# Patient Record
Sex: FEMALE | Race: WHITE | NOT HISPANIC OR LATINO | Employment: FULL TIME | URBAN - METROPOLITAN AREA
[De-identification: names, ages, dates, MRNs, and addresses within clinical notes are randomized per-mention and may not be internally consistent; named-entity substitution may affect disease eponyms.]

---

## 2017-02-13 ENCOUNTER — OFFICE VISIT (OUTPATIENT)
Dept: FAMILY MEDICINE | Facility: CLINIC | Age: 47
End: 2017-02-13
Payer: COMMERCIAL

## 2017-02-13 VITALS
DIASTOLIC BLOOD PRESSURE: 76 MMHG | HEART RATE: 125 BPM | SYSTOLIC BLOOD PRESSURE: 164 MMHG | OXYGEN SATURATION: 98 % | BODY MASS INDEX: 43.49 KG/M2 | WEIGHT: 270.6 LBS | TEMPERATURE: 97.1 F | HEIGHT: 66 IN

## 2017-02-13 DIAGNOSIS — I10 BENIGN ESSENTIAL HYPERTENSION: Primary | ICD-10-CM

## 2017-02-13 DIAGNOSIS — R42 VERTIGO: ICD-10-CM

## 2017-02-13 DIAGNOSIS — Z86.73 HISTORY OF STROKE: ICD-10-CM

## 2017-02-13 DIAGNOSIS — R00.0 TACHYCARDIA: ICD-10-CM

## 2017-02-13 DIAGNOSIS — E78.5 HYPERLIPIDEMIA LDL GOAL <100: ICD-10-CM

## 2017-02-13 PROCEDURE — 99214 OFFICE O/P EST MOD 30 MIN: CPT | Performed by: INTERNAL MEDICINE

## 2017-02-13 RX ORDER — METOPROLOL TARTRATE 25 MG/1
TABLET, FILM COATED ORAL
Refills: 1 | COMMUNITY
Start: 2017-01-23 | End: 2017-02-13 | Stop reason: DRUGHIGH

## 2017-02-13 RX ORDER — CALCIUM CARBONATE/VITAMIN D3 600 MG-20
TABLET ORAL
Refills: 0 | COMMUNITY
Start: 2016-04-21 | End: 2020-11-16

## 2017-02-13 RX ORDER — ATORVASTATIN CALCIUM 20 MG/1
TABLET, FILM COATED ORAL
Refills: 1 | COMMUNITY
Start: 2017-01-23 | End: 2017-06-26

## 2017-02-13 RX ORDER — CHLORTHALIDONE 25 MG/1
TABLET ORAL
Refills: 1 | COMMUNITY
Start: 2017-01-23 | End: 2017-03-08

## 2017-02-13 RX ORDER — CARVEDILOL PHOSPHATE 20 MG/1
20 CAPSULE, EXTENDED RELEASE ORAL DAILY
Qty: 30 CAPSULE | Refills: 3 | Status: SHIPPED | OUTPATIENT
Start: 2017-02-13 | End: 2017-02-17

## 2017-02-13 ASSESSMENT — PAIN SCALES - GENERAL: PAINLEVEL: MILD PAIN (3)

## 2017-02-13 NOTE — MR AVS SNAPSHOT
After Visit Summary   2/13/2017    Flor Fernandez    MRN: [id number]           Patient Information     Date Of Birth          1970        Visit Information        Provider Department      2/13/2017 3:00 PM Nayeli Barnard MD [facility]        Today's Diagnoses     Benign essential hypertension    -  1    Tachycardia        Hyperlipidemia LDL goal <100        Vertigo          Care Instructions    Stop metoprolol and start taking carvedilol 20 MG once daily. It can make you tired so you could just start taking it at night.  Have your blood pressure checked at the pharmacy Wednesday. You can have your fasting labs drawn anytime this week.      [facility]    If you have any questions regarding to your visit please contact your care team:     Team Pink:   Clinic Hours Telephone Number   Internal Medicine:  Dr. Nayeli Hi, NP       7am-7pm  Monday - Thursday   7am-5pm  Fridays  [phone]  (Appointment scheduling available 24/7)    Questions about your visit?  Team Line  [phone]   Urgent Care - [place] - 11am-9pm Monday-Friday Saturday-Sunday- 9am-5pm   [place] - 5pm-9pm Monday-Friday Saturday-Sunday- 9am-5pm  [phone] - [place]   [phone] - [place]       What options do I have for visits at the clinic other than the traditional office visit?  To expand how we care for you, many of our providers are utilizing electronic visits (e-visits) and telephone visits, when medically appropriate, for interactions with their patients rather than a visit in the clinic.   We also offer nurse visits for many medical concerns. Just like any other service, we will bill your insurance company for this type of visit based on time spent on the phone with your provider. Not all insurance companies cover these visits. Please check with your medical insurance if this type of visit is covered. You will  be responsible for any charges that are not paid by your insurance.      E-visits via Wattblockhart:  generally incur a $35.00 fee.  Telephone visits:  Time spent on the phone: *charged based on time that is spent on the phone in increments of 10 minutes. Estimated cost:   5-10 mins $30.00   11-20 mins. $59.00   21-30 mins. $85.00   Use [employer] (secure email communication and access to your chart) to send your primary care provider a message or make an appointment. Ask someone on your Team how to sign up for [employer].    For a Price Quote for your services, please call our [employer] Line at [phone].    As always, Thank you for trusting us with your health care needs!    Laurel Antunez CMA          Follow-ups after your visit        Future tests that were ordered for you today     Open Future Orders        Priority Expected Expires Ordered    **ALT FUTURE anytime Routine 2/13/2017 2/13/2018 2/13/2017    **TSH with free T4 reflex FUTURE anytime Routine 2/13/2017 2/13/2018 2/13/2017    **Lipid panel reflex to direct LDL FUTURE anytime Routine 2/13/2017 2/13/2018 2/13/2017    **CBC with platelets FUTURE anytime Routine 2/13/2017 2/13/2018 2/13/2017            Who to contact     If you have questions or need follow up information about today's clinic visit or your schedule please contact [facility] directly at [phone].  Normal or non-critical lab and imaging results will be communicated to you by Wattblockhart, letter or phone within 4 business days after the clinic has received the results. If you do not hear from us within 7 days, please contact the clinic through Wattblockhart or phone. If you have a critical or abnormal lab result, we will notify you by phone as soon as possible.  Submit refill requests through [employer] or call your pharmacy and they will forward the refill request to us. Please allow 3 business days for your refill to be completed.          Additional Information About Your Visit       "  Correlsensehart Information     Pinshape lets you send messages to your doctor, view your test results, renew your prescriptions, schedule appointments and more. To sign up, go to [email or web address] . Click on \"Log in\" on the left side of the screen, which will take you to the Welcome page. Then click on \"Sign up Now\" on the right side of the page.     You will be asked to enter the access code listed below, as well as some personal information. Please follow the directions to create your username and password.     Your access code is: [email or web address]  Expires: 2017  3:31 PM     Your access code will  in 90 days. If you need help or a new code, please call your [facility] clinic or [phone].        Care EveryWhere ID     This is your Care EveryWhere ID. This could be used by other organizations to access your [facility] medical records  [id number]        Your Vitals Were     Pulse Temperature Height Last Period Pulse Oximetry Breastfeeding?    125 97.1  F (36.2  C) (Oral) 5' 6\" (1.676 m) 2017 (Exact Date) 98% No    BMI (Body Mass Index)                   43.68 kg/m2            Blood Pressure from Last 3 Encounters:   17 164/76    Weight from Last 3 Encounters:   17 270 lb 9.6 oz (122.7 kg)                 Today's Medication Changes          These changes are accurate as of: 17  3:31 PM.  If you have any questions, ask your nurse or doctor.               Start taking these medicines.        Dose/Directions    carvedilol 20 MG 24 hr capsule   Commonly known as:  COREG CR   Used for:  Benign essential hypertension   Started by:  Nayeli Barnard MD        Dose:  20 mg   Take 1 capsule (20 mg) by mouth daily   Quantity:  30 capsule   Refills:  3         Stop taking these medicines if you haven't already. Please contact your care team if you have questions.     metoprolol 25 MG tablet   Commonly known as:  LOPRESSOR   Stopped by:  Nayeli Barnard MD                Where to get your " medicines      These medications were sent to [facility]/pharmacy #[id number] - [place], [place] - [address] [place]  [address], [place] [address]     Phone:  [phone]     carvedilol 20 MG 24 hr capsule                Primary Care Provider    None Specified       No primary provider on file.        Thank you!     Thank you for choosing [facility]  for your care. Our goal is always to provide you with excellent care. Hearing back from our patients is one way we can continue to improve our services. Please take a few minutes to complete the written survey that you may receive in the mail after your visit with us. Thank you!             Your Updated Medication List - Protect others around you: Learn how to safely use, store and throw away your medicines at www.disposemymeds.org.          This list is accurate as of: 2/13/17  3:31 PM.  Always use your most recent med list.                   Brand Name Dispense Instructions for use    atorvastatin 20 MG tablet    LIPITOR     TAKE 1 TAB BY MOUTH DAILY. INDICATIONS: CEREBROVASCULAR ACCIDENT OR STROKE       carvedilol 20 MG 24 hr capsule    COREG CR    30 capsule    Take 1 capsule (20 mg) by mouth daily       chlorthalidone 25 MG tablet    HYGROTON     TAKE 1/2 TALBET BY MOUTH EVERY DAY       [facility] ASPIRIN 325 MG tablet   Generic drug:  aspirin      TAKE 1 TABLET BY MOUTH ONCE DAILY WITH A MEAL.

## 2017-02-13 NOTE — NURSING NOTE
"Chief Complaint   Patient presents with     URI     x4-5 days       Initial BP (!) 156/92  Pulse 120  Temp 97.1  F (36.2  C) (Oral)  Ht 5' 6\" (1.676 m)  Wt 270 lb 9.6 oz (122.7 kg)  LMP 01/24/2017 (Exact Date)  SpO2 98%  Breastfeeding? No  BMI 43.68 kg/m2 Estimated body mass index is 43.68 kg/(m^2) as calculated from the following:    Height as of this encounter: 5' 6\" (1.676 m).    Weight as of this encounter: 270 lb 9.6 oz (122.7 kg).  Medication Reconciliation: complete       Laurel Antunez CMA      "

## 2017-02-13 NOTE — PROGRESS NOTES
"INTERNAL MEDICINE   SUBJECTIVE:                                                    Flor Fernandez is a 47 year old female who presents to clinic today for the following health issues:    ENT Symptoms             Symptoms: cc Present Absent Comment   Fever/Chills  x     Fatigue  x     Muscle Aches  x     Eye Irritation  x     Sneezing  x     Nasal Charles/Drg  x     Sinus Pressure/Pain   x    Loss of smell  x     Dental pain   x    Sore Throat   x    Swollen Glands   x    Ear Pain/Fullness  x     Cough  x     Wheeze   x    Chest Pain   x    Shortness of breath   x    Rash   x    Other   x      Symptom duration:  x4-5 days   Symptom severity:  moderate   Treatments tried:  Coriseden   Contacts:  Yes - work         Symptoms - Patient says she feels like she just has the \"crud\" like everyone else. She is taking Coriseden. Patient got concerned with her high BP and then the dizziness onset this morning- feels like she's spinning around. Yesterday she had a low grade fever. Her mom and son have been sick, her son's dry cough persists for two weeks now. Her most significant symptoms are fatigue and dizziness. These spells have been present prior to her ENT symptoms.    Last BP check was prior to 12/29/16- first reading was high, end of the visit it was 132/80 something.    Stroke hx - Previously she's had two strokes. The first at a time when she was not regularly seeing the doctor, smoking, and overweight. The second occurred after she ran out of medication. Patient has not been smoking since her strokes. Residual effects include minor short term memory loss.      Problem list and histories reviewed & adjusted, as indicated.  Additional history: as documented    Labs reviewed in EPIC  Problem list, Medication list, Allergies, and Medical/Social/Surgical histories reviewed in [facility] and updated as appropriate.    ROS:  C: NEGATIVE for fever, chills, change in weight  E/M: NEGATIVE for ear, mouth and throat problems  R: " "NEGATIVE for significant cough or SOB  CV: NEGATIVE for chest pain, palpitations or peripheral edema  GI: NEGATIVE for nausea, abdominal pain, heartburn, or change in bowel habits  N: NEGATIVE for weakness or paresthesias. POSITIVE for dizziness and fatigue   P: NEGATIVE for changes in mood or affect    This document serves as a record of the services and decisions personally performed and made by Nayeli Barnard MD. It was created on his/her behalf by Nahed Winston, trained medical scribe. The creation of this document is based the provider's statements to the medical scribes.    Scribe Nahed Winston 2:48 PM, February 13, 2017  OBJECTIVE:                                                    BP (!) 156/92  Pulse 120  Temp 97.1  F (36.2  C) (Oral)  Ht 1.676 m (5' 6\")  Wt 122.7 kg (270 lb 9.6 oz)  LMP 01/24/2017 (Exact Date)  SpO2 98%  Breastfeeding? No  BMI 43.68 kg/m2  Body mass index is 43.68 kg/(m^2).  GENERAL: healthy, alert and no distress  HENT: ear canals and TM's normal, nose and mouth without ulcers or lesions  NECK: no adenopathy, no asymmetry, masses, or scars and thyroid normal to palpation  RESP: lungs clear to auscultation - no rales, rhonchi or wheezes  CV: tachycardic and regular rhythm, normal S1 S2, no S3 or S4, no murmur, click or rub, no peripheral edema and peripheral pulses strong  NEURO: Normal strength and tone, mentation intact and speech normal.   PSYCH: mentation appears normal, affect normal/bright  CN 2-12 are grossly intact  Negative hallpike bilateral     Diagnostic Test Results:  none      ASSESSMENT/PLAN:                                                    1. Benign essential hypertension  Patient's BP was high at initial and recheck in clinic. She will stop taking metoprolol and start on carvedilol 20 MG daily. Returning Wednesday to the pharmacy for a BP check. If still high will consider increasing dose or adding on another BP med.   - carvedilol (COREG CR) 20 MG 24 hr capsule; Take " 1 capsule (20 mg) by mouth daily  Dispense: 30 capsule; Refill: 3    2. Tachycardia   She will schedule for lab draw.old records needed for comparison.   - **TSH with free T4 reflex FUTURE anytime; Future  - **CBC with platelets FUTURE anytime; Future    3. Hyperlipidemia LDL goal <100  Will schedule for fasting lab draw.   - **ALT FUTURE anytime; Future  - **Lipid panel reflex to direct LDL FUTURE anytime; Future    4. Vertigo  Likely due to high BP. Switched her BP medications. Hopefully episodes will resolve with lower BP.  No new neurologic symptoms otherwise to suggest stroke    History of stroke - no evidence to suggest recurrence.        Patient Instructions   Stop metoprolol and start taking carvedilol 20 MG once daily. It can make you tired so you could just start taking it at night.  Have your blood pressure checked at the pharmacy Wednesday. You can have your fasting labs drawn anytime this week.        I spent 19 minutes of time with the patient and >50% of it was in education and counseling regarding dizziness & body aches.    The information in this document, created by the medical scribe for me, accurately reflects the services I personally performed and the decisions made by me. I have reviewed and approved this document for accuracy prior to leaving the patient care area.  Nayeli Barnard MD  2:48 PM, 02/13/17    Nayeli Barnard MD  [facility]    Start 3:11 PM  End 3:30 PM

## 2017-02-13 NOTE — PATIENT INSTRUCTIONS
Stop metoprolol and start taking carvedilol 20 MG once daily. It can make you tired so you could just start taking it at night.  Have your blood pressure checked at the pharmacy Wednesday. You can have your fasting labs drawn anytime this week.      [facility]    If you have any questions regarding to your visit please contact your care team:     Team Pink:   Clinic Hours Telephone Number   Internal Medicine:  Dr. Nayeli Hi NP       7am-7pm  Monday - Thursday   7am-5pm  Fridays  [phone]  (Appointment scheduling available 24/7)    Questions about your visit?  Team Line  [phone]   Urgent Care - [place] - 11am-9pm Monday-Friday Saturday-Sunday- 9am-5pm   [place] - 5pm-9pm Monday-Friday Saturday-Sunday- 9am-5pm  [phone] - [place]   [phone] - [place]       What options do I have for visits at the clinic other than the traditional office visit?  To expand how we care for you, many of our providers are utilizing electronic visits (e-visits) and telephone visits, when medically appropriate, for interactions with their patients rather than a visit in the clinic.   We also offer nurse visits for many medical concerns. Just like any other service, we will bill your insurance company for this type of visit based on time spent on the phone with your provider. Not all insurance companies cover these visits. Please check with your medical insurance if this type of visit is covered. You will be responsible for any charges that are not paid by your insurance.      E-visits via InsideMaps:  generally incur a $35.00 fee.  Telephone visits:  Time spent on the phone: *charged based on time that is spent on the phone in increments of 10 minutes. Estimated cost:   5-10 mins $30.00   11-20 mins. $59.00   21-30 mins. $85.00   Use InsideMaps (secure email communication and access to your chart) to send your primary care provider a  message or make an appointment. Ask someone on your Team how to sign up for Order[employer].    For a Price Quote for your services, please call our Consumer Price Line at [phone].    As always, Thank you for trusting us with your health care needs!    Laurel Antunez CMA

## 2017-02-15 ENCOUNTER — ALLIED HEALTH/NURSE VISIT (OUTPATIENT)
Dept: NURSING | Facility: CLINIC | Age: 47
End: 2017-02-15
Payer: COMMERCIAL

## 2017-02-15 VITALS — DIASTOLIC BLOOD PRESSURE: 88 MMHG | HEART RATE: 84 BPM | SYSTOLIC BLOOD PRESSURE: 142 MMHG | OXYGEN SATURATION: 98 %

## 2017-02-15 DIAGNOSIS — I10 BENIGN ESSENTIAL HYPERTENSION: Primary | ICD-10-CM

## 2017-02-15 PROCEDURE — 99207 ZZC NO CHARGE NURSE ONLY: CPT

## 2017-02-15 NOTE — NURSING NOTE
"Chief Complaint   Patient presents with     Hypertension     BP Check       Initial /88  Pulse 84  LMP 01/24/2017 (Exact Date)  SpO2 98% Estimated body mass index is 43.68 kg/(m^2) as calculated from the following:    Height as of 2/13/17: 5' 6\" (1.676 m).    Weight as of 2/13/17: 270 lb 9.6 oz (122.7 kg).  Medication Reconciliation: unable or not appropriate to perform   Flor ANGI Fernandez is a 47 year old female who comes in today for a Blood Pressure check because of elevated BP reading at last appointment.    *Document pulse and BP  *Use new set of vitals button for multiple readings.  *Use extended vitals for orthostatic    Vitals as recorded, a large cuff was used.    Patient is taking medication as prescribed  Patient is tolerating medications well.  Patient is not monitoring Blood Pressure at home.  Average readings if yes are unknown  Patient states her insurance will not cover carvedilol (COREG CR) 20 MG 24 hr capsule. Pharmacy said they will send over PA request or have provider change medication. Patient is still taking Metoprolol in the mean time.    Current complaints: none    Disposition: results routed to MD/AP      "

## 2017-02-15 NOTE — MR AVS SNAPSHOT
After Visit Summary   2/15/2017    Flor Fernandez    MRN: [id number]           Patient Information     Date Of Birth          1970        Visit Information        Provider Department      2/15/2017 9:45 AM [facility]        Today's Diagnoses     Benign essential hypertension    -  1       Follow-ups after your visit        Your next 10 appointments already scheduled     Feb 17, 2017  8:00 AM CST   LAB with [facility]   [facility]    [address] [address] [phone]           Patient must bring picture ID.  Patient should be prepared to give a urine specimen  Please do not eat 10-12 hours before your appointment if you are coming in fasting for labs on lipids, cholesterol, or glucose (sugar).  Pregnant women should follow their Care Team instructions. Water with medications is okay. Do not drink coffee or other fluids.   If you have concerns about taking  your medications, please ask at office or if scheduling via Silent Edge, send a message by clicking on Secure Messaging, Message Your Care Team.              Who to contact     If you have questions or need follow up information about today's clinic visit or your schedule please contact [facility] directly at [phone].  Normal or non-critical lab and imaging results will be communicated to you by MyChart, letter or phone within 4 business days after the clinic has received the results. If you do not hear from us within 7 days, please contact the clinic through [employer] or phone. If you have a critical or abnormal lab result, we will notify you by phone as soon as possible.  Submit refill requests through [employer] or call your pharmacy and they will forward the refill request to us. Please allow 3 business days for your refill to be completed.          Additional Information About Your Visit        [employer] Information     Silent Edge lets you send  "messages to your doctor, view your test results, renew your prescriptions, schedule appointments and more. To sign up, go to [email or web address] . Click on \"Log in\" on the left side of the screen, which will take you to the Welcome page. Then click on \"Sign up Now\" on the right side of the page.     You will be asked to enter the access code listed below, as well as some personal information. Please follow the directions to create your username and password.     Your access code is: [email or web address]  Expires: 2017  3:31 PM     Your access code will  in 90 days. If you need help or a new code, please call your [place] clinic or [phone].        Care EveryWhere ID     This is your Care EveryWhere ID. This could be used by other organizations to access your [place] medical records  [id number]        Your Vitals Were     Pulse Last Period Pulse Oximetry             84 2017 (Exact Date) 98%          Blood Pressure from Last 3 Encounters:   02/15/17 142/88   17 164/76    Weight from Last 3 Encounters:   17 270 lb 9.6 oz (122.7 kg)              Today, you had the following     No orders found for display       Primary Care Provider    None Specified       No primary provider on file.        Thank you!     Thank you for choosing [facility]  for your care. Our goal is always to provide you with excellent care. Hearing back from our patients is one way we can continue to improve our services. Please take a few minutes to complete the written survey that you may receive in the mail after your visit with us. Thank you!             Your Updated Medication List - Protect others around you: Learn how to safely use, store and throw away your medicines at www.disposemymeds.org.          This list is accurate as of: 2/15/17  9:51 AM.  Always use your most recent med list.                   Brand Name Dispense Instructions for use    atorvastatin 20 MG tablet    LIPITOR     TAKE 1 TAB " BY MOUTH DAILY. INDICATIONS: CEREBROVASCULAR ACCIDENT OR STROKE       carvedilol 20 MG 24 hr capsule    COREG CR    30 capsule    Take 1 capsule (20 mg) by mouth daily       chlorthalidone 25 MG tablet    HYGROTON     TAKE 1/2 TALBET BY MOUTH EVERY DAY       [facility] ASPIRIN 325 MG tablet   Generic drug:  aspirin      TAKE 1 TABLET BY MOUTH ONCE DAILY WITH A MEAL.

## 2017-02-16 ENCOUNTER — TELEPHONE (OUTPATIENT)
Dept: FAMILY MEDICINE | Facility: CLINIC | Age: 47
End: 2017-02-16

## 2017-02-16 DIAGNOSIS — R00.0 TACHYCARDIA: Primary | ICD-10-CM

## 2017-02-16 NOTE — TELEPHONE ENCOUNTER
Received fax from pharmacy stating patient requires Prior Authorization for carvedilol (COREG CR) 20 MG 24 hr capsule.     Insurance information:   Name: [employer]  Phone number: [phone]   ID number: [id number]    Initiate prior authorization or change medication?    If a prior authorization is to be initiated, please list the following:    -any medications the patient has tried and failed or any contraindications.  -is the patient currently on this medication, or has tried before?  -What is the diagnosis?  -Justification or other information that may be helpful.

## 2017-02-16 NOTE — TELEPHONE ENCOUNTER
Can we contact patient and see if there is a reason why she cannot take coreg immediate release twice daily?    Thanks,  Jayna Hi, CNP

## 2017-02-16 NOTE — TELEPHONE ENCOUNTER
Spoke to patient in regards to question below. Patient states there isn't any reason as to why she couldn't take the coreg immediate release twice daily. Patient states that Dr. Barnard had thought the only one that would be covered was the 24 hour capsule and that is why she prescribed that one.  Please advise.    Laurel Antunez, CMA

## 2017-02-17 DIAGNOSIS — R00.0 TACHYCARDIA: ICD-10-CM

## 2017-02-17 DIAGNOSIS — E78.5 HYPERLIPIDEMIA LDL GOAL <100: ICD-10-CM

## 2017-02-17 LAB
ERYTHROCYTE [DISTWIDTH] IN BLOOD BY AUTOMATED COUNT: 14.2 % (ref 10–15)
HCT VFR BLD AUTO: 40.4 % (ref 35–47)
HGB BLD-MCNC: 12.4 G/DL (ref 11.7–15.7)
MCH RBC QN AUTO: 24.9 PG (ref 26.5–33)
MCHC RBC AUTO-ENTMCNC: 30.7 G/DL (ref 31.5–36.5)
MCV RBC AUTO: 81 FL (ref 78–100)
PLATELET # BLD AUTO: 461 10E9/L (ref 150–450)
RBC # BLD AUTO: 4.97 10E12/L (ref 3.8–5.2)
WBC # BLD AUTO: 9.5 10E9/L (ref 4–11)

## 2017-02-17 PROCEDURE — 36415 COLL VENOUS BLD VENIPUNCTURE: CPT | Performed by: INTERNAL MEDICINE

## 2017-02-17 PROCEDURE — 84460 ALANINE AMINO (ALT) (SGPT): CPT | Performed by: INTERNAL MEDICINE

## 2017-02-17 PROCEDURE — 84443 ASSAY THYROID STIM HORMONE: CPT | Performed by: INTERNAL MEDICINE

## 2017-02-17 PROCEDURE — 85027 COMPLETE CBC AUTOMATED: CPT | Performed by: INTERNAL MEDICINE

## 2017-02-17 PROCEDURE — 80061 LIPID PANEL: CPT | Performed by: INTERNAL MEDICINE

## 2017-02-17 RX ORDER — CARVEDILOL 6.25 MG/1
6.25 TABLET ORAL 2 TIMES DAILY WITH MEALS
Qty: 60 TABLET | Refills: 1 | Status: SHIPPED | OUTPATIENT
Start: 2017-02-17 | End: 2017-05-01

## 2017-02-17 NOTE — TELEPHONE ENCOUNTER
Script for coreg 6.25 mg BID sent to pharmacy for patient.  She should continue with plan for blood pressure recheck in pharmacy per Dr. Barnard's orders.    Jayna Hi, CNP

## 2017-02-17 NOTE — TELEPHONE ENCOUNTER
Left message on voicemail for patient to return call to RN hotline at # [phone].  Need to updated that Carvedilol Immediate release was sent to the pharmacy to be taken BID.  Extended release was not covered.     Michael Schwartz RN

## 2017-02-17 NOTE — TELEPHONE ENCOUNTER
Patient notified of providers message as written.  Patient verbalized understanding, no further questions or concerns.    Nikkie Garcia RN

## 2017-02-20 LAB
ALT SERPL W P-5'-P-CCNC: 33 U/L (ref 0–50)
CHOLEST SERPL-MCNC: 128 MG/DL
HDLC SERPL-MCNC: 46 MG/DL
LDLC SERPL CALC-MCNC: 46 MG/DL
NONHDLC SERPL-MCNC: 82 MG/DL
TRIGL SERPL-MCNC: 181 MG/DL
TSH SERPL DL<=0.005 MIU/L-ACNC: 2.84 MU/L (ref 0.4–4)

## 2017-02-21 NOTE — PROGRESS NOTES
Normal liver blood test. Normal thyroid. Platelet count is a touch high which is new for you as I compare to old labs.  I would recommend that we recheck this in a couple weeks.  I want to see you at that time anyway to recheck your blood pressure in the office.

## 2017-03-08 ENCOUNTER — OFFICE VISIT (OUTPATIENT)
Dept: FAMILY MEDICINE | Facility: CLINIC | Age: 47
End: 2017-03-08
Payer: COMMERCIAL

## 2017-03-08 VITALS
SYSTOLIC BLOOD PRESSURE: 140 MMHG | TEMPERATURE: 96.3 F | BODY MASS INDEX: 43.91 KG/M2 | DIASTOLIC BLOOD PRESSURE: 92 MMHG | OXYGEN SATURATION: 100 % | HEIGHT: 66 IN | WEIGHT: 273.2 LBS | HEART RATE: 71 BPM

## 2017-03-08 DIAGNOSIS — I10 BENIGN ESSENTIAL HYPERTENSION: Primary | ICD-10-CM

## 2017-03-08 DIAGNOSIS — D75.839 THROMBOCYTOSIS: ICD-10-CM

## 2017-03-08 DIAGNOSIS — Z86.73 HISTORY OF STROKE: ICD-10-CM

## 2017-03-08 PROCEDURE — 99213 OFFICE O/P EST LOW 20 MIN: CPT | Performed by: INTERNAL MEDICINE

## 2017-03-08 RX ORDER — CHLORTHALIDONE 25 MG/1
25 TABLET ORAL DAILY
Qty: 90 TABLET | Refills: 1 | Status: SHIPPED | OUTPATIENT
Start: 2017-03-08 | End: 2017-05-03

## 2017-03-08 NOTE — PATIENT INSTRUCTIONS
Increase chlorthalidone to 25 MG (full tablet) daily.   Schedule a blood draw in 2-3 weeks.  Have your blood pressure check in pharmacy at your lab draw.  I will follow up with you based off the results.    [facility]    If you have any questions regarding to your visit please contact your care team:     Team Pink:   Clinic Hours Telephone Number   Internal Medicine:  Dr. Nayeli Hi, NP       7am-7pm  Monday - Thursday   7am-5pm  Fridays  [phone]  (Appointment scheduling available 24/7)    Questions about your visit?  Team Line  [phone]   Urgent Care - [place] and [facility] - 11am-9pm Monday-Friday Saturday-Sunday- 9am-5pm   [place] - 5pm-9pm Monday-Friday Saturday-Sunday- 9am-5pm  [phone] - [place]   [phone] - [place]       What options do I have for visits at the clinic other than the traditional office visit?  To expand how we care for you, many of our providers are utilizing electronic visits (e-visits) and telephone visits, when medically appropriate, for interactions with their patients rather than a visit in the clinic.   We also offer nurse visits for many medical concerns. Just like any other service, we will bill your insurance company for this type of visit based on time spent on the phone with your provider. Not all insurance companies cover these visits. Please check with your medical insurance if this type of visit is covered. You will be responsible for any charges that are not paid by your insurance.      E-visits via AquaMobile:  generally incur a $35.00 fee.  Telephone visits:  Time spent on the phone: *charged based on time that is spent on the phone in increments of 10 minutes. Estimated cost:   5-10 mins $30.00   11-20 mins. $59.00   21-30 mins. $85.00   Use AquaMobile (secure email communication and access to your chart) to send your primary care provider a message or make an appointment. Ask someone on  your Team how to sign up for [employer].    For a Price Quote for your services, please call our Consumer Price Line at [phone].    As always, Thank you for trusting us with your health care needs!    Discharged by Alanis WEI CMA ([facility])

## 2017-03-08 NOTE — PROGRESS NOTES
"INTERNAL MEDICINE  SUBJECTIVE:                                                    Flor Fernandez is a 47 year old female who presents to clinic today for the following health issues:    Patient presents with:  Recheck Medication: bp meds  Results: lab results    Clinic on 2/13/17:   \"Stop metoprolol and start taking carvedilol 20 MG once daily. It can make you tired so you could just start taking it at night. Have your blood pressure checked at the pharmacy Wednesday. You can have your fasting labs drawn anytime this week.\"      BP - Feels better on the carvedilol. Denies any side effects. Notices her ankles are a little more swollen today, which generally happens during her period.    Perimenopause - Curious if she is pre menopause? Periods usually come every 2.5 weeks. Last time week late. Bleeding very heavy or passing blood clots. Not disturbed by this but annoyed. Has bled through clothes a couple times. Saw gynecologist awhile ago; who told her to take Ibuprofen and follow-up with an US.      Platelet count was elevated at last draw.  This is new.  She has a history of stroke.      Problem list and histories reviewed & adjusted, as indicated.  Additional history: as documented    Labs reviewed in EPIC  Reviewed and updated as needed this visit by clinical staff  Reviewed and updated as needed this visit by Provider      ROS:  C: NEGATIVE for fever, chills, change in weight  R: NEGATIVE for significant cough or SOB  CV: NEGATIVE for chest pain, palpitations or peripheral edema  : NEGATIVE for frequency, dysuria, or hematuria  M: NEGATIVE for significant arthralgias or myalgia  P: NEGATIVE for changes in mood or affect    This document serves as a record of the services and decisions personally performed and made by Nayeli Barnard MD. It was created on his/her behalf by Nahed Winston, trained medical scribe. The creation of this document is based the provider's statements to the medical scribes.    Scribe " "Nahed Winston 1:42 PM, March 8, 2017  OBJECTIVE:                                                    BP (!) 140/92 (BP Location: Left arm, Patient Position: Chair, Cuff Size: Adult Large)  Pulse 71  Temp 96.3  F (35.7  C) (Oral)  Ht 1.676 m (5' 6\")  Wt 123.9 kg (273 lb 3.2 oz)  LMP 03/03/2017  SpO2 100%  BMI 44.1 kg/m2  Body mass index is 44.1 kg/(m^2).  GENERAL: healthy, alert and no distress  CV: regular rate and rhythm, normal S1 S2, no S3 or S4, no murmur, click or rub, peripheral edema and peripheral pulses strong  MS: no gross musculoskeletal defects noted, trace ankle edema  NEURO: Normal strength and tone, mentation intact and speech normal  PSYCH: mentation appears normal, affect normal/bright    Diagnostic Test Results:  Results for orders placed or performed in visit on 02/17/17   **ALT FUTURE anytime   Result Value Ref Range    ALT 33 0 - 50 U/L   **TSH with free T4 reflex FUTURE anytime   Result Value Ref Range    TSH 2.84 0.40 - 4.00 mU/L   **Lipid panel reflex to direct LDL FUTURE anytime   Result Value Ref Range    Cholesterol 128 <200 mg/dL    Triglycerides 181 (H) <150 mg/dL    HDL Cholesterol 46 (L) >49 mg/dL    LDL Cholesterol Calculated 46 <100 mg/dL    Non HDL Cholesterol 82 <130 mg/dL   **CBC with platelets FUTURE anytime   Result Value Ref Range    WBC 9.5 4.0 - 11.0 10e9/L    RBC Count 4.97 3.8 - 5.2 10e12/L    Hemoglobin 12.4 11.7 - 15.7 g/dL    Hematocrit 40.4 35.0 - 47.0 %    MCV 81 78 - 100 fl    MCH 24.9 (L) 26.5 - 33.0 pg    MCHC 30.7 (L) 31.5 - 36.5 g/dL    RDW 14.2 10.0 - 15.0 %    Platelet Count 461 (H) 150 - 450 10e9/L        ASSESSMENT/PLAN:                                                    1. Benign essential hypertension  Slightly elevated today. Increased her chlorthalidone to 25 MG daily. She will return in 2-3 weeks for labs and have a BP check at this time.  - chlorthalidone (HYGROTON) 25 MG tablet; Take 1 tablet (25 mg) by mouth daily  Dispense: 90 tablet; Refill: 1  - " **Basic metabolic panel FUTURE anytime; Future    2. Thrombocytosis (H)  Elevated platelets at last draw. Will schedule lab draw to recheck. Could be related to iron deficiency given her heavy periods.    - CBC with platelets differential; Future  - Ferritin; Future  - Iron and iron binding capacity; Future    3. History of stroke  Further platelet lab monitoring. Otherwise no reason to suspect recurrence.       Patient Instructions     Increase chlorthalidone to 25 MG (full tablet) daily.   Schedule a blood draw in 2-3 weeks.  Have your blood pressure check in pharmacy at your lab draw.  I will follow up with you based off the results.    [facility]    If you have any questions regarding to your visit please contact your care team:     Team Pink:   Clinic Hours Telephone Number   Internal Medicine:  Dr. Nayeli Hi, NP       7am-7pm  Monday - Thursday   7am-5pm  Fridays  [phone]  (Appointment scheduling available 24/7)    Questions about your visit?  Team Line  [phone]   Urgent Care - [place] - 11am-9pm Monday-Friday Saturday-Sunday- 9am-5pm   [place] - 5pm-9pm Monday-Friday Saturday-Sunday- 9am-5pm  [phone] - [place]  [phone] - [place]       What options do I have for visits at the clinic other than the traditional office visit?  To expand how we care for you, many of our providers are utilizing electronic visits (e-visits) and telephone visits, when medically appropriate, for interactions with their patients rather than a visit in the clinic.   We also offer nurse visits for many medical concerns. Just like any other service, we will bill your insurance company for this type of visit based on time spent on the phone with your provider. Not all insurance companies cover these visits. Please check with your medical insurance if this type of visit is covered. You will be responsible for any charges that  are not paid by your insurance.      E-visits via Novocor Medical Systemshart:  generally incur a $35.00 fee.  Telephone visits:  Time spent on the phone: *charged based on time that is spent on the phone in increments of 10 minutes. Estimated cost:   5-10 mins $30.00   11-20 mins. $59.00   21-30 mins. $85.00   Use Novocor Medical Systemshart (secure email communication and access to your chart) to send your primary care provider a message or make an appointment. Ask someone on your Team how to sign up for Classkickt.    For a Price Quote for your services, please call our [employer] Line at [phone].    As always, Thank you for trusting us with your health care needs!    Discharged by Alanis WEI CMA ([facility])        I spent 12 minutes of time with the patient and >50% of it was in education and counseling regarding BP follow-up.    The information in this document, created by the medical scribe for me, accurately reflects the services I personally performed and the decisions made by me. I have reviewed and approved this document for accuracy prior to leaving the patient care area.  Nayeli Barnard MD  1:43 PM, 03/08/17    Nayeli Barnard MD  [facility]    Start 1:41 PM  End 1:53 PM

## 2017-03-08 NOTE — PROGRESS NOTES
"INTERNAL MEDICINE  SUBJECTIVE:                                                    Flor Fernandez is a 47 year old female who presents to clinic today for the following health issues:    Hypertension Follow-up      Outpatient blood pressures {ISCHECKIN}    Low Salt Diet: { :630748::\"no added salt\"}       Amount of exercise or physical activity: {Exercise frequency days per week:202378}    Problems taking medications regularly: {Med Problems:149067::\"No\"}    Medication side effects: {CHRONIC MED SIDE EFFECTS:786694::\"none\"}    Diet: { :282043}    Clinic on 17:   \"Stop metoprolol and start taking carvedilol 20 MG once daily. It can make you tired so you could just start taking it at night.  Have your blood pressure checked at the pharmacy Wednesday. You can have your fasting labs drawn anytime this week.\"           Problem list and histories reviewed & adjusted, as indicated.  Additional history: as documented    Labs reviewed in EPIC  Reviewed and updated as needed this visit by clinical staff  Reviewed and updated as needed this visit by Provider    ROS:  C: NEGATIVE for fever, chills, change in weight  I: NEGATIVE for worrisome rashes, moles or lesions  E: NEGATIVE for vision changes or irritation  E/M: NEGATIVE for ear, mouth and throat problems  R: NEGATIVE for significant cough or SOB  B: NEGATIVE for masses, tenderness or discharge  CV: NEGATIVE for chest pain, palpitations or peripheral edema  GI: NEGATIVE for nausea, abdominal pain, heartburn, or change in bowel habits  : NEGATIVE for frequency, dysuria, or hematuria  M: NEGATIVE for significant arthralgias or myalgia  N: NEGATIVE for weakness, dizziness or paresthesias  E: NEGATIVE for temperature intolerance, skin/hair changes  H: NEGATIVE for bleeding problems  P: NEGATIVE for changes in mood or affect    This document serves as a record of the services and decisions personally performed and made by Nayeli Barnard MD. It was created on " "his/her behalf by Nahed Winston, trained medical scribe. The creation of this document is based the provider's statements to the medical scribes.    Joseph Winston 1:37 PM, March 8, 2017  OBJECTIVE:                                                    /90 (BP Location: Left arm, Patient Position: Chair, Cuff Size: Adult Regular)  Pulse 87  Temp 96.3  F (35.7  C) (Oral)  Ht 1.676 m (5' 6\")  Wt 123.9 kg (273 lb 3.2 oz)  LMP 03/03/2017  SpO2 100%  BMI 44.1 kg/m2  Body mass index is 44.1 kg/(m^2).  {Exam List:692547}    Diagnostic Test Results:  Results for orders placed or performed in visit on 02/17/17   **ALT FUTURE anytime   Result Value Ref Range    ALT 33 0 - 50 U/L   **TSH with free T4 reflex FUTURE anytime   Result Value Ref Range    TSH 2.84 0.40 - 4.00 mU/L   **Lipid panel reflex to direct LDL FUTURE anytime   Result Value Ref Range    Cholesterol 128 <200 mg/dL    Triglycerides 181 (H) <150 mg/dL    HDL Cholesterol 46 (L) >49 mg/dL    LDL Cholesterol Calculated 46 <100 mg/dL    Non HDL Cholesterol 82 <130 mg/dL   **CBC with platelets FUTURE anytime   Result Value Ref Range    WBC 9.5 4.0 - 11.0 10e9/L    RBC Count 4.97 3.8 - 5.2 10e12/L    Hemoglobin 12.4 11.7 - 15.7 g/dL    Hematocrit 40.4 35.0 - 47.0 %    MCV 81 78 - 100 fl    MCH 24.9 (L) 26.5 - 33.0 pg    MCHC 30.7 (L) 31.5 - 36.5 g/dL    RDW 14.2 10.0 - 15.0 %    Platelet Count 461 (H) 150 - 450 10e9/L        ASSESSMENT/PLAN:                                                    {Diag Picklist:555683}    {Follow-Up:428500}    The information in this document, created by the medical scribe for me, accurately reflects the services I personally performed and the decisions made by me. I have reviewed and approved this document for accuracy prior to leaving the patient care area.  Nayeli Barnard MD  1:37 PM, 03/08/17    Nayeli Barnard MD  [facility] [place]    Start  End ***     "

## 2017-03-08 NOTE — NURSING NOTE
"Chief Complaint   Patient presents with     Recheck Medication     bp meds     Results     lab results       Initial /90 (BP Location: Left arm, Patient Position: Chair, Cuff Size: Adult Regular)  Pulse 87  Temp 96.3  F (35.7  C) (Oral)  Ht 5' 6\" (1.676 m)  Wt 273 lb 3.2 oz (123.9 kg)  LMP 03/03/2017  SpO2 100%  BMI 44.1 kg/m2 Estimated body mass index is 44.1 kg/(m^2) as calculated from the following:    Height as of this encounter: 5' 6\" (1.676 m).    Weight as of this encounter: 273 lb 3.2 oz (123.9 kg).  Medication Reconciliation: complete   Alanis WEI CMA ([facility])      "

## 2017-03-08 NOTE — MR AVS SNAPSHOT
After Visit Summary   3/8/2017    Flor Fernandez    MRN: [id number]           Patient Information     Date Of Birth          1970        Visit Information        Provider Department      3/8/2017 1:30 PM Nayeli Barnard MD [facility]        Today's Diagnoses     Benign essential hypertension    -  1    Thrombocytosis (H)        History of stroke          Care Instructions    Increase chlorthalidone to 25 MG (full tablet) daily.   Schedule a blood draw in 2-3 weeks.  Have your blood pressure check in pharmacy at your lab draw.  I will follow up with you based off the results.    [facility]    If you have any questions regarding to your visit please contact your care team:     Team Pink:   Clinic Hours Telephone Number   Internal Medicine:  Dr. Nayeli Hi NP       7am-7pm  Monday - Thursday   7am-5pm  Fridays  [phone]  (Appointment scheduling available 24/7)    Questions about your visit?  Team Line  [phone]   Urgent Care - [place] - 11am-9pm Monday-Friday Saturday-Sunday- 9am-5pm   [place] - 5pm-9pm Monday-Friday Saturday-Sunday- 9am-5pm  [phone] - [place]   [phone] - [place]       What options do I have for visits at the clinic other than the traditional office visit?  To expand how we care for you, many of our providers are utilizing electronic visits (e-visits) and telephone visits, when medically appropriate, for interactions with their patients rather than a visit in the clinic.   We also offer nurse visits for many medical concerns. Just like any other service, we will bill your insurance company for this type of visit based on time spent on the phone with your provider. Not all insurance companies cover these visits. Please check with your medical insurance if this type of visit is covered. You will be responsible for any charges that are not paid by your  insurance.      E-visits via DataRosehart:  generally incur a $35.00 fee.  Telephone visits:  Time spent on the phone: *charged based on time that is spent on the phone in increments of 10 minutes. Estimated cost:   5-10 mins $30.00   11-20 mins. $59.00   21-30 mins. $85.00   Use DataRosehart (secure email communication and access to your chart) to send your primary care provider a message or make an appointment. Ask someone on your Team how to sign up for [email or web address].    For a Price Quote for your services, please call our [employer] Line at [phone].    As always, Thank you for trusting us with your health care needs!    Discharged by Alanis WEI CMA ([facility])          Follow-ups after your visit        Follow-up notes from your care team     Return in about 6 months (around 9/8/2017).      Future tests that were ordered for you today     Open Future Orders        Priority Expected Expires Ordered    **Basic metabolic panel FUTURE anytime Routine 3/22/2017 3/8/2018 3/8/2017    CBC with platelets differential Routine 3/22/2017 3/8/2018 3/8/2017    Ferritin Routine 3/22/2017 3/8/2018 3/8/2017    Iron and iron binding capacity Routine 3/22/2017 3/8/2018 3/8/2017            Who to contact     If you have questions or need follow up information about today's clinic visit or your schedule please contact [facility] directly at [phone].  Normal or non-critical lab and imaging results will be communicated to you by MyChart, letter or phone within 4 business days after the clinic has received the results. If you do not hear from us within 7 days, please contact the clinic through DataRosehart or phone. If you have a critical or abnormal lab result, we will notify you by phone as soon as possible.  Submit refill requests through [employer] or call your pharmacy and they will forward the refill request to us. Please allow 3 business days for your refill to be completed.          Additional Information About Your Visit       "  Spiffy Societyhart Information     [employer] lets you send messages to your doctor, view your test results, renew your prescriptions, schedule appointments and more. To sign up, go to [email or web address] . Click on \"Log in\" on the left side of the screen, which will take you to the Welcome page. Then click on \"Sign up Now\" on the right side of the page.     You will be asked to enter the access code listed below, as well as some personal information. Please follow the directions to create your username and password.     Your access code is: [email or web address]  Expires: 2017  3:31 PM     Your access code will  in 90 days. If you need help or a new code, please call your [place] clinic or [phone].        Care EveryWhere ID     This is your Care EveryWhere ID. This could be used by other organizations to access your [place] medical records  [id number]        Your Vitals Were     Pulse Temperature Height Last Period Pulse Oximetry BMI (Body Mass Index)    71 96.3  F (35.7  C) (Oral) 5' 6\" (1.676 m) 2017 100% 44.1 kg/m2       Blood Pressure from Last 3 Encounters:   17 (!) 140/92   02/15/17 142/88   17 164/76    Weight from Last 3 Encounters:   17 273 lb 3.2 oz (123.9 kg)   17 270 lb 9.6 oz (122.7 kg)                 Today's Medication Changes          These changes are accurate as of: 3/8/17  1:54 PM.  If you have any questions, ask your nurse or doctor.               These medicines have changed or have updated prescriptions.        Dose/Directions    chlorthalidone 25 MG tablet   Commonly known as:  HYGROTON   This may have changed:  See the new instructions.   Used for:  Benign essential hypertension   Changed by:  Nayeli Barnard MD        Dose:  25 mg   Take 1 tablet (25 mg) by mouth daily   Quantity:  90 tablet   Refills:  1            Where to get your medicines      These medications were sent to [facility] - [address]  [address], [place] [address]     Phone:  [phone]     chlorthalidone 25 MG tablet                Primary Care Provider    None Specified       No primary provider on file.        Thank you!     Thank you for choosing [facility]  for your care. Our goal is always to provide you with excellent care. Hearing back from our patients is one way we can continue to improve our services. Please take a few minutes to complete the written survey that you may receive in the mail after your visit with us. Thank you!             Your Updated Medication List - Protect others around you: Learn how to safely use, store and throw away your medicines at www.disposemymeds.org.          This list is accurate as of: 3/8/17  1:54 PM.  Always use your most recent med list.                   Brand Name Dispense Instructions for use    atorvastatin 20 MG tablet    LIPITOR     TAKE 1 TAB BY MOUTH DAILY. INDICATIONS: CEREBROVASCULAR ACCIDENT OR STROKE       carvedilol 6.25 MG tablet    COREG    60 tablet    Take 1 tablet (6.25 mg) by mouth 2 times daily (with meals)       chlorthalidone 25 MG tablet    HYGROTON    90 tablet    Take 1 tablet (25 mg) by mouth daily       [facility] ASPIRIN 325 MG tablet   Generic drug:  aspirin      TAKE 1 TABLET BY MOUTH ONCE DAILY WITH A MEAL.

## 2017-03-22 DIAGNOSIS — I10 BENIGN ESSENTIAL HYPERTENSION: ICD-10-CM

## 2017-03-22 DIAGNOSIS — E61.1 IRON DEFICIENCY: Primary | ICD-10-CM

## 2017-03-22 DIAGNOSIS — D75.839 THROMBOCYTOSIS: ICD-10-CM

## 2017-03-22 LAB
ANION GAP SERPL CALCULATED.3IONS-SCNC: 10 MMOL/L (ref 3–14)
BASOPHILS # BLD AUTO: 0 10E9/L (ref 0–0.2)
BASOPHILS NFR BLD AUTO: 0.3 %
BUN SERPL-MCNC: 14 MG/DL (ref 7–30)
CALCIUM SERPL-MCNC: 9.2 MG/DL (ref 8.5–10.1)
CHLORIDE SERPL-SCNC: 100 MMOL/L (ref 94–109)
CO2 SERPL-SCNC: 30 MMOL/L (ref 20–32)
CREAT SERPL-MCNC: 0.62 MG/DL (ref 0.52–1.04)
DIFFERENTIAL METHOD BLD: ABNORMAL
EOSINOPHIL # BLD AUTO: 0.1 10E9/L (ref 0–0.7)
EOSINOPHIL NFR BLD AUTO: 1.2 %
ERYTHROCYTE [DISTWIDTH] IN BLOOD BY AUTOMATED COUNT: 15.3 % (ref 10–15)
FERRITIN SERPL-MCNC: 10 NG/ML (ref 8–252)
GFR SERPL CREATININE-BSD FRML MDRD: ABNORMAL ML/MIN/1.7M2
GLUCOSE SERPL-MCNC: 126 MG/DL (ref 70–99)
HCT VFR BLD AUTO: 39.7 % (ref 35–47)
HGB BLD-MCNC: 12.5 G/DL (ref 11.7–15.7)
IRON SATN MFR SERPL: 7 % (ref 15–46)
IRON SERPL-MCNC: 32 UG/DL (ref 35–180)
LYMPHOCYTES # BLD AUTO: 2.7 10E9/L (ref 0.8–5.3)
LYMPHOCYTES NFR BLD AUTO: 24.7 %
MCH RBC QN AUTO: 25.1 PG (ref 26.5–33)
MCHC RBC AUTO-ENTMCNC: 31.5 G/DL (ref 31.5–36.5)
MCV RBC AUTO: 80 FL (ref 78–100)
MONOCYTES # BLD AUTO: 0.6 10E9/L (ref 0–1.3)
MONOCYTES NFR BLD AUTO: 5.8 %
NEUTROPHILS # BLD AUTO: 7.5 10E9/L (ref 1.6–8.3)
NEUTROPHILS NFR BLD AUTO: 68 %
PLATELET # BLD AUTO: 502 10E9/L (ref 150–450)
POTASSIUM SERPL-SCNC: 3.6 MMOL/L (ref 3.4–5.3)
RBC # BLD AUTO: 4.99 10E12/L (ref 3.8–5.2)
SODIUM SERPL-SCNC: 140 MMOL/L (ref 133–144)
TIBC SERPL-MCNC: 450 UG/DL (ref 240–430)
WBC # BLD AUTO: 10.9 10E9/L (ref 4–11)

## 2017-03-22 PROCEDURE — 36415 COLL VENOUS BLD VENIPUNCTURE: CPT | Performed by: INTERNAL MEDICINE

## 2017-03-22 PROCEDURE — 80048 BASIC METABOLIC PNL TOTAL CA: CPT | Performed by: INTERNAL MEDICINE

## 2017-03-22 PROCEDURE — 83540 ASSAY OF IRON: CPT | Performed by: INTERNAL MEDICINE

## 2017-03-22 PROCEDURE — 85025 COMPLETE CBC W/AUTO DIFF WBC: CPT | Performed by: INTERNAL MEDICINE

## 2017-03-22 PROCEDURE — 82728 ASSAY OF FERRITIN: CPT | Performed by: INTERNAL MEDICINE

## 2017-03-22 PROCEDURE — 83550 IRON BINDING TEST: CPT | Performed by: INTERNAL MEDICINE

## 2017-03-22 NOTE — PROGRESS NOTES
Have her make a follow up appointment with me in 6 weeks but she still needs the blood pressure check asap.

## 2017-03-22 NOTE — PROGRESS NOTES
Normal kidney and electrolyte lab.  You are very deficient in iron and this is likely causing the elevated platelet count.  Please take Vitron C 1 tablet daily.  This is over the counter iron replacement.      She needed to come back for a blood pressure check as well per last note instructions.

## 2017-04-27 ENCOUNTER — ALLIED HEALTH/NURSE VISIT (OUTPATIENT)
Dept: FAMILY MEDICINE | Facility: CLINIC | Age: 47
End: 2017-04-27
Payer: COMMERCIAL

## 2017-04-27 ENCOUNTER — TELEPHONE (OUTPATIENT)
Dept: FAMILY MEDICINE | Facility: CLINIC | Age: 47
End: 2017-04-27

## 2017-04-27 VITALS — DIASTOLIC BLOOD PRESSURE: 92 MMHG | SYSTOLIC BLOOD PRESSURE: 142 MMHG

## 2017-04-27 DIAGNOSIS — Z01.30 BP CHECK: Primary | ICD-10-CM

## 2017-04-27 DIAGNOSIS — I10 BENIGN ESSENTIAL HYPERTENSION: Primary | ICD-10-CM

## 2017-04-27 PROCEDURE — 99207 ZZC NO CHARGE NURSE ONLY: CPT | Performed by: INTERNAL MEDICINE

## 2017-04-27 NOTE — PROGRESS NOTES
Flor Fernandez is enrolled/participating in the retail pharmacy Blood Pressure Goals Achievement Program (BPGAP).  Flor Fernandez was evaluated at [facility] on April 27, 2017 at which time her blood pressure was:    BP Readings from Last 3 Encounters:   04/27/17 (!) 142/92   03/08/17 (!) 140/92   02/15/17 142/88     Reviewed lifestyle modifications for blood pressure control and reduction: including making healthy food choices, managing weight, getting regular exercise, smoking cessation, reducing alcohol consumption, monitoring blood pressure regularly.     Flor Fernandez is not experiencing symptoms.    Follow-Up: BP is not at goal of < 140/90mmHg (patient 18+ years of age with or without diabetes), Recommended follow-up in 1 month at the pharmacy. Routing to PCP as an FYI.    Recommendation to Provider:    Completed by: REBA Espinal, Pharm.D.  [facility]   [facility] Pharmacist   On behalf of [facility]

## 2017-04-28 ENCOUNTER — TELEPHONE (OUTPATIENT)
Dept: FAMILY MEDICINE | Facility: CLINIC | Age: 47
End: 2017-04-28

## 2017-04-28 RX ORDER — LISINOPRIL 10 MG/1
10 TABLET ORAL DAILY
Qty: 30 TABLET | Refills: 0 | Status: SHIPPED | OUTPATIENT
Start: 2017-04-28 | End: 2017-05-03

## 2017-04-28 NOTE — TELEPHONE ENCOUNTER
RN notified patient of the provider's message as it's written below.  Patient agrees and verbalized understanding.     Rx sent to patient's preferred pharmacy.    Signed Prescriptions:                        Disp   Refills    lisinopril (PRINIVIL/ZESTRIL) 10 MG tablet 30 tab*0        Sig: Take 1 tablet (10 mg) by mouth daily  Authorizing Provider: GEMINI BARFIELD  Ordering User: NED CHANG, RN, BSN

## 2017-04-28 NOTE — TELEPHONE ENCOUNTER
Blood pressure is still not at goal.  Let's add on Lisinopril 10 mg daily.  Keep follow up appointment May 3rd.    1 month supply with 0 refills

## 2017-04-28 NOTE — TELEPHONE ENCOUNTER
Please confirm the new medication lisinopril 10mg is in addition to her chlorthalidone and carvedilol. Pt thought it was just a dose increase for one of her med.  Mira العراقي, [facility]  Phone [phone]  Fax      [phone]

## 2017-05-01 ENCOUNTER — TELEPHONE (OUTPATIENT)
Dept: FAMILY MEDICINE | Facility: CLINIC | Age: 47
End: 2017-05-01

## 2017-05-01 DIAGNOSIS — R00.0 TACHYCARDIA: ICD-10-CM

## 2017-05-01 RX ORDER — CARVEDILOL 6.25 MG/1
6.25 TABLET ORAL 2 TIMES DAILY WITH MEALS
Qty: 60 TABLET | Refills: 0 | Status: SHIPPED | OUTPATIENT
Start: 2017-05-01 | End: 2017-05-03

## 2017-05-01 NOTE — TELEPHONE ENCOUNTER
Reason for Call: Request for an order or referral:    Order or referral being requested: Carvedilol    Date needed: as soon as possible    Has the patient been seen by the PCP for this problem? YES    Additional comments: Patient ran out of her medication on 04/28/2017 need refill asap    Phone number Patient can be reached at:  Work number on file:  [phone]    Best Time:  Doing the day    Can we leave a detailed message on this number?      Call taken on 5/1/2017 at 3:11 PM by Abbey Lott

## 2017-05-01 NOTE — TELEPHONE ENCOUNTER
carvedilol (COREG) 6.25 MG tablet    Last Written Prescription Date: 2-17-17  Last Fill Quantity: 60, # refills: 1    Last Office Visit with G, P or [place] prescribing provider:  3-8-17   Future Office Visit:    Next 5 appointments (look out 90 days)     May 03, 2017  1:30 PM CDT   Office Visit with Nayeli Barnard MD   [facility]    [address] [address] [phone]                    BP Readings from Last 3 Encounters:   04/27/17 (!) 142/92   03/08/17 (!) 140/92   02/15/17 142/88

## 2017-05-01 NOTE — TELEPHONE ENCOUNTER
Prescription approved per [facility] Refill Protocol.  Please notify    Michael Schwartz RN

## 2017-05-03 ENCOUNTER — OFFICE VISIT (OUTPATIENT)
Dept: FAMILY MEDICINE | Facility: CLINIC | Age: 47
End: 2017-05-03
Payer: COMMERCIAL

## 2017-05-03 VITALS
HEART RATE: 98 BPM | WEIGHT: 272 LBS | BODY MASS INDEX: 43.9 KG/M2 | SYSTOLIC BLOOD PRESSURE: 134 MMHG | DIASTOLIC BLOOD PRESSURE: 80 MMHG | OXYGEN SATURATION: 99 % | TEMPERATURE: 98.7 F

## 2017-05-03 DIAGNOSIS — R00.0 TACHYCARDIA: ICD-10-CM

## 2017-05-03 DIAGNOSIS — Z86.73 HISTORY OF STROKE: ICD-10-CM

## 2017-05-03 DIAGNOSIS — E61.1 IRON DEFICIENCY: ICD-10-CM

## 2017-05-03 DIAGNOSIS — I10 BENIGN ESSENTIAL HYPERTENSION: Primary | ICD-10-CM

## 2017-05-03 DIAGNOSIS — D75.839 THROMBOCYTOSIS: ICD-10-CM

## 2017-05-03 DIAGNOSIS — E78.5 HYPERLIPIDEMIA LDL GOAL <100: ICD-10-CM

## 2017-05-03 LAB
ANION GAP SERPL CALCULATED.3IONS-SCNC: 7 MMOL/L (ref 3–14)
BUN SERPL-MCNC: 8 MG/DL (ref 7–30)
CALCIUM SERPL-MCNC: 9.6 MG/DL (ref 8.5–10.1)
CHLORIDE SERPL-SCNC: 95 MMOL/L (ref 94–109)
CO2 SERPL-SCNC: 34 MMOL/L (ref 20–32)
CREAT SERPL-MCNC: 0.55 MG/DL (ref 0.52–1.04)
FERRITIN SERPL-MCNC: 27 NG/ML (ref 8–252)
GFR SERPL CREATININE-BSD FRML MDRD: ABNORMAL ML/MIN/1.7M2
GLUCOSE SERPL-MCNC: 117 MG/DL (ref 70–99)
POTASSIUM SERPL-SCNC: 3.4 MMOL/L (ref 3.4–5.3)
SODIUM SERPL-SCNC: 136 MMOL/L (ref 133–144)

## 2017-05-03 PROCEDURE — 80048 BASIC METABOLIC PNL TOTAL CA: CPT | Performed by: INTERNAL MEDICINE

## 2017-05-03 PROCEDURE — 99213 OFFICE O/P EST LOW 20 MIN: CPT | Performed by: INTERNAL MEDICINE

## 2017-05-03 PROCEDURE — 85025 COMPLETE CBC W/AUTO DIFF WBC: CPT | Performed by: INTERNAL MEDICINE

## 2017-05-03 PROCEDURE — 82728 ASSAY OF FERRITIN: CPT | Performed by: INTERNAL MEDICINE

## 2017-05-03 PROCEDURE — 2894A VOIDCORRECT: CPT | Performed by: INTERNAL MEDICINE

## 2017-05-03 PROCEDURE — 36415 COLL VENOUS BLD VENIPUNCTURE: CPT | Performed by: INTERNAL MEDICINE

## 2017-05-03 RX ORDER — LISINOPRIL 10 MG/1
10 TABLET ORAL DAILY
Qty: 90 TABLET | Refills: 3 | Status: SHIPPED | OUTPATIENT
Start: 2017-05-03 | End: 2017-11-08

## 2017-05-03 RX ORDER — CHLORTHALIDONE 25 MG/1
25 TABLET ORAL DAILY
Qty: 90 TABLET | Refills: 1 | Status: SHIPPED | OUTPATIENT
Start: 2017-05-03 | End: 2017-11-08

## 2017-05-03 RX ORDER — CARVEDILOL 6.25 MG/1
6.25 TABLET ORAL 2 TIMES DAILY WITH MEALS
Qty: 180 TABLET | Refills: 3 | Status: SHIPPED | OUTPATIENT
Start: 2017-05-03 | End: 2017-11-08

## 2017-05-03 ASSESSMENT — PAIN SCALES - GENERAL: PAINLEVEL: NO PAIN (0)

## 2017-05-03 NOTE — PROGRESS NOTES
"INTERNAL MEDICINE  SUBJECTIVE:                                                    Flor Fernandez is a 47 year old female who presents to clinic today for the following health issues: 6 week hypertension follow-up.    Last Visit 3/8/17:  \"Increase chlorthalidone to 25 MG (full tablet) daily.   Schedule a blood draw in 2-3 weeks.  Have your blood pressure check in pharmacy at your lab draw.  I will follow up with you based off the results.\"      Patient is tolerating medications well. She is taking lisinopril at night as it makes her really tired; can sleep it off and is fine the next day. Taking atorvastatin at night, chlorthalidone in the morning, and carvedilol bid.     She is taking a daily iron supplement. Bowel movements were regular. With iron they fluctuated but are returning to normal. She has black stool.    Periods have been ok. Her last period came earlier than normal but they are once a month, no longer every two weeks. She had a PAP done roughly a year ago at [facility].     Denies any stroke like symptoms.    BP Readings from Last 3 Encounters:   05/03/17 134/80   04/27/17 (!) 142/92   03/08/17 (!) 140/92     Problem list and histories reviewed & adjusted, as indicated.  Additional history: as documented    Labs reviewed in EPIC  Reviewed and updated as needed this visit by clinical staff  Reviewed and updated as needed this visit by Provider    ROS:  C: NEGATIVE for fever, chills, change in weight  R: NEGATIVE for significant cough or SOB  CV: NEGATIVE for chest pain, palpitations or peripheral edema  GI: NEGATIVE for nausea, abdominal pain, heartburn, or change in bowel habits  M: NEGATIVE for significant arthralgias or myalgia  N: NEGATIVE for weakness, dizziness or paresthesias  P: NEGATIVE for changes in mood or affect    This document serves as a record of the services and decisions personally performed and made by Nayeli Barnard MD. It was created on his/her behalf by Nahed Winston, " trained medical scribe. The creation of this document is based the provider's statements to the medical scribes.    Mattibjovon Nahed Winston 1:42 PM, May 3, 2017  OBJECTIVE:                                                    /80 (BP Location: Left arm, Patient Position: Chair, Cuff Size: Adult Large)  Pulse 98  Temp 98.7  F (37.1  C) (Oral)  Wt 123.4 kg (272 lb)  LMP 04/24/2017 (Exact Date)  SpO2 99%  Breastfeeding? No  BMI 43.9 kg/m2  Body mass index is 43.9 kg/(m^2).  GENERAL: healthy, alert and no distress  RESP: lungs clear to auscultation - no rales, rhonchi or wheezes  CV: regular rate and rhythm, normal S1 S2, no S3 or S4, no murmur, click or rub, peripheral edema and peripheral pulses strong  MS: no gross musculoskeletal defects, trace ankle edema  NEURO: Normal strength and tone, mentation intact and speech normal  PSYCH: mentation appears normal, affect normal/bright    Diagnostic Test Results:  No results found for this or any previous visit (from the past 24 hour(s)).      ASSESSMENT/PLAN:                                                    1. Benign essential hypertension  Controlled. The current medical regimen is effective;  continue present plan and medications.  - lisinopril (PRINIVIL/ZESTRIL) 10 MG tablet; Take 1 tablet (10 mg) by mouth daily  Dispense: 90 tablet; Refill: 3  - chlorthalidone (HYGROTON) 25 MG tablet; Take 1 tablet (25 mg) by mouth daily  Dispense: 90 tablet; Refill: 1  - Basic metabolic panel    2. Tachycardia  Stable. She continues on carvedilol.   - carvedilol (COREG) 6.25 MG tablet; Take 1 tablet (6.25 mg) by mouth 2 times daily (with meals)  Dispense: 180 tablet; Refill: 3    3. Iron deficiency  Checking ferritin level today. She continues on daily iron supplement. I might have her continue daily iron while having her period (ie through menopause)  - Ferritin  - CBC with platelets    4. Thrombocytosis (H)  Checking platelet level. If doesn't improve would need to see  hematology due to history of stroke  - CBC with platelets    5. History of stroke  Per above.     6. Hyperlipidemia LDL goal <100  Stable. Continues on atorvastatin.       Patient Instructions     Follow up in 6 months.    [facility]    If you have any questions regarding to your visit please contact your care team:     Team Pink:   Clinic Hours Telephone Number   Internal Medicine:  Dr. Nayeli Hi, NP       7am-7pm  Monday - Thursday   7am-5pm  Fridays  [phone]  (Appointment scheduling available 24/7)    Questions about your visit?  Team Line  [phone]   [facility] [place] [facility] - 11am-9pm Monday-Friday Saturday-Sunday- 9am-5pm   [place] - 5pm-9pm Monday-Friday Saturday-Sunday- 9am-5pm  [phone] - [place]   [phone] - [place]       What options do I have for visits at the clinic other than the traditional office visit?  To expand how we care for you, many of our providers are utilizing electronic visits (e-visits) and telephone visits, when medically appropriate, for interactions with their patients rather than a visit in the clinic.   We also offer nurse visits for many medical concerns. Just like any other service, we will bill your insurance company for this type of visit based on time spent on the phone with your provider. Not all insurance companies cover these visits. Please check with your medical insurance if this type of visit is covered. You will be responsible for any charges that are not paid by your insurance.      E-visits via Info[employer]out:  generally incur a $35.00 fee.  Telephone visits:  Time spent on the phone: *charged based on time that is spent on the phone in increments of 10 minutes. Estimated cost:   5-10 mins $30.00   11-20 mins. $59.00   21-30 mins. $85.00   Use InfoScout (secure email communication and access to your chart) to send your primary care provider a message or make an appointment.  Ask someone on your Team how to sign up for [employer].    For a Price Quote for your services, please call our Consumer Price Line at [phone].    As always, Thank you for trusting us with your health care needs!    Discharged by Alanis WEI CMA ([facility])        I spent 13 minutes of time with the patient and >50% of it was in education and counseling regarding 6 week follow-up.    The information in this document, created by the medical scribe for me, accurately reflects the services I personally performed and the decisions made by me. I have reviewed and approved this document for accuracy prior to leaving the patient care area.  Nayeli Barnard MD  1:42 PM, 05/03/17    Nayeli Barnard MD  [facility]    Start 1:48 PM  End 2:01 PM

## 2017-05-03 NOTE — MR AVS SNAPSHOT
After Visit Summary   5/3/2017    Flor Fernandez    MRN: [id number]           Patient Information     Date Of Birth          1970        Visit Information        Provider Department      5/3/2017 1:30 PM Nayeli Barnard MD [facility]        Today's Diagnoses     Benign essential hypertension    -  1    Tachycardia        Iron deficiency        Thrombocytosis (H)        History of stroke        Hyperlipidemia LDL goal <100          Care Instructions    Follow up in 6 months.    [facility]    If you have any questions regarding to your visit please contact your care team:     Team Pink:   Clinic Hours Telephone Number   Internal Medicine:  Dr. Nayeli Hi, NP       7am-7pm  Monday - Thursday   7am-5pm  Fridays  [phone]  (Appointment scheduling available 24/7)    Questions about your visit?  Team Line  [phone]   [facility] [place] [facility] - 11am-9pm Monday-Friday Saturday-Sunday- 9am-5pm   [place] - 5pm-9pm Monday-Friday Saturday-Sunday- 9am-5pm  [phone] - [place]   [phone] - [place]       What options do I have for visits at the clinic other than the traditional office visit?  To expand how we care for you, many of our providers are utilizing electronic visits (e-visits) and telephone visits, when medically appropriate, for interactions with their patients rather than a visit in the clinic.   We also offer nurse visits for many medical concerns. Just like any other service, we will bill your insurance company for this type of visit based on time spent on the phone with your provider. Not all insurance companies cover these visits. Please check with your medical insurance if this type of visit is covered. You will be responsible for any charges that are not paid by your insurance.      E-visits via Plum:  generally incur a $35.00 fee.  Telephone visits:  Time spent on the  phone: *charged based on time that is spent on the phone in increments of 10 minutes. Estimated cost:   5-10 mins $30.00   11-20 mins. $59.00   21-30 mins. $85.00   Use Run3Dt (secure email communication and access to your chart) to send your primary care provider a message or make an appointment. Ask someone on your Team how to sign up for [employer].    For a Price Quote for your services, please call our Consumer Price Line at [phone].    As always, Thank you for trusting us with your health care needs!    Discharged by Alanis WEI CMA ([facility])          Follow-ups after your visit        Your next 10 appointments already scheduled     Sep 06, 2017  1:30 PM CDT   Office Visit with Nayeli Barnard MD   [facility]    [address] [place] [id number] [phone]           Bring a current list of meds and any records pertaining to this visit.  For Physicals, please bring immunization records and any forms needing to be filled out.  Please arrive 10 minutes early to complete paperwork.              Who to contact     If you have questions or need follow up information about today's clinic visit or your schedule please contact [facility] directly at [phone].  Normal or non-critical lab and imaging results will be communicated to you by [employer], letter or phone within 4 business days after the clinic has received the results. If you do not hear from us within 7 days, please contact the clinic through [employer] or phone. If you have a critical or abnormal lab result, we will notify you by phone as soon as possible.  Submit refill requests through [employer] or call your pharmacy and they will forward the refill request to us. Please allow 3 business days for your refill to be completed.          Additional Information About Your Visit        [employer] Information     [employer] lets you send messages to your doctor, view your test results, renew your  "prescriptions, schedule appointments and more. To sign up, go to [email or web address] . Click on \"Log in\" on the left side of the screen, which will take you to the Welcome page. Then click on \"Sign up Now\" on the right side of the page.     You will be asked to enter the access code listed below, as well as some personal information. Please follow the directions to create your username and password.     Your access code is: [email or web address]  Expires: 2017  4:31 PM     Your access code will  in 90 days. If you need help or a new code, please call your [place] clinic or [phone].        Care EveryWhere ID     This is your Care EveryWhere ID. This could be used by other organizations to access your [place] medical records  [id number]        Your Vitals Were     Pulse Temperature Last Period Pulse Oximetry Breastfeeding? BMI (Body Mass Index)    98 98.7  F (37.1  C) (Oral) 2017 (Exact Date) 99% No 43.9 kg/m2       Blood Pressure from Last 3 Encounters:   17 134/80   17 (!) 142/92   17 (!) 140/92    Weight from Last 3 Encounters:   17 272 lb (123.4 kg)   17 273 lb 3.2 oz (123.9 kg)   17 270 lb 9.6 oz (122.7 kg)              We Performed the Following     Basic metabolic panel     CBC with platelets     Ferritin          Where to get your medicines      These medications were sent to [facility] [place] - [place], [place] - [address]  [address], [place] [address]     Phone:  [phone]     carvedilol 6.25 MG tablet    chlorthalidone 25 MG tablet    lisinopril 10 MG tablet          Primary Care Provider    None Specified       No primary provider on file.        Thank you!     Thank you for choosing [facility]  for your care. Our goal is always to provide you with excellent care. Hearing back from our patients is one way we can continue to improve our services. Please take a few minutes to complete the written " survey that you may receive in the mail after your visit with us. Thank you!             Your Updated Medication List - Protect others around you: Learn how to safely use, store and throw away your medicines at www.disposemymeds.org.          This list is accurate as of: 5/3/17  2:03 PM.  Always use your most recent med list.                   Brand Name Dispense Instructions for use    atorvastatin 20 MG tablet    LIPITOR     TAKE 1 TAB BY MOUTH DAILY. INDICATIONS: CEREBROVASCULAR ACCIDENT OR STROKE       carvedilol 6.25 MG tablet    COREG    180 tablet    Take 1 tablet (6.25 mg) by mouth 2 times daily (with meals)       chlorthalidone 25 MG tablet    HYGROTON    90 tablet    Take 1 tablet (25 mg) by mouth daily       [employer] ASPIRIN 325 MG tablet   Generic drug:  aspirin      TAKE 1 TABLET BY MOUTH ONCE DAILY WITH A MEAL.       ferrous fumarate 65 mg ([place]. FE)-Vitamin C 125 mg  MG Tabs tablet    VITRON C     Take 1 tablet by mouth daily       lisinopril 10 MG tablet    PRINIVIL/ZESTRIL    90 tablet    Take 1 tablet (10 mg) by mouth daily

## 2017-05-03 NOTE — NURSING NOTE
"Chief Complaint   Patient presents with     RECHECK     6 week follow-up       Initial /80 (BP Location: Left arm, Patient Position: Chair, Cuff Size: Adult Large)  Pulse 98  Temp 98.7  F (37.1  C) (Oral)  Wt 272 lb (123.4 kg)  LMP 04/24/2017 (Exact Date)  SpO2 99%  Breastfeeding? No  BMI 43.9 kg/m2 Estimated body mass index is 43.9 kg/(m^2) as calculated from the following:    Height as of 3/8/17: 5' 6\" (1.676 m).    Weight as of this encounter: 272 lb (123.4 kg).  Medication Reconciliation: complete   .NAILA/MA      "

## 2017-05-03 NOTE — PATIENT INSTRUCTIONS
Follow up in 6 months.    [facility]    If you have any questions regarding to your visit please contact your care team:     Team Pink:   Clinic Hours Telephone Number   Internal Medicine:  Dr. Nayeli Hi, NP       7am-7pm  Monday - Thursday   7am-5pm  Fridays  [phone]  (Appointment scheduling available 24/7)    Questions about your visit?  Team Line  [phone]   Urgent Care - [place] and [facility] - 11am-9pm Monday-Friday Saturday-Sunday- 9am-5pm   [place] - 5pm-9pm Monday-Friday Saturday-Sunday- 9am-5pm  [phone] - [facility]  [phone] - [place]       What options do I have for visits at the clinic other than the traditional office visit?  To expand how we care for you, many of our providers are utilizing electronic visits (e-visits) and telephone visits, when medically appropriate, for interactions with their patients rather than a visit in the clinic.   We also offer nurse visits for many medical concerns. Just like any other service, we will bill your insurance company for this type of visit based on time spent on the phone with your provider. Not all insurance companies cover these visits. Please check with your medical insurance if this type of visit is covered. You will be responsible for any charges that are not paid by your insurance.      E-visits via [employer]:  generally incur a $35.00 fee.  Telephone visits:  Time spent on the phone: *charged based on time that is spent on the phone in increments of 10 minutes. Estimated cost:   5-10 mins $30.00   11-20 mins. $59.00   21-30 mins. $85.00   Use [employer] (secure email communication and access to your chart) to send your primary care provider a message or make an appointment. Ask someone on your Team how to sign up for [employer].    For a Price Quote for your services, please call our Consumer Price Line at [phone].    As always, Thank you for trusting us with your  health care needs!    Discharged by Alanis WEI CMA ([facility])

## 2017-05-04 LAB
DIFFERENTIAL METHOD BLD: ABNORMAL
EOSINOPHIL # BLD AUTO: 0.2 10E9/L (ref 0–0.7)
EOSINOPHIL NFR BLD AUTO: 2 %
ERYTHROCYTE [DISTWIDTH] IN BLOOD BY AUTOMATED COUNT: 18.7 % (ref 10–15)
ERYTHROCYTE [DISTWIDTH] IN BLOOD BY AUTOMATED COUNT: NORMAL % (ref 10–15)
HCT VFR BLD AUTO: 43.3 % (ref 35–47)
HCT VFR BLD AUTO: NORMAL % (ref 35–47)
HGB BLD-MCNC: 14 G/DL (ref 11.7–15.7)
HGB BLD-MCNC: NORMAL G/DL (ref 11.7–15.7)
LYMPHOCYTES # BLD AUTO: 3.2 10E9/L (ref 0.8–5.3)
LYMPHOCYTES NFR BLD AUTO: 27 %
MCH RBC QN AUTO: 26.5 PG (ref 26.5–33)
MCH RBC QN AUTO: NORMAL PG (ref 26.5–33)
MCHC RBC AUTO-ENTMCNC: 32.3 G/DL (ref 31.5–36.5)
MCHC RBC AUTO-ENTMCNC: NORMAL G/DL (ref 31.5–36.5)
MCV RBC AUTO: 82 FL (ref 78–100)
MCV RBC AUTO: NORMAL FL (ref 78–100)
MONOCYTES # BLD AUTO: 0.8 10E9/L (ref 0–1.3)
MONOCYTES NFR BLD AUTO: 7 %
NEUTROPHILS # BLD AUTO: 7.5 10E9/L (ref 1.6–8.3)
NEUTROPHILS NFR BLD AUTO: 64 %
PLATELET # BLD AUTO: 457 10E9/L (ref 150–450)
PLATELET # BLD AUTO: NORMAL 10E9/L (ref 150–450)
RBC # BLD AUTO: 5.29 10E12/L (ref 3.8–5.2)
RBC # BLD AUTO: NORMAL 10E12/L (ref 3.8–5.2)
WBC # BLD AUTO: 11.7 10E9/L (ref 4–11)
WBC # BLD AUTO: NORMAL 10E9/L (ref 4–11)

## 2017-05-04 NOTE — PROGRESS NOTES
Iron levels are better but she should remain on 1 tab daily of Vitron C.  Normal electrolytes. Normal kidney function.   Platelets are just a little elevated and may completely normalize with more iron.  Now the white cell count is a touch high as well.  Awaiting differential.

## 2017-05-05 NOTE — PROGRESS NOTES
Differential on the blood looks fine.  Let's repeat another CBC with diff in 3 months.  I think everything will be normal by then as you will have been on iron for longer.     Indication - thrombocytosis.

## 2017-05-28 DIAGNOSIS — R00.0 TACHYCARDIA: ICD-10-CM

## 2017-05-31 RX ORDER — CARVEDILOL 6.25 MG/1
TABLET ORAL
Qty: 60 TABLET | Refills: 0
Start: 2017-05-31

## 2017-05-31 NOTE — TELEPHONE ENCOUNTER
Prescription sent to [facility] pharmacy on 05/03/17, confirmed with patient that refills should not be going to [facility], patient has transferred to the [facility]. Kimberly Pires MA

## 2017-06-26 DIAGNOSIS — E78.5 HYPERLIPIDEMIA LDL GOAL <100: Primary | ICD-10-CM

## 2017-06-26 RX ORDER — ATORVASTATIN CALCIUM 20 MG/1
TABLET, FILM COATED ORAL
Qty: 90 TABLET | Refills: 3 | Status: SHIPPED | OUTPATIENT
Start: 2017-06-26 | End: 2017-11-08

## 2017-06-26 NOTE — TELEPHONE ENCOUNTER
Atorvastatin 20mg       Last Written Prescription Date: 03/23/2017  Last Fill Quantity: 90, # refills: 0    Last Office Visit with [facility], P or [facility] prescribing provider:  05/03/2017   Future Office Visit:      Cholesterol   Date Value Ref [place] Status   02/17/2017 128 <200 mg/dL Final     HDL Cholesterol   Date Value Ref [place] Status   02/17/2017 46 (L) >49 mg/dL Final     LDL Cholesterol Calculated   Date Value Ref Range Status   02/17/2017 46 <100 mg/dL Final     Comment:     Desirable:       <100 mg/dl     Triglycerides   Date Value Ref Range Status   02/17/2017 181 (H) <150 mg/dL Final     Comment:     Borderline high:  150-199 mg/dl   High:             200-499 mg/dl   Very high:       >499 mg/dl   Fasting specimen       ALT   Date Value Ref [place] Status   02/17/2017 33 0 - 50 U/L Final      Soledad Jimenez  Pharmacy Technician  [facility]  Ph# [phone]  Fax# [phone]

## 2017-10-03 ENCOUNTER — OFFICE VISIT (OUTPATIENT)
Dept: OPTOMETRY | Facility: CLINIC | Age: 47
End: 2017-10-03
Payer: COMMERCIAL

## 2017-10-03 DIAGNOSIS — H52.201 ASTIGMATISM WITH PRESBYOPIA, RIGHT: Primary | ICD-10-CM

## 2017-10-03 DIAGNOSIS — H52.12 MYOPIA WITH ASTIGMATISM AND PRESBYOPIA, LEFT: ICD-10-CM

## 2017-10-03 DIAGNOSIS — H52.202 MYOPIA WITH ASTIGMATISM AND PRESBYOPIA, LEFT: ICD-10-CM

## 2017-10-03 DIAGNOSIS — H52.4 ASTIGMATISM WITH PRESBYOPIA, RIGHT: Primary | ICD-10-CM

## 2017-10-03 DIAGNOSIS — H52.4 MYOPIA WITH ASTIGMATISM AND PRESBYOPIA, LEFT: ICD-10-CM

## 2017-10-03 PROCEDURE — 92004 COMPRE OPH EXAM NEW PT 1/>: CPT | Performed by: OPTOMETRIST

## 2017-10-03 PROCEDURE — 92015 DETERMINE REFRACTIVE STATE: CPT | Performed by: OPTOMETRIST

## 2017-10-03 ASSESSMENT — CONF VISUAL FIELD
OD_NORMAL: 1
OS_NORMAL: 1

## 2017-10-03 ASSESSMENT — TONOMETRY
OD_IOP_MMHG: 16
OS_IOP_MMHG: 16
IOP_METHOD: APPLANATION

## 2017-10-03 ASSESSMENT — VISUAL ACUITY
OS_SC: 20/30
OD_SC: 20/25
OD_SC: 20/30 -2
OS_SC+: -1
OS_SC: 20/40
METHOD: SNELLEN - LINEAR

## 2017-10-03 ASSESSMENT — CUP TO DISC RATIO
OS_RATIO: 0.3
OD_RATIO: 0.3

## 2017-10-03 ASSESSMENT — REFRACTION_MANIFEST
OS_AXIS: 180
OD_SPHERE: -0.50
OS_SPHERE: -1.00
OD_ADD: +1.25
OD_CYLINDER: +0.50
OS_CYLINDER: +0.50
OS_ADD: +1.25
OD_AXIS: 180

## 2017-10-03 ASSESSMENT — EXTERNAL EXAM - RIGHT EYE: OD_EXAM: NORMAL

## 2017-10-03 ASSESSMENT — SLIT LAMP EXAM - LIDS
COMMENTS: NORMAL
COMMENTS: NORMAL

## 2017-10-03 ASSESSMENT — EXTERNAL EXAM - LEFT EYE: OS_EXAM: NORMAL

## 2017-10-03 NOTE — MR AVS SNAPSHOT
After Visit Summary   10/3/2017    Flor Fernandez    MRN: [id number]           Patient Information     Date Of Birth          1970        Visit Information        Provider Department      10/3/2017 3:20 PM Maria Elena Lamas OD [facility]        Today's Diagnoses     Astigmatism with presbyopia, right    -  1    Myopia with astigmatism and presbyopia, left          Care Instructions        A final glasses prescription was given.  Allow time for adaptation.  The glasses may cause dizziness and affect depth perception for awhile.  Return to clinic 1 year for Comprehensive Vision Exam      Maria Elena Lamas O.D  [facility]  [address] [place] [address]. [place]  [address] [phone]                  Follow-ups after your visit        Follow-up notes from your care team     Return in about 1 year (around 10/3/2018) for Eye Exam.      Your next 10 appointments already scheduled     Nov 08, 2017  1:30 PM CST   Office Visit with Nayeli Barnard MD   [facility]    [address] [place] [address]  [place] [address] [phone]           Bring a current list of meds and any records pertaining to this visit. For Physicals, please bring immunization records and any forms needing to be filled out. Please arrive 10 minutes early to complete paperwork.              Who to contact     If you have questions or need follow up information about today's clinic visit or your schedule please contact [facility] directly at [phone].  Normal or non-critical lab and imaging results will be communicated to you by MyChart, letter or phone within 4 business days after the clinic has received the results. If you do not hear from us within 7 days, please contact the clinic through MyChart or phone. If you have a critical or abnormal lab result, we will notify you by phone as soon as possible.  Submit refill requests through J-Kant or call  "your pharmacy and they will forward the refill request to us. Please allow 3 business days for your refill to be completed.          Additional Information About Your Visit        MyChart Information     Chegue.lÃ¡har"" lets you send messages to your doctor, view your test results, renew your prescriptions, schedule appointments and more. To sign up, go to [email or web address] . Click on \"Log in\" on the left side of the screen, which will take you to the Welcome page. Then click on \"Sign up Now\" on the right side of the page.     You will be asked to enter the access code listed below, as well as some personal information. Please follow the directions to create your username and password.     Your access code is: [email or web address]  Expires: 2018  4:49 PM     Your access code will  in 90 days. If you need help or a new code, please call your [facility] clinic or [phone].        Care EveryWhere ID     This is your Care EveryWhere ID. This could be used by other organizations to access your [facility] medical records  [id number]         Blood Pressure from Last 3 Encounters:   17 134/80   17 (!) 142/92   17 (!) 140/92    Weight from Last 3 Encounters:   17 123.4 kg (272 lb)   17 123.9 kg (273 lb 3.2 oz)   17 122.7 kg (270 lb 9.6 oz)              We Performed the Following     EYE EXAM (SIMPLE-NONBILLABLE)     REFRACTION        Primary Care Provider    None Specified       No primary provider on file.        Equal Access to Services     DANY FLORES : Hadii aminah Khan, walaurelda lujavieradaha, qaybta kaalrosana summers. So [facility] [phone].    ATENCIÓN: Si habla español, tiene a sung disposición servicios gratuitos de asistencia lingüística. Llame al [phone].    We comply with applicable federal civil rights laws and [place] laws. We do not discriminate on the basis of race, color, national origin, age, disability, sex, sexual " orientation, or gender identity.            Thank you!     Thank you for choosing [facility]  for your care. Our goal is always to provide you with excellent care. Hearing back from our patients is one way we can continue to improve our services. Please take a few minutes to complete the written survey that you may receive in the mail after your visit with us. Thank you!             Your Updated Medication List - Protect others around you: Learn how to safely use, store and throw away your medicines at www.disposemymeds.org.          This list is accurate as of: 10/3/17  4:49 PM.  Always use your most recent med list.                   Brand Name Dispense Instructions for use Diagnosis    atorvastatin 20 MG tablet    LIPITOR    90 tablet    TAKE 1 TAB BY MOUTH DAILY. INDICATIONS: CEREBROVASCULAR ACCIDENT OR STROKE    Hyperlipidemia LDL goal <100       carvedilol 6.25 MG tablet    COREG    180 tablet    Take 1 tablet (6.25 mg) by mouth 2 times daily (with meals)    Tachycardia       chlorthalidone 25 MG tablet    HYGROTON    90 tablet    Take 1 tablet (25 mg) by mouth daily    Benign essential hypertension       CVS ASPIRIN 325 MG tablet   Generic drug:  aspirin      TAKE 1 TABLET BY MOUTH ONCE DAILY WITH A MEAL.        ferrous fumarate 65 mg ([place]. FE)-Vitamin C 125 mg  MG Tabs tablet    VITRON C     Take 1 tablet by mouth daily    Iron deficiency       lisinopril 10 MG tablet    PRINIVIL/ZESTRIL    90 tablet    Take 1 tablet (10 mg) by mouth daily    Benign essential hypertension

## 2017-10-03 NOTE — PROGRESS NOTES
Chief Complaint   Patient presents with     COMPREHENSIVE EYE EXAM      Accompanied by self  Last Eye Exam: 3 years ago  Dilated Previously: Yes    What are you currently using to see?  Glasses-lost       Distance Vision Acuity: Noticed gradual change in both eyes    Near Vision Acuity: Not satisfied     Eye Comfort: good  Do you use eye drops? 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       OBJECTIVE: See Ophthalmology exam    ASSESSMENT:    ICD-10-CM    1. Astigmatism with presbyopia, right H52.201 EYE EXAM (SIMPLE-NONBILLABLE)    H52.4 REFRACTION   2. Myopia with astigmatism and presbyopia, left H52.12 EYE EXAM (SIMPLE-NONBILLABLE)    H52.202 REFRACTION    H52.4       PLAN:   A final glasses prescription was given.  Allow time for adaptation.  The glasses may cause dizziness and affect depth perception for awhile.  Return to clinic 1 year for Comprehensive Vision Exam      Maria Elena Lamas O.D  [address]  [place]  [address]    [phone]

## 2017-10-03 NOTE — PATIENT INSTRUCTIONS
A final glasses prescription was given.  Allow time for adaptation.  The glasses may cause dizziness and affect depth perception for awhile.  Return to clinic 1 year for Comprehensive Vision Exam      Maria Elena Lamas O.D  [address]  [place]  [address]    [phone]

## 2017-11-02 ENCOUNTER — ALLIED HEALTH/NURSE VISIT (OUTPATIENT)
Dept: FAMILY MEDICINE | Facility: CLINIC | Age: 47
End: 2017-11-02
Payer: COMMERCIAL

## 2017-11-02 VITALS — SYSTOLIC BLOOD PRESSURE: 120 MMHG | DIASTOLIC BLOOD PRESSURE: 72 MMHG

## 2017-11-02 DIAGNOSIS — I10 BENIGN ESSENTIAL HYPERTENSION: Primary | ICD-10-CM

## 2017-11-02 PROCEDURE — 99207 ZZC NO CHARGE NURSE ONLY: CPT | Performed by: INTERNAL MEDICINE

## 2017-11-02 NOTE — PROGRESS NOTES
Flor Fernandez is enrolled/participating in the retail pharmacy Blood Pressure Goals Achievement Program (BPGAP).  Flor Fernandez was evaluated at [facility] on November 2, 2017 at which time her blood pressure was:    BP Readings from Last 3 Encounters:   11/02/17 120/72   05/03/17 134/80   04/27/17 (!) 142/92     Reviewed lifestyle modifications for blood pressure control and reduction: including making healthy food choices, managing weight, getting regular exercise, smoking cessation, reducing alcohol consumption, monitoring blood pressure regularly.     Flor Fernandez is not experiencing symptoms.    Follow-Up: BP is at goal of < 140/90mmHg (patient 18+ years of age with or without diabetes).  Recommended follow-up at pharmacy in 6 months.     Recommendation to Provider: Continue current treatment.     Flor Fernandez was evaluated for enrollment into the PGEN study today.    Patient eligible for enrollment:  No  Patient interested in enrollment:  No    Completed by: Thank you,  Yanira Burnett, PharmD  [facility]  [phone]

## 2017-11-02 NOTE — MR AVS SNAPSHOT
"              After Visit Summary   2017    Flor Fernandez    MRN: [id number]           Patient Information     Date Of Birth          1970        Visit Information        Provider Department      2017 2:54 PM Nayeli Banrard MD [facility]        Today's Diagnoses     Benign essential hypertension    -  1       Follow-ups after your visit        Who to contact     If you have questions or need follow up information about today's clinic visit or your schedule please contact [facility] directly at [phone].  Normal or non-critical lab and imaging results will be communicated to you by iRex Technologieshart, letter or phone within 4 business days after the clinic has received the results. If you do not hear from us within 7 days, please contact the clinic through iRex Technologieshart or phone. If you have a critical or abnormal lab result, we will notify you by phone as soon as possible.  Submit refill requests through Kuldat or call your pharmacy and they will forward the refill request to us. Please allow 3 business days for your refill to be completed.          Additional Information About Your Visit        MyChart Information     Kuldat lets you send messages to your doctor, view your test results, renew your prescriptions, schedule appointments and more. To sign up, go to [email or web address] . Click on \"Log in\" on the left side of the screen, which will take you to the Welcome page. Then click on \"Sign up Now\" on the right side of the page.     You will be asked to enter the access code listed below, as well as some personal information. Please follow the directions to create your username and password.     Your access code is: [email or web address]  Expires: 2018  3:49 PM     Your access code will  in 90 days. If you need help or a new code, please call your [facility] or [phone].        Care EveryWhere ID     This is your Care EveryWhere ID. This could be used by other " organizations to access your [place] medical records  [id number]         Blood Pressure from Last 3 Encounters:   11/08/17 120/74   11/02/17 120/72   05/03/17 134/80    Weight from Last 3 Encounters:   11/08/17 284 lb (128.8 kg)   05/03/17 272 lb (123.4 kg)   03/08/17 273 lb 3.2 oz (123.9 kg)              Today, you had the following     No orders found for display       Primary Care Provider Office Phone # Fax #    Nayeli Barnard [phone] [address] [facility] [address]        Equal Access to Services     [facility] : Hadii aminah Khan, waaxda atilio, qalyndon gonzalezalmada cee, rosana reddy. So [facility] [phone].    ATENCIÓN: Si habla español, tiene a sung disposición servicios gratuitos de asistencia lingüística. Llame al [phone].    We comply with applicable federal civil rights laws and [place] laws. We do not discriminate on the basis of race, color, national origin, age, disability, sex, sexual orientation, or gender identity.            Thank you!     Thank you for choosing [facility]  for your care. Our goal is always to provide you with excellent care. Hearing back from our patients is one way we can continue to improve our services. Please take a few minutes to complete the written survey that you may receive in the mail after your visit with us. Thank you!             Your Updated Medication List - Protect others around you: Learn how to safely use, store and throw away your medicines at www.disposemymeds.org.          This list is accurate as of: 11/2/17 11:59 PM.  Always use your most recent med list.                   Brand Name Dispense Instructions for use Diagnosis    CVS ASPIRIN 325 MG tablet   Generic drug:  aspirin      TAKE 1 TABLET BY MOUTH ONCE DAILY WITH A MEAL.        ferrous fumarate 65 mg ([place]. FE)-Vitamin C 125 mg  MG Tabs tablet    VITRON C     Take 1 tablet by mouth daily     Iron deficiency

## 2017-11-08 ENCOUNTER — OFFICE VISIT (OUTPATIENT)
Dept: INTERNAL MEDICINE | Facility: CLINIC | Age: 47
End: 2017-11-08
Payer: COMMERCIAL

## 2017-11-08 VITALS
OXYGEN SATURATION: 98 % | SYSTOLIC BLOOD PRESSURE: 120 MMHG | HEART RATE: 83 BPM | BODY MASS INDEX: 45.84 KG/M2 | WEIGHT: 284 LBS | DIASTOLIC BLOOD PRESSURE: 74 MMHG | TEMPERATURE: 98.6 F

## 2017-11-08 DIAGNOSIS — E61.1 IRON DEFICIENCY: ICD-10-CM

## 2017-11-08 DIAGNOSIS — I10 BENIGN ESSENTIAL HYPERTENSION: Primary | ICD-10-CM

## 2017-11-08 DIAGNOSIS — E78.5 HYPERLIPIDEMIA LDL GOAL <100: ICD-10-CM

## 2017-11-08 DIAGNOSIS — D75.839 THROMBOCYTOSIS: ICD-10-CM

## 2017-11-08 DIAGNOSIS — Z23 NEED FOR TDAP VACCINATION: ICD-10-CM

## 2017-11-08 DIAGNOSIS — E66.01 MORBID OBESITY (H): ICD-10-CM

## 2017-11-08 LAB
ANION GAP SERPL CALCULATED.3IONS-SCNC: 11 MMOL/L (ref 3–14)
BASOPHILS # BLD AUTO: 0.1 10E9/L (ref 0–0.2)
BASOPHILS NFR BLD AUTO: 0.6 %
BUN SERPL-MCNC: 9 MG/DL (ref 7–30)
CALCIUM SERPL-MCNC: 9.1 MG/DL (ref 8.5–10.1)
CHLORIDE SERPL-SCNC: 95 MMOL/L (ref 94–109)
CO2 SERPL-SCNC: 28 MMOL/L (ref 20–32)
CREAT SERPL-MCNC: 0.51 MG/DL (ref 0.52–1.04)
DIFFERENTIAL METHOD BLD: NORMAL
EOSINOPHIL # BLD AUTO: 0.1 10E9/L (ref 0–0.7)
EOSINOPHIL NFR BLD AUTO: 1.2 %
ERYTHROCYTE [DISTWIDTH] IN BLOOD BY AUTOMATED COUNT: 13.5 % (ref 10–15)
GFR SERPL CREATININE-BSD FRML MDRD: >90 ML/MIN/1.7M2
GLUCOSE SERPL-MCNC: 97 MG/DL (ref 70–99)
HCT VFR BLD AUTO: 41.9 % (ref 35–47)
HGB BLD-MCNC: 13.6 G/DL (ref 11.7–15.7)
LYMPHOCYTES # BLD AUTO: 2.6 10E9/L (ref 0.8–5.3)
LYMPHOCYTES NFR BLD AUTO: 25.2 %
MCH RBC QN AUTO: 28.9 PG (ref 26.5–33)
MCHC RBC AUTO-ENTMCNC: 32.5 G/DL (ref 31.5–36.5)
MCV RBC AUTO: 89 FL (ref 78–100)
MONOCYTES # BLD AUTO: 0.5 10E9/L (ref 0–1.3)
MONOCYTES NFR BLD AUTO: 5 %
NEUTROPHILS # BLD AUTO: 6.9 10E9/L (ref 1.6–8.3)
NEUTROPHILS NFR BLD AUTO: 68 %
PLATELET # BLD AUTO: 435 10E9/L (ref 150–450)
POTASSIUM SERPL-SCNC: 3.5 MMOL/L (ref 3.4–5.3)
RBC # BLD AUTO: 4.7 10E12/L (ref 3.8–5.2)
SODIUM SERPL-SCNC: 134 MMOL/L (ref 133–144)
WBC # BLD AUTO: 10.2 10E9/L (ref 4–11)

## 2017-11-08 PROCEDURE — 85025 COMPLETE CBC W/AUTO DIFF WBC: CPT | Performed by: INTERNAL MEDICINE

## 2017-11-08 PROCEDURE — 99214 OFFICE O/P EST MOD 30 MIN: CPT | Mod: 25 | Performed by: INTERNAL MEDICINE

## 2017-11-08 PROCEDURE — 90715 TDAP VACCINE 7 YRS/> IM: CPT | Performed by: INTERNAL MEDICINE

## 2017-11-08 PROCEDURE — 80048 BASIC METABOLIC PNL TOTAL CA: CPT | Performed by: INTERNAL MEDICINE

## 2017-11-08 PROCEDURE — 90471 IMMUNIZATION ADMIN: CPT | Performed by: INTERNAL MEDICINE

## 2017-11-08 PROCEDURE — 36415 COLL VENOUS BLD VENIPUNCTURE: CPT | Performed by: INTERNAL MEDICINE

## 2017-11-08 RX ORDER — LISINOPRIL 10 MG/1
10 TABLET ORAL DAILY
Qty: 90 TABLET | Refills: 3 | Status: SHIPPED | OUTPATIENT
Start: 2017-11-08 | End: 2018-12-03

## 2017-11-08 RX ORDER — CHLORTHALIDONE 25 MG/1
25 TABLET ORAL DAILY
Qty: 90 TABLET | Refills: 1 | Status: SHIPPED | OUTPATIENT
Start: 2017-11-08 | End: 2018-05-15

## 2017-11-08 RX ORDER — CARVEDILOL 6.25 MG/1
6.25 TABLET ORAL 2 TIMES DAILY WITH MEALS
Qty: 180 TABLET | Refills: 3 | Status: SHIPPED | OUTPATIENT
Start: 2017-11-08 | End: 2019-01-15

## 2017-11-08 RX ORDER — ATORVASTATIN CALCIUM 20 MG/1
TABLET, FILM COATED ORAL
Qty: 90 TABLET | Refills: 3 | Status: SHIPPED | OUTPATIENT
Start: 2017-11-08 | End: 2019-01-02

## 2017-11-08 NOTE — MR AVS SNAPSHOT
After Visit Summary   11/8/2017    Flor Fernandez    MRN: [id number]           Patient Information     Date Of Birth          1970        Visit Information        Provider Department      11/8/2017 1:30 PM Nayeli Barnard MD [facility]        Today's Diagnoses     Benign essential hypertension    -  1    Thrombocytosis (H)        Hyperlipidemia LDL goal <100        Need for Tdap vaccination          Care Instructions    Start food logging on MyFitness Pal for a week.    Look at your insurance drug formulary and see if there are any weight loss medications that are covered.     Schedule an appointment with me in the weight clinic.       [facility]    If you have any questions regarding to your visit please contact your care team:     Team Pink:   Clinic Hours Telephone Number   Internal Medicine:  Dr. Nayeli Hi, NP       7am-7pm  Monday - Thursday   7am-5pm  Fridays  [phone]  (Appointment scheduling available 24/7)    Questions about your visit?  Team Line  [phone]   Urgent Care - [place] - 11am-9pm Monday-Friday Saturday-Sunday- 9am-5pm   [place] - 5pm-9pm Monday-Friday Saturday-Sunday- 9am-5pm  [phone] - [place]   [phone] - [place]       What options do I have for visits at the clinic other than the traditional office visit?  To expand how we care for you, many of our providers are utilizing electronic visits (e-visits) and telephone visits, when medically appropriate, for interactions with their patients rather than a visit in the clinic.   We also offer nurse visits for many medical concerns. Just like any other service, we will bill your insurance company for this type of visit based on time spent on the phone with your provider. Not all insurance companies cover these visits. Please check with your medical insurance if this type of visit is covered. You will be  "responsible for any charges that are not paid by your insurance.      E-visits via Brencohart:  generally incur a $35.00 fee.  Telephone visits:  Time spent on the phone: *charged based on time that is spent on the phone in increments of 10 minutes. Estimated cost:   5-10 mins $30.00   11-20 mins. $59.00   21-30 mins. $85.00   Use Brencohart (secure email communication and access to your chart) to send your primary care provider a message or make an appointment. Ask someone on your Team how to sign up for RotaPostt.    For a Price Quote for your services, please call our [employer] Line at [phone].    As always, Thank you for trusting us with your health care needs!    Laurel Antunez CMA            Follow-ups after your visit        Follow-up notes from your care team     Return in about 6 months (around 5/8/2018).      Who to contact     If you have questions or need follow up information about today's clinic visit or your schedule please contact [facility] directly at [phone].  Normal or non-critical lab and imaging results will be communicated to you by Brencohart, letter or phone within 4 business days after the clinic has received the results. If you do not hear from us within 7 days, please contact the clinic through Brencohart or phone. If you have a critical or abnormal lab result, we will notify you by phone as soon as possible.  Submit refill requests through [employer] or call your pharmacy and they will forward the refill request to us. Please allow 3 business days for your refill to be completed.          Additional Information About Your Visit        [employer] Properties Information     [employer] lets you send messages to your doctor, view your test results, renew your prescriptions, schedule appointments and more. To sign up, go to [email or web address] . Click on \"Log in\" on the left side of the screen, which will take you to the Welcome page. Then click on \"Sign up Now\" on the right side of the page. "     You will be asked to enter the access code listed below, as well as some personal information. Please follow the directions to create your username and password.     Your access code is: [email or web address]  Expires: 2018  3:49 PM     Your access code will  in 90 days. If you need help or a new code, please call your [place] clinic or [phone].        Care EveryWhere ID     This is your Care EveryWhere ID. This could be used by other organizations to access your [place] medical records  [id number]        Your Vitals Were     Pulse Temperature Pulse Oximetry BMI (Body Mass Index)          83 98.6  F (37  C) (Oral) 98% 45.84 kg/m2         Blood Pressure from Last 3 Encounters:   17 120/74   17 120/72   17 134/80    Weight from Last 3 Encounters:   17 284 lb (128.8 kg)   17 272 lb (123.4 kg)   17 273 lb 3.2 oz (123.9 kg)              We Performed the Following     Basic metabolic panel     CBC with platelets differential     TDAP VACCINE (ADACEL)          Where to get your medicines      These medications were sent to [facility] [place]  [place] - [address] [place] [address]  [address] [facility] [address], [facility] [address]     Phone:  [phone]     atorvastatin 20 MG tablet    carvedilol 6.25 MG tablet    chlorthalidone 25 MG tablet    lisinopril 10 MG tablet          Primary Care Provider Office Phone # Fax #    Nayeli Barnard [phone] [address] [facility] [address]        Equal Access to Services     [facility]: Hadii aminah Khan, carlos trmible, qaybta lisaalrosana summers. So [facility] [phone].    ATENCIÓN: Si habla español, tiene a sung disposición servicios gratuitos de asistencia lingüística. Yao al [phone].    We comply with applicable federal civil rights laws and [place] laws. We do not discriminate on the basis of race, color, national origin,  age, disability, sex, sexual orientation, or gender identity.            Thank you!     Thank you for choosing [facility]  for your care. Our goal is always to provide you with excellent care. Hearing back from our patients is one way we can continue to improve our services. Please take a few minutes to complete the written survey that you may receive in the mail after your visit with us. Thank you!             Your Updated Medication List - Protect others around you: Learn how to safely use, store and throw away your medicines at www.disposemymeds.org.          This list is accurate as of: 11/8/17  2:02 PM.  Always use your most recent med list.                   Brand Name Dispense Instructions for use Diagnosis    atorvastatin 20 MG tablet    LIPITOR    90 tablet    TAKE 1 TAB BY MOUTH DAILY. INDICATIONS: CEREBROVASCULAR ACCIDENT OR STROKE    Hyperlipidemia LDL goal <100       carvedilol 6.25 MG tablet    COREG    180 tablet    Take 1 tablet (6.25 mg) by mouth 2 times daily (with meals)        chlorthalidone 25 MG tablet    HYGROTON    90 tablet    Take 1 tablet (25 mg) by mouth daily    Benign essential hypertension       CVS ASPIRIN 325 MG tablet   Generic drug:  aspirin      TAKE 1 TABLET BY MOUTH ONCE DAILY WITH A MEAL.        ferrous fumarate 65 mg ([place]. FE)-Vitamin C 125 mg  MG Tabs tablet    VITRON C     Take 1 tablet by mouth daily    Iron deficiency       lisinopril 10 MG tablet    PRINIVIL/ZESTRIL    90 tablet    Take 1 tablet (10 mg) by mouth daily    Benign essential hypertension

## 2017-11-08 NOTE — PATIENT INSTRUCTIONS
Start food logging on MyFitness Pal for a week.    Look at your insurance drug formulary and see if there are any weight loss medications that are covered.     Schedule an appointment with me in the weight clinic.       [facility]    If you have any questions regarding to your visit please contact your care team:     Team Pink:   Clinic Hours Telephone Number   Internal Medicine:  Dr. Nayeli Hi, NP       7am-7pm  Monday - Thursday   7am-5pm  Fridays  [phone]  (Appointment scheduling available 24/7)    Questions about your visit?  Team Line  [phone]   [facility] [place] [facility] 11am-9pm Monday-Friday Saturday-Sunday- 9am-5pm   [place] - 5pm-9pm Monday-Friday Saturday-Sunday- 9am-5pm  [phone] - [place]   [phone] - [place]       What options do I have for visits at the clinic other than the traditional office visit?  To expand how we care for you, many of our providers are utilizing electronic visits (e-visits) and telephone visits, when medically appropriate, for interactions with their patients rather than a visit in the clinic.   We also offer nurse visits for many medical concerns. Just like any other service, we will bill your insurance company for this type of visit based on time spent on the phone with your provider. Not all insurance companies cover these visits. Please check with your medical insurance if this type of visit is covered. You will be responsible for any charges that are not paid by your insurance.      E-visits via Mustard [employer]:  generally incur a $35.00 fee.  Telephone visits:  Time spent on the phone: *charged based on time that is spent on the phone in increments of 10 minutes. Estimated cost:   5-10 mins $30.00   11-20 mins. $59.00   21-30 mins. $85.00   Use [employer] (secure email communication and access to your chart) to send your primary care provider a message or make an appointment.  Ask someone on your Team how to sign up for Viblio.    For a Price Quote for your services, please call our Consumer Price Line at [phone].    As always, Thank you for trusting us with your health care needs!    Laurel Antunez CMA

## 2017-11-08 NOTE — LETTER
[address]  [place] [address]    November 9, 2017    Flor Fernandez  [address]  [place] [address]          Dear Flor,    Normal kidney function and electrolytes.       If you have any questions please feel free to contact [phone] or myself via Nuro Pharmahart    Enclosed is a copy of your results.     Results for orders placed or performed in visit on 11/08/17   CBC with platelets differential   Result Value Ref Range    WBC 10.2 4.0 - 11.0 10e9/L    RBC Count 4.70 3.8 - 5.2 10e12/L    Hemoglobin 13.6 11.7 - 15.7 g/dL    Hematocrit 41.9 35.0 - 47.0 %    MCV 89 78 - 100 fl    MCH 28.9 26.5 - 33.0 pg    MCHC 32.5 31.5 - 36.5 g/dL    RDW 13.5 10.0 - 15.0 %    Platelet Count 435 150 - 450 10e9/L    Diff Method Automated Method     % Neutrophils 68.0 %    % Lymphocytes 25.2 %    % Monocytes 5.0 %    % Eosinophils 1.2 %    % Basophils 0.6 %    Absolute Neutrophil 6.9 1.6 - 8.3 10e9/L    Absolute Lymphocytes 2.6 0.8 - 5.3 10e9/L    Absolute Monocytes 0.5 0.0 - 1.3 10e9/L    Absolute Eosinophils 0.1 0.0 - 0.7 10e9/L    Absolute Basophils 0.1 0.0 - 0.2 10e9/L   Basic metabolic panel   Result Value Ref Range    Sodium 134 133 - 144 mmol/L    Potassium 3.5 3.4 - 5.3 mmol/L    Chloride 95 94 - 109 mmol/L    Carbon Dioxide 28 20 - 32 mmol/L    Anion Gap 11 3 - 14 mmol/L    Glucose 97 70 - 99 mg/dL    Urea Nitrogen 9 7 - 30 mg/dL    Creatinine 0.51 (L) 0.52 - 1.04 mg/dL    GFR Estimate >90 >60 mL/min/1.7m2    GFR Estimate If Black >90 >60 mL/min/1.7m2    Calcium 9.1 8.5 - 10.1 mg/dL       If you have any questions or concerns, please call myself or my nurse at [phone].      Sincerely,        Nayeli Barnard MD/[place]

## 2017-11-08 NOTE — NURSING NOTE
"Chief Complaint   Patient presents with     Recheck Medication       Initial /74  Pulse 83  Temp 98.6  F (37  C) (Oral)  Wt 284 lb (128.8 kg)  SpO2 98%  BMI 45.84 kg/m2 Estimated body mass index is 45.84 kg/(m^2) as calculated from the following:    Height as of 3/8/17: 5' 6\" (1.676 m).    Weight as of this encounter: 284 lb (128.8 kg).  Medication Reconciliation: complete     Gisela Collins MA    "

## 2017-11-09 NOTE — PROGRESS NOTES
Dear Flor,    Your recent test results are attached.      Normal kidney function and electrolytes.      If you have any questions please feel free to contact [phone] or myself via Cigitalt.    Sincerely,  Jayna Hi, CNP

## 2018-01-15 NOTE — PROGRESS NOTES
INTERNAL MEDICINE  Medical Weight Loss - Initial Evaluation    Primary Care Physician: Nayeli Barnard    Chief Complaint:   Chief Complaint   Patient presents with     Consult     Wt Readings from Last 5 Encounters:   01/17/18 125.9 kg (277 lb 8 oz)   11/08/17 128.8 kg (284 lb)   05/03/17 123.4 kg (272 lb)   03/08/17 123.9 kg (273 lb 3.2 oz)   02/13/17 122.7 kg (270 lb 9.6 oz)       HISTORY OF PRESENT ILLNESS (HPI):    Patient is 47 year old year old female who is here for medical weight loss initial evaluation.       Weight history - She was a normal body weight child. She had her son 17 years ago and started gaining weight a few months after that. Other than that there have been no triggers to her weight gain, besides aging. Patient lost 20 pounds after her divorce. She has tried to diet and exercise more without results, would get frustrated, quit, and gain more weight.     Mom had Non Hodgkin's Lymphoma.   Dad is overweight.   Denies kidney stones, elevated eye pressure, or glaucoma.     Diet - She craves carbohydrates and is drinking regular soda 2 cans a day. Patient finds herself eating too fast and too much in general. She does not eat when she is tired but when she is bored. At work there are temptations. She does not craves sweets, but more so candy and sugar.    Typical Daily Diet  Breakfast: coffee   Lunch: dinner leftovers, frozen meal, soda  Dinner: pasta OR meat with vegetable, side dish (potato, pasta, rice), soda  Late night: chips, candy    Exercise - She has a gym membership and used to go, but does not currently use it. Patient had mixed feelings about going to the gym and did not like going alone. Sometimes she walks her dogs.      She desires to lose weight to Improve overall health and Improve self worth.    Highest adult weight was 284 lbs.   Patient feels her goal weight is 145-150 lbs 1-2 years.     Previous weight loss efforts: OTHER: diet and exercise.    Exercise Habits: Patient is not  "currently exercising.  Previous exercise included going to the gym.       Patient has had weight loss surgery.  Patient has not considered weight loss surgery but would like to reserve it for a last resort- she feels it is \"an easy way out\".  Patient has considered weight loss medication.  Patient has not been on weight loss medication.      OBESITY RELATED COMORBIDITIES:   hypertension and hyperlipidemia    PAST SURGICAL HISTORY:   Past Surgical History:   Procedure Laterality Date     APPENDECTOMY       [facility] AFF SURGICAL PATHOLOGY  12/29/2016       PAST MEDICAL HISTORY:  Past Medical History:   Diagnosis Date     Cerebral infarction (H)      Hypertension        MEDICATIONS:   Current Outpatient Prescriptions   Medication Sig Dispense Refill     topiramate (TOPAMAX) 25 MG tablet Take 1 tablet (25 mg) at bedtime for 1 week, then 1 tablet twice daily for 1 week, then 1 tablet in AM and 2 in PM for 1 week, then 2 tablets twice daily. 70 tablet 1     chlorthalidone (HYGROTON) 25 MG tablet Take 1 tablet (25 mg) by mouth daily 90 tablet 1     atorvastatin (LIPITOR) 20 MG tablet TAKE 1 TAB BY MOUTH DAILY. INDICATIONS: CEREBROVASCULAR ACCIDENT OR STROKE 90 tablet 3     lisinopril (PRINIVIL/ZESTRIL) 10 MG tablet Take 1 tablet (10 mg) by mouth daily 90 tablet 3     carvedilol (COREG) 6.25 MG tablet Take 1 tablet (6.25 mg) by mouth 2 times daily (with meals) 180 tablet 3     ferrous fumarate 65 mg, [place]. FE,-Vitamin C 125 mg (VITRON C)  MG TABS tablet Take 1 tablet by mouth daily       CVS ASPIRIN 325 MG tablet TAKE 1 TABLET BY MOUTH ONCE DAILY WITH A MEAL.  0       ALLERGIES:   No Known Allergies    SOCIAL HISTORY:   Social History     Social History     Marital status: Single     Spouse name: N/A     Number of children: N/A     Years of education: N/A     Occupational History     Not on file.     Social History Main Topics     Smoking status: Former Smoker     Types: Cigarettes     Smokeless tobacco: Never Used     " "Alcohol use No     Drug use: No     Sexual activity: Not on file     Other Topics Concern     Not on file     Social History Narrative       FAMILY HISTORY:   Family History   Problem Relation Age of Onset     Other Cancer Mother      Asthma Mother      DIABETES Father      Glaucoma No family hx of      Macular Degeneration No family hx of        REVIEW OF SYSTEMS:   ROS: 10 point ROS neg other than the symptoms noted above in the HPI.     This document serves as a record of the services and decisions personally performed and made by Nayeli Barnard MD. It was created on his/her behalf by Nahed Winston, trained medical scribe. The creation of this document is based the provider's statements to the medical scribes.    Scribjovon Winston 2:44 PM, January 17, 2018    PHYSICAL EXAMINATION:    VITALS: /78  Pulse 97  Temp 97.5  F (36.4  C) (Oral)  Resp 16  Ht 1.664 m (5' 5.5\")  Wt 125.9 kg (277 lb 8 oz)  LMP 01/03/2018  SpO2 98%  BMI 45.48 kg/m2  GENERAL: Patient is a 47 year old year old female is in no acute distress.  Patient is alert and orientated x 4, pleasant and cooperative with exam. Mood and affect are appropriate.  HEENT:  Head is normocephalic, nontraumatic. Oropharynx normal.  NECK: is supple without lymphadenopathy, thyroidmegaly, or mass.  Trachea midline.   CARDIOVASCULAR:  Regular rate and rhythm without murmurs, rubs, or gallops.  RESPIRATORY:  Lungs are clear to auscultation bilaterally, respiratory effort is normal.   GASTROINTESTINAL:  Abdomen is obese, soft, nontender, without obvious organomegaly or masses.  Positive bowel sounds throughout. No bruits.  LOWER EXTREMITIES:  No edema, cyanosis, ulceration, or chronic venous stasis noted bilaterally.  NEUROLOGIC:   Gait appears normal.  SKIN:  No intertiginous irritation or rash.    1+ posterior tibial pulses bilateral    PSYCH: mentation appears normal, affect normal/bright    LAB RESULTS:   Office Visit on 11/08/2017   Component Date " Value Ref Range Status     WBC 11/08/2017 10.2  4.0 - 11.0 10e9/L Final     RBC Count 11/08/2017 4.70  3.8 - 5.2 10e12/L Final     Hemoglobin 11/08/2017 13.6  11.7 - 15.7 g/dL Final     Hematocrit 11/08/2017 41.9  35.0 - 47.0 % Final     MCV 11/08/2017 89  78 - 100 fl Final     MCH 11/08/2017 28.9  26.5 - 33.0 pg Final     MCHC 11/08/2017 32.5  31.5 - 36.5 g/dL Final     RDW 11/08/2017 13.5  10.0 - 15.0 % Final     Platelet Count 11/08/2017 435  150 - 450 10e9/L Final     Diff Method 11/08/2017 Automated Method   Final     % Neutrophils 11/08/2017 68.0  % Final     % Lymphocytes 11/08/2017 25.2  % Final     % Monocytes 11/08/2017 5.0  % Final     % Eosinophils 11/08/2017 1.2  % Final     % Basophils 11/08/2017 0.6  % Final     Absolute Neutrophil 11/08/2017 6.9  1.6 - 8.3 10e9/L Final     Absolute Lymphocytes 11/08/2017 2.6  0.8 - 5.3 10e9/L Final     Absolute Monocytes 11/08/2017 0.5  0.0 - 1.3 10e9/L Final     Absolute Eosinophils 11/08/2017 0.1  0.0 - 0.7 10e9/L Final     Absolute Basophils 11/08/2017 0.1  0.0 - 0.2 10e9/L Final     Sodium 11/08/2017 134  133 - 144 mmol/L Final     Potassium 11/08/2017 3.5  3.4 - 5.3 mmol/L Final     Chloride 11/08/2017 95  94 - 109 mmol/L Final     Carbon Dioxide 11/08/2017 28  20 - 32 mmol/L Final     Anion Gap 11/08/2017 11  3 - 14 mmol/L Final     Glucose 11/08/2017 97  70 - 99 mg/dL Final    Non Fasting     Urea Nitrogen 11/08/2017 9  7 - 30 mg/dL Final     Creatinine 11/08/2017 0.51* 0.52 - 1.04 mg/dL Final     GFR Estimate 11/08/2017 >90  >60 mL/min/1.7m2 Final    Non  GFR Calc     GFR Estimate If Black 11/08/2017 >90  >60 mL/min/1.7m2 Final    African American GFR Calc     Calcium 11/08/2017 9.1  8.5 - 10.1 mg/dL Final       ASSESSMENT/PLAN:    1. Morbid obesity (H)  Pt started progressively gaining weight after having her son 17 years ago. She struggles with sugar cravings and is drinking two soda a day. She is not exercising. Pt will start eating  breakfast, following the Plate Method, and stop drinking soda. She will start exercising again and look into options through the Advaxis. Start Topamax one tablet at bedtime and increase her dose per instructions on the pill bottle if needed. Discussed that bariatric surgery would be a good tool for her to consider in the future, with the likelihood of medication helping her lose weight. Follow up with me in one month.   Med options: Belviq, Contrave, Victoza  - topiramate (TOPAMAX) 25 MG tablet; Take 1 tablet (25 mg) at bedtime for 1 week, then 1 tablet twice daily for 1 week, then 1 tablet in AM and 2 in PM for 1 week, then 2 tablets twice daily.  Dispense: 70 tablet; Refill: 1    2. Benign essential hypertension  Controlled. Continues on chlorthalidone 25 mg daily, lisinopril 10 mg daily, and carvedilol 6.25 mg bid.    3. Craving for particular food  Sugar and soda cravings. She will start Topamax.         All of patients questions about the weight loss program were answered fully.       Patient Instructions     Stop drinking soda.     Start eating breakfast and following the Plate Method at lunch and dinner.    Try to start doing some form of exercise.   Look into Advaxis- money you can use towards a , fitness classes, etc.    Start Topamax one tablet at bedtime to help with cravings - you can increase your dose per instructions on the pill bottle.     Bariatric surgery is an option in the future; you can always go to an information session to learn more (Davin).    Follow up with me in one month.      [facility]    If you have any questions regarding to your visit please contact your care team:     Team Pink:   Clinic Hours Telephone Number   Internal Medicine:  Dr. Nayeli Hi NP       7am-7pm  Monday - Thursday   7am-5pm  Fridays  [phone]  (Appointment scheduling available 24/7)    Questions about  your visit?  Team Line  [phone]   Urgent Care - [place] and [place] - 11am-9pm Monday-Friday Saturday-Sunday- 9am-5pm   [place] - 5pm-9pm Monday-Friday Saturday-Sunday- 9am-5pm  [phone] - [place]  [phone] - [place]       What options do I have for visits at the clinic other than the traditional office visit?  To expand how we care for you, many of our providers are utilizing electronic visits (e-visits) and telephone visits, when medically appropriate, for interactions with their patients rather than a visit in the clinic.   We also offer nurse visits for many medical concerns. Just like any other service, we will bill your insurance company for this type of visit based on time spent on the phone with your provider. Not all insurance companies cover these visits. Please check with your medical insurance if this type of visit is covered. You will be responsible for any charges that are not paid by your insurance.      E-visits via CrossMedia:  generally incur a $35.00 fee.  Telephone visits:  Time spent on the phone: *charged based on time that is spent on the phone in increments of 10 minutes. Estimated cost:   5-10 mins $30.00   11-20 mins. $59.00   21-30 mins. $85.00   Use Club Emprendehart (secure email communication and access to your chart) to send your primary care provider a message or make an appointment. Ask someone on your Team how to sign up for RidePalt.    For a Price Quote for your services, please call our Consumer Price Line at [phone].    As always, Thank you for trusting us with your health care needs!    Discharged by Alanis WEI CMA ([facility])        I spent 21 minutes of time with the patient and >50% of it was in education and counseling regarding weight management.    The information in this document, created by the medical scribe for me, accurately reflects the services I personally performed and the decisions made by me. I have reviewed and approved this document  for accuracy prior to leaving the patient care area.  Nayeli Barnard MD  2:44 PM, 01/17/18    Nayeli Barnard MD  Internal Medicine    American Board of Obesity Medicine Diplomate     Start 2:43 PM  End 2:56 PM  Post Call  Start 3:04 PM  End 3:13 PM

## 2018-01-17 ENCOUNTER — OFFICE VISIT (OUTPATIENT)
Dept: INTERNAL MEDICINE | Facility: CLINIC | Age: 48
End: 2018-01-17
Payer: COMMERCIAL

## 2018-01-17 VITALS
SYSTOLIC BLOOD PRESSURE: 126 MMHG | DIASTOLIC BLOOD PRESSURE: 78 MMHG | OXYGEN SATURATION: 98 % | HEIGHT: 66 IN | BODY MASS INDEX: 44.6 KG/M2 | RESPIRATION RATE: 16 BRPM | WEIGHT: 277.5 LBS | HEART RATE: 97 BPM | TEMPERATURE: 97.5 F

## 2018-01-17 DIAGNOSIS — I10 BENIGN ESSENTIAL HYPERTENSION: ICD-10-CM

## 2018-01-17 DIAGNOSIS — R63.8 CRAVING FOR PARTICULAR FOOD: ICD-10-CM

## 2018-01-17 DIAGNOSIS — E66.01 MORBID OBESITY (H): Primary | ICD-10-CM

## 2018-01-17 PROCEDURE — 99214 OFFICE O/P EST MOD 30 MIN: CPT | Performed by: INTERNAL MEDICINE

## 2018-01-17 RX ORDER — TOPIRAMATE 25 MG/1
TABLET, FILM COATED ORAL
Qty: 70 TABLET | Refills: 1 | Status: SHIPPED | OUTPATIENT
Start: 2018-01-17 | End: 2018-02-26

## 2018-01-17 NOTE — MR AVS SNAPSHOT
After Visit Summary   1/17/2018    Flor Fernandez    MRN: [id number]           Patient Information     Date Of Birth          1970        Visit Information        Provider Department      1/17/2018 2:30 PM Nayeli Barnard MD [facility]        Today's Diagnoses     Morbid obesity (H)    -  1    Benign essential hypertension        Craving for particular food          Care Instructions    Stop drinking soda.     Start eating breakfast and following the Plate Method at lunch and dinner.    Try to start doing some form of exercise.   Look into [place] [employer] Savings- money you can use towards a , fitness classes, etc.    Start Topamax one tablet at bedtime to help with cravings - you can increase your dose per instructions on the pill bottle.     Bariatric surgery is an option in the future; you can always go to an information session to learn more (Davin).    Follow up with me in one month.      [facility]    If you have any questions regarding to your visit please contact your care team:     Team Pink:   Clinic Hours Telephone Number   Internal Medicine:  Dr. Nayeli Hi, NP       7am-7pm  Monday - Thursday   7am-5pm  Fridays  [phone]  (Appointment scheduling available 24/7)    Questions about your visit?  Team Line  [phone]   Urgent Care - [place] - 11am-9pm Monday-Friday Saturday-Sunday- 9am-5pm   [place] - 5pm-9pm Monday-Friday Saturday-Sunday- 9am-5pm  [phone] - [place]   [phone] - [place]       What options do I have for visits at the clinic other than the traditional office visit?  To expand how we care for you, many of our providers are utilizing electronic visits (e-visits) and telephone visits, when medically appropriate, for interactions with their patients rather than a visit in the clinic.   We also offer nurse visits for many medical  concerns. Just like any other service, we will bill your insurance company for this type of visit based on time spent on the phone with your provider. Not all insurance companies cover these visits. Please check with your medical insurance if this type of visit is covered. You will be responsible for any charges that are not paid by your insurance.      E-visits via Pixonichart:  generally incur a $35.00 fee.  Telephone visits:  Time spent on the phone: *charged based on time that is spent on the phone in increments of 10 minutes. Estimated cost:   5-10 mins $30.00   11-20 mins. $59.00   21-30 mins. $85.00   Use TI[employer]O Software (secure email communication and access to your chart) to send your primary care provider a message or make an appointment. Ask someone on your Team how to sign up for TIBCO Software.    For a Price Quote for your services, please call our [employer] Line at [phone].    As always, Thank you for trusting us with your health care needs!    Discharged by Alanis WEI CMA ([facility])            Follow-ups after your visit        Your next 10 appointments already scheduled     May 08, 2018  3:00 PM CDT   Office Visit with Nayeli Barnard MD   [facility] ([facility])    [address] [place] [id number] [phone]           Bring a current list of meds and any records pertaining to this visit. For Physicals, please bring immunization records and any forms needing to be filled out. Please arrive 10 minutes early to complete paperwork.              Who to contact     If you have questions or need follow up information about today's clinic visit or your schedule please contact [facility] directly at [phone].  Normal or non-critical lab and imaging results will be communicated to you by Pixonichart, letter or phone within 4 business days after the clinic has received the results. If you do not hear from us within 7 days, please contact the clinic through TIBCO Software  "or phone. If you have a critical or abnormal lab result, we will notify you by phone as soon as possible.  Submit refill requests through [employer] Online [employer] or call your pharmacy and they will forward the refill request to us. Please allow 3 business days for your refill to be completed.          Additional Information About Your Visit        Vahnahart Information     [employer] Online Entertainment lets you send messages to your doctor, view your test results, renew your prescriptions, schedule appointments and more. To sign up, go to [email or web address] . Click on \"Log in\" on the left side of the screen, which will take you to the Welcome page. Then click on \"Sign up Now\" on the right side of the page.     You will be asked to enter the access code listed below, as well as some personal information. Please follow the directions to create your username and password.     Your access code is: [email or web address]  Expires: 2018  3:14 PM     Your access code will  in 90 days. If you need help or a new code, please call your [place] clinic or [phone].        Care EveryWhere ID     This is your Care EveryWhere ID. This could be used by other organizations to access your [place] medical records  [id number]        Your Vitals Were     Pulse Temperature Respirations Height Last Period Pulse Oximetry    97 97.5  F (36.4  C) (Oral) 16 5' 5.5\" (1.664 m) 2018 98%    BMI (Body Mass Index)                   45.48 kg/m2            Blood Pressure from Last 3 Encounters:   18 126/78   17 120/74   17 120/72    Weight from Last 3 Encounters:   18 277 lb 8 oz (125.9 kg)   17 284 lb (128.8 kg)   17 272 lb (123.4 kg)              Today, you had the following     No orders found for display         Today's Medication Changes          These changes are accurate as of: 18  3:14 PM.  If you have any questions, ask your nurse or doctor.               Start taking these medicines.        Dose/Directions    topiramate " 25 MG tablet   Commonly known as:  TOPAMAX   Used for:  Morbid obesity (H)   Started by:  Nayeli Barnard MD        Take 1 tablet (25 mg) at bedtime for 1 week, then 1 tablet twice daily for 1 week, then 1 tablet in AM and 2 in PM for 1 week, then 2 tablets twice daily.   Quantity:  70 tablet   Refills:  1            Where to get your medicines      Some of these will need a paper prescription and others can be bought over the counter.  Ask your nurse if you have questions.     Bring a paper prescription for each of these medications     topiramate 25 MG tablet                Primary Care Provider Office Phone # Fax #    Nayeli Barnard [phone] [address] [facility] [address]        Equal Access to Services     [facility] : Hadii aminah mccain hadasho Soomaali, waaxda luqadaha, qaybta kaalmada adeegyada, rosana ruiz . So [facility] [phone].    ATENCIÓN: Si habla español, tiene a sung disposición servicios gratuitos de asistencia lingüística. LlMount St. Mary Hospital [phone].    We comply with applicable federal civil rights laws and [place] laws. We do not discriminate on the basis of race, color, national origin, age, disability, sex, sexual orientation, or gender identity.            Thank you!     Thank you for choosing [facility]  for your care. Our goal is always to provide you with excellent care. Hearing back from our patients is one way we can continue to improve our services. Please take a few minutes to complete the written survey that you may receive in the mail after your visit with us. Thank you!             Your Updated Medication List - Protect others around you: Learn how to safely use, store and throw away your medicines at www.disposemymeds.org.          This list is accurate as of: 1/17/18  3:14 PM.  Always use your most recent med list.                   Brand Name Dispense Instructions for use Diagnosis    atorvastatin 20 MG tablet     LIPITOR    90 tablet    TAKE 1 TAB BY MOUTH DAILY. INDICATIONS: CEREBROVASCULAR ACCIDENT OR STROKE    Hyperlipidemia LDL goal <100       carvedilol 6.25 MG tablet    COREG    180 tablet    Take 1 tablet (6.25 mg) by mouth 2 times daily (with meals)        chlorthalidone 25 MG tablet    HYGROTON    90 tablet    Take 1 tablet (25 mg) by mouth daily    Benign essential hypertension       CVS ASPIRIN 325 MG tablet   Generic drug:  aspirin      TAKE 1 TABLET BY MOUTH ONCE DAILY WITH A MEAL.        ferrous fumarate 65 mg ([place]. FE)-Vitamin C 125 mg  MG Tabs tablet    VITRON C     Take 1 tablet by mouth daily    Iron deficiency       lisinopril 10 MG tablet    PRINIVIL/ZESTRIL    90 tablet    Take 1 tablet (10 mg) by mouth daily    Benign essential hypertension       topiramate 25 MG tablet    TOPAMAX    70 tablet    Take 1 tablet (25 mg) at bedtime for 1 week, then 1 tablet twice daily for 1 week, then 1 tablet in AM and 2 in PM for 1 week, then 2 tablets twice daily.    Morbid obesity (H)

## 2018-01-17 NOTE — PATIENT INSTRUCTIONS
Stop drinking soda.     Start eating breakfast and following the Plate Method at lunch and dinner.    Try to start doing some form of exercise.   Look into [place] [employer] Savings- money you can use towards a , fitness classes, etc.    Start Topamax one tablet at bedtime to help with cravings - you can increase your dose per instructions on the pill bottle.     Bariatric surgery is an option in the future; you can always go to an information session to learn more (Davin).    Follow up with me in one month.      [facility]    If you have any questions regarding to your visit please contact your care team:     Team Pink:   Clinic Hours Telephone Number   Internal Medicine:  Dr. Nayeli Hi, NP       7am-7pm  Monday - Thursday   7am-5pm  Fridays  [phone]  (Appointment scheduling available 24/7)    Questions about your visit?  Team Line  [phone]   Urgent Care - [place] - 11am-9pm Monday-Friday Saturday-Sunday- 9am-5pm   [place] - 5pm-9pm Monday-Friday Saturday-Sunday- 9am-5pm  [phone] - [place]   [phone] - [place]       What options do I have for visits at the clinic other than the traditional office visit?  To expand how we care for you, many of our providers are utilizing electronic visits (e-visits) and telephone visits, when medically appropriate, for interactions with their patients rather than a visit in the clinic.   We also offer nurse visits for many medical concerns. Just like any other service, we will bill your insurance company for this type of visit based on time spent on the phone with your provider. Not all insurance companies cover these visits. Please check with your medical insurance if this type of visit is covered. You will be responsible for any charges that are not paid by your insurance.      E-visits via mGenerator:  generally incur a $35.00 fee.  Telephone  visits:  Time spent on the phone: *charged based on time that is spent on the phone in increments of 10 minutes. Estimated cost:   5-10 mins $30.00   11-20 mins. $59.00   21-30 mins. $85.00   Use Trading Blockt (secure email communication and access to your chart) to send your primary care provider a message or make an appointment. Ask someone on your Team how to sign up for WeSpeke.    For a Price Quote for your services, please call our [employer] Price Line at [phone].    As always, Thank you for trusting us with your health care needs!    Discharged by Alanis WEI CMA ([facility])

## 2018-01-18 PROBLEM — R63.8 CRAVING FOR PARTICULAR FOOD: Status: ACTIVE | Noted: 2018-01-18

## 2018-02-04 ENCOUNTER — RADIANT APPOINTMENT (OUTPATIENT)
Dept: GENERAL RADIOLOGY | Facility: CLINIC | Age: 48
End: 2018-02-04
Attending: FAMILY MEDICINE
Payer: COMMERCIAL

## 2018-02-04 ENCOUNTER — OFFICE VISIT (OUTPATIENT)
Dept: URGENT CARE | Facility: URGENT CARE | Age: 48
End: 2018-02-04
Payer: COMMERCIAL

## 2018-02-04 VITALS
HEART RATE: 120 BPM | OXYGEN SATURATION: 98 % | BODY MASS INDEX: 45.48 KG/M2 | DIASTOLIC BLOOD PRESSURE: 81 MMHG | TEMPERATURE: 98.1 F | SYSTOLIC BLOOD PRESSURE: 140 MMHG | WEIGHT: 277.5 LBS

## 2018-02-04 DIAGNOSIS — S89.92XA KNEE INJURY, LEFT, INITIAL ENCOUNTER: Primary | ICD-10-CM

## 2018-02-04 DIAGNOSIS — S89.92XA KNEE INJURY, LEFT, INITIAL ENCOUNTER: ICD-10-CM

## 2018-02-04 PROCEDURE — 99213 OFFICE O/P EST LOW 20 MIN: CPT | Performed by: FAMILY MEDICINE

## 2018-02-04 PROCEDURE — 73562 X-RAY EXAM OF KNEE 3: CPT | Mod: LT

## 2018-02-04 NOTE — MR AVS SNAPSHOT
After Visit Summary   2/4/2018    Flor Fernandez    MRN: [id number]           Patient Information     Date Of Birth          1970        Visit Information        Provider Department      2/4/2018 9:25 AM Foreign Dailey MD [facility]        Today's Diagnoses     Knee injury, left, initial encounter    -  1      Care Instructions      Treating Strains and Sprains  Strains and sprains happen when muscles or other soft tissues near your bones stretch or tear. These injuries can cause bruising, swelling, and pain. To ease your discomfort and speed the healing of your strain or sprain, follow the tips below. Remember, a strain or sprain can take 6 to 8 weeks to heal.     Important Note: Do not give aspirin to children or teens without discussing it with your healthcare provider first.        Ice first, heat later    Use ice for the first 24 to 48 hours after injury. Ice helps prevent swelling and reduce pain. Ice the injury for no more than 20 minutes at a time and allow at least  20 minutes between icing sessions.    Apply heat after the first 72 hours, once the swelling has gone down. Heat relaxes muscles and increases blood flow. Soak the injured area in warm water or use a heating pad set on low for no more than 15 minutes at a time.  Wrap and elevate    Wrap an injured limb firmly with an elastic bandage. This provides support and helps prevent swelling. Don t wear an elastic bandage overnight. Watch for tingling, numbness, or increased pain, and remove the bandage immediately if any of these occurs.    Elevate the injured area to help reduce swelling and throbbing. It s best to raise an injured limb above the level of your heart.     Medicines    Over-the-counter medicines such as acetaminophen or ibuprofen can help reduce pain. Some also help reduce swelling.    Take medicine only as directed.    Rest the area even if medicines are controlling the pain.  Rest    Rest the  injured area by not using it for 24 hours.    When you re ready, return slowly to your normal activities. Rest the injured area often.    Don t use or walk on an injured limb if it hurts.  Date Last Reviewed: 9/3/2015    0816-6897 [employer]. [address] [place] [address]. All rights reserved. This information is not intended as a substitute for professional medical care. Always follow your healthcare professional's instructions.                Follow-ups after your visit        Your next 10 appointments already scheduled     Feb 26, 2018  2:45 PM CST   SHORT with Nayeli Barnard MD   [facility]    [address] [place] [id number] [phone]            May 08, 2018  3:00 PM CDT   Office Visit with Nayeli Barnard MD   [facility] ([facility]    [address] [place] [phone] [phone]           Bring a current list of meds and any records pertaining to this visit. For Physicals, please bring immunization records and any forms needing to be filled out. Please arrive 10 minutes early to complete paperwork.              Who to contact     If you have questions or need follow up information about today's clinic visit or your schedule please contact [facility] directly at [phone].  Normal or non-critical lab and imaging results will be communicated to you by MyChart, letter or phone within 4 business days after the clinic has received the results. If you do not hear from us within 7 days, please contact the clinic through [employer] or phone. If you have a critical or abnormal lab result, we will notify you by phone as soon as possible.  Submit refill requests through [employer] or call your pharmacy and they will forward the refill request to us. Please allow 3 business days for your refill to be completed.          Additional Information About Your Visit        [facility]  "Information     [employer] lets you send messages to your doctor, view your test results, renew your prescriptions, schedule appointments and more. To sign up, go to [email or web address] . Click on \"Log in\" on the left side of the screen, which will take you to the Welcome page. Then click on \"Sign up Now\" on the right side of the page.     You will be asked to enter the access code listed below, as well as some personal information. Please follow the directions to create your username and password.     Your access code is: [email or web address]  Expires: 2018  3:14 PM     Your access code will  in 90 days. If you need help or a new code, please call your [place] clinic or [phone].        Care EveryWhere ID     This is your [facility] EveryWhere ID. This could be used by other organizations to access your [place] medical records  [id number]        Your Vitals Were     Pulse Temperature Pulse Oximetry BMI (Body Mass Index)          120 98.1  F (36.7  C) (Tympanic) 98% 45.48 kg/m2         Blood Pressure from Last 3 Encounters:   18 140/81   18 126/78   17 120/74    Weight from Last 3 Encounters:   18 277 lb 8 oz (125.9 kg)   18 277 lb 8 oz (125.9 kg)   17 284 lb (128.8 kg)               Primary Care Provider Office Phone # Fax #    Nayeli Cheryl Barnard [phone] [facility] [address]        Equal Access to Services     [facility]: Hadii aminah mccain hadashsantosh Sojung, waaxda luqadaha, qaybta kaalrosana summers. So [facility] [phone].    ATENCIÓN: Si habla español, tiene a sung disposición servicios gratuitos de asistencia lingüística. Llame al [phone].    We comply with applicable federal civil rights laws and [place] laws. We do not discriminate on the basis of race, color, national origin, age, disability, sex, sexual orientation, or gender identity.            Thank you!     Thank you for " choosing [facility]  for your care. Our goal is always to provide you with excellent care. Hearing back from our patients is one way we can continue to improve our services. Please take a few minutes to complete the written survey that you may receive in the mail after your visit with us. Thank you!             Your Updated Medication List - Protect others around you: Learn how to safely use, store and throw away your medicines at www.disposemymeds.org.          This list is accurate as of 2/4/18 10:32 AM.  Always use your most recent med list.                   Brand Name Dispense Instructions for use Diagnosis    atorvastatin 20 MG tablet    LIPITOR    90 tablet    TAKE 1 TAB BY MOUTH DAILY. INDICATIONS: CEREBROVASCULAR ACCIDENT OR STROKE    Hyperlipidemia LDL goal <100       carvedilol 6.25 MG tablet    COREG    180 tablet    Take 1 tablet (6.25 mg) by mouth 2 times daily (with meals)        chlorthalidone 25 MG tablet    HYGROTON    90 tablet    Take 1 tablet (25 mg) by mouth daily    Benign essential hypertension       [employer] ASPIRIN 325 MG tablet   Generic drug:  aspirin      TAKE 1 TABLET BY MOUTH ONCE DAILY WITH A MEAL.        ferrous fumarate 65 mg ([place]. FE)-Vitamin C 125 mg  MG Tabs tablet    VITRON C     Take 1 tablet by mouth daily    Iron deficiency       lisinopril 10 MG tablet    PRINIVIL/ZESTRIL    90 tablet    Take 1 tablet (10 mg) by mouth daily    Benign essential hypertension       topiramate 25 MG tablet    TOPAMAX    70 tablet    Take 1 tablet (25 mg) at bedtime for 1 week, then 1 tablet twice daily for 1 week, then 1 tablet in AM and 2 in PM for 1 week, then 2 tablets twice daily.    Morbid obesity (H)

## 2018-02-04 NOTE — PATIENT INSTRUCTIONS
Treating Strains and Sprains  Strains and sprains happen when muscles or other soft tissues near your bones stretch or tear. These injuries can cause bruising, swelling, and pain. To ease your discomfort and speed the healing of your strain or sprain, follow the tips below. Remember, a strain or sprain can take 6 to 8 weeks to heal.     Important Note: Do not give aspirin to children or teens without discussing it with your healthcare provider first.        Ice first, heat later    Use ice for the first 24 to 48 hours after injury. Ice helps prevent swelling and reduce pain. Ice the injury for no more than 20 minutes at a time and allow at least  20 minutes between icing sessions.    Apply heat after the first 72 hours, once the swelling has gone down. Heat relaxes muscles and increases blood flow. Soak the injured area in warm water or use a heating pad set on low for no more than 15 minutes at a time.  Wrap and elevate    Wrap an injured limb firmly with an elastic bandage. This provides support and helps prevent swelling. Don t wear an elastic bandage overnight. Watch for tingling, numbness, or increased pain, and remove the bandage immediately if any of these occurs.    Elevate the injured area to help reduce swelling and throbbing. It s best to raise an injured limb above the level of your heart.     Medicines    Over-the-counter medicines such as acetaminophen or ibuprofen can help reduce pain. Some also help reduce swelling.    Take medicine only as directed.    Rest the area even if medicines are controlling the pain.  Rest    Rest the injured area by not using it for 24 hours.    When you re ready, return slowly to your normal activities. Rest the injured area often.    Don t use or walk on an injured limb if it hurts.  Date Last Reviewed: 9/3/2015    0431-6578 [employer]. [address] [facility], [place] [address]. All rights reserved. This information is not intended as a substitute for  professional medical care. Always follow your healthcare professional's instructions.

## 2018-02-04 NOTE — PROGRESS NOTES
SUBJECTIVE:                                                    Flor Fernandez is a 48 year old female who presents to clinic today for the following health issues:      Knee Pain      Duration: yesterday     Description (location/character/radiation): left knee    Intensity:  moderate    Accompanying signs and symptoms: pain and swelling     History (similar episodes/previous evaluation): slipped and fell on side walk     Precipitating or alleviating factors: None    Therapies tried and outcome: ice and ibuprofen with some relief     Able to weight bear and ambulate since injury though with some difficulty        Problem list and histories reviewed & adjusted, as indicated.  Additional history: as documented    Patient Active Problem List   Diagnosis     Vertigo     Hyperlipidemia LDL goal <100     Tachycardia     Benign essential hypertension     History of stroke     Iron deficiency     Thrombocytosis (H)     Morbid obesity (H)     Craving for particular food     Past Surgical History:   Procedure Laterality Date     APPENDECTOMY       [facility] SURGICAL PATHOLOGY  12/29/2016       Social History   Substance Use Topics     Smoking status: Former Smoker     Types: Cigarettes     Smokeless tobacco: Never Used     Alcohol use No     Family History   Problem Relation Age of Onset     Other Cancer Mother      Asthma Mother      DIABETES Father      Glaucoma No family hx of      Macular Degeneration No family hx of          Current Outpatient Prescriptions   Medication Sig Dispense Refill     topiramate (TOPAMAX) 25 MG tablet Take 1 tablet (25 mg) at bedtime for 1 week, then 1 tablet twice daily for 1 week, then 1 tablet in AM and 2 in PM for 1 week, then 2 tablets twice daily. 70 tablet 1     chlorthalidone (HYGROTON) 25 MG tablet Take 1 tablet (25 mg) by mouth daily 90 tablet 1     atorvastatin (LIPITOR) 20 MG tablet TAKE 1 TAB BY MOUTH DAILY. INDICATIONS: CEREBROVASCULAR ACCIDENT OR STROKE 90 tablet 3      lisinopril (PRINIVIL/ZESTRIL) 10 MG tablet Take 1 tablet (10 mg) by mouth daily 90 tablet 3     carvedilol (COREG) 6.25 MG tablet Take 1 tablet (6.25 mg) by mouth 2 times daily (with meals) 180 tablet 3     ferrous fumarate 65 mg, [place]. FE,-Vitamin C 125 mg (VITRON C)  MG TABS tablet Take 1 tablet by mouth daily       CVS ASPIRIN 325 MG tablet TAKE 1 TABLET BY MOUTH ONCE DAILY WITH A MEAL.  0     No Known Allergies  Recent Labs   Lab Test  11/08/17   1407  05/03/17   1408   02/17/17   0804  05/27/16 04/25/16   A1C   --    --    --    --    --    --   5.3   LDL   --    --    --   46   --   91   --    HDL   --    --    --   46*   --   42   --    TRIG   --    --    --   181*   --   188*   --    ALT   --    --    --   33   --    --    --    CR  0.51*  0.55   < >   --    < >  0.66   --    GFRESTIMATED  >90  >90  Non African American GFR Calc     < >   --    < >  >60   --    GFRESTBLACK  >90  >90  African American GFR Calc     < >   --    < >  >60   --    POTASSIUM  3.5  3.4   < >   --    < >  3.9   --    TSH   --    --    --   2.84   --    --    --     < > = values in this interval not displayed.      BP Readings from Last 3 Encounters:   02/04/18 140/81   01/17/18 126/78   11/08/17 120/74    Wt Readings from Last 3 Encounters:   02/04/18 277 lb 8 oz (125.9 kg)   01/17/18 277 lb 8 oz (125.9 kg)   11/08/17 284 lb (128.8 kg)                  Labs reviewed in EPIC    ROS:  Constitutional, HEENT, cardiovascular, pulmonary, gi and gu systems are negative, except as otherwise noted.    OBJECTIVE:     /81  Pulse 120  Temp 98.1  F (36.7  C) (Tympanic)  Wt 277 lb 8 oz (125.9 kg)  SpO2 98%  BMI 45.48 kg/m2  Body mass index is 45.48 kg/(m^2).  GENERAL: alert and no distress  EYES: Eyes grossly normal to inspection, PERRL and conjunctivae and sclerae normal  HENT: ear canals and TM's normal, nose and mouth without ulcers or lesions  NECK: no adenopathy, no asymmetry, masses, or scars and thyroid normal to  palpation  RESP: lungs clear to auscultation - no rales, rhonchi or wheezes  CV: regular rates and rhythm, normal S1 S2, no S3 or S4 and no murmur, click or rub  MS: Left knee: Mildly swollen anterior knee with some bruising and tenderness, no erythema or joint laxity noted, gait slightly antalgic, no pedal edema, pulses 3+, sensation to touch and pressure intact  NEURO: Normal strength and tone, mentation intact and speech normal    XR KNEE LT 3 VW   2/4/2018 10:24 AM      HISTORY: slipped and fell on side walk; Knee injury, left, initial  encounter     COMPARISON: None.         IMPRESSION: No evidence of fracture. Joint spaces are well-preserved.  No joint effusion.  ASSESSMENT/PLAN:         ICD-10-CM    1. Knee injury, left, initial encounter S89.92XA XR Knee Left 3 Views     Symptoms are likely secondary to left knee sprain injury.  X-ray findings explained this came back unremarkable. RICE therapy recommended along with over-the-counter analgesia.  Written information provided. Suggested to follow-up if symptoms persist or worsen.  All questions answered.       Patient Instructions       Treating Strains and Sprains  Strains and sprains happen when muscles or other soft tissues near your bones stretch or tear. These injuries can cause bruising, swelling, and pain. To ease your discomfort and speed the healing of your strain or sprain, follow the tips below. Remember, a strain or sprain can take 6 to 8 weeks to heal.     Important Note: Do not give aspirin to children or teens without discussing it with your healthcare provider first.        Ice first, heat later    Use ice for the first 24 to 48 hours after injury. Ice helps prevent swelling and reduce pain. Ice the injury for no more than 20 minutes at a time and allow at least  20 minutes between icing sessions.    Apply heat after the first 72 hours, once the swelling has gone down. Heat relaxes muscles and increases blood flow. Soak the injured area in warm  water or use a heating pad set on low for no more than 15 minutes at a time.  Wrap and elevate    Wrap an injured limb firmly with an elastic bandage. This provides support and helps prevent swelling. Don t wear an elastic bandage overnight. Watch for tingling, numbness, or increased pain, and remove the bandage immediately if any of these occurs.    Elevate the injured area to help reduce swelling and throbbing. It s best to raise an injured limb above the level of your heart.     Medicines    Over-the-counter medicines such as acetaminophen or ibuprofen can help reduce pain. Some also help reduce swelling.    Take medicine only as directed.    Rest the area even if medicines are controlling the pain.  Rest    Rest the injured area by not using it for 24 hours.    When you re ready, return slowly to your normal activities. Rest the injured area often.    Don t use or walk on an injured limb if it hurts.  Date Last Reviewed: 9/3/2015    8442-6461 [employer]. [address] [place] [address], [place] [address]. All rights reserved. This information is not intended as a substitute for professional medical care. Always follow your healthcare professional's instructions.            Foreign Dailey MD  [facility]

## 2018-02-09 ENCOUNTER — OFFICE VISIT (OUTPATIENT)
Dept: ORTHOPEDICS | Facility: CLINIC | Age: 48
End: 2018-02-09

## 2018-02-09 VITALS — RESPIRATION RATE: 16 BRPM | HEIGHT: 66 IN | BODY MASS INDEX: 45.13 KG/M2 | WEIGHT: 280.8 LBS

## 2018-02-09 DIAGNOSIS — S80.02XA CONTUSION OF LEFT KNEE, INITIAL ENCOUNTER: Primary | ICD-10-CM

## 2018-02-09 PROCEDURE — 99203 OFFICE O/P NEW LOW 30 MIN: CPT | Performed by: ORTHOPAEDIC SURGERY

## 2018-02-09 ASSESSMENT — PAIN SCALES - GENERAL: PAINLEVEL: NO PAIN (0)

## 2018-02-09 NOTE — PROGRESS NOTES
chief complaint:   Chief Complaint   Patient presents with     Knee Pain     Left knee injury. DOI 2/3/18. Patient states she slipped on the sidewalk at [employer] and fell and landed straight on her knee. Immediate pain and swelling. Pain is mid shin to knee, anterior. Today she has numbness over the area. She has been using an ACE wrap, icing, and taking ibuprofen.       HISTORY OF PRESENT ILLNESS    Flor Fernandez is a 48 year old female seen for evaluation of a left knee injury that occurred on 2/3/2018. Patient was outside a [employer] and slipped on the sidewalk, landing straight onto the front of her left knee. She had immediate pain and swelling. She was seen at urgent care the next day and had x-rays taken, which didn't show any fracture or swelling. Also started to have bruising about 2 days after the injury. It has been 1 weeks since the injury. She continues to have some pain over the mid shin to the anterior knee. She has frequent numbness over the lateral aspect of the area. No pain at rest or walking, rated a 0/10. For treatment, she has tried using an ACE wrap, icing and is taking ibuprofen.    No history of injury or problems with the left knee in the past.     Present symptoms: no pain, + swelling, + bruising.    Symptoms occur weightbearing, kneeling.    The frequency of symptoms frequent.  Pain severity: 0/10  Pain quality: dull and aching  Exacerbating Factors: pressure to front of the knee.  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  Patient Active Problem List    Diagnosis Date Noted      Craving for particular food 01/18/2018     Priority: Medium     Morbid obesity (H) 11/09/2017     Priority: Medium     Iron deficiency 05/03/2017     Priority: Medium     Thrombocytosis (H) 05/03/2017     Priority: Medium     Vertigo 02/13/2017     Priority: Medium     Hyperlipidemia LDL goal <100 02/13/2017     Priority: Medium     Tachycardia 02/13/2017     Priority: Medium     Benign essential hypertension 02/13/2017     Priority: Medium     History of stroke 02/13/2017     Priority: Medium       Surgical Hx:  has a past surgical history that includes SURGICAL PATHOLOGY (12/29/2016) and appendectomy.    Medications:   Current Outpatient Prescriptions:      topiramate (TOPAMAX) 25 MG tablet, Take 1 tablet (25 mg) at bedtime for 1 week, then 1 tablet twice daily for 1 week, then 1 tablet in AM and 2 in PM for 1 week, then 2 tablets twice daily., Disp: 70 tablet, Rfl: 1     chlorthalidone (HYGROTON) 25 MG tablet, Take 1 tablet (25 mg) by mouth daily, Disp: 90 tablet, Rfl: 1     atorvastatin (LIPITOR) 20 MG tablet, TAKE 1 TAB BY MOUTH DAILY. INDICATIONS: CEREBROVASCULAR ACCIDENT OR STROKE, Disp: 90 tablet, Rfl: 3     lisinopril (PRINIVIL/ZESTRIL) 10 MG tablet, Take 1 tablet (10 mg) by mouth daily, Disp: 90 tablet, Rfl: 3     carvedilol (COREG) 6.25 MG tablet, Take 1 tablet (6.25 mg) by mouth 2 times daily (with meals), Disp: 180 tablet, Rfl: 3     ferrous fumarate 65 mg, [place]. FE,-Vitamin C 125 mg (VITRON C)  MG TABS tablet, Take 1 tablet by mouth daily, Disp: , Rfl:      [employer] ASPIRIN 325 MG tablet, TAKE 1 TABLET BY MOUTH ONCE DAILY WITH A MEAL., Disp: , Rfl: 0    Allergies: No Known Allergies    Social Hx:  at [facility] [employer].  reports that she has quit smoking. Her smoking use included Cigarettes. She has never used smokeless tobacco. She reports that she does not drink alcohol or use illicit drugs.    Family Hx: family history includes Asthma in her mother; DIABETES in her father; Other Cancer in her  "mother. There is no history of Glaucoma or Macular Degeneration.    REVIEW OF SYSTEMS: 10 point ROS neg other than the symptoms noted above in the HPI and PMH. Notables include  CONSTITUTIONAL:NEGATIVE for fever, chills, change in weight  INTEGUMENTARY/SKIN: NEGATIVE for worrisome rashes, moles or lesions  MUSCULOSKELETAL:See HPI above  NEURO: NEGATIVE for weakness, dizziness or paresthesias    This document serves as a record of the services and decisions personally performed and made by Samm Costa MD. It was created on his behalf by Remedios Rojas, a trained medical scribe. The creation of this document is based the provider's statements to the medical scribe.    Scribe Remedios Rojas 8:59 AM 2/9/2018    PHYSICAL EXAM:  Resp 16  Ht 1.664 m (5' 5.5\")  Wt 127.4 kg (280 lb 12.8 oz)  BMI 46.02 kg/m2   GENERAL APPEARANCE: healthy, alert, no distress  SKIN: no suspicious lesions or rashes  NEURO: Normal strength and tone, mentation intact and speech normal  PSYCH:  mentation appears normal and affect normal, not anxious  RESPIRATORY: No increased work of breathing.    Hands: no clubbing, nail pitting or nodes.    BILATERAL LOWER EXTREMITIES:  Gait: normal  Alignment: valgus.  No gross deformities or masses.  No Quad atrophy, strength normal.  Intact sensation deep peroneal nerve, superficial peroneal nerve, med/lat tibial nerve, sural nerve, saphenous nerve  Intact EHL, EDL, TA, FHL, GS, quadriceps hamstrings and hip flexors  Toes warm and well perfused, brisk capillary refill. Palpable 2+ dp pulses.  Bilateral calf soft and nttp or squeeze.  Edema: none    LEFT KNEE EXAM:    Skin: intact, large area of anterior anteromedial knee ecchymosis, inferior to patella. There is mild localized swelling.  Decreased sensation lateral to area of ecchymosis  Squat: 100 %, not limited by pain.     ROM: 0 extension to 120 flexion without discomfort.  Tight hamstrings on straight leg raise.  Effusion: none  Tender: patella , " prepatellar bursa, tibial tubercle and area of ecchymosis.  NTTP med/lat joint line, anterior or posterior knee  McMurrays: negative    Valgus stress/MCL: stable, and non-painful at both 0 and 30 degrees knee flexion  Varus stress: stable, and non-painful at both 0 and 30 degrees knee flexion  Lachmans: neg, firm endpoint  Posterior Drawer stable  Patellofemoral joint:                Extensor Lag: none              Q Angle: normal              Patellar Mobility: normal              Apprehension: negative              Crepitations: minimal   Grind: negative    RIGHT KNEE EXAM:    Skin: intact, no ecchymosis or erythema  Squat: 100 %, not limited by pain.     ROM: 0 extension to 120 flexion  Tight hamstrings on straight leg raise.  Effusion: none  Tender: NTTP med/lat joint line, anterior or posterior knee, calf, shin  McMurrays: negative    Valgus stress/MCL: stable, and non-painful at both 0 and 30 degrees knee flexion  Varus stress: stable, and non-painful at both 0 and 30 degrees knee flexion  Lachmans: neg, firm endpoint  Posterior Drawer stable  Patellofemoral joint:                Extensor Lag: none              Q Angle: normal              Patellar Mobility: normal              Apprehension: negative              Crepitations: minimal   Grind: negative    X-RAY:  3 views left knee from 2/4/2018 were reviewed in clinic today. On my review, no obvious fractures or dislocations. no obvious effusion.        Impression:  48 year old female with acute anterior left knee pain, contusion.    Plan:   * reviewed imaging studies with patient, exam  * consistent with contusion / bruising, swelling.  * numbness likely from contusion, swelling around the nerve.  * expect symptoms to gradually improve over the next weeks  * rest  * ice  * elevation  * knee range of motion to prevent stiffness  * NSAIDS, acetaminophen as needed  * avoid aggravating activities, in particular kneeling  * return to clinic as needed.        The  information in this document, created by a scribe for me, accurately reflects the services I personally performed and the decisions made by me. I have reviewed and approved this document for accuracy.     Samm Costa M.D., M.S.  Dept. of Orthopaedic Surgery  [facility]

## 2018-02-09 NOTE — MR AVS SNAPSHOT
After Visit Summary   2/9/2018    Flor Fernandez    MRN: [id number]           Patient Information     Date Of Birth          1970        Visit Information        Provider Department      2/9/2018 8:45 AM Samm Costa MD [facility]        Today's Diagnoses     Contusion of left knee, initial encounter    -  1      Care Instructions    Please remember to call and schedule a follow up appointment if one was recommended at your earliest convenience.  Orthopedics CLINIC HOURS TELEPHONE NUMBER   Dr. Julissa Quan  Certified Medical Assistant   Monday & Wednesday   8am - 5pm  Thursday 1pm - 5pm  Friday 8am -11:30am Specialty schedulers:   [phone] to schedule your surgery.  Main Clinic:   [phone] to make an appointment with any provider.    Urgent Care locations:    [facility] Monday-Friday Closed  Saturday-Sunday 9am-5pm      Monday-Friday 12pm - 8pm  Saturday-Sunday 9am-5pm [phone]     If SURGERY has been recommended, please call our Specialty Schedulers at [phone] to schedule your procedure.    If you need a medication refill, please contact your pharmacy. Please allow 3 business days for your refill to be completed.    If an MRI or CT scan has been recommended, please call [facility] Schedulers at [phone] to schedule your appointment.  Use [employer] (secure e-mail communication and access to your chart) to send a message or to make an appointment. Please ask how you can sign up for [employer].  Your care team's suggested websites for health information:   [email or web address] : Up to date and easily searchable information on multiple topics.   [email or web address] : [place] dept of heat, public health issues in [place], N1N1              Follow-ups after your visit        Follow-up notes from your care team     Return if symptoms worsen or fail to improve.      Your next 10 appointments already  "scheduled     Feb 26, 2018  2:45 PM CST   SHORT with Nayeli Barnard MD   [facility] ([facility])    [address] [place] [id number] [phone]            May 08, 2018  3:00 PM CDT   Office Visit with Nayeli Barnard MD   [facility] ([address] [id number] [phone]           Bring a current list of meds and any records pertaining to this visit. For Physicals, please bring immunization records and any forms needing to be filled out. Please arrive 10 minutes early to complete paperwork.              Who to contact     If you have questions or need follow up information about today's clinic visit or your schedule please contact [facility] directly at [phone].  Normal or non-critical lab and imaging results will be communicated to you by MyChart, letter or phone within 4 business days after the clinic has received the results. If you do not hear from us within 7 days, please contact the clinic through [employer] or phone. If you have a critical or abnormal lab result, we will notify you by phone as soon as possible.  Submit refill requests through "[employer]" or call your pharmacy and they will forward the refill request to us. Please allow 3 business days for your refill to be completed.          Additional Information About Your Visit        "[employer]" Information     "[employer]" lets you send messages to your doctor, view your test results, renew your prescriptions, schedule appointments and more. To sign up, go to [email or web address] . Click on \"Log in\" on the left side of the screen, which will take you to the Welcome page. Then click on \"Sign up Now\" on the right side of the page.     You will be asked to enter the access code listed below, as well as some personal information. Please follow the directions to create your username and password.     Your access code is: " [email or web address]  Expires: 2018  3:14 PM     Your access code will  in 90 days. If you need help or a new code, please call your [place] clinic or [phone].        Care EveryWhere ID     This is your Care EveryWhere ID. This could be used by other organizations to access your [place] medical records  [id number]        Your Vitals Were     Respirations Height BMI (Body Mass Index)             16 5' 5.5\" (1.664 m) 46.02 kg/m2          Blood Pressure from Last 3 Encounters:   18 140/81   18 126/78   17 120/74    Weight from Last 3 Encounters:   18 280 lb 12.8 oz (127.4 kg)   18 277 lb 8 oz (125.9 kg)   18 277 lb 8 oz (125.9 kg)              Today, you had the following     No orders found for display       Primary Care Provider Office Phone # Fax #    Nayeli Barnard [phone] [address] [facility] [address]        Equal Access to Services     [facility] AH: Hadii aad ku hadasho Sokateali, waaxda luqadaha, qaybta kaalmada adeegyada, rosana ruiz . So [facility] [phone].    ATENCIÓN: Si habla español, tiene a sung disposición servicios gratuitos de asistencia lingüística. Llame al [phone].    We comply with applicable federal civil rights laws and [place] laws. We do not discriminate on the basis of race, color, national origin, age, disability, sex, sexual orientation, or gender identity.            Thank you!     Thank you for choosing [facility]  for your care. Our goal is always to provide you with excellent care. Hearing back from our patients is one way we can continue to improve our services. Please take a few minutes to complete the written survey that you may receive in the mail after your visit with us. Thank you!             Your Updated Medication List - Protect others around you: Learn how to safely use, store and throw away your medicines at www.disposemymeds.org.          This " list is accurate as of 2/9/18 10:17 AM.  Always use your most recent med list.                   Brand Name Dispense Instructions for use Diagnosis    atorvastatin 20 MG tablet    LIPITOR    90 tablet    TAKE 1 TAB BY MOUTH DAILY. INDICATIONS: CEREBROVASCULAR ACCIDENT OR STROKE    Hyperlipidemia LDL goal <100       carvedilol 6.25 MG tablet    COREG    180 tablet    Take 1 tablet (6.25 mg) by mouth 2 times daily (with meals)        chlorthalidone 25 MG tablet    HYGROTON    90 tablet    Take 1 tablet (25 mg) by mouth daily    Benign essential hypertension       CVS ASPIRIN 325 MG tablet   Generic drug:  aspirin      TAKE 1 TABLET BY MOUTH ONCE DAILY WITH A MEAL.        ferrous fumarate 65 mg ([place]. FE)-Vitamin C 125 mg  MG Tabs tablet    VITRON C     Take 1 tablet by mouth daily    Iron deficiency       lisinopril 10 MG tablet    PRINIVIL/ZESTRIL    90 tablet    Take 1 tablet (10 mg) by mouth daily    Benign essential hypertension       topiramate 25 MG tablet    TOPAMAX    70 tablet    Take 1 tablet (25 mg) at bedtime for 1 week, then 1 tablet twice daily for 1 week, then 1 tablet in AM and 2 in PM for 1 week, then 2 tablets twice daily.    Morbid obesity (H)

## 2018-02-09 NOTE — PATIENT INSTRUCTIONS
Please remember to call and schedule a follow up appointment if one was recommended at your earliest convenience.  Orthopedics CLINIC HOURS TELEPHONE NUMBER   Dr. Julissa Quan  Certified Medical Assistant   Monday & Wednesday   8am - 5pm  Thursday 1pm - 5pm  Friday 8am -11:30am Specialty schedulers:   [phone] to schedule your surgery.  Main Clinic:   [phone] to make an appointment with any provider.    Urgent Care locations:    [facility] Monday-Friday Closed  Saturday-Sunday 9am-5pm      Monday-Friday 12pm - 8pm  Saturday-Sunday 9am-5pm [phone]     If SURGERY has been recommended, please call our Specialty Schedulers at [phone] to schedule your procedure.    If you need a medication refill, please contact your pharmacy. Please allow 3 business days for your refill to be completed.    If an MRI or CT scan has been recommended, please call [facility] Schedulers at [phone] to schedule your appointment.  Use [employer] (secure e-mail communication and access to your chart) to send a message or to make an appointment. Please ask how you can sign up for [employer].  Your care team's suggested websites for health information:   [email or web address] : Up to date and easily searchable information on multiple topics.   Www.[email or web address].[email or web address] : [place] dept of heat, public health issues in [place], N1N1

## 2018-02-09 NOTE — LETTER
2/9/2018         RE: Flor Fernandez  [address]  [place] [address]        Dear Colleague,    Thank you for referring your patient, Flor Fernandez, to the [facility]. Please see a copy of my visit note below.    chief complaint:   Chief Complaint   Patient presents with     Knee Pain     Left knee injury. DOI 2/3/18. Patient states she slipped on the sidewalk at [employer] and fell and landed straight on her knee. Immediate pain and swelling. Pain is mid shin to knee, anterior. Today she has numbness over the area. She has been using an ACE wrap, icing, and taking ibuprofen.       HISTORY OF PRESENT ILLNESS    Flor Fernandez is a 48 year old female seen for evaluation of a left knee injury that occurred on 2/3/2018. Patient was outside a [employer] and slipped on the sidewalk, landing straight onto the front of her left knee. She had immediate pain and swelling. She was seen at urgent care the next day and had x-rays taken, which didn't show any fracture or swelling. Also started to have bruising about 2 days after the injury. It has been 1 weeks since the injury. She continues to have some pain over the mid shin to the anterior knee. She has frequent numbness over the lateral aspect of the area. No pain at rest or walking, rated a 0/10. For treatment, she has tried using an ACE wrap, icing and is taking ibuprofen.    No history of injury or problems with the left knee in the past.     Present symptoms: no pain, + swelling, + bruising.    Symptoms occur weightbearing, kneeling.    The frequency of symptoms frequent.  Pain severity: 0/10  Pain quality: dull and aching  Exacerbating Factors: pressure to front of the knee.  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  Patient Active Problem List    Diagnosis Date Noted     Craving for particular food 01/18/2018     Priority: Medium     Morbid obesity (H) 11/09/2017     Priority: Medium     Iron deficiency 05/03/2017     Priority: Medium     Thrombocytosis (H) 05/03/2017     Priority: Medium     Vertigo 02/13/2017     Priority: Medium     Hyperlipidemia LDL goal <100 02/13/2017     Priority: Medium     Tachycardia 02/13/2017     Priority: Medium     Benign essential hypertension 02/13/2017     Priority: Medium     History of stroke 02/13/2017     Priority: Medium       Surgical Hx:  has a past surgical history that includes SURGICAL PATHOLOGY (12/29/2016) and appendectomy.    Medications:   Current Outpatient Prescriptions:      topiramate (TOPAMAX) 25 MG tablet, Take 1 tablet (25 mg) at bedtime for 1 week, then 1 tablet twice daily for 1 week, then 1 tablet in AM and 2 in PM for 1 week, then 2 tablets twice daily., Disp: 70 tablet, Rfl: 1     chlorthalidone (HYGROTON) 25 MG tablet, Take 1 tablet (25 mg) by mouth daily, Disp: 90 tablet, Rfl: 1     atorvastatin (LIPITOR) 20 MG tablet, TAKE 1 TAB BY MOUTH DAILY. INDICATIONS: CEREBROVASCULAR ACCIDENT OR STROKE, Disp: 90 tablet, Rfl: 3     lisinopril (PRINIVIL/ZESTRIL) 10 MG tablet, Take 1 tablet (10 mg) by mouth daily, Disp: 90 tablet, Rfl: 3     carvedilol (COREG) 6.25 MG tablet, Take 1 tablet (6.25 mg) by mouth 2 times daily (with meals), Disp: 180 tablet, Rfl: 3     ferrous fumarate 65 mg, [place]. FE,-Vitamin C 125 mg (VITRON C)  MG TABS tablet, Take 1 tablet by mouth daily, Disp: , Rfl:      CVS ASPIRIN 325 MG tablet, TAKE 1 TABLET BY MOUTH ONCE DAILY WITH A MEAL., Disp: , Rfl: 0    Allergies: No Known Allergies    Social Hx:  at [facility].  reports that she has quit smoking. Her  "smoking use included Cigarettes. She has never used smokeless tobacco. She reports that she does not drink alcohol or use illicit drugs.    Family Hx: family history includes Asthma in her mother; DIABETES in her father; Other Cancer in her mother. There is no history of Glaucoma or Macular Degeneration.    REVIEW OF SYSTEMS: 10 point ROS neg other than the symptoms noted above in the HPI and PMH. Notables include  CONSTITUTIONAL:NEGATIVE for fever, chills, change in weight  INTEGUMENTARY/SKIN: NEGATIVE for worrisome rashes, moles or lesions  MUSCULOSKELETAL:See HPI above  NEURO: NEGATIVE for weakness, dizziness or paresthesias    This document serves as a record of the services and decisions personally performed and made by Samm Costa MD. It was created on his behalf by Remedios Rojas, a trained medical scribe. The creation of this document is based the provider's statements to the medical scribe.    Scribe Remedios Rojas 8:59 AM 2/9/2018    PHYSICAL EXAM:  Resp 16  Ht 1.664 m (5' 5.5\")  Wt 127.4 kg (280 lb 12.8 oz)  BMI 46.02 kg/m2   GENERAL APPEARANCE: healthy, alert, no distress  SKIN: no suspicious lesions or rashes  NEURO: Normal strength and tone, mentation intact and speech normal  PSYCH:  mentation appears normal and affect normal, not anxious  RESPIRATORY: No increased work of breathing.    Hands: no clubbing, nail pitting or nodes.    BILATERAL LOWER EXTREMITIES:  Gait: normal  Alignment: valgus.  No gross deformities or masses.  No Quad atrophy, strength normal.  Intact sensation deep peroneal nerve, superficial peroneal nerve, med/lat tibial nerve, sural nerve, saphenous nerve  Intact EHL, EDL, TA, FHL, GS, quadriceps hamstrings and hip flexors  Toes warm and well perfused, brisk capillary refill. Palpable 2+ dp pulses.  Bilateral calf soft and nttp or squeeze.  Edema: none    LEFT KNEE EXAM:    Skin: intact, large area of anterior anteromedial knee ecchymosis, inferior to patella. There is mild " localized swelling.  Decreased sensation lateral to area of ecchymosis  Squat: 100 %, not limited by pain.     ROM: 0 extension to 120 flexion without discomfort.  Tight hamstrings on straight leg raise.  Effusion: none  Tender: patella , prepatellar bursa, tibial tubercle and area of ecchymosis.  NTTP med/lat joint line, anterior or posterior knee  McMurrays: negative    Valgus stress/MCL: stable, and non-painful at both 0 and 30 degrees knee flexion  Varus stress: stable, and non-painful at both 0 and 30 degrees knee flexion  Lachmans: neg, firm endpoint  Posterior Drawer stable  Patellofemoral joint:                Extensor Lag: none              Q Angle: normal              Patellar Mobility: normal              Apprehension: negative              Crepitations: minimal   Grind: negative    RIGHT KNEE EXAM:    Skin: intact, no ecchymosis or erythema  Squat: 100 %, not limited by pain.     ROM: 0 extension to 120 flexion  Tight hamstrings on straight leg raise.  Effusion: none  Tender: NTTP med/lat joint line, anterior or posterior knee, calf, shin  McMurrays: negative    Valgus stress/MCL: stable, and non-painful at both 0 and 30 degrees knee flexion  Varus stress: stable, and non-painful at both 0 and 30 degrees knee flexion  Lachmans: neg, firm endpoint  Posterior Drawer stable  Patellofemoral joint:                Extensor Lag: none              Q Angle: normal              Patellar Mobility: normal              Apprehension: negative              Crepitations: minimal   Grind: negative    X-RAY:  3 views left knee from 2/4/2018 were reviewed in clinic today. On my review, no obvious fractures or dislocations. no obvious effusion.        Impression:  48 year old female with acute anterior left knee pain, contusion.    Plan:   * reviewed imaging studies with patient, exam  * consistent with contusion / bruising, swelling.  * numbness likely from contusion, swelling around the nerve.  * expect symptoms to  gradually improve over the next weeks  * rest  * ice  * elevation  * knee range of motion to prevent stiffness  * NSAIDS, acetaminophen as needed  * avoid aggravating activities, in particular kneeling  * return to clinic as needed.        The information in this document, created by a scribe for me, accurately reflects the services I personally performed and the decisions made by me. I have reviewed and approved this document for accuracy.     Samm Costa M.D., M.S.  Dept. of Orthopaedic Surgery  [facility]      Again, thank you for allowing me to participate in the care of your patient.        Sincerely,        Samm Costa MD

## 2018-02-26 ENCOUNTER — TELEPHONE (OUTPATIENT)
Dept: INTERNAL MEDICINE | Facility: CLINIC | Age: 48
End: 2018-02-26

## 2018-02-26 ENCOUNTER — OFFICE VISIT (OUTPATIENT)
Dept: INTERNAL MEDICINE | Facility: CLINIC | Age: 48
End: 2018-02-26
Payer: COMMERCIAL

## 2018-02-26 VITALS
OXYGEN SATURATION: 97 % | BODY MASS INDEX: 44.2 KG/M2 | DIASTOLIC BLOOD PRESSURE: 72 MMHG | SYSTOLIC BLOOD PRESSURE: 134 MMHG | HEART RATE: 90 BPM | HEIGHT: 66 IN | WEIGHT: 275 LBS | RESPIRATION RATE: 16 BRPM | TEMPERATURE: 97.4 F

## 2018-02-26 DIAGNOSIS — R63.8 CRAVING FOR PARTICULAR FOOD: ICD-10-CM

## 2018-02-26 DIAGNOSIS — E66.01 MORBID OBESITY (H): Primary | ICD-10-CM

## 2018-02-26 PROCEDURE — 99213 OFFICE O/P EST LOW 20 MIN: CPT | Performed by: INTERNAL MEDICINE

## 2018-02-26 RX ORDER — LIRAGLUTIDE 6 MG/ML
INJECTION SUBCUTANEOUS
Qty: 9 ML | Refills: 1 | Status: SHIPPED | OUTPATIENT
Start: 2018-02-26 | End: 2018-04-10

## 2018-02-26 RX ORDER — TOPIRAMATE 25 MG/1
25 TABLET, FILM COATED ORAL AT BEDTIME
Qty: 90 TABLET | Refills: 1 | Status: SHIPPED | OUTPATIENT
Start: 2018-02-26 | End: 2018-04-10

## 2018-02-26 NOTE — PATIENT INSTRUCTIONS
You could try rowing at the gym until your knee pain resolves.     Schedule with nutritionist Myla Duong.    I will submit Victoza/Saxenda injections to your insurance for coverage.  Please notify me if this is approved and then I will arrange for teaching.     Continue on Topamax unchanged     Follow up with me in 5-6 weeks.          [facility]    If you have any questions regarding to your visit please contact your care team:     Team Pink:   Clinic Hours Telephone Number   Internal Medicine:  Dr. Nayeli Hi, NP       7am-7pm  Monday - Thursday   7am-5pm  Fridays  [phone]  (Appointment scheduling available 24/7)    Questions about your visit?  Team Line  [phone]   Urgent Care - [place] [facility] - 11am-9pm Monday-Friday Saturday-Sunday- 9am-5pm   [place] - 5pm-9pm Monday-Friday Saturday-Sunday- 9am-5pm  [phone] - [facility]  [phone] - [place]       What options do I have for visits at the clinic other than the traditional office visit?  To expand how we care for you, many of our providers are utilizing electronic visits (e-visits) and telephone visits, when medically appropriate, for interactions with their patients rather than a visit in the clinic.   We also offer nurse visits for many medical concerns. Just like any other service, we will bill your insurance company for this type of visit based on time spent on the phone with your provider. Not all insurance companies cover these visits. Please check with your medical insurance if this type of visit is covered. You will be responsible for any charges that are not paid by your insurance.      E-visits via DashLuxe:  generally incur a $35.00 fee.  Telephone visits:  Time spent on the phone: *charged based on time that is spent on the phone in increments of 10 minutes. Estimated cost:   5-10 mins $30.00   11-20 mins. $59.00   21-30 mins. $85.00   Use DashLuxe  (secure email communication and access to your chart) to send your primary care provider a message or make an appointment. Ask someone on your Team how to sign up for InternetArrayhart.    For a Price Quote for your services, please call our Consumer Price Line at [phone].    As always, Thank you for trusting us with your health care needs!    Alanis WEI CMA ([facility])

## 2018-02-26 NOTE — MR AVS SNAPSHOT
After Visit Summary   2/26/2018    Flor Fernandez    MRN: [id number]           Patient Information     Date Of Birth          1970        Visit Information        Provider Department      2/26/2018 2:45 PM Nayeli Barnard MD [facility]        Today's Diagnoses     Morbid obesity (H)    -  1    Craving for particular food          Care Instructions    You could try rowing at the gym until your knee pain resolves.     Schedule with nutritionist Myla Duong.    I will submit Victoza/Saxenda injections to your insurance for coverage.  Please notify me if this is approved and then I will arrange for teaching.     Continue on Topamax unchanged     Follow up with me in 5-6 weeks.          [facility]    If you have any questions regarding to your visit please contact your care team:     Team Pink:   Clinic Hours Telephone Number   Internal Medicine:  Dr. Nayeli Hi, NP       7am-7pm  Monday - Thursday   7am-5pm  Fridays  [phone]  (Appointment scheduling available 24/7)    Questions about your visit?  Team Line  [phone]   Urgent [facility] - [place] [facility] - 11am-9pm Monday-Friday Saturday-Sunday- 9am-5pm   [place] - 5pm-9pm Monday-Friday Saturday-Sunday- 9am-5pm  [phone] - [place]   [phone] - [place]       What options do I have for visits at the clinic other than the traditional office visit?  To expand how we care for you, many of our providers are utilizing electronic visits (e-visits) and telephone visits, when medically appropriate, for interactions with their patients rather than a visit in the clinic.   We also offer nurse visits for many medical concerns. Just like any other service, we will bill your insurance company for this type of visit based on time spent on the phone with your provider. Not all insurance companies cover these visits. Please check with your medical insurance  if this type of visit is covered. You will be responsible for any charges that are not paid by your insurance.      E-visits via AlizÃ© Pharmahart:  generally incur a $35.00 fee.  Telephone visits:  Time spent on the phone: *charged based on time that is spent on the phone in increments of 10 minutes. Estimated cost:   5-10 mins $30.00   11-20 mins. $59.00   21-30 mins. $85.00   Use Wiral Internet Groupt (secure email communication and access to your chart) to send your primary care provider a message or make an appointment. Ask someone on your Team how to sign up for Affectiva.    For a Price Quote for your services, please call our [place]arium Line at [phone].    As always, Thank you for trusting us with your health care needs!    Alanis WEI CMA ([facility])            Follow-ups after your visit        Additional Services     NUTRITION REFERRAL       Your provider has referred you to: G: [facility] [phone]   [email or web address]    Please be aware that coverage of these services is subject to the terms and limitations of your health insurance plan.  Call member services at your health plan with any benefit or coverage questions.      Please bring the following with you to your appointment:    (1) This referral request  (2) Any documents given to you regarding this referral  (3) Any specific questions you have about diet and/or food choices                  Your next 10 appointments already scheduled     May 08, 2018  3:00 PM CDT   Office Visit with [facility]eli [facility]ard MD   [facility]    [address] [id number] [phone]           Bring a current list of meds and any records pertaining to this visit. For Physicals, please bring immunization records and any forms needing to be filled out. Please arrive 10 minutes early to complete paperwork.              Who to contact     If you have questions or need follow up information  "about today's clinic visit or your schedule please contact [facility] directly at [phone].  Normal or non-critical lab and imaging results will be communicated to you by Xylogenicshart, letter or phone within 4 business days after the clinic has received the results. If you do not hear from us within 7 days, please contact the clinic through Xylogenicshart or phone. If you have a critical or abnormal lab result, we will notify you by phone as soon as possible.  Submit refill requests through Needbox AS or call your pharmacy and they will forward the refill request to us. Please allow 3 business days for your refill to be completed.          Additional Information About Your Visit        Xylogenics[employer]Ub[employer] Services Information     Needbox AS lets you send messages to your doctor, view your test results, renew your prescriptions, schedule appointments and more. To sign up, go to [email or web address] . Click on \"Log in\" on the left side of the screen, which will take you to the Welcome page. Then click on \"Sign up Now\" on the right side of the page.     You will be asked to enter the access code listed below, as well as some personal information. Please follow the directions to create your username and password.     Your access code is: [email or web address]  Expires: 2018  3:14 PM     Your access code will  in 90 days. If you need help or a new code, please call your [place] clinic or [phone].        Care EveryWhere ID     This is your Care EveryWhere ID. This could be used by other organizations to access your [place] medical records  [id number]        Your Vitals Were     Pulse Temperature Respirations Height Last Period Pulse Oximetry    90 97.4  F (36.3  C) (Oral) 16 5' 5.5\" (1.664 m) 2018 97%    BMI (Body Mass Index)                   45.07 kg/m2            Blood Pressure from Last 3 Encounters:   18 134/72   18 140/81   18 126/78    Weight from Last 3 Encounters:   18 275 lb (124.7 kg) "   02/09/18 280 lb 12.8 oz (127.4 kg)   02/04/18 277 lb 8 oz (125.9 kg)              We Performed the Following     NUTRITION REFERRAL          Today's Medication Changes          These changes are accurate as of 2/26/18  3:23 PM.  If you have any questions, ask your nurse or doctor.               Start taking these medicines.        Dose/Directions    insulin pen needle 32G X 4 MM   Commonly known as:  BD YAZMIN U/F   Used for:  Morbid obesity (H), Craving for particular food   Started by:  Nayeli Barnard MD        Use once daily or as directed.   Quantity:  100 each   Refills:  3       liraglutide 18 MG/3ML soln   Commonly known as:  VICTOZA   Used for:  Morbid obesity (H), Craving for particular food   Started by:  Nayeli Barnard MD        Take 0.6 mg daily for 1 week, then 1.2 mg daily for 1 week and then 1.8 mg daily.   Quantity:  9 mL   Refills:  1         These medicines have changed or have updated prescriptions.        Dose/Directions    topiramate 25 MG tablet   Commonly known as:  TOPAMAX   This may have changed:    - how much to take  - how to take this  - when to take this  - additional instructions   Used for:  Morbid obesity (H)   Changed by:  Nayeli Barnard MD        Dose:  25 mg   Take 1 tablet (25 mg) by mouth At Bedtime Ok to hold prescription and not fill at this time until patient calls.   Quantity:  90 tablet   Refills:  1            Where to get your medicines      These medications were sent to [facility] [place]  [place] - [address]  [address], [place] [address]     Phone:  [phone]     insulin pen needle 32G X 4 MM    liraglutide 18 MG/3ML soln    topiramate 25 MG tablet                Primary Care Provider Office Phone # Fax #    Nayeli Barnard [phone] [address]  [place] [address]        Equal Access to Services     JOSE FLORES AH: Nathaniel Khan, carlos trimble, jennifer chin  rosana mikecruz trevzioaan ah. So [facility] [phone].    ATENCIÓN: Si hiamla kinjal, tiene a sung disposición servicios gratuitos de asistencia lingüística. Yao al [phone].    We comply with applicable federal civil rights laws and [place] laws. We do not discriminate on the basis of race, color, national origin, age, disability, sex, sexual orientation, or gender identity.            Thank you!     Thank you for choosing [facility]  for your care. Our goal is always to provide you with excellent care. Hearing back from our patients is one way we can continue to improve our services. Please take a few minutes to complete the written survey that you may receive in the mail after your visit with us. Thank you!             Your Updated Medication List - Protect others around you: Learn how to safely use, store and throw away your medicines at www.disposemymeds.org.          This list is accurate as of 2/26/18  3:23 PM.  Always use your most recent med list.                   Brand Name Dispense Instructions for use Diagnosis    atorvastatin 20 MG tablet    LIPITOR    90 tablet    TAKE 1 TAB BY MOUTH DAILY. INDICATIONS: CEREBROVASCULAR ACCIDENT OR STROKE    Hyperlipidemia LDL goal <100       carvedilol 6.25 MG tablet    COREG    180 tablet    Take 1 tablet (6.25 mg) by mouth 2 times daily (with meals)        chlorthalidone 25 MG tablet    HYGROTON    90 tablet    Take 1 tablet (25 mg) by mouth daily    Benign essential hypertension       CVS ASPIRIN 325 MG tablet   Generic drug:  aspirin      TAKE 1 TABLET BY MOUTH ONCE DAILY WITH A MEAL.        ferrous fumarate 65 mg ([place]. FE)-Vitamin C 125 mg  MG Tabs tablet    VITRON C     Take 1 tablet by mouth daily    Iron deficiency       insulin pen needle 32G X 4 MM    BD YAZMIN U/F    100 each    Use once daily or as directed.    Morbid obesity (H), Craving for particular food       liraglutide 18 MG/3ML soln    VICTOZA    9 mL     Take 0.6 mg daily for 1 week, then 1.2 mg daily for 1 week and then 1.8 mg daily.    Morbid obesity (H), Craving for particular food       lisinopril 10 MG tablet    PRINIVIL/ZESTRIL    90 tablet    Take 1 tablet (10 mg) by mouth daily    Benign essential hypertension       topiramate 25 MG tablet    TOPAMAX    90 tablet    Take 1 tablet (25 mg) by mouth At Bedtime Ok to hold prescription and not fill at this time until patient calls.    Morbid obesity (H)

## 2018-02-26 NOTE — TELEPHONE ENCOUNTER
Prior Authorization Retail Medication Request  Medication/Dose: victoza needs a prior auth  Diagnosis and ICD code: /R636  New/Renewal/Insurance Change PA: NEW  Previously Tried and Failed Therapies: ?????    Insurance ID (if provided): [id number]  Insurance Phone (if provided): [phone]    Any additional info from fax request:     If you received a fax notification from an outside Pharmacy:  [facility] Name:[facility]  Pharmacy #:[phone]   Pharmacy Fax:[phone]          Prerequisite Therapy Required--looks like it as to be changed to something else!!!

## 2018-02-26 NOTE — PROGRESS NOTES
INTERNAL MEDICINE  Medical Weight Loss - Follow-up Visit    Chief Complaint:   Chief Complaint   Patient presents with     Weight Check     1 month follow-up topamax, dizzy, constipation     Wt Readings from Last 5 Encounters:   02/26/18 124.7 kg (275 lb)   02/09/18 127.4 kg (280 lb 12.8 oz)   02/04/18 125.9 kg (277 lb 8 oz)   01/17/18 125.9 kg (277 lb 8 oz)   11/08/17 128.8 kg (284 lb)       HISTORY OF PRESENT ILLNESS (HPI):    Patient  returns today for medical weight loss follow-up visit. Patient was last seen by me on 1/17/2018 and has lost 2 pounds.     Update - She started taking Topamax; on day two she got constipated and by day three she could not eat. She decreased her dose to one tablet. Her portions are smaller, she is not eating as much in general, and it has made pop and processed food taste bad. She has not put anymore thought into surgery she would like to take a few months and see what she can do. She feels she struggles most with planning meals. Patient is eating breakfast.    Exercise - Patient is still trying to figure out a workout regime that works for her. She fell and hurt her knee after last visit. She has been walking.      Patient is not seeing dietitian    Patient is not seeing PT       Today we reviewed patient's lab results for labs ordered at last visit and answered patient's questions regarding lab results.     Today patient expresses interest in beginning weight loss medication.  Discussed risks vs benefits of  Topiramate and Victoza/Saxenda  with patient in detail. Also discussed dosing of this medication.     This document serves as a record of the services and decisions personally performed and made by Nayeli Barnard MD. It was created on his/her behalf by Nahed Winston, trained medical scribe. The creation of this document is based the provider's statements to the medical scribes.    Joseph Winston 3:11 PM, February 26, 2018    MEDICATIONS:   Current Outpatient  "Prescriptions   Medication Sig Dispense Refill     liraglutide (VICTOZA) 18 MG/3ML soln Take 0.6 mg daily for 1 week, then 1.2 mg daily for 1 week and then 1.8 mg daily. 9 mL 1     insulin pen needle (BD YAZMIN U/F) 32G X 4 MM Use once daily or as directed. 100 each 3     topiramate (TOPAMAX) 25 MG tablet Take 1 tablet (25 mg) by mouth At Bedtime Ok to hold prescription and not fill at this time until patient calls. 90 tablet 1     chlorthalidone (HYGROTON) 25 MG tablet Take 1 tablet (25 mg) by mouth daily 90 tablet 1     atorvastatin (LIPITOR) 20 MG tablet TAKE 1 TAB BY MOUTH DAILY. INDICATIONS: CEREBROVASCULAR ACCIDENT OR STROKE 90 tablet 3     lisinopril (PRINIVIL/ZESTRIL) 10 MG tablet Take 1 tablet (10 mg) by mouth daily 90 tablet 3     carvedilol (COREG) 6.25 MG tablet Take 1 tablet (6.25 mg) by mouth 2 times daily (with meals) 180 tablet 3     ferrous fumarate 65 mg, [place]. FE,-Vitamin C 125 mg (VITRON C)  MG TABS tablet Take 1 tablet by mouth daily       [facility] ASPIRIN 325 MG tablet TAKE 1 TABLET BY MOUTH ONCE DAILY WITH A MEAL.  0     [DISCONTINUED] topiramate (TOPAMAX) 25 MG tablet Take 1 tablet (25 mg) at bedtime for 1 week, then 1 tablet twice daily for 1 week, then 1 tablet in AM and 2 in PM for 1 week, then 2 tablets twice daily. 70 tablet 1       ALLERGIES:   No Known Allergies    PHYSICAL EXAMINATION:    VITALS: /72  Pulse 90  Temp 97.4  F (36.3  C) (Oral)  Resp 16  Ht 1.664 m (5' 5.5\")  Wt 124.7 kg (275 lb)  LMP 01/29/2018  SpO2 97%  BMI 45.07 kg/m2  GENERAL: Patient is a 48 year old year old female  in no acute distress.  Patient is alert and orientated x 4, pleasant and cooperative with exam.     CARDIOVASCULAR: Regular rate and rhythm without murmurs, rubs, or gallops.  RESPIRATORY:  Lungs are clear to auscultation bilaterally, respiratory effort is normal.   LOWER EXTREMITIES:  No edema noted bilaterally  PSYCH: mentation appears normal, affect normal/bright    LAB RESULTS:   Office " Visit on 11/08/2017   Component Date Value Ref Range Status     WBC 11/08/2017 10.2  4.0 - 11.0 10e9/L Final     RBC Count 11/08/2017 4.70  3.8 - 5.2 10e12/L Final     Hemoglobin 11/08/2017 13.6  11.7 - 15.7 g/dL Final     Hematocrit 11/08/2017 41.9  35.0 - 47.0 % Final     MCV 11/08/2017 89  78 - 100 fl Final     MCH 11/08/2017 28.9  26.5 - 33.0 pg Final     MCHC 11/08/2017 32.5  31.5 - 36.5 g/dL Final     RDW 11/08/2017 13.5  10.0 - 15.0 % Final     Platelet Count 11/08/2017 435  150 - 450 10e9/L Final     Diff Method 11/08/2017 Automated Method   Final     % Neutrophils 11/08/2017 68.0  % Final     % Lymphocytes 11/08/2017 25.2  % Final     % Monocytes 11/08/2017 5.0  % Final     % Eosinophils 11/08/2017 1.2  % Final     % Basophils 11/08/2017 0.6  % Final     Absolute Neutrophil 11/08/2017 6.9  1.6 - 8.3 10e9/L Final     Absolute Lymphocytes 11/08/2017 2.6  0.8 - 5.3 10e9/L Final     Absolute Monocytes 11/08/2017 0.5  0.0 - 1.3 10e9/L Final     Absolute Eosinophils 11/08/2017 0.1  0.0 - 0.7 10e9/L Final     Absolute Basophils 11/08/2017 0.1  0.0 - 0.2 10e9/L Final     Sodium 11/08/2017 134  133 - 144 mmol/L Final     Potassium 11/08/2017 3.5  3.4 - 5.3 mmol/L Final     Chloride 11/08/2017 95  94 - 109 mmol/L Final     Carbon Dioxide 11/08/2017 28  20 - 32 mmol/L Final     Anion Gap 11/08/2017 11  3 - 14 mmol/L Final     Glucose 11/08/2017 97  70 - 99 mg/dL Final    Non Fasting     Urea Nitrogen 11/08/2017 9  7 - 30 mg/dL Final     Creatinine 11/08/2017 0.51* 0.52 - 1.04 mg/dL Final     GFR Estimate 11/08/2017 >90  >60 mL/min/1.7m2 Final    Non  GFR Calc     GFR Estimate If Black 11/08/2017 >90  >60 mL/min/1.7m2 Final    African American GFR Calc     Calcium 11/08/2017 9.1  8.5 - 10.1 mg/dL Final       ASSESSMENT/PLAN:    1. Morbid obesity (H)  Weight is moving in the right direction. Topamax is working well to control her portions and curb her cravings. Pt struggles with meal planning at work and  will schedule with the nutritionist. She is walking for exercise and has a gym membership. Will submit Victoza/Saxenda to her insurance for coverage. Continues on Topamax one tablet daily. Bariatric surgery is still an option in the future but she isn't thinking about this very strongly which is ok. Follow up with me one month after starting the injections. Although contrave and belviq are options I would prefer not to use them due to her heart rate and HYPERTENSION making them more dangerous options.    - liraglutide (VICTOZA) 18 MG/3ML soln; Take 0.6 mg daily for 1 week, then 1.2 mg daily for 1 week and then 1.8 mg daily.  Dispense: 9 mL; Refill: 1  - NUTRITION REFERRAL  - insulin pen needle (BD YAZMIN U/F) 32G X 4 MM; Use once daily or as directed.  Dispense: 100 each; Refill: 3    2. Craving for particular food  See above.  - liraglutide (VICTOZA) 18 MG/3ML soln; Take 0.6 mg daily for 1 week, then 1.2 mg daily for 1 week and then 1.8 mg daily.  Dispense: 9 mL; Refill: 1  - NUTRITION REFERRAL  - insulin pen needle (BD YAZMIN U/F) 32G X 4 MM; Use once daily or as directed.  Dispense: 100 each; Refill: 3      PLAN:    Continue  Topiramate and start Victoza/Saxenda.  Prescription was given for Topiramate and Victoza/Saxenda Patient is to return to the clinic in Other: one month after starting Victoza/Saxenda. Patient is advised to call clinic if she experiences any adverse reaction(s).     Patient was advised to follow up with dietician: Myla Duong.       ORDERS PLACED IN TODAY'S VISIT:  Orders Placed This Encounter   Procedures     NUTRITION REFERRAL       Patient Instructions     You could try rowing at the gym until your knee pain resolves.     Schedule with nutritionist Myla Duong.    I will submit Victoza/Saxenda injections to your insurance for coverage.  Please notify me if this is approved and then I will arrange for teaching.     Continue on Topamax unchanged     Follow up with me in 5-6  weeks.          [facility]    If you have any questions regarding to your visit please contact your care team:     Team Pink:   Clinic Hours Telephone Number   Internal Medicine:  Dr. Nayeli Hi NP       7am-7pm  Monday - Thursday   7am-5pm  Fridays  [phone]  (Appointment scheduling available 24/7)    Questions about your visit?  Team Line  [phone]   Urgent Care - [place] and [facility] - 11am-9pm Monday-Friday Saturday-Sunday- 9am-5pm   [place] - 5pm-9pm Monday-Friday Saturday-Sunday- 9am-5pm  [phone] - [place]   [phone] - [place]       What options do I have for visits at the clinic other than the traditional office visit?  To expand how we care for you, many of our providers are utilizing electronic visits (e-visits) and telephone visits, when medically appropriate, for interactions with their patients rather than a visit in the clinic.   We also offer nurse visits for many medical concerns. Just like any other service, we will bill your insurance company for this type of visit based on time spent on the phone with your provider. Not all insurance companies cover these visits. Please check with your medical insurance if this type of visit is covered. You will be responsible for any charges that are not paid by your insurance.      E-visits via [employer]:  generally incur a $35.00 fee.  Telephone visits:  Time spent on the phone: *charged based on time that is spent on the phone in increments of 10 minutes. Estimated cost:   5-10 mins $30.00   11-20 mins. $59.00   21-30 mins. $85.00   Use [employer] (secure email communication and access to your chart) to send your primary care provider a message or make an appointment. Ask someone on your Team how to sign up for [employer].    For a Price Quote for your services, please call our Consumer Price Line at [phone].    As always, Thank you for trusting us with your health  care needs!    Alanis WEI CMA ([facility])      I spent 13 minutes of time with the patient and >50% of it was in education and counseling regarding weight management.     The information in this document, created by the medical scribe for me, accurately reflects the services I personally performed and the decisions made by me. I have reviewed and approved this document for accuracy prior to leaving the patient care area.  Nayeli Barnard MD  3:11 PM, 02/26/18    Start 3:09 PM  End 3:22 PM

## 2018-02-28 ENCOUNTER — TELEPHONE (OUTPATIENT)
Dept: INTERNAL MEDICINE | Facility: CLINIC | Age: 48
End: 2018-02-28

## 2018-02-28 NOTE — TELEPHONE ENCOUNTER
Prior Authorization Retail Medication Request  Medication/Dose: PA request  Diagnosis and ICD code:   New/Renewal/Insurance Change PA: New  Previously Tried and Failed Therapies:???    Insurance ID (if provided): [id number]  Insurance Phone (if provided): [phone]    Any additional info from fax request:     If you received a fax notification from an outside Pharmacy:  Pharmacy Name:[facility]  Pharmacy #:[phone]  Pharmacy Fax: [phone]

## 2018-03-02 NOTE — TELEPHONE ENCOUNTER
PA Initiation    Medication: SAXENDA 18MG/3ML SOLN  Insurance Company: [employer] - Phone [phone] Fax [phone]  Pharmacy Filling the Rx: [facility] - [address]  Filling Pharmacy Phone: [phone]  Filling Pharmacy Fax:    Start Date: 3/2/2018

## 2018-03-19 ENCOUNTER — OFFICE VISIT (OUTPATIENT)
Dept: PSYCHOLOGY | Facility: CLINIC | Age: 48
End: 2018-03-19
Payer: COMMERCIAL

## 2018-03-19 DIAGNOSIS — F41.9 ANXIETY DISORDER, UNSPECIFIED TYPE: Primary | ICD-10-CM

## 2018-03-19 PROCEDURE — 90791 PSYCH DIAGNOSTIC EVALUATION: CPT | Performed by: MARRIAGE & FAMILY THERAPIST

## 2018-03-19 ASSESSMENT — ANXIETY QUESTIONNAIRES
GAD7 TOTAL SCORE: 6
IF YOU CHECKED OFF ANY PROBLEMS ON THIS QUESTIONNAIRE, HOW DIFFICULT HAVE THESE PROBLEMS MADE IT FOR YOU TO DO YOUR WORK, TAKE CARE OF THINGS AT HOME, OR GET ALONG WITH OTHER PEOPLE: SOMEWHAT DIFFICULT
7. FEELING AFRAID AS IF SOMETHING AWFUL MIGHT HAPPEN: SEVERAL DAYS
2. NOT BEING ABLE TO STOP OR CONTROL WORRYING: SEVERAL DAYS
6. BECOMING EASILY ANNOYED OR IRRITABLE: SEVERAL DAYS
3. WORRYING TOO MUCH ABOUT DIFFERENT THINGS: SEVERAL DAYS
1. FEELING NERVOUS, ANXIOUS, OR ON EDGE: SEVERAL DAYS
5. BEING SO RESTLESS THAT IT IS HARD TO SIT STILL: NOT AT ALL

## 2018-03-19 ASSESSMENT — PATIENT HEALTH QUESTIONNAIRE - PHQ9: 5. POOR APPETITE OR OVEREATING: SEVERAL DAYS
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
## 2018-03-19 NOTE — MR AVS SNAPSHOT
MRN:[id number]                      After Visit Summary   3/19/2018    Flor Fernandez    MRN: [id number]           Visit Information        Provider Department      3/19/2018 10:00 AM Vikas Perez, [facility]      Your next 10 appointments already scheduled     Mar 27, 2018  3:00 PM CDT   Return Visit with Vikas Perez [facility] ([facility])    [address] [place] [id number]   [phone]            Mar 30, 2018 10:30 AM CDT   Consult HOD with  [employer]   [facility]    [address] [place] [id number]   [phone]            Apr 06, 2018  2:00 PM CDT   Return Visit with Vikas Perez [facility] ([facility])    [address] [place] [id number]   [phone]            Apr 10, 2018 11:45 AM CDT   SHORT with Nayeli Barnard MD   [facility] ([facility]    [address] [place] [id number] [phone]            Apr 13, 2018  2:00 PM CDT   Return Visit with Vikas Perez [facility] ([facility])    [address] [place] [id number]   [phone]            May 08, 2018  3:00 PM CDT   Office Visit with Nayeli Barnard MD   [facility] ([facility]    [address] [place] [id number] [phone]           Bring a current list of meds and any records pertaining to this visit. For Physicals, please bring immunization records and any forms needing to be filled out. Please arrive 10 minutes early to complete paperwork.              MyChart Information     [employer] lets you send messages to your doctor, view your test results, renew your prescriptions, schedule appointments and more. To sign up, go to  [email or web address] . Click on \"Log in\" on the left side of the screen, which will take you to the Welcome page. Then click on \"Sign up Now\" on the right side of the page.     You will be asked to enter the access code listed below, as well as some personal information. Please follow the directions to create your username and password.     Your access code is: [email or web address]  Expires: 2018  4:14 PM     Your access code will  in 90 days. If you need help or a new code, please call your [facility] clinic or [phone].        Care EveryWhere ID     This is your Care EveryWhere ID. This could be used by other organizations to access your [facility] medical records  [id number]        Equal Access to Services     JOSE FLORES : Nathaniel Khan, carlos trimble, jennifer mike, rosana ruiz . So [facility] [phone].    ATENCIÓN: Si habla español, tiene a sung disposición servicios gratuitos de asistencia lingüística. Llame al [phone].    We comply with applicable federal civil rights laws and [place] laws. We do not discriminate on the basis of race, color, national origin, age, disability, sex, sexual orientation, or gender identity.            "

## 2018-03-19 NOTE — Clinical Note
I met with Flor for Diagnostic Assessment/therapy intake.  Plan for therapy will be to focus on increasing calm mindset and improving relationships with her children.  I am looking forward to working with Flor!

## 2018-03-20 ASSESSMENT — ANXIETY QUESTIONNAIRES: GAD7 TOTAL SCORE: 6

## 2018-03-20 ASSESSMENT — PATIENT HEALTH QUESTIONNAIRE - PHQ9: SUM OF ALL RESPONSES TO PHQ QUESTIONS 1-9: 5

## 2018-03-27 ENCOUNTER — OFFICE VISIT (OUTPATIENT)
Dept: PSYCHOLOGY | Facility: CLINIC | Age: 48
End: 2018-03-27
Payer: COMMERCIAL

## 2018-03-27 DIAGNOSIS — F41.9 ANXIETY DISORDER, UNSPECIFIED TYPE: Primary | ICD-10-CM

## 2018-03-27 PROCEDURE — 90834 PSYTX W PT 45 MINUTES: CPT | Performed by: MARRIAGE & FAMILY THERAPIST

## 2018-03-27 ASSESSMENT — ANXIETY QUESTIONNAIRES
7. FEELING AFRAID AS IF SOMETHING AWFUL MIGHT HAPPEN: SEVERAL DAYS
3. WORRYING TOO MUCH ABOUT DIFFERENT THINGS: NOT AT ALL
6. BECOMING EASILY ANNOYED OR IRRITABLE: NOT AT ALL
5. BEING SO RESTLESS THAT IT IS HARD TO SIT STILL: NOT AT ALL
GAD7 TOTAL SCORE: 2
2. NOT BEING ABLE TO STOP OR CONTROL WORRYING: NOT AT ALL
IF YOU CHECKED OFF ANY PROBLEMS ON THIS QUESTIONNAIRE, HOW DIFFICULT HAVE THESE PROBLEMS MADE IT FOR YOU TO DO YOUR WORK, TAKE CARE OF THINGS AT HOME, OR GET ALONG WITH OTHER PEOPLE: NOT DIFFICULT AT ALL
1. FEELING NERVOUS, ANXIOUS, OR ON EDGE: NOT AT ALL

## 2018-03-27 ASSESSMENT — PATIENT HEALTH QUESTIONNAIRE - PHQ9: 5. POOR APPETITE OR OVEREATING: SEVERAL DAYS

## 2018-03-28 ASSESSMENT — ANXIETY QUESTIONNAIRES: GAD7 TOTAL SCORE: 2

## 2018-03-28 ASSESSMENT — PATIENT HEALTH QUESTIONNAIRE - PHQ9: SUM OF ALL RESPONSES TO PHQ QUESTIONS 1-9: 1

## 2018-03-28 NOTE — PROGRESS NOTES
Progress Note    Client Name: Flro Fernandez  Date: 3/27/18         Service Type: Individual      Session Start Time: 3:00  Session End Time: 3:52      Session Length: 38-52     Session #: 1     Attendees: Client attended alone    Treatment Plan Last Reviewed: N/A, next due 6/27/18.  PHQ-9 / RODRIGUE-7 : completed.     DATA      Progress Since Last Session (Related to Symptoms / Goals / Homework):   Symptoms: Improved    Homework: Achieved / completed to satisfaction  Completed in session      Episode of Care Goals: Satisfactory progress - PREPARATION (Decided to change - considering how); Intervened by negotiating a change plan and determining options / strategies for behavior change, identifying triggers, exploring social supports, and working towards setting a date to begin behavior change     Current / Ongoing Stressors and Concerns:   Client reported that she continues to experience anxiety and depressive symptoms regarding the status of the relationships with her children following a conflict with her son which resulted in him moving out prior to turning 20.  Client identified wanting to engage in EMDR therapy, and well as working on increasing her calm mindset and improving her interpersonal functioning/relationships as her goals for therapy.     Treatment Objective(s) Addressed in This Session:   use cognitive strategies identified in therapy to challenge anxious thoughts  Identify negative self-talk and behaviors: challenge core beliefs, myths, and actions  Client identified her goals for therapy.     Intervention:   CBT: taught/reviewed with client behavioral triad for understanding interrelationships between her thoughts, feelings, and actions, and letting go of things outside of her control/self-forgiveness.  EMDR: administered Dissociative Experiences Scale (SONALI) assessment to client to determine her readiness for engaging in EMDR therapy.        ASSESSMENT:  Current Emotional / Mental Status (status of significant symptoms):   Risk status (Self / Other harm or suicidal ideation)   Client denies current fears or concerns for personal safety.   Client denies current or recent suicidal ideation or behaviors.   Client denies current or recent homicidal ideation or behaviors.   Client denies current or recent self injurious behavior or ideation.   Client denies other safety concerns.   A safety and risk management plan has not been developed at this time, however client was given the after-hours number / 911 should there be a change in any of these risk factors.     Appearance:   Appropriate    Eye Contact:   Good    Psychomotor Behavior: Normal    Attitude:   Cooperative    Orientation:   All   Speech    Rate / Production: Normal     Volume:  Normal    Mood:    Anxious  Depressed  Normal Sad    Affect:    Appropriate    Thought Content:  Clear    Thought Form:  Coherent  Logical    Insight:    Good      Medication Review:   No current psychiatric medications prescribed     Medication Compliance:   NA     Changes in Health Issues:   None reported     Chemical Use Review:   Substance Use: Chemical use reviewed, no active concerns identified      Tobacco Use: No current tobacco use.       Collateral Reports Completed:   Not Applicable    PLAN: (Client Tasks / Therapist Tasks / Other)  Client agreed to work on letting go of things outside of her control and to complete SONALI assessment to determine her readiness for EMDR therapy between now and follow-up session next week.        Vikas Perez, LILIANAFT                                                         ________________________________________________________________________    Treatment Plan    Client's Name: Flro Fernandez  YOB: 1970    Date: 3/27/18    DSM-V Diagnoses: 300.00 (F41.9) Unspecified Anxiety Disorder  Psychosocial / Contextual Factors: problems with primary support group: conflicts with  client's children  WHODAS: 15    Referral / Collaboration:  Referral to another professional/service is not indicated at this time..    Anticipated number of session or this episode of care: 11-20      MeasurableTreatment Goal(s) related to diagnosis / functional impairment(s)  Goal 1: Client will successfully process through past trauma defined as reporting 0 Subjective Units of Distress related to trauma on 0-10 scale and 7 on Validity of (positive) Cognition scale about self on 1-7 scale   I will know I've met my goal when I feel less stuck.       Objective #A (Client Action)    Status: New - Date: 3/27/18      Client will identify past traumatic events/memories which are causing current distress.     Intervention(s)  Therapist will take client's history and facilitate client's identification of targets for EMDR.     Objective #B  Client will complete needed assessment(s) and [facility] EMDR resourcing to confirm readiness for EMDR.    Status: New - Date: 3/27/18      Intervention(s)  Therapist will administer Dissociative Experiences Scale, Multidimensional Inventory of Dissociation as needed and complete [facility] EMDR resourcing with client.     Objective #C  Client will engage in installing at least 2 EMDR resources.  Status: New - Date: 3/27/18      Intervention(s)  Therapist will complete EMDR resourcing (Container, Remote Control, grounding/progressive muscle relaxation, Light Stream, Inner Advisor) with client.     Objective #D (Client Action)    Status: New - Date: 3/27/18      Client will engage in reprocessing all past traumatic event/memory targets.     Intervention(s)   Therapist will complete EMDR reprocessing with client.    Goal 2: Client will increase overall baseline calm mindset by 2 points on a 1-10 Likert scale per self-report (10 = optimal calm mindset).    I will know I've met my goal when I feel less anxious.      Objective #A (Client Action)    Status: New - Date: 3/27/18     Client will  use cognitive strategies identified in therapy to challenge anxious thoughts.    Intervention(s)  Therapist will teach emotional regulation skills. CBT/REBT ABCD model.    Objective #B  Client will use at least 2 new coping skills for anxiety management in the next 12 weeks.    Status: New - Date: 3/27/18     Intervention(s)  Therapist will teach emotional regulation skills. DBT Core Mindfulness, Distress Tolerance, Emotion Regulation, and Interpersonal Effectiveness skills.    Goal 3: Client will improve her interpersonal functioning/relationships by 2 points on a 1-10 Likert scale per self-report (10 = optimal interpersonal functioning/relationships).    I will know I've met my goal when my relationships with my children have improved.      Objective #A (Client Action)    Status: New - Date: 3/27/18     Client will learn & utilize at least 2 new assertive communication skills weekly.    Intervention(s)  Therapist will teach Nonviolent Communication and DBT Interpersonal Effectiveness skills..    Client has reviewed and agreed to the above plan.      JENNIFER Horowitz  March 27, 2018

## 2018-03-30 ENCOUNTER — TELEPHONE (OUTPATIENT)
Dept: FAMILY MEDICINE | Facility: CLINIC | Age: 48
End: 2018-03-30

## 2018-03-30 NOTE — TELEPHONE ENCOUNTER
Dr. Barnard is out of the office today and will be back on 4/2/18  Routing to Dr. Akil Schwartz, RN

## 2018-03-30 NOTE — TELEPHONE ENCOUNTER
Insurance finally approved Saxenda but for a $1200.00 copay, a coupon card will only bring the cost down to $1000.00.  Talked to patient, this is too expensive.  Patient is wondering if there are other options?    Thank you,  Yanira Burnett, PharmD  [facility]  [phone]

## 2018-04-02 NOTE — TELEPHONE ENCOUNTER
Called home phone and woman answered and stated she is at work. MA spoke to patient and work and informed her of below message.   Nikky FLYNN CMA ([facility])

## 2018-04-02 NOTE — TELEPHONE ENCOUNTER
With her medical history, I would say no, not at this time.  The next step would be to rethink surgical options.   Dr. Barnard

## 2018-04-06 ENCOUNTER — OFFICE VISIT (OUTPATIENT)
Dept: PSYCHOLOGY | Facility: CLINIC | Age: 48
End: 2018-04-06
Payer: COMMERCIAL

## 2018-04-06 DIAGNOSIS — F41.9 ANXIETY DISORDER, UNSPECIFIED TYPE: Primary | ICD-10-CM

## 2018-04-06 PROCEDURE — 90834 PSYTX W PT 45 MINUTES: CPT | Performed by: MARRIAGE & FAMILY THERAPIST

## 2018-04-06 ASSESSMENT — ANXIETY QUESTIONNAIRES
7. FEELING AFRAID AS IF SOMETHING AWFUL MIGHT HAPPEN: NOT AT ALL
5. BEING SO RESTLESS THAT IT IS HARD TO SIT STILL: NOT AT ALL
6. BECOMING EASILY ANNOYED OR IRRITABLE: NOT AT ALL
GAD7 TOTAL SCORE: 0
3. WORRYING TOO MUCH ABOUT DIFFERENT THINGS: NOT AT ALL
2. NOT BEING ABLE TO STOP OR CONTROL WORRYING: NOT AT ALL
1. FEELING NERVOUS, ANXIOUS, OR ON EDGE: NOT AT ALL

## 2018-04-06 ASSESSMENT — PATIENT HEALTH QUESTIONNAIRE - PHQ9: 5. POOR APPETITE OR OVEREATING: NOT AT ALL

## 2018-04-06 NOTE — MR AVS SNAPSHOT
"                  MRN:[id number]                      After Visit Summary   4/6/2018    Flor Fernandez    MRN: [id number]           Visit Information        Provider Department      4/6/2018 2:00 PM Vikas Perez, [facility]      Your next 10 appointments already scheduled     Apr 10, 2018 11:45 AM CDT   SHORT with Nayeli Barnard MD   [facility] ([address] [address]   [phone]            Apr 13, 2018  2:00 PM CDT   Return Visit with Vikas Perez [facility] ([facility])    [address] [place] [address]   [phone]            Apr 17, 2018  2:00 PM CDT   Return Visit with Vikas Perez [facility] ([facility])    [address] [place] [address]   [phone]            Apr 27, 2018  1:00 PM CDT   Return Visit with Vikas Perez [facility] ([facility])    [address] [place] [address]   [phone]            May 15, 2018  3:00 PM CDT   [facility]    [address] [place] [id number]   [phone]           Bring a current list of meds and any records pertaining to this visit. For Physicals, please bring immunization records and any forms needing to be filled out. Please arrive 10 minutes early to complete paperwork.              [employer] Information     [employer] lets you send messages to your doctor, view your test results, renew your prescriptions, schedule appointments and more. To sign up, go to [email or web address] . Click on \"Log in\" on the left side of the screen, which will take you to the Welcome page. Then click on \"Sign up Now\" on the right side of the page.     You will be asked to enter the access code " listed below, as well as some personal information. Please follow the directions to create your username and password.     Your access code is: [email or web address]  Expires: 2018  4:14 PM     Your access code will  in 90 days. If you need help or a new code, please call your [place] clinic or [phone].        Care EveryWhere ID     This is your Care EveryWhere ID. This could be used by other organizations to access your [place] medical records  [id number]        Equal Access to Services     [facility]SE [facility] : Hadii aminah lopez Sojung, walaurelda luqadaha, qalyndon kaalmaprasanna mike, rosana ruiz . So [facility] [phone].    ATENCIÓN: Si habla español, tiene a sung disposición servicios gratuitos de asistencia lingüística. Llame al [phone].    We comply with applicable federal civil rights laws and [place] laws. We do not discriminate on the basis of race, color, national origin, age, disability, sex, sexual orientation, or gender identity.

## 2018-04-06 NOTE — PROGRESS NOTES
"                                             Progress Note    Client Name: Flor Fernandez  Date: 4/6/18         Service Type: Individual      Session Start Time: 2:00  Session End Time: 2:52      Session Length: 38-52     Session #: 2     Attendees: Client attended alone    Treatment Plan Last Reviewed: N/A, next due 6/27/18.  PHQ-9 / RODRIGUE-7 : completed.     DATA      Progress Since Last Session (Related to Symptoms / Goals / Homework):   Symptoms: Improved    Homework: Achieved / completed to satisfaction      Episode of Care Goals: Satisfactory progress - ACTION (Actively working towards change); Intervened by reinforcing change plan / affirming steps taken     Current / Ongoing Stressors and Concerns:   Client reported that she is often only able to focus on negative things in her life.  Client reported having belief of \"I don't deserve good things\" based upon her experiences of not having good things.  Client also reported having anxiety around her parenting and belief that \"I didn't do a good job\" parenting.     Treatment Objective(s) Addressed in This Session:   use cognitive strategies identified in therapy to challenge anxious thoughts  Identify negative self-talk and behaviors: challenge core beliefs, myths, and actions     Intervention:   CBT: taught/reviewed with client strategy of becoming \"personal \" to look for evidence of irrational beliefs.  Also reviewed with client \"3 good things\" exercise to improve her focus on positives.        ASSESSMENT: Current Emotional / Mental Status (status of significant symptoms):   Risk status (Self / Other harm or suicidal ideation)   Client denies current fears or concerns for personal safety.   Client denies current or recent suicidal ideation or behaviors.   Client denies current or recent homicidal ideation or behaviors.   Client denies current or recent self injurious behavior or ideation.   Client denies other safety concerns.   A safety and risk " "management plan has not been developed at this time, however client was given the after-hours number / 911 should there be a change in any of these risk factors.     Appearance:   Appropriate    Eye Contact:   Good    Psychomotor Behavior: Normal    Attitude:   Cooperative    Orientation:   All   Speech    Rate / Production: Normal     Volume:  Normal    Mood:    Anxious  Depressed  Normal   Affect:    Appropriate    Thought Content:  Clear    Thought Form:  Coherent  Logical    Insight:    Good      Medication Review:   No current psychiatric medications prescribed     Medication Compliance:   NA     Changes in Health Issues:   None reported     Chemical Use Review:   Substance Use: Chemical use reviewed, no active concerns identified      Tobacco Use: No current tobacco use.       Collateral Reports Completed:   Not Applicable    PLAN: (Client Tasks / Therapist Tasks / Other)  Client agreed to work on identifying evidence supporting her irrational beliefs and practicing \"3 good things\" exercise daily to improve her mood/positive focus between now and follow-up session next week.        Vikas Perez, [facility]                                                         ________________________________________________________________________    Treatment Plan    Client's Name: Flor Fernandez  YOB: 1970    Date: 3/27/18    DSM-V Diagnoses: 300.00 (F41.9) Unspecified Anxiety Disorder  Psychosocial / Contextual Factors: problems with primary support group: conflicts with client's children  WHODAS: 15    Referral / Collaboration:  Referral to another professional/service is not indicated at this time..    Anticipated number of session or this episode of care: 11-20      MeasurableTreatment Goal(s) related to diagnosis / functional impairment(s)  Goal 1: Client will successfully process through past trauma defined as reporting 0 Subjective Units of Distress related to trauma on 0-10 scale and 7 on " Validity of (positive) Cognition scale about self on 1-7 scale   I will know I've met my goal when I feel less stuck.       Objective #A (Client Action)    Status: New - Date: 3/27/18      Client will identify past traumatic events/memories which are causing current distress.     Intervention(s)  Therapist will take client's history and facilitate client's identification of targets for EMDR.     Objective #B  Client will complete needed assessment(s) and [facility] EMDR resourcing to confirm readiness for EMDR.    Status: New - Date: 3/27/18      Intervention(s)  Therapist will administer Dissociative Experiences Scale, Multidimensional Inventory of Dissociation as needed and complete [facility] EMDR resourcing with client.     Objective #C  Client will engage in installing at least 2 EMDR resources.  Status: New - Date: 3/27/18      Intervention(s)  Therapist will complete EMDR resourcing (Container, Remote Control, grounding/progressive muscle relaxation, Light Stream, Inner Advisor) with client.     Objective #D (Client Action)    Status: New - Date: 3/27/18      Client will engage in reprocessing all past traumatic event/memory targets.     Intervention(s)   Therapist will complete EMDR reprocessing with client.    Goal 2: Client will increase overall baseline calm mindset by 2 points on a 1-10 Likert scale per self-report (10 = optimal calm mindset).    I will know I've met my goal when I feel less anxious.      Objective #A (Client Action)    Status: New - Date: 3/27/18     Client will use cognitive strategies identified in therapy to challenge anxious thoughts.    Intervention(s)  Therapist will teach emotional regulation skills. CBT/REBT ABCD model.    Objective #B  Client will use at least 2 new coping skills for anxiety management in the next 12 weeks.    Status: New - Date: 3/27/18     Intervention(s)  Therapist will teach emotional regulation skills. DBT Core Mindfulness, Distress Tolerance, Emotion  Regulation, and Interpersonal Effectiveness skills.    Goal 3: Client will improve her interpersonal functioning/relationships by 2 points on a 1-10 Likert scale per self-report (10 = optimal interpersonal functioning/relationships).    I will know I've met my goal when my relationships with my children have improved.      Objective #A (Client Action)    Status: New - Date: 3/27/18     Client will learn & utilize at least 2 new assertive communication skills weekly.    Intervention(s)  Therapist will teach Nonviolent Communication and DBT Interpersonal Effectiveness skills..    Client has reviewed and agreed to the above plan.      Vikas Perez, LMFT  April 6, 2018

## 2018-04-07 ASSESSMENT — PATIENT HEALTH QUESTIONNAIRE - PHQ9: SUM OF ALL RESPONSES TO PHQ QUESTIONS 1-9: 0

## 2018-04-07 ASSESSMENT — ANXIETY QUESTIONNAIRES: GAD7 TOTAL SCORE: 0

## 2018-04-09 NOTE — PROGRESS NOTES
INTERNAL MEDICINE  Medical Weight Loss - Follow-up Visit    Chief Complaint:   Chief Complaint   Patient presents with     Weight Check     6 week follow-up     Health Maintenance     Wt Readings from Last 5 Encounters:   04/10/18 124.1 kg (273 lb 8 oz)   02/26/18 124.7 kg (275 lb)   02/09/18 127.4 kg (280 lb 12.8 oz)   02/04/18 125.9 kg (277 lb 8 oz)   01/17/18 125.9 kg (277 lb 8 oz)     HISTORY OF PRESENT ILLNESS (HPI):    Patient  returns today for medical weight loss follow-up visit. Patient was last seen by me on 3/30/2018 and has lost 4 pounds and 2.5% body fat.     Update - Her insurance approved Saxenda but the cost was still too expensive. She has been struggling the most with exercise but has been more affected by personal issues the past 6 weeks. Her youngest child moved out early because he did not like her rules so she lives by herself now. Her anxiety has heightened and she started seeing her therapist again which has been helpful. Vikas Perez told her to ask if there was a medical reason causing her anxiety. She has two friends who are a good support group and they are going to sign up for the gym together. No problems with binging or cravings. Got ideas for meal planning from [facility] and decided not to see the nutritionist. Taking Topamax at bedtime. Patient says she wants to get bariatric surgery because she is getting denied with her weight loss medications.    Additional notes:  She has constipation once a month and wonders if she can take Metamucil.       Patient is not seeing dietitian.  Patient is not seeing PT.     Today we reviewed patient's lab results for labs ordered at last visit and answered patient's questions regarding lab results.     MEDICATIONS:   Current Outpatient Prescriptions   Medication Sig Dispense Refill     cyanocobalamin (VITAMIN  B-12) 1000 MCG tablet Take 1,000 mcg by mouth daily       Multiple Vitamins-Minerals (MULTI-VITAMIN GUMMIES) CHEW        topiramate  (TOPAMAX) 25 MG tablet Take 2 tablets (50 mg) by mouth At Bedtime Ok to hold prescription and not fill at this time until patient calls. 180 tablet 1     chlorthalidone (HYGROTON) 25 MG tablet Take 1 tablet (25 mg) by mouth daily 90 tablet 1     atorvastatin (LIPITOR) 20 MG tablet TAKE 1 TAB BY MOUTH DAILY. INDICATIONS: CEREBROVASCULAR ACCIDENT OR STROKE 90 tablet 3     lisinopril (PRINIVIL/ZESTRIL) 10 MG tablet Take 1 tablet (10 mg) by mouth daily 90 tablet 3     carvedilol (COREG) 6.25 MG tablet Take 1 tablet (6.25 mg) by mouth 2 times daily (with meals) 180 tablet 3     ferrous fumarate 65 mg, [place]. FE,-Vitamin C 125 mg (VITRON C)  MG TABS tablet Take 1 tablet by mouth daily       CVS ASPIRIN 325 MG tablet TAKE 1 TABLET BY MOUTH ONCE DAILY WITH A MEAL.  0     [DISCONTINUED] topiramate (TOPAMAX) 25 MG tablet Take 1 tablet (25 mg) by mouth At Bedtime Ok to hold prescription and not fill at this time until patient calls. 90 tablet 1       ALLERGIES:   No Known Allergies    PHYSICAL EXAMINATION:    VITALS: /60 (BP Location: Left arm, Cuff Size: Adult Large)  Pulse 89  Temp 97.2  F (36.2  C) (Oral)  Resp 20  Wt 124.1 kg (273 lb 8 oz)  LMP 04/09/2018 (Exact Date)  SpO2 99%  Breastfeeding? No  BMI 44.82 kg/m2  GENERAL: Patient is a 48 year old year old female  in no acute distress.  Patient is alert and orientated x 4, pleasant and cooperative with exam.     CARDIOVASCULAR:  Regular rate and rhythm without murmurs, rubs, or gallops.  RESPIRATORY:  Lungs are clear to auscultation bilaterally, respiratory effort is normal.   PSYCH: mentation appears normal, affect normal/bright    LAB RESULTS:   Office Visit on 11/08/2017   Component Date Value Ref Range Status     WBC 11/08/2017 10.2  4.0 - 11.0 10e9/L Final     RBC Count 11/08/2017 4.70  3.8 - 5.2 10e12/L Final     Hemoglobin 11/08/2017 13.6  11.7 - 15.7 g/dL Final     Hematocrit 11/08/2017 41.9  35.0 - 47.0 % Final     MCV 11/08/2017 89  78 - 100 fl  Final     MCH 11/08/2017 28.9  26.5 - 33.0 pg Final     MCHC 11/08/2017 32.5  31.5 - 36.5 g/dL Final     RDW 11/08/2017 13.5  10.0 - 15.0 % Final     Platelet Count 11/08/2017 435  150 - 450 10e9/L Final     Diff Method 11/08/2017 Automated Method   Final     % Neutrophils 11/08/2017 68.0  % Final     % Lymphocytes 11/08/2017 25.2  % Final     % Monocytes 11/08/2017 5.0  % Final     % Eosinophils 11/08/2017 1.2  % Final     % Basophils 11/08/2017 0.6  % Final     Absolute Neutrophil 11/08/2017 6.9  1.6 - 8.3 10e9/L Final     Absolute Lymphocytes 11/08/2017 2.6  0.8 - 5.3 10e9/L Final     Absolute Monocytes 11/08/2017 0.5  0.0 - 1.3 10e9/L Final     Absolute Eosinophils 11/08/2017 0.1  0.0 - 0.7 10e9/L Final     Absolute Basophils 11/08/2017 0.1  0.0 - 0.2 10e9/L Final     Sodium 11/08/2017 134  133 - 144 mmol/L Final     Potassium 11/08/2017 3.5  3.4 - 5.3 mmol/L Final     Chloride 11/08/2017 95  94 - 109 mmol/L Final     Carbon Dioxide 11/08/2017 28  20 - 32 mmol/L Final     Anion Gap 11/08/2017 11  3 - 14 mmol/L Final     Glucose 11/08/2017 97  70 - 99 mg/dL Final    Non Fasting     Urea Nitrogen 11/08/2017 9  7 - 30 mg/dL Final     Creatinine 11/08/2017 0.51* 0.52 - 1.04 mg/dL Final     GFR Estimate 11/08/2017 >90  >60 mL/min/1.7m2 Final    Non  GFR Calc     GFR Estimate If Black 11/08/2017 >90  >60 mL/min/1.7m2 Final    African American GFR Calc     Calcium 11/08/2017 9.1  8.5 - 10.1 mg/dL Final     ASSESSMENT/PLAN:    1. Drug-induced constipation  Patient will try Metamucil or Citrucel    2. Morbid obesity (H)  Saxenda was too expensive. Patient will increase to Topamax 50mg. She has been seeing a therapist for anxiety due to personal issues. Patient will continue working on weight loss with diet and exercise and discuss bariatric surgery in 6 months.   She will need regular appts for her weight loss in order to qualify for surger.  The only other option is naltrexone but I don't feel this will  get her to goal.    - topiramate (TOPAMAX) 25 MG tablet; Take 2 tablets (50 mg) by mouth At Bedtime Ok to hold prescription and not fill at this time until patient calls.  Dispense: 180 tablet; Refill: 1      PLAN:    Increase  Topiramate.  Prescription was given for Topiramate. Patient is advised to call clinic if she experiences any adverse reaction(s).       ORDERS PLACED IN TODAY'S VISIT:  No orders of the defined types were placed in this encounter.      Patient Instructions     If you get gas with Metamucil, switch to Citrucel.    Check if your insurance covers weight loss surgery.    Naltrexone is really the only other medication option.    Try to increase to 50 mg of Topamax at bedtime.      [facility]    If you have any questions regarding to your visit please contact your care team:     Team Pink:   Clinic Hours Telephone Number   Internal Medicine:  Dr. Nayeli Hi, NP       7am-7pm  Monday - Thursday   7am-5pm  Fridays  [phone]  (Appointment scheduling available 24/7)    Questions about your visit?  Team Line  [phone]   Urgent Care - [place] - 11am-9pm Monday-Friday Saturday-Sunday- 9am-5pm   [place] - 5pm-9pm Monday-Friday Saturday-Sunday- 9am-5pm  [phone] - [place]   [phone] - [place]       What options do I have for visits at the clinic other than the traditional office visit?  To expand how we care for you, many of our providers are utilizing electronic visits (e-visits) and telephone visits, when medically appropriate, for interactions with their patients rather than a visit in the clinic.   We also offer nurse visits for many medical concerns. Just like any other service, we will bill your insurance company for this type of visit based on time spent on the phone with your provider. Not all insurance companies cover these visits. Please check with your medical insurance if this type of  visit is covered. You will be responsible for any charges that are not paid by your insurance.      E-visits via Reflect Systemshart:  generally incur a $35.00 fee.  Telephone visits:  Time spent on the phone: *charged based on time that is spent on the phone in increments of 10 minutes. Estimated cost:   5-10 mins $30.00   11-20 mins. $59.00   21-30 mins. $85.00   Use Reflect Systemshart (secure email communication and access to your chart) to send your primary care provider a message or make an appointment. Ask someone on your Team how to sign up for DragonRAD.    For a Price Quote for your services, please call our Runcom Line at [phone].    As always, Thank you for trusting us with your health care needs!    Laurel Antunez CMA          I spent 12 minutes of time with the patient and >50% of it was in education and counseling regarding weight management.     The information in this document, created by the medical scribe, Kiarra Leon, for me, accurately reflects the services I personally performed and the decisions made by me. I have reviewed and approved this document for accuracy prior to leaving the patient care area.    Nayeli Barnard MD  Internal Medicine    American Board of Obesity Medicine Diplomate     Start 12:10 PM  End 12:22 PM

## 2018-04-10 ENCOUNTER — TELEPHONE (OUTPATIENT)
Dept: INTERNAL MEDICINE | Facility: CLINIC | Age: 48
End: 2018-04-10

## 2018-04-10 ENCOUNTER — OFFICE VISIT (OUTPATIENT)
Dept: INTERNAL MEDICINE | Facility: CLINIC | Age: 48
End: 2018-04-10
Payer: COMMERCIAL

## 2018-04-10 VITALS
RESPIRATION RATE: 20 BRPM | DIASTOLIC BLOOD PRESSURE: 60 MMHG | SYSTOLIC BLOOD PRESSURE: 130 MMHG | HEART RATE: 89 BPM | WEIGHT: 273.5 LBS | OXYGEN SATURATION: 99 % | TEMPERATURE: 97.2 F | BODY MASS INDEX: 44.82 KG/M2

## 2018-04-10 DIAGNOSIS — E66.01 MORBID OBESITY (H): Primary | ICD-10-CM

## 2018-04-10 DIAGNOSIS — K59.03 DRUG-INDUCED CONSTIPATION: ICD-10-CM

## 2018-04-10 PROCEDURE — 99213 OFFICE O/P EST LOW 20 MIN: CPT | Performed by: INTERNAL MEDICINE

## 2018-04-10 RX ORDER — NEOMYCIN/POLYMYXIN B/PRAMOXINE 3.5-10K-1
CREAM (GRAM) TOPICAL
COMMUNITY

## 2018-04-10 RX ORDER — TOPIRAMATE 25 MG/1
50 TABLET, FILM COATED ORAL AT BEDTIME
Qty: 180 TABLET | Refills: 1 | Status: SHIPPED | OUTPATIENT
Start: 2018-04-10 | End: 2018-05-15

## 2018-04-10 RX ORDER — LANOLIN ALCOHOL/MO/W.PET/CERES
1000 CREAM (GRAM) TOPICAL DAILY
COMMUNITY

## 2018-04-10 ASSESSMENT — PAIN SCALES - GENERAL: PAINLEVEL: NO PAIN (0)

## 2018-04-10 NOTE — MR AVS SNAPSHOT
After Visit Summary   4/10/2018    Flor Fernandez    MRN: [id number]           Patient Information     Date Of Birth          1970        Visit Information        Provider Department      4/10/2018 11:45 AM Nayeli Barnard MD [facility]        Today's Diagnoses     Drug-induced constipation    -  1    Morbid obesity (H)          Care Instructions    If you get gas with Metamucil, switch to Citrucel.    Check if your insurance covers weight loss surgery.    Naltrexone is really the only other medication option.    Try to increase to 50 mg of Topamax at bedtime.      [facility]    If you have any questions regarding to your visit please contact your care team:     Team Pink:   Clinic Hours Telephone Number   Internal Medicine:  Dr. Nayeli Hi NP       7am-7pm  Monday - Thursday   7am-5pm  Fridays  [phone]  (Appointment scheduling available 24/7)    Questions about your visit?  Team Line  [phone]   Urgent Care - [place] - 11am-9pm Monday-Friday Saturday-Sunday- 9am-5pm   [place] - 5pm-9pm Monday-Friday Saturday-Sunday- 9am-5pm  [phone] - [place]   [phone] - [place]       What options do I have for visits at the clinic other than the traditional office visit?  To expand how we care for you, many of our providers are utilizing electronic visits (e-visits) and telephone visits, when medically appropriate, for interactions with their patients rather than a visit in the clinic.   We also offer nurse visits for many medical concerns. Just like any other service, we will bill your insurance company for this type of visit based on time spent on the phone with your provider. Not all insurance companies cover these visits. Please check with your medical insurance if this type of visit is covered. You will be responsible for any charges that are not paid by your insurance.       E-visits via QuantiaMDhart:  generally incur a $35.00 fee.  Telephone visits:  Time spent on the phone: *charged based on time that is spent on the phone in increments of 10 minutes. Estimated cost:   5-10 mins $30.00   11-20 mins. $59.00   21-30 mins. $85.00   Use QuantiaMDhart (secure email communication and access to your chart) to send your primary care provider a message or make an appointment. Ask someone on your Team how to sign up for VC4Africat.    For a Price Quote for your services, please call our Consumer Price Line at [phone].    As always, Thank you for trusting us with your health care needs!    Laurel Antunez CMA            Follow-ups after your visit        Your next 10 appointments already scheduled     Apr 13, 2018  2:00 PM CDT   Return Visit with Vikas Perez, [facility] ([facility])    [address] [place] [id number]   [phone]            Apr 17, 2018  2:00 PM CDT   Return Visit with Vikas Perez, [facility] ([facility])    [address] [place] [id number]   [phone]            Apr 27, 2018  1:00 PM CDT   Return Visit with Vikas Perez, [facility] ([facility])    [address] [place] [id number]   [phone]            May 15, 2018  3:00 PM CDT   Office Visit with Nayeli Barnard MD   [facility]    [address] [place] [id number]   [phone]           Bring a current list of meds and any records pertaining to this visit. For Physicals, please bring immunization records and any forms needing to be filled out. Please arrive 10 minutes early to complete paperwork.              Who to contact     If you have questions or need follow up information about today's clinic visit or your schedule please contact [facility] directly at [phone].  Normal or  "non-critical lab and imaging results will be communicated to you by MyChart, letter or phone within 4 business days after the clinic has received the results. If you do not hear from us within 7 days, please contact the clinic through Open Wagert or phone. If you have a critical or abnormal lab result, we will notify you by phone as soon as possible.  Submit refill requests through [employer] or call your pharmacy and they will forward the refill request to us. Please allow 3 business days for your refill to be completed.          Additional Information About Your Visit        [employer] Information     [employer] lets you send messages to your doctor, view your test results, renew your prescriptions, schedule appointments and more. To sign up, go to [email or web address] . Click on \"Log in\" on the left side of the screen, which will take you to the Welcome page. Then click on \"Sign up Now\" on the right side of the page.     You will be asked to enter the access code listed below, as well as some personal information. Please follow the directions to create your username and password.     Your access code is: [email or web address]  Expires: 2018  4:14 PM     Your access code will  in 90 days. If you need help or a new code, please call your [facility] clinic or [phone].        Care EveryWhere ID     This is your Care EveryWhere ID. This could be used by other organizations to access your [facility] medical records  [id number]        Your Vitals Were     Pulse Temperature Respirations Last Period Pulse Oximetry Breastfeeding?    89 97.2  F (36.2  C) (Oral) 20 2018 (Exact Date) 99% No    BMI (Body Mass Index)                   44.82 kg/m2            Blood Pressure from Last 3 Encounters:   04/10/18 130/60   18 134/72   18 140/81    Weight from Last 3 Encounters:   04/10/18 273 lb 8 oz (124.1 kg)   18 275 lb (124.7 kg)   18 280 lb 12.8 oz (127.4 kg)              Today, you had the following     " No orders found for display         Today's Medication Changes          These changes are accurate as of 4/10/18 12:22 PM.  If you have any questions, ask your nurse or doctor.               These medicines have changed or have updated prescriptions.        Dose/Directions    topiramate 25 MG tablet   Commonly known as:  TOPAMAX   This may have changed:  how much to take   Used for:  Morbid obesity (H)   Changed by:  Nayeli Barnard MD        Dose:  50 mg   Take 2 tablets (50 mg) by mouth At Bedtime Ok to hold prescription and not fill at this time until patient calls.   Quantity:  180 tablet   Refills:  1            Where to get your medicines      These medications were sent to [facility] [place], [place] - [address]  [address], [place] [address]     Phone:  [phone]     topiramate 25 MG tablet                Primary Care Provider Office Phone # Fax #    Nayeli Barnard -[phone] [address] [facility] [address]        Equal Access to Services     [facility]: Hadii aad ku hadasho Soomaali, waaxda luqadaha, qaybta kaalmada adeegyada, waxay robert haymaximiliano ruiz . So [facility] [phone].    ATENCIÓN: Si habla español, tiene a sung disposición servicios gratuitos de asistencia lingüística. Llame al [phone].    We comply with applicable federal civil rights laws and [place] laws. We do not discriminate on the basis of race, color, national origin, age, disability, sex, sexual orientation, or gender identity.            Thank you!     Thank you for choosing [facility]  for your care. Our goal is always to provide you with excellent care. Hearing back from our patients is one way we can continue to improve our services. Please take a few minutes to complete the written survey that you may receive in the mail after your visit with us. Thank you!             Your Updated Medication List - Protect others  around you: Learn how to safely use, store and throw away your medicines at www.disposemymeds.org.          This list is accurate as of 4/10/18 12:22 PM.  Always use your most recent med list.                   Brand Name Dispense Instructions for use Diagnosis    atorvastatin 20 MG tablet    LIPITOR    90 tablet    TAKE 1 TAB BY MOUTH DAILY. INDICATIONS: CEREBROVASCULAR ACCIDENT OR STROKE    Hyperlipidemia LDL goal <100       carvedilol 6.25 MG tablet    COREG    180 tablet    Take 1 tablet (6.25 mg) by mouth 2 times daily (with meals)        chlorthalidone 25 MG tablet    HYGROTON    90 tablet    Take 1 tablet (25 mg) by mouth daily    Benign essential hypertension       CVS ASPIRIN 325 MG tablet   Generic drug:  aspirin      TAKE 1 TABLET BY MOUTH ONCE DAILY WITH A MEAL.        cyanocobalamin 1000 MCG tablet    vitamin  B-12     Take 1,000 mcg by mouth daily        ferrous fumarate 65 mg ([place]. FE)-Vitamin C 125 mg  MG Tabs tablet    VITRON C     Take 1 tablet by mouth daily    Iron deficiency       lisinopril 10 MG tablet    PRINIVIL/ZESTRIL    90 tablet    Take 1 tablet (10 mg) by mouth daily    Benign essential hypertension       MULTI-VITAMIN GUMMIES Chew           topiramate 25 MG tablet    TOPAMAX    180 tablet    Take 2 tablets (50 mg) by mouth At Bedtime Ok to hold prescription and not fill at this time until patient calls.    Morbid obesity (H)

## 2018-04-10 NOTE — TELEPHONE ENCOUNTER
Reason for Call:  Other call back    Detailed comments: Would like to know if she will need to schedule a follow up appointment     Phone Number Patient can be reached at: Home number on file [phone] (home)    Best Time: any time    Can we leave a detailed message on this number? YES    Call taken on 4/10/2018 at 12:30 PM by Olga Cristobal

## 2018-04-10 NOTE — NURSING NOTE
"Chief Complaint   Patient presents with     Weight Check     6 week follow-up     Health Maintenance       Initial /60 (BP Location: Left arm, Cuff Size: Adult Large)  Pulse 89  Temp 97.2  F (36.2  C) (Oral)  Resp 20  Wt 273 lb 8 oz (124.1 kg)  LMP 04/09/2018 (Exact Date)  SpO2 99%  Breastfeeding? No  BMI 44.82 kg/m2 Estimated body mass index is 44.82 kg/(m^2) as calculated from the following:    Height as of 2/26/18: 5' 5.5\" (1.664 m).    Weight as of this encounter: 273 lb 8 oz (124.1 kg).  Medication Reconciliation: complete       Laurel Antunez CMA    "

## 2018-04-10 NOTE — PATIENT INSTRUCTIONS
If you get gas with Metamucil, switch to Citrucel.    Check if your insurance covers weight loss surgery.    Naltrexone is really the only other medication option.    Try to increase to 50 mg of Topamax at bedtime.      [facility]    If you have any questions regarding to your visit please contact your care team:     Team Pink:   Clinic Hours Telephone Number   Internal Medicine:  Dr. Nayeli Hi, NP       7am-7pm  Monday - Thursday   7am-5pm  Fridays  [phone]  (Appointment scheduling available 24/7)    Questions about your visit?  Team Line  [phone]   [facility] [place] [facility] - 11am-9pm Monday-Friday Saturday-Sunday- 9am-5pm   [place] - 5pm-9pm Monday-Friday Saturday-Sunday- 9am-5pm  [phone] - [place]   [phone] - [place]       What options do I have for visits at the clinic other than the traditional office visit?  To expand how we care for you, many of our providers are utilizing electronic visits (e-visits) and telephone visits, when medically appropriate, for interactions with their patients rather than a visit in the clinic.   We also offer nurse visits for many medical concerns. Just like any other service, we will bill your insurance company for this type of visit based on time spent on the phone with your provider. Not all insurance companies cover these visits. Please check with your medical insurance if this type of visit is covered. You will be responsible for any charges that are not paid by your insurance.      E-visits via BiddingForGood:  generally incur a $35.00 fee.  Telephone visits:  Time spent on the phone: *charged based on time that is spent on the phone in increments of 10 minutes. Estimated cost:   5-10 mins $30.00   11-20 mins. $59.00   21-30 mins. $85.00   Use BiddingForGood (secure email communication and access to your chart) to send your primary care provider a message or make an appointment.  Ask someone on your Team how to sign up for SignaCert.    For a Price Quote for your services, please call our Consumer Price Line at [phone].    As always, Thank you for trusting us with your health care needs!    Laurel Antunez CMA

## 2018-04-13 ENCOUNTER — OFFICE VISIT (OUTPATIENT)
Dept: PSYCHOLOGY | Facility: CLINIC | Age: 48
End: 2018-04-13
Payer: COMMERCIAL

## 2018-04-13 DIAGNOSIS — F41.9 ANXIETY DISORDER, UNSPECIFIED TYPE: Primary | ICD-10-CM

## 2018-04-13 PROCEDURE — 90834 PSYTX W PT 45 MINUTES: CPT | Performed by: MARRIAGE & FAMILY THERAPIST

## 2018-04-13 ASSESSMENT — ANXIETY QUESTIONNAIRES
3. WORRYING TOO MUCH ABOUT DIFFERENT THINGS: NOT AT ALL
2. NOT BEING ABLE TO STOP OR CONTROL WORRYING: NOT AT ALL
GAD7 TOTAL SCORE: 0
7. FEELING AFRAID AS IF SOMETHING AWFUL MIGHT HAPPEN: NOT AT ALL
5. BEING SO RESTLESS THAT IT IS HARD TO SIT STILL: NOT AT ALL
6. BECOMING EASILY ANNOYED OR IRRITABLE: NOT AT ALL
1. FEELING NERVOUS, ANXIOUS, OR ON EDGE: NOT AT ALL

## 2018-04-13 ASSESSMENT — PATIENT HEALTH QUESTIONNAIRE - PHQ9: 5. POOR APPETITE OR OVEREATING: NOT AT ALL

## 2018-04-13 NOTE — PROGRESS NOTES
Progress Note    Client Name: Flor Fernandez  Date: 4/13/18         Service Type: Individual      Session Start Time: 2:00  Session End Time: 2:52      Session Length: 38-52     Session #: 3     Attendees: Client attended alone    Treatment Plan Last Reviewed: N/A, next due 6/27/18.  PHQ-9 / RODRIGUE-7 : completed.     DATA      Progress Since Last Session (Related to Symptoms / Goals / Homework):   Symptoms: Improved    Homework: Achieved / completed to satisfaction      Episode of Care Goals: Satisfactory progress - ACTION (Actively working towards change); Intervened by reinforcing change plan / affirming steps taken     Current / Ongoing Stressors and Concerns:   Client reported being anxious about her son's prom and his relationship with his girlfriend.  Client reported that his son's girlfriend takes him for granted and doesn't care at times, and that it was very hard on him when they broke up.  Client reported concern about not wanting to be overbearing in her parenting in her concern for her son.  Client reported wanting to engage in EMDR resourcing in session today.     Treatment Objective(s) Addressed in This Session:   use cognitive strategies identified in therapy to challenge anxious thoughts     Intervention:   CBT: reviewed with client normalizing her parental anxiety and working on ways to let go.  EMDR: installed [facility] resource.        ASSESSMENT: Current Emotional / Mental Status (status of significant symptoms):   Risk status (Self / Other harm or suicidal ideation)   Client denies current fears or concerns for personal safety.   Client denies current or recent suicidal ideation or behaviors.   Client denies current or recent homicidal ideation or behaviors.   Client denies current or recent self injurious behavior or ideation.   Client denies other safety concerns.   A safety and risk management plan has not been developed at this time, however  client was given the after-hours number / 911 should there be a change in any of these risk factors.     Appearance:   Appropriate    Eye Contact:   Good    Psychomotor Behavior: Normal    Attitude:   Cooperative    Orientation:   All   Speech    Rate / Production: Normal     Volume:  Normal    Mood:    Anxious  Normal   Affect:    Appropriate    Thought Content:  Clear    Thought Form:  Coherent  Logical    Insight:    Good      Medication Review:   No current psychiatric medications prescribed     Medication Compliance:   NA     Changes in Health Issues:   None reported     Chemical Use Review:   Substance Use: Chemical use reviewed, no active concerns identified      Tobacco Use: No current tobacco use.       Collateral Reports Completed:   Not Applicable    PLAN: (Client Tasks / Therapist Tasks / Other)  Client agreed to work on letting go of her son's outcomes and practicing EMDR [facility] resourcing at least once daily and additionally as needed for anxiety/distress between now and follow-up session next week.        Vikas Perez, LILIANAFT                                                         ________________________________________________________________________    Treatment Plan    Client's Name: Flor Fernandez  YOB: 1970    Date: 3/27/18    DSM-V Diagnoses: 300.00 (F41.9) Unspecified Anxiety Disorder  Psychosocial / Contextual Factors: problems with primary support group: conflicts with client's children  WHODAS: 15    Referral / Collaboration:  Referral to another professional/service is not indicated at this time..    Anticipated number of session or this episode of care: 11-20      MeasurableTreatment Goal(s) related to diagnosis / functional impairment(s)  Goal 1: Client will successfully process through past trauma defined as reporting 0 Subjective Units of Distress related to trauma on 0-10 scale and 7 on Validity of (positive) Cognition scale about self on 1-7 scale   I  will know I've met my goal when I feel less stuck.       Objective #A (Client Action)    Status: New - Date: 3/27/18      Client will identify past traumatic events/memories which are causing current distress.     Intervention(s)  Therapist will take client's history and facilitate client's identification of targets for EMDR.     Objective #B  Client will complete needed assessment(s) and [facility] EMDR resourcing to confirm readiness for EMDR.    Status: New - Date: 3/27/18      Intervention(s)  Therapist will administer Dissociative Experiences Scale, Multidimensional Inventory of Dissociation as needed and complete [facility] EMDR resourcing with client.     Objective #C  Client will engage in installing at least 2 EMDR resources.  Status: New - Date: 3/27/18      Intervention(s)  Therapist will complete EMDR resourcing (Container, Remote Control, grounding/progressive muscle relaxation, Light Stream, Inner Advisor) with client.     Objective #D (Client Action)    Status: New - Date: 3/27/18      Client will engage in reprocessing all past traumatic event/memory targets.     Intervention(s)   Therapist will complete EMDR reprocessing with client.    Goal 2: Client will increase overall baseline calm mindset by 2 points on a 1-10 Likert scale per self-report (10 = optimal calm mindset).    I will know I've met my goal when I feel less anxious.      Objective #A (Client Action)    Status: New - Date: 3/27/18     Client will use cognitive strategies identified in therapy to challenge anxious thoughts.    Intervention(s)  Therapist will teach emotional regulation skills. CBT/REBT ABCD model.    Objective #B  Client will use at least 2 new coping skills for anxiety management in the next 12 weeks.    Status: New - Date: 3/27/18     Intervention(s)  Therapist will teach emotional regulation skills. DBT Core Mindfulness, Distress Tolerance, Emotion Regulation, and Interpersonal Effectiveness skills.    Goal 3: Client  will improve her interpersonal functioning/relationships by 2 points on a 1-10 Likert scale per self-report (10 = optimal interpersonal functioning/relationships).    I will know I've met my goal when my relationships with my children have improved.      Objective #A (Client Action)    Status: New - Date: 3/27/18     Client will learn & utilize at least 2 new assertive communication skills weekly.    Intervention(s)  Therapist will teach Nonviolent Communication and DBT Interpersonal Effectiveness skills..    Client has reviewed and agreed to the above plan.      JENNIFER Horowitz  April 13, 2018

## 2018-04-13 NOTE — MR AVS SNAPSHOT
"                  MRN:[id number]                      After Visit Summary   4/13/2018    Flor Fernandez    MRN: [id number]           Visit Information        Provider Department      4/13/2018 2:00 PM Vikas Perez, [facility]      Your next 10 appointments already scheduled     Apr 17, 2018  2:00 PM CDT   Return Visit with Vikas Waiteman [address] [address]   [phone]            Apr 27, 2018  1:00 PM CDT   Return Visit with Vikas Byron Waiteman [facility] ([address] [address]   [phone]            May 01, 2018  2:00 PM CDT   Return Visit with Vikas Waiteman [facility] ([address] [address]   [phone]            May 15, 2018  3:00 PM CDT   Office Visit with Nayeli Barnard MD   [facility] ([address] [address] [phone]           Bring a current list of meds and any records pertaining to this visit. For Physicals, please bring immunization records and any forms needing to be filled out. Please arrive 10 minutes early to complete paperwork.            May 22, 2018  2:15 PM CDT   SHORT with Nayeli Barnard MD   [address] [address] [phone]              MyChart Information     clypd lets you send messages to your doctor, view your test results, renew your prescriptions, schedule appointments and more. To sign up, go to [email or web address] . Click on \"Log in\" on the left side of the screen, which will take you to the Welcome page. Then click on \"Sign up Now\" on the right side of the page.     You will be asked to enter the access code " listed below, as well as some personal information. Please follow the directions to create your username and password.     Your access code is: [email or web address]  Expires: 2018  4:14 PM     Your access code will  in 90 days. If you need help or a new code, please call your [place] clinic or [phone].        Care EveryWhere ID     This is your Care EveryWhere ID. This could be used by other organizations to access your [place] medical records  [id number]        Equal Access to Services     JOSE [facility] : Hadii aminah lopez Sojung, walaurelda luqadaha, qalyndon kaalmaprasanna mike, rosana ruiz . So [facility] [phone].    ATENCIÓN: Si habla español, tiene a sung disposición servicios gratuitos de asistencia lingüística. Llame al [phone].    We comply with applicable federal civil rights laws and [place] laws. We do not discriminate on the basis of race, color, national origin, age, disability, sex, sexual orientation, or gender identity.

## 2018-04-14 ASSESSMENT — ANXIETY QUESTIONNAIRES: GAD7 TOTAL SCORE: 0

## 2018-04-14 ASSESSMENT — PATIENT HEALTH QUESTIONNAIRE - PHQ9: SUM OF ALL RESPONSES TO PHQ QUESTIONS 1-9: 0

## 2018-04-17 ENCOUNTER — OFFICE VISIT (OUTPATIENT)
Dept: PSYCHOLOGY | Facility: CLINIC | Age: 48
End: 2018-04-17
Payer: COMMERCIAL

## 2018-04-17 DIAGNOSIS — F41.9 ANXIETY DISORDER, UNSPECIFIED TYPE: Primary | ICD-10-CM

## 2018-04-17 PROCEDURE — 90834 PSYTX W PT 45 MINUTES: CPT | Performed by: MARRIAGE & FAMILY THERAPIST

## 2018-04-17 ASSESSMENT — ANXIETY QUESTIONNAIRES
1. FEELING NERVOUS, ANXIOUS, OR ON EDGE: NOT AT ALL
2. NOT BEING ABLE TO STOP OR CONTROL WORRYING: NOT AT ALL
7. FEELING AFRAID AS IF SOMETHING AWFUL MIGHT HAPPEN: NOT AT ALL
GAD7 TOTAL SCORE: 0
5. BEING SO RESTLESS THAT IT IS HARD TO SIT STILL: NOT AT ALL
6. BECOMING EASILY ANNOYED OR IRRITABLE: NOT AT ALL
3. WORRYING TOO MUCH ABOUT DIFFERENT THINGS: NOT AT ALL

## 2018-04-17 ASSESSMENT — PATIENT HEALTH QUESTIONNAIRE - PHQ9: 5. POOR APPETITE OR OVEREATING: NOT AT ALL

## 2018-04-17 NOTE — MR AVS SNAPSHOT
"                  MRN:[id number]                      After Visit Summary   4/17/2018    Flor Fernandez    MRN: [id number]           Visit Information        Provider Department      4/17/2018 2:00 PM Vikas Perez, [facility]      Your next 10 appointments already scheduled     Apr 27, 2018  1:00 PM CDT   Return Visit with Vikas Waiteman [address] [address]   [phone]            May 01, 2018  2:00 PM CDT   Return Visit with Vikas Byron [place] [facility] ([address] [address]   [phone]            May 11, 2018  1:00 PM CDT   Return Visit with Vikas Waiteman [facility] ([address] [address]   [phone]            May 15, 2018  3:00 PM CDT   Office Visit with Nayeli Barnard MD   [facility] ([address] [address] [phone]           Bring a current list of meds and any records pertaining to this visit. For Physicals, please bring immunization records and any forms needing to be filled out. Please arrive 10 minutes early to complete paperwork.            May 22, 2018  2:15 PM CDT   SHORT with Nayeli Barnard MD   [address] [address] [phone]              MyChart Information     Marketecture lets you send messages to your doctor, view your test results, renew your prescriptions, schedule appointments and more. To sign up, go to [email or web address] . Click on \"Log in\" on the left side of the screen, which will take you to the Welcome page. Then click on \"Sign up Now\" on the right side of the page.     You will be asked to enter the access code " listed below, as well as some personal information. Please follow the directions to create your username and password.     Your access code is: [email or web address]  Expires: 2018  4:14 PM     Your access code will  in 90 days. If you need help or a new code, please call your [place] clinic or [phone].        Care EveryWhere ID     This is your Care EveryWhere ID. This could be used by other organizations to access your [place] medical records  [id number]        Equal Access to Services     [facility]SE [facility] : Hadii aminah lopez Sojung, walaurelda luqadaha, qalyndon kaalmaprasanna mike, rosana ruiz . So [facility] [phone].    ATENCIÓN: Si habla español, tiene a sung disposición servicios gratuitos de asistencia lingüística. Llame al [phone].    We comply with applicable federal civil rights laws and [place] laws. We do not discriminate on the basis of race, color, national origin, age, disability, sex, sexual orientation, or gender identity.

## 2018-04-18 ASSESSMENT — PATIENT HEALTH QUESTIONNAIRE - PHQ9: SUM OF ALL RESPONSES TO PHQ QUESTIONS 1-9: 0

## 2018-04-18 ASSESSMENT — ANXIETY QUESTIONNAIRES: GAD7 TOTAL SCORE: 0

## 2018-04-27 ENCOUNTER — OFFICE VISIT (OUTPATIENT)
Dept: PSYCHOLOGY | Facility: CLINIC | Age: 48
End: 2018-04-27
Payer: COMMERCIAL

## 2018-04-27 DIAGNOSIS — F41.9 ANXIETY DISORDER, UNSPECIFIED TYPE: Primary | ICD-10-CM

## 2018-04-27 PROCEDURE — 90834 PSYTX W PT 45 MINUTES: CPT | Performed by: MARRIAGE & FAMILY THERAPIST

## 2018-04-27 ASSESSMENT — ANXIETY QUESTIONNAIRES
1. FEELING NERVOUS, ANXIOUS, OR ON EDGE: NOT AT ALL
7. FEELING AFRAID AS IF SOMETHING AWFUL MIGHT HAPPEN: NOT AT ALL
3. WORRYING TOO MUCH ABOUT DIFFERENT THINGS: NOT AT ALL
2. NOT BEING ABLE TO STOP OR CONTROL WORRYING: NOT AT ALL
GAD7 TOTAL SCORE: 0
6. BECOMING EASILY ANNOYED OR IRRITABLE: NOT AT ALL
5. BEING SO RESTLESS THAT IT IS HARD TO SIT STILL: NOT AT ALL

## 2018-04-27 ASSESSMENT — PATIENT HEALTH QUESTIONNAIRE - PHQ9: 5. POOR APPETITE OR OVEREATING: NOT AT ALL

## 2018-04-27 NOTE — MR AVS SNAPSHOT
"                  MRN:[id number]                      After Visit Summary   4/27/2018    Flor Fernandez    MRN: [id number]           Visit Information        Provider Department      4/27/2018 1:00 PM Vikas Perez, [facility]      Your next 10 appointments already scheduled     May 01, 2018  2:00 PM CDT   Return Visit with Vikas Perez [address] [address]   [phone]            May 11, 2018  1:00 PM CDT   Return Visit with Vikas Perez [address] [address]   [phone]            May 15, 2018  3:00 PM CDT   Office Visit with Nayeli Barnard MD   [facility] ([address] [address]   [phone]           Bring a current list of meds and any records pertaining to this visit. For Physicals, please bring immunization records and any forms needing to be filled out. Please arrive 10 minutes early to complete paperwork.            May 22, 2018  2:15 PM CDT   SHORT with Nayeli Barnard MD   [facility] ([address] [address]   [phone]            May 29, 2018  2:00 PM CDT   Return Visit with Vikas Perez [facility] ([facility])    [address] [place] [address]   [phone]              MyChart Information     [employer] lets you send messages to your doctor, view your test results, renew your prescriptions, schedule appointments and more. To sign up, go to [email or web address] . Click on \"Log in\" on the left side of the screen, which will take you to the Welcome page. Then click on \"Sign up Now\" on the right side of the page.     You will be asked to enter the access code " listed below, as well as some personal information. Please follow the directions to create your username and password.     Your access code is: [email or web address]  Expires: 2018  2:00 PM     Your access code will  in 90 days. If you need help or a new code, please call your [place] clinic or [phone].        Care EveryWhere ID     This is your Care EveryWhere ID. This could be used by other organizations to access your [place] medical records  [id number]        Equal Access to Services     JOSE FLORES : Nathaniel Khan, carlos trimble, jennifer gonzalezalmalu mike, rosana ruiz . So [facility] [phone].    ATENCIÓN: Si habla español, tiene a sung disposición servicios gratuitos de asistencia lingüística. Llame al [phone].    We comply with applicable federal civil rights laws and [place] laws. We do not discriminate on the basis of race, color, national origin, age, disability, sex, sexual orientation, or gender identity.

## 2018-04-27 NOTE — PROGRESS NOTES
Progress Note    Client Name: Flor Fernandez  Date: 4/17/18         Service Type: Individual      Session Start Time: 1:01  Session End Time: 1:50      Session Length: 38-52     Session #: 6     Attendees: Client attended alone    Treatment Plan Last Reviewed: N/A, next due 6/27/18.  PHQ-9 / RODRIGUE-7 : completed.     DATA      Progress Since Last Session (Related to Symptoms / Goals / Homework):   Symptoms: Improved    Homework: Achieved / completed to satisfaction      Episode of Care Goals: Satisfactory progress - ACTION (Actively working towards change); Intervened by reinforcing change plan / affirming steps taken     Current / Ongoing Stressors and Concerns:   Client reported feeling bad about the work conflict and wanting to work on doing what she can to repair it, though she is not directly involved.  Client reported that her son has been in conflict with his father, and feeling bad about that, working to stay supportive.  Client reported using EMDR Container resource for her relationship with her son's father.     Treatment Objective(s) Addressed in This Session:   use cognitive strategies identified in therapy to challenge anxious thoughts     Intervention:   CBT: reviewed with client staying positive in hopes of postively impacting her work conflict situation.  Interpersonal Therapy: reviewed with client effective management strategies for her relationship with her son and his father.        ASSESSMENT: Current Emotional / Mental Status (status of significant symptoms):   Risk status (Self / Other harm or suicidal ideation)   Client denies current fears or concerns for personal safety.   Client denies current or recent suicidal ideation or behaviors.   Client denies current or recent homicidal ideation or behaviors.   Client denies current or recent self injurious behavior or ideation.   Client denies other safety concerns.   A safety and risk management plan has  not been developed at this time, however client was given the after-hours number / 911 should there be a change in any of these risk factors.     Appearance:   Appropriate    Eye Contact:   Good    Psychomotor Behavior: Normal    Attitude:   Cooperative    Orientation:   All   Speech    Rate / Production: Normal     Volume:  Normal    Mood:    Anxious  Normal   Affect:    Appropriate    Thought Content:  Clear    Thought Form:  Coherent  Logical    Insight:    Good      Medication Review:   No current psychiatric medications prescribed     Medication Compliance:   NA     Changes in Health Issues:   None reported     Chemical Use Review:   Substance Use: Chemical use reviewed, no active concerns identified      Tobacco Use: No current tobacco use.       Collateral Reports Completed:   Not Applicable    PLAN: (Client Tasks / Therapist Tasks / Other)  Client agreed to continue to work on setting positive tone in work conflict situation and effectively managing her relationship with her son between now and follow-up session next week.        Vikas Perez LMFT                                                         ________________________________________________________________________    Treatment Plan    Client's Name: Flor Fernandez  YOB: 1970    Date: 3/27/18    DSM-V Diagnoses: 300.00 (F41.9) Unspecified Anxiety Disorder  Psychosocial / Contextual Factors: problems with primary support group: conflicts with client's children  WHODAS: 15    Referral / Collaboration:  Referral to another professional/service is not indicated at this time..    Anticipated number of session or this episode of care: 11-20      MeasurableTreatment Goal(s) related to diagnosis / functional impairment(s)  Goal 1: Client will successfully process through past trauma defined as reporting 0 Subjective Units of Distress related to trauma on 0-10 scale and 7 on Validity of (positive) Cognition scale about self on 1-7  scale   I will know I've met my goal when I feel less stuck.       Objective #A (Client Action)    Status: New - Date: 3/27/18      Client will identify past traumatic events/memories which are causing current distress.     Intervention(s)  Therapist will take client's history and facilitate client's identification of targets for EMDR.     Objective #B  Client will complete needed assessment(s) and [facility] EMDR resourcing to confirm readiness for EMDR.    Status: New - Date: 3/27/18      Intervention(s)  Therapist will administer Dissociative Experiences Scale, Multidimensional Inventory of Dissociation as needed and complete [facility] EMDR resourcing with client.     Objective #C  Client will engage in installing at least 2 EMDR resources.  Status: New - Date: 3/27/18      Intervention(s)  Therapist will complete EMDR resourcing (Container, Remote Control, grounding/progressive muscle relaxation, Light Stream, Inner Advisor) with client.     Objective #D (Client Action)    Status: New - Date: 3/27/18      Client will engage in reprocessing all past traumatic event/memory targets.     Intervention(s)   Therapist will complete EMDR reprocessing with client.    Goal 2: Client will increase overall baseline calm mindset by 2 points on a 1-10 Likert scale per self-report (10 = optimal calm mindset).    I will know I've met my goal when I feel less anxious.      Objective #A (Client Action)    Status: New - Date: 3/27/18     Client will use cognitive strategies identified in therapy to challenge anxious thoughts.    Intervention(s)  Therapist will teach emotional regulation skills. CBT/REBT ABCD model.    Objective #B  Client will use at least 2 new coping skills for anxiety management in the next 12 weeks.    Status: New - Date: 3/27/18     Intervention(s)  Therapist will teach emotional regulation skills. DBT Core Mindfulness, Distress Tolerance, Emotion Regulation, and Interpersonal Effectiveness skills.    Goal  3: Client will improve her interpersonal functioning/relationships by 2 points on a 1-10 Likert scale per self-report (10 = optimal interpersonal functioning/relationships).    I will know I've met my goal when my relationships with my children have improved.      Objective #A (Client Action)    Status: New - Date: 3/27/18     Client will learn & utilize at least 2 new assertive communication skills weekly.    Intervention(s)  Therapist will teach Nonviolent Communication and DBT Interpersonal Effectiveness skills..    Client has reviewed and agreed to the above plan.      Vikas Perez, LMFT  April 27, 2018

## 2018-04-28 ASSESSMENT — PATIENT HEALTH QUESTIONNAIRE - PHQ9: SUM OF ALL RESPONSES TO PHQ QUESTIONS 1-9: 0

## 2018-04-28 ASSESSMENT — ANXIETY QUESTIONNAIRES: GAD7 TOTAL SCORE: 0

## 2018-05-01 ENCOUNTER — OFFICE VISIT (OUTPATIENT)
Dept: PSYCHOLOGY | Facility: CLINIC | Age: 48
End: 2018-05-01
Payer: COMMERCIAL

## 2018-05-01 DIAGNOSIS — F41.9 ANXIETY DISORDER, UNSPECIFIED TYPE: Primary | ICD-10-CM

## 2018-05-01 PROCEDURE — 90834 PSYTX W PT 45 MINUTES: CPT | Performed by: MARRIAGE & FAMILY THERAPIST

## 2018-05-01 ASSESSMENT — ANXIETY QUESTIONNAIRES
5. BEING SO RESTLESS THAT IT IS HARD TO SIT STILL: NOT AT ALL
3. WORRYING TOO MUCH ABOUT DIFFERENT THINGS: NOT AT ALL
2. NOT BEING ABLE TO STOP OR CONTROL WORRYING: NOT AT ALL
GAD7 TOTAL SCORE: 0
7. FEELING AFRAID AS IF SOMETHING AWFUL MIGHT HAPPEN: NOT AT ALL
6. BECOMING EASILY ANNOYED OR IRRITABLE: NOT AT ALL
1. FEELING NERVOUS, ANXIOUS, OR ON EDGE: NOT AT ALL

## 2018-05-01 ASSESSMENT — PATIENT HEALTH QUESTIONNAIRE - PHQ9: 5. POOR APPETITE OR OVEREATING: NOT AT ALL

## 2018-05-01 NOTE — PROGRESS NOTES
"                                             Progress Note    Client Name: Flor Fernandez  Date: 5/1/18         Service Type: Individual      Session Start Time: 2:00  Session End Time: 2:50      Session Length: 38-52     Session #: 7     Attendees: Client attended alone    Treatment Plan Last Reviewed: N/A, next due 6/27/18.  PHQ-9 / RODRIGUE-7 : completed.     DATA      Progress Since Last Session (Related to Symptoms / Goals / Homework):   Symptoms: Improved    Homework: Achieved / completed to satisfaction      Episode of Care Goals: Satisfactory progress - ACTION (Actively working towards change); Intervened by reinforcing change plan / affirming steps taken     Current / Ongoing Stressors and Concerns:   Client reported helping her daughter and son to move into their new apartment over the weekend, which she reported being stressful.  Client reported that she has \"something close to hate\" towards her son's father.  Client reported that her work situation continues to be stressful, as she wants the tension to decrease.     Treatment Objective(s) Addressed in This Session:   use at least 2 coping skills for anxiety management in the next 12 weeks     Intervention:   Solution Focused: reviewed with client her plan for working through the work situation.        ASSESSMENT: Current Emotional / Mental Status (status of significant symptoms):   Risk status (Self / Other harm or suicidal ideation)   Client denies current fears or concerns for personal safety.   Client denies current or recent suicidal ideation or behaviors.   Client denies current or recent homicidal ideation or behaviors.   Client denies current or recent self injurious behavior or ideation.   Client denies other safety concerns.   A safety and risk management plan has not been developed at this time, however client was given the after-hours number / 911 should there be a change in any of these risk factors.     Appearance:   Appropriate    Eye " Contact:   Good    Psychomotor Behavior: Normal    Attitude:   Cooperative    Orientation:   All   Speech    Rate / Production: Normal     Volume:  Normal    Mood:    Anxious  Normal   Affect:    Appropriate    Thought Content:  Clear    Thought Form:  Coherent  Logical    Insight:    Good      Medication Review:   No current psychiatric medications prescribed     Medication Compliance:   NA     Changes in Health Issues:   None reported     Chemical Use Review:   Substance Use: Chemical use reviewed, no active concerns identified      Tobacco Use: No current tobacco use.       Collateral Reports Completed:   Not Applicable    PLAN: (Client Tasks / Therapist Tasks / Other)  Client agreed to work following through with her plan for dealing with her work situation tension (i.e. assertively communicating her concerns to her supervisor) between now and follow-up session next week.        Vikas Byron Perez, LMFT                                                         ________________________________________________________________________    Treatment Plan    Client's Name: Flor Fernandez  YOB: 1970    Date: 3/27/18    DSM-V Diagnoses: 300.00 (F41.9) Unspecified Anxiety Disorder  Psychosocial / Contextual Factors: problems with primary support group: conflicts with client's children  WHODAS: 15    Referral / Collaboration:  Referral to another professional/service is not indicated at this time..    Anticipated number of session or this episode of care: 11-20      MeasurableTreatment Goal(s) related to diagnosis / functional impairment(s)  Goal 1: Client will successfully process through past trauma defined as reporting 0 Subjective Units of Distress related to trauma on 0-10 scale and 7 on Validity of (positive) Cognition scale about self on 1-7 scale   I will know I've met my goal when I feel less stuck.       Objective #A (Client Action)    Status: New - Date: 3/27/18      Client will identify past  traumatic events/memories which are causing current distress.     Intervention(s)  Therapist will take client's history and facilitate client's identification of targets for EMDR.     Objective #B  Client will complete needed assessment(s) and [facility] EMDR resourcing to confirm readiness for EMDR.    Status: New - Date: 3/27/18      Intervention(s)  Therapist will administer Dissociative Experiences Scale, Multidimensional Inventory of Dissociation as needed and complete [facility] EMDR resourcing with client.     Objective #C  Client will engage in installing at least 2 EMDR resources.  Status: New - Date: 3/27/18      Intervention(s)  Therapist will complete EMDR resourcing (Container, Remote Control, grounding/progressive muscle relaxation, Light Stream, Inner Advisor) with client.     Objective #D (Client Action)    Status: New - Date: 3/27/18      Client will engage in reprocessing all past traumatic event/memory targets.     Intervention(s)   Therapist will complete EMDR reprocessing with client.    Goal 2: Client will increase overall baseline calm mindset by 2 points on a 1-10 Likert scale per self-report (10 = optimal calm mindset).    I will know I've met my goal when I feel less anxious.      Objective #A (Client Action)    Status: New - Date: 3/27/18     Client will use cognitive strategies identified in therapy to challenge anxious thoughts.    Intervention(s)  Therapist will teach emotional regulation skills. CBT/REBT ABCD model.    Objective #B  Client will use at least 2 new coping skills for anxiety management in the next 12 weeks.    Status: New - Date: 3/27/18     Intervention(s)  Therapist will teach emotional regulation skills. DBT Core Mindfulness, Distress Tolerance, Emotion Regulation, and Interpersonal Effectiveness skills.    Goal 3: Client will improve her interpersonal functioning/relationships by 2 points on a 1-10 Likert scale per self-report (10 = optimal interpersonal  functioning/relationships).    I will know I've met my goal when my relationships with my children have improved.      Objective #A (Client Action)    Status: New - Date: 3/27/18     Client will learn & utilize at least 2 new assertive communication skills weekly.    Intervention(s)  Therapist will teach Nonviolent Communication and DBT Interpersonal Effectiveness skills..    Client has reviewed and agreed to the above plan.      JENNIFER Horowitz  May 1, 2018

## 2018-05-01 NOTE — MR AVS SNAPSHOT
"                  MRN:[id number]                      After Visit Summary   5/1/2018    Flor Fernandez    MRN: [id number]           Visit Information        Provider Department      5/1/2018 2:00 PM Vikas Perez, [facility] Generic      Your next 10 appointments already scheduled     May 11, 2018  1:00 PM CDT   Return Visit with Vikas Perez [address] [address]   [phone]            May 15, 2018  3:00 PM CDT   Office Visit with Nayeli Barnard MD   [facility] ([address] [address] [phone]           Bring a current list of meds and any records pertaining to this visit. For Physicals, please bring immunization records and any forms needing to be filled out. Please arrive 10 minutes early to complete paperwork.            May 22, 2018  2:15 PM CDT   SHORT with Nayeli Barnard MD   [facility] ([address] [address] [phone]            May 29, 2018  2:00 PM CDT   Return Visit with Vikas Waiteman [facility] ([address] [address]   [phone]            Jun 05, 2018  2:00 PM CDT   Return Visit with Vikas Byron Waiteman [facility] ([address] [address]   [phone]              MyChart Information     [employer] lets you send messages to your doctor, view your test results, renew your prescriptions, schedule appointments and more. To sign up, go to [email or web address] . Click on \"Log in\" on the left side of the screen, which will take you to the Welcome page. Then click on \"Sign up Now\" on the right side of the page.     You will be asked to enter the access code " listed below, as well as some personal information. Please follow the directions to create your username and password.     Your access code is: [email or web address]  Expires: 2018  2:00 PM     Your access code will  in 90 days. If you need help or a new code, please call your [place] clinic or [phone].        Care EveryWhere ID     This is your Care EveryWhere ID. This could be used by other organizations to access your [place] medical records  [id number]        Equal Access to Services     JOSE FLORES : Nathaniel Khan, carlos trimble, jennifer gonzalezalmalu mike, rosana ruiz . So [facility] [phone].    ATENCIÓN: Si habla español, tiene a sung disposición servicios gratuitos de asistencia lingüística. Llame al [phone].    We comply with applicable federal civil rights laws and [place] laws. We do not discriminate on the basis of race, color, national origin, age, disability, sex, sexual orientation, or gender identity.

## 2018-05-02 ASSESSMENT — PATIENT HEALTH QUESTIONNAIRE - PHQ9: SUM OF ALL RESPONSES TO PHQ QUESTIONS 1-9: 0

## 2018-05-02 ASSESSMENT — ANXIETY QUESTIONNAIRES: GAD7 TOTAL SCORE: 0

## 2018-05-11 ENCOUNTER — OFFICE VISIT (OUTPATIENT)
Dept: PSYCHOLOGY | Facility: CLINIC | Age: 48
End: 2018-05-11
Payer: COMMERCIAL

## 2018-05-11 DIAGNOSIS — F41.9 ANXIETY DISORDER, UNSPECIFIED TYPE: Primary | ICD-10-CM

## 2018-05-11 PROCEDURE — 90834 PSYTX W PT 45 MINUTES: CPT | Performed by: MARRIAGE & FAMILY THERAPIST

## 2018-05-11 ASSESSMENT — PATIENT HEALTH QUESTIONNAIRE - PHQ9: 5. POOR APPETITE OR OVEREATING: NOT AT ALL

## 2018-05-11 ASSESSMENT — ANXIETY QUESTIONNAIRES
7. FEELING AFRAID AS IF SOMETHING AWFUL MIGHT HAPPEN: NOT AT ALL
2. NOT BEING ABLE TO STOP OR CONTROL WORRYING: NOT AT ALL
GAD7 TOTAL SCORE: 0
1. FEELING NERVOUS, ANXIOUS, OR ON EDGE: NOT AT ALL
6. BECOMING EASILY ANNOYED OR IRRITABLE: NOT AT ALL
3. WORRYING TOO MUCH ABOUT DIFFERENT THINGS: NOT AT ALL
5. BEING SO RESTLESS THAT IT IS HARD TO SIT STILL: NOT AT ALL

## 2018-05-11 NOTE — PROGRESS NOTES
Progress Note    Client Name: Flor Fernandez  Date: 5/11/18         Service Type: Individual      Session Start Time: 1:01  Session End Time: 1:52      Session Length: 38-52     Session #: 8     Attendees: Client attended alone    Treatment Plan Last Reviewed: N/A, next due 6/27/18.  PHQ-9 / RODRIGUE-7 : completed.     DATA      Progress Since Last Session (Related to Symptoms / Goals / Homework):   Symptoms: Improved    Homework: Achieved / completed to satisfaction      Episode of Care Goals: Satisfactory progress - ACTION (Actively working towards change); Intervened by reinforcing change plan / affirming steps taken     Current / Ongoing Stressors and Concerns:   Client reported feeling anxious regarding her mother's 6-month oncology check-up results.  Client reported that her work situation has improved somewhat, so no action has been taken yet.  Client reported concerns about her son's relationship with his father.     Treatment Objective(s) Addressed in This Session:   use at least 2 coping skills for anxiety management in the next 12 weeks     Intervention:   CBT: reviewed with client letting go of things outside of her control (i.e. other's actions/responses) and controlling what she can (i.e. her own actions).  DBT: reviewed with client distraction skills/strategies for coping with anxiety.        ASSESSMENT: Current Emotional / Mental Status (status of significant symptoms):   Risk status (Self / Other harm or suicidal ideation)   Client denies current fears or concerns for personal safety.   Client denies current or recent suicidal ideation or behaviors.   Client denies current or recent homicidal ideation or behaviors.   Client denies current or recent self injurious behavior or ideation.   Client denies other safety concerns.   A safety and risk management plan has not been developed at this time, however client was given the after-hours number / 911 should  there be a change in any of these risk factors.     Appearance:   Appropriate    Eye Contact:   Good    Psychomotor Behavior: Normal    Attitude:   Cooperative    Orientation:   All   Speech    Rate / Production: Normal     Volume:  Normal    Mood:    Anxious  Normal   Affect:    Appropriate    Thought Content:  Clear    Thought Form:  Coherent  Logical    Insight:    Good      Medication Review:   No current psychiatric medications prescribed     Medication Compliance:   NA     Changes in Health Issues:   None reported     Chemical Use Review:   Substance Use: Chemical use reviewed, no active concerns identified      Tobacco Use: No current tobacco use.       Collateral Reports Completed:   Not Applicable    PLAN: (Client Tasks / Therapist Tasks / Other)  Client agreed to work on using distraction skills/strategies and letting go of things outside of her control and focusing on what is in her control as needed for anxiety between now and follow-up session in three weeks.        Vikas Perez, [facility]                                                         ________________________________________________________________________    Treatment Plan    Client's Name: Flor Fernandez  YOB: 1970    Date: 3/27/18    DSM-V Diagnoses: 300.00 (F41.9) Unspecified Anxiety Disorder  Psychosocial / Contextual Factors: problems with primary support group: conflicts with client's children  WHODAS: 15    Referral / Collaboration:  Referral to another professional/service is not indicated at this time..    Anticipated number of session or this episode of care: 11-20      MeasurableTreatment Goal(s) related to diagnosis / functional impairment(s)  Goal 1: Client will successfully process through past trauma defined as reporting 0 Subjective Units of Distress related to trauma on 0-10 scale and 7 on Validity of (positive) Cognition scale about self on 1-7 scale   I will know I've met my goal when I feel less  deepak.       Objective #A (Client Action)    Status: New - Date: 3/27/18      Client will identify past traumatic events/memories which are causing current distress.     Intervention(s)  Therapist will take client's history and facilitate client's identification of targets for EMDR.     Objective #B  Client will complete needed assessment(s) and [facility] EMDR resourcing to confirm readiness for EMDR.    Status: New - Date: 3/27/18      Intervention(s)  Therapist will administer Dissociative Experiences Scale, Multidimensional Inventory of Dissociation as needed and complete [facility] EMDR resourcing with client.     Objective #C  Client will engage in installing at least 2 EMDR resources.  Status: New - Date: 3/27/18      Intervention(s)  Therapist will complete EMDR resourcing (Container, Remote Control, grounding/progressive muscle relaxation, Light Stream, Inner Advisor) with client.     Objective #D (Client Action)    Status: New - Date: 3/27/18      Client will engage in reprocessing all past traumatic event/memory targets.     Intervention(s)   Therapist will complete EMDR reprocessing with client.    Goal 2: Client will increase overall baseline calm mindset by 2 points on a 1-10 Likert scale per self-report (10 = optimal calm mindset).    I will know I've met my goal when I feel less anxious.      Objective #A (Client Action)    Status: New - Date: 3/27/18     Client will use cognitive strategies identified in therapy to challenge anxious thoughts.    Intervention(s)  Therapist will teach emotional regulation skills. CBT/REBT ABCD model.    Objective #B  Client will use at least 2 new coping skills for anxiety management in the next 12 weeks.    Status: New - Date: 3/27/18     Intervention(s)  Therapist will teach emotional regulation skills. DBT Core Mindfulness, Distress Tolerance, Emotion Regulation, and Interpersonal Effectiveness skills.    Goal 3: Client will improve her interpersonal  functioning/relationships by 2 points on a 1-10 Likert scale per self-report (10 = optimal interpersonal functioning/relationships).    I will know I've met my goal when my relationships with my children have improved.      Objective #A (Client Action)    Status: New - Date: 3/27/18     Client will learn & utilize at least 2 new assertive communication skills weekly.    Intervention(s)  Therapist will teach Nonviolent Communication and DBT Interpersonal Effectiveness skills..    Client has reviewed and agreed to the above plan.      JENNIFER Horowitz  May 11, 2018

## 2018-05-11 NOTE — MR AVS SNAPSHOT
"                  MRN:[id number]                      After Visit Summary   5/11/2018    Flor Fernandez    MRN: [id number]           Visit Information        Provider Department      5/11/2018 1:00 PM Vikas Perez, [facility] Generic      Your next 10 appointments already scheduled     May 15, 2018  3:00 PM CDT   Office Visit with Nayeli Barnard MD   [facility] ([facility])    [address] [place] [address]   [phone]           Bring a current list of meds and any records pertaining to this visit. For Physicals, please bring immunization records and any forms needing to be filled out. Please arrive 10 minutes early to complete paperwork.            May 22, 2018  2:15 PM CDT   SHORT with Nayeli Barnard MD   [facility] ([address] [address]   [phone]            May 29, 2018  2:00 PM CDT   Return Visit with Vikas Perez [facility] ([address] [address]   [phone]            Jun 05, 2018  2:00 PM CDT   Return Visit with Vikas Perez, [facility] ([address] [address]   [phone]            Jun 12, 2018  2:00 PM CDT   Return Visit with Vikas Perez [facility] ([address] [address]   [phone]              MyChart Information     [employer] lets you send messages to your doctor, view your test results, renew your prescriptions, schedule appointments and more. To sign up, go to [email or web address] . Click on \"Log in\" on the left side of the screen, which will take you to the Welcome page. Then click on \"Sign up Now\" on the right side of the page.     You will be asked to enter the access code " listed below, as well as some personal information. Please follow the directions to create your username and password.     Your access code is: [email or web address]  Expires: 2018  2:00 PM     Your access code will  in 90 days. If you need help or a new code, please call your [place] clinic or [phone].        Care EveryWhere ID     This is your Care EveryWhere ID. This could be used by other organizations to access your [place] medical records  [id number]        Equal Access to Services     JOSE FLORES : Nathaniel Khan, carlos trimble, jennifer gonzalezalmalu mike, rosana ruiz . So [facility] [phone].    ATENCIÓN: Si habla español, tiene a sung disposición servicios gratuitos de asistencia lingüística. Llame al [phone].    We comply with applicable federal civil rights laws and [place] laws. We do not discriminate on the basis of race, color, national origin, age, disability, sex, sexual orientation, or gender identity.

## 2018-05-12 ASSESSMENT — ANXIETY QUESTIONNAIRES: GAD7 TOTAL SCORE: 0

## 2018-05-12 ASSESSMENT — PATIENT HEALTH QUESTIONNAIRE - PHQ9: SUM OF ALL RESPONSES TO PHQ QUESTIONS 1-9: 0

## 2018-05-15 ENCOUNTER — OFFICE VISIT (OUTPATIENT)
Dept: INTERNAL MEDICINE | Facility: CLINIC | Age: 48
End: 2018-05-15
Payer: COMMERCIAL

## 2018-05-15 VITALS
RESPIRATION RATE: 20 BRPM | TEMPERATURE: 96.6 F | HEART RATE: 98 BPM | WEIGHT: 270.5 LBS | BODY MASS INDEX: 44.33 KG/M2 | DIASTOLIC BLOOD PRESSURE: 72 MMHG | SYSTOLIC BLOOD PRESSURE: 124 MMHG | OXYGEN SATURATION: 99 %

## 2018-05-15 DIAGNOSIS — E66.01 MORBID OBESITY (H): ICD-10-CM

## 2018-05-15 DIAGNOSIS — E78.5 HYPERLIPIDEMIA LDL GOAL <100: ICD-10-CM

## 2018-05-15 DIAGNOSIS — R63.8 CRAVING FOR PARTICULAR FOOD: ICD-10-CM

## 2018-05-15 DIAGNOSIS — Z13.1 SCREENING FOR DIABETES MELLITUS: ICD-10-CM

## 2018-05-15 DIAGNOSIS — I10 BENIGN ESSENTIAL HYPERTENSION: Primary | ICD-10-CM

## 2018-05-15 DIAGNOSIS — E61.1 IRON DEFICIENCY: ICD-10-CM

## 2018-05-15 DIAGNOSIS — R20.9 DISTURBANCE OF SKIN SENSATION: ICD-10-CM

## 2018-05-15 LAB
CHOLEST SERPL-MCNC: 138 MG/DL
GLUCOSE SERPL-MCNC: 98 MG/DL (ref 70–99)
HDLC SERPL-MCNC: 44 MG/DL
LDLC SERPL CALC-MCNC: 68 MG/DL
NONHDLC SERPL-MCNC: 94 MG/DL
TRIGL SERPL-MCNC: 130 MG/DL

## 2018-05-15 PROCEDURE — 80061 LIPID PANEL: CPT | Performed by: INTERNAL MEDICINE

## 2018-05-15 PROCEDURE — 36415 COLL VENOUS BLD VENIPUNCTURE: CPT | Performed by: INTERNAL MEDICINE

## 2018-05-15 PROCEDURE — 82728 ASSAY OF FERRITIN: CPT | Performed by: INTERNAL MEDICINE

## 2018-05-15 PROCEDURE — 82607 VITAMIN B-12: CPT | Performed by: INTERNAL MEDICINE

## 2018-05-15 PROCEDURE — 99214 OFFICE O/P EST MOD 30 MIN: CPT | Performed by: INTERNAL MEDICINE

## 2018-05-15 PROCEDURE — 82947 ASSAY GLUCOSE BLOOD QUANT: CPT | Performed by: INTERNAL MEDICINE

## 2018-05-15 RX ORDER — CHLORTHALIDONE 25 MG/1
25 TABLET ORAL DAILY
Qty: 90 TABLET | Refills: 3 | Status: SHIPPED | OUTPATIENT
Start: 2018-05-15 | End: 2019-03-08

## 2018-05-15 RX ORDER — TOPIRAMATE 25 MG/1
TABLET, FILM COATED ORAL
Qty: 360 TABLET | Refills: 3 | Status: SHIPPED | OUTPATIENT
Start: 2018-05-15 | End: 2018-08-14

## 2018-05-15 NOTE — MR AVS SNAPSHOT
After Visit Summary   5/15/2018    Flor Fernandez    MRN: [id number]           Patient Information     Date Of Birth          1970        Visit Information        Provider Department      5/15/2018 3:00 PM Nayeli Barnard MD [facility]        Today's Diagnoses     Disturbance of skin sensation    -  1    Morbid obesity (H)        Benign essential hypertension        Craving for particular food        Hyperlipidemia LDL goal <100        Screening for diabetes mellitus        Iron deficiency          Care Instructions    Take Topamax 1 tablet in the morning and 2 tablets in the evening for 2 weeks. Increase to 2 tablets twice daily after that. If you have trouble concentrating at the higher dose, drop back down to 50mg.    Get a blood draw in 1 month.    Follow up in 3 months.    [facility]    If you have any questions regarding to your visit please contact your care team:     Team Pink:   Clinic Hours Telephone Number   Internal Medicine:  Dr. Nayeli Hi, NP       7am-7pm  Monday - Thursday   7am-5pm  Fridays  [phone]  (Appointment scheduling available 24/7)    Questions about your recent visit?  Team Line  [phone]   Urgent Care - [place] and [place] - 11am-9pm Monday-Friday Saturday-Sunday- 9am-5pm   [place] - 5pm-9pm Monday-Friday Saturday-Sunday- 9am-5pm  [phone] - [place]  [phone] - [place]       What options do I have for a visit other than an office visit? We offer electronic visits (e-visits) and telephone visits, when medically appropriate.  Please check with your medical insurance to see if these types of visits are covered, as you will be responsible for any charges that are not paid by your insurance.      You can use Whatever (secure electronic communication) to access to your chart, send your primary care provider a message, or make an appointment. Ask a team member  how to get started.     For a price quote for your services, please call our Consumer Price Line at [phone] or our Imaging Cost estimation line at [phone] (for imaging tests).    Laurel Antunez CMA          Follow-ups after your visit        Follow-up notes from your care team     Return in about 3 months (around 8/15/2018).      Your next 10 appointments already scheduled     May 22, 2018  2:15 PM CDT   SHORT with Nayeli Barnard MD   [address] [address]   [phone]            May 29, 2018  2:00 PM CDT   Return Visit with Vikas Perez [address] [address]   [phone]            Jun 05, 2018  2:00 PM CDT   Return Visit with Vikas Perez [address] [address]   [phone]            Jun 12, 2018  2:00 PM CDT   Return Visit with Vikas Perez [address] [address]   [phone]              Future tests that were ordered for you today     Open Future Orders        Priority Expected Expires Ordered    **Basic metabolic panel FUTURE anytime Routine 5/15/2018 5/15/2019 5/15/2018            Who to contact     If you have questions or need follow up information about today's clinic visit or your schedule please contact [facility] directly at [phone].  Normal or non-critical lab and imaging results will be communicated to you by MyChart, letter or phone within 4 business days after the clinic has received the results. If you do not hear from us within 7 days, please contact the clinic through MyChart or phone. If you have a critical or abnormal lab result, we will notify you by phone as soon as possible.  Submit refill  "requests through [employer] or call your pharmacy and they will forward the refill request to us. Please allow 3 business days for your refill to be completed.          Additional Information About Your Visit        [employer] Information     [employer] lets you send messages to your doctor, view your test results, renew your prescriptions, schedule appointments and more. To sign up, go to [email or web address] . Click on \"Log in\" on the left side of the screen, which will take you to the Welcome page. Then click on \"Sign up Now\" on the right side of the page.     You will be asked to enter the access code listed below, as well as some personal information. Please follow the directions to create your username and password.     Your access code is: [email or web address]  Expires: 2018  2:00 PM     Your access code will  in 90 days. If you need help or a new code, please call your [place] clinic or [phone].        Care EveryWhere ID     This is your Care EveryWhere ID. This could be used by other organizations to access your [place] medical records  [id number]        Your Vitals Were     Pulse Temperature Respirations Pulse Oximetry Breastfeeding? BMI (Body Mass Index)    98 96.6  F (35.9  C) (Oral) 20 99% No 44.97 kg/m2       Blood Pressure from Last 3 Encounters:   05/15/18 124/72   04/10/18 130/60   18 134/72    Weight from Last 3 Encounters:   05/15/18 274 lb 6.4 oz (124.5 kg)   04/10/18 273 lb 8 oz (124.1 kg)   18 275 lb (124.7 kg)              We Performed the Following     Ferritin     Glucose     Lipid panel reflex to direct LDL Fasting     Vitamin B12          Today's Medication Changes          These changes are accurate as of 5/15/18  3:30 PM.  If you have any questions, ask your nurse or doctor.               These medicines have changed or have updated prescriptions.        Dose/Directions    topiramate 25 MG tablet   Commonly known as:  TOPAMAX   This may have changed:    - how much to " take  - how to take this  - when to take this  - additional instructions   Used for:  Morbid obesity (H)   Changed by:  Nayeli Barnard MD        Take 1 tab in the morning and 2 in the evening for 2 weeks and then 2 tabs twice daily.   Quantity:  360 tablet   Refills:  3            Where to get your medicines      These medications were [place] [facility] [place] [address]  [address], [place] [address]     Phone:  [phone]     chlorthalidone 25 MG tablet    topiramate 25 MG tablet                Primary Care Provider Office Phone # Fax #    Nayeli Barnard [phone] [address] [address]        Equal Access to Services     DANY FLORES : Hadii aminah mccain hadasho Soomaali, waaxda luqadaha, qaybta kaalmada adeegyada, waxay robert haymaximiliano ruiz . So [facility] [phone].    ATENCIÓN: Si habla español, tiene a sung disposición servicios gratuitos de asistencia lingüística. Llame al [phone].    We comply with applicable federal civil rights laws and [place] laws. We do not discriminate on the basis of race, color, national origin, age, disability, sex, sexual orientation, or gender identity.            Thank you!     Thank you for choosing [facility]  for your care. Our goal is always to provide you with excellent care. Hearing back from our patients is one way we can continue to improve our services. Please take a few minutes to complete the written survey that you may receive in the mail after your visit with us. Thank you!             Your Updated Medication List - Protect others around you: Learn how to safely use, store and throw away your medicines at www.disposemymeds.org.          This list is accurate as of 5/15/18  3:30 PM.  Always use your most recent med list.                   Brand Name Dispense Instructions for use Diagnosis    atorvastatin 20 MG tablet    LIPITOR    90  tablet    TAKE 1 TAB BY MOUTH DAILY. INDICATIONS: CEREBROVASCULAR ACCIDENT OR STROKE    Hyperlipidemia LDL goal <100       carvedilol 6.25 MG tablet    COREG    180 tablet    Take 1 tablet (6.25 mg) by mouth 2 times daily (with meals)        chlorthalidone 25 MG tablet    HYGROTON    90 tablet    Take 1 tablet (25 mg) by mouth daily    Benign essential hypertension       [facility] ASPIRIN 325 MG tablet   Generic drug:  aspirin      TAKE 1 TABLET BY MOUTH ONCE DAILY WITH A MEAL.        cyanocobalamin 1000 MCG tablet    vitamin  B-12     Take 1,000 mcg by mouth daily        ferrous fumarate 65 mg ([place]. FE)-Vitamin C 125 mg  MG Tabs tablet    VITRON C     Take 1 tablet by mouth daily    Iron deficiency       lisinopril 10 MG tablet    PRINIVIL/ZESTRIL    90 tablet    Take 1 tablet (10 mg) by mouth daily    Benign essential hypertension       MULTI-VITAMIN GUMMIES Chew           topiramate 25 MG tablet    TOPAMAX    360 tablet    Take 1 tab in the morning and 2 in the evening for 2 weeks and then 2 tabs twice daily.    Morbid obesity (H)

## 2018-05-15 NOTE — PROGRESS NOTES
INTERNAL MEDICINE  ASSESSMENT/PLAN:   1. Morbid obesity (H)  Cravings are controlled on Topamax. She will increase to 50mg.   - topiramate (TOPAMAX) 25 MG tablet; Take 1 tab in the morning and 2 in the evening for 2 weeks and then 2 tabs twice daily.  Dispense: 360 tablet; Refill: 3    2. Benign essential hypertension    - chlorthalidone (HYGROTON) 25 MG tablet; Take 1 tablet (25 mg) by mouth daily  Dispense: 90 tablet; Refill: 3  - **Basic metabolic panel FUTURE anytime; Future    3. Disturbance of skin sensation  Periodic in her feet   - Glucose  - Vitamin B12    4. Craving for particular food  Titrate up on topamax.  Per patient instructions.     5. Hyperlipidemia LDL goal <100    - Lipid panel reflex to direct LDL Fasting    6. Screening for diabetes mellitus    - Glucose    7. Iron deficiency    - Ferritin    Patient Instructions  Take Topamax 1 tablet in the morning and 2 tablets in the evening for 2 weeks. Increase to 2 tablets twice daily after that. If you have trouble concentrating at the higher dose, drop back down to 50mg.    Get a blood draw in 1 month.    Follow up in 3 months    SUBJECTIVE:   Flor Fernandez is a 48 year old female who presents to clinic today for the following health issues:    Patient presents to clinic today for hypertension follow up.    Weight - She ran out of Topamax for a week and has not gotten it refilled yet. Denies withdrawal problems. When she is on it, she does not have as many cravings. This week, she was still able to manage her cravings well even without Topamax by telling herself she does not need to eat extra food. She started working at at [employer] and is doing a lot of cardio but fearful about doing weights due to body image. She is walking 2 miles on the treadmill.    Hypertension - Her blood pressure has been well controlled.     Neuropathy - She has felt tingling in her feet but has not felt it in the last week. She has a family history of diabetes. Takes  vitamin B12 500mg daily.     Additional notes:   Seeing Vikas Perez has been going well      Problem list and histories reviewed & adjusted, as indicated.  Additional history: as documented    Labs reviewed in EPIC    Reviewed and updated as needed this visit by clinical staff  Tobacco  Allergies  Meds  Med Hx  Surg Hx  Fam Hx  Soc Hx      Reviewed and updated as needed this visit by Provider         ROS:  CONSTITUTIONAL: NEGATIVE for fever, chills, change in weight  NEURO: NEGATIVE for weakness, dizziness, paresthesias, POSITIVE for feet tingling  PSYCHIATRIC: NEGATIVE for changes in mood or affect    This document serves as a record of the services and decisions personally performed and made by Nayeli Barnard MD. It was created on his/her behalf by Kiarra Leon, trained medical scribe. The creation of this document is based the provider's statements to the medical scribes.    Scribjovon Leon 3:20 PM, May 15, 2018  OBJECTIVE:   /72 (BP Location: Left arm, Cuff Size: Adult Regular)  Pulse 98  Temp 96.6  F (35.9  C) (Oral)  Resp 20  Wt 124.5 kg (274 lb 6.4 oz)  SpO2 99%  Breastfeeding? No  BMI 44.97 kg/m2  Body mass index is 44.97 kg/(m^2).  GENERAL: healthy, alert and no distress  RESP: lungs clear to auscultation - no rales, rhonchi or wheezes  CV: regular rate and rhythm, normal S1 S2, no S3 or S4, no murmur, click or rub, no peripheral edema and peripheral pulses strong  NEURO: Normal strength and tone, mentation intact and speech normal  PSYCH: mentation appears normal, affect normal/bright    Diagnostic Test Results:  Results for orders placed or performed in visit on 02/04/18   XR Knee Left 3 Views    Narrative    XR KNEE LT 3 VW   2/4/2018 10:24 AM     HISTORY: slipped and fell on side walk; Knee injury, left, initial  encounter    COMPARISON: None.      Impression    IMPRESSION: No evidence of fracture. Joint spaces are well-preserved.  No joint effusion.    WAQAS ALEXANDRA MD         I  spent 10 minutes of time with the patient and >50% of it was in education and counseling regarding hypertension and weight loss.    The information in this document, created by the medical scribe, Kiarra Leon, for me, accurately reflects the services I personally performed and the decisions made by me. I have reviewed and approved this document for accuracy prior to leaving the patient care area.    Nayeli Barnard MD  [facility]    Start 3:19 PM  End 3:29 PM

## 2018-05-15 NOTE — PATIENT INSTRUCTIONS
Take Topamax 1 tablet in the morning and 2 tablets in the evening for 2 weeks. Increase to 2 tablets twice daily after that. If you have trouble concentrating at the higher dose, drop back down to 50mg.    Get a blood draw in 1 month.    Follow up in 3 months.    [facility]    If you have any questions regarding to your visit please contact your care team:     Team Pink:   Clinic Hours Telephone Number   Internal Medicine:  Dr. Nayeli Hi NP       7am-7pm  Monday - Thursday   7am-5pm  Fridays  [phone]  (Appointment scheduling available 24/7)    Questions about your recent visit?  Team Line  [phone]   Urgent Care - [place] and [facility] - 11am-9pm Monday-Friday Saturday-Sunday- 9am-5pm   [place] - 5pm-9pm Monday-Friday Saturday-Sunday- 9am-5pm  [phone] - [place]  [phone] - [place]       What options do I have for a visit other than an office visit? We offer electronic visits (e-visits) and telephone visits, when medically appropriate.  Please check with your medical insurance to see if these types of visits are covered, as you will be responsible for any charges that are not paid by your insurance.      You can use [employer] (secure electronic communication) to access to your chart, send your primary care provider a message, or make an appointment. Ask a team member how to get started.     For a price quote for your services, please call our Consumer Price Line at [phone] or our Imaging Cost estimation line at [phone] (for imaging tests).    Laurel Antunez, CMA

## 2018-05-15 NOTE — LETTER
[facility]  [address] [place] [address]    May 17, 2018    Flor Fernandez  [address]  [place] [address]          Dear Flor,    Normal vitamin B12 level. Normal iron levels. Adequate cholesterol.   Normal blood sugar    Enclosed is a copy of your results.     Results for orders placed or performed in visit on 05/15/18   Lipid panel reflex to direct LDL Fasting   Result Value Ref Range    Cholesterol 138 <200 mg/dL    Triglycerides 130 <150 mg/dL    HDL Cholesterol 44 (L) >49 mg/dL    LDL Cholesterol Calculated 68 <100 mg/dL    Non HDL Cholesterol 94 <130 mg/dL   Glucose   Result Value Ref Range    Glucose 98 70 - 99 mg/dL   Ferritin   Result Value Ref Range    Ferritin 53 8 - 252 ng/mL   Vitamin B12   Result Value Ref Range    Vitamin B12 1013 (H) 193 - 986 pg/mL       If you have any questions or concerns, please call myself or my nurse at [phone].      Sincerely,        Nayeli Barnard MD/pranay

## 2018-05-16 LAB
FERRITIN SERPL-MCNC: 53 NG/ML (ref 8–252)
VIT B12 SERPL-MCNC: 1013 PG/ML (ref 193–986)

## 2018-05-29 ENCOUNTER — OFFICE VISIT (OUTPATIENT)
Dept: PSYCHOLOGY | Facility: CLINIC | Age: 48
End: 2018-05-29
Payer: COMMERCIAL

## 2018-05-29 DIAGNOSIS — F41.9 ANXIETY DISORDER, UNSPECIFIED TYPE: Primary | ICD-10-CM

## 2018-05-29 PROCEDURE — 90834 PSYTX W PT 45 MINUTES: CPT | Performed by: MARRIAGE & FAMILY THERAPIST

## 2018-05-29 ASSESSMENT — ANXIETY QUESTIONNAIRES
7. FEELING AFRAID AS IF SOMETHING AWFUL MIGHT HAPPEN: NOT AT ALL
3. WORRYING TOO MUCH ABOUT DIFFERENT THINGS: NOT AT ALL
2. NOT BEING ABLE TO STOP OR CONTROL WORRYING: NOT AT ALL
GAD7 TOTAL SCORE: 0
5. BEING SO RESTLESS THAT IT IS HARD TO SIT STILL: NOT AT ALL
1. FEELING NERVOUS, ANXIOUS, OR ON EDGE: NOT AT ALL
6. BECOMING EASILY ANNOYED OR IRRITABLE: NOT AT ALL

## 2018-05-29 ASSESSMENT — PATIENT HEALTH QUESTIONNAIRE - PHQ9: 5. POOR APPETITE OR OVEREATING: NOT AT ALL

## 2018-05-30 ASSESSMENT — PATIENT HEALTH QUESTIONNAIRE - PHQ9: SUM OF ALL RESPONSES TO PHQ QUESTIONS 1-9: 0

## 2018-05-30 ASSESSMENT — ANXIETY QUESTIONNAIRES: GAD7 TOTAL SCORE: 0

## 2018-05-30 NOTE — PROGRESS NOTES
"                                             Progress Note    Client Name: Flor Fernandez  Date: 5/29/18         Service Type: Individual      Session Start Time: 2:00  Session End Time: 2:50      Session Length: 38-52     Session #: 9     Attendees: Client attended alone    Treatment Plan Last Reviewed: N/A, next due 6/27/18.  PHQ-9 / RODRIGUE-7 : completed.     DATA      Progress Since Last Session (Related to Symptoms / Goals / Homework):   Symptoms: Improved    Homework: Achieved / completed to satisfaction      Episode of Care Goals: Satisfactory progress - ACTION (Actively working towards change); Intervened by reinforcing change plan / affirming steps taken     Current / Ongoing Stressors and Concerns:   Client reported feeling hurt that her children have expressed not wanting to come to her house, though they are seeing each other more.  Client reported continuing to feel like she is \"walking on eggshells\" at times with her children, and as a result has not directly communicated with them regarding her concerns/feelings.     Treatment Objective(s) Addressed in This Session:   learn & utilize at least 2 assertive communication skills weekly     Intervention:   Interpersonal Therapy: reviewed with client communicating her concerns/feelings assertively and directly with her children.        ASSESSMENT: Current Emotional / Mental Status (status of significant symptoms):   Risk status (Self / Other harm or suicidal ideation)   Client denies current fears or concerns for personal safety.   Client denies current or recent suicidal ideation or behaviors.   Client denies current or recent homicidal ideation or behaviors.   Client denies current or recent self injurious behavior or ideation.   Client denies other safety concerns.   A safety and risk management plan has not been developed at this time, however client was given the after-hours number / 911 should there be a change in any of these risk " factors.     Appearance:   Appropriate    Eye Contact:   Good    Psychomotor Behavior: Normal    Attitude:   Cooperative    Orientation:   All   Speech    Rate / Production: Normal     Volume:  Normal    Mood:    Anxious  Normal   Affect:    Appropriate    Thought Content:  Clear    Thought Form:  Coherent  Logical    Insight:    Good      Medication Review:   No current psychiatric medications prescribed     Medication Compliance:   NA     Changes in Health Issues:   None reported     Chemical Use Review:   Substance Use: Chemical use reviewed, no active concerns identified      Tobacco Use: No current tobacco use.       Collateral Reports Completed:   Not Applicable    PLAN: (Client Tasks / Therapist Tasks / Other)  Client agreed to work on communicating with increasing assertiveness and directness her concerns/feelings with her children between now and follow-up session next week.        Vikas Perez, LMFT                                                         ________________________________________________________________________    Treatment Plan    Client's Name: Flor Fernandez  YOB: 1970    Date: 3/27/18    DSM-V Diagnoses: 300.00 (F41.9) Unspecified Anxiety Disorder  Psychosocial / Contextual Factors: problems with primary support group: conflicts with client's children  WHODAS: 15    Referral / Collaboration:  Referral to another professional/service is not indicated at this time..    Anticipated number of session or this episode of care: 11-20      MeasurableTreatment Goal(s) related to diagnosis / functional impairment(s)  Goal 1: Client will successfully process through past trauma defined as reporting 0 Subjective Units of Distress related to trauma on 0-10 scale and 7 on Validity of (positive) Cognition scale about self on 1-7 scale   I will know I've met my goal when I feel less stuck.       Objective #A (Client Action)    Status: New - Date: 3/27/18      Client will  identify past traumatic events/memories which are causing current distress.     Intervention(s)  Therapist will take client's history and facilitate client's identification of targets for EMDR.     Objective #B  Client will complete needed assessment(s) and [facility] EMDR resourcing to confirm readiness for EMDR.    Status: New - Date: 3/27/18      Intervention(s)  Therapist will administer Dissociative Experiences Scale, Multidimensional Inventory of Dissociation as needed and complete [facility] EMDR resourcing with client.     Objective #C  Client will engage in installing at least 2 EMDR resources.  Status: New - Date: 3/27/18      Intervention(s)  Therapist will complete EMDR resourcing (Container, Remote Control, grounding/progressive muscle relaxation, Light Stream, Inner Advisor) with client.     Objective #D (Client Action)    Status: New - Date: 3/27/18      Client will engage in reprocessing all past traumatic event/memory targets.     Intervention(s)   Therapist will complete EMDR reprocessing with client.    Goal 2: Client will increase overall baseline calm mindset by 2 points on a 1-10 Likert scale per self-report (10 = optimal calm mindset).    I will know I've met my goal when I feel less anxious.      Objective #A (Client Action)    Status: New - Date: 3/27/18     Client will use cognitive strategies identified in therapy to challenge anxious thoughts.    Intervention(s)  Therapist will teach emotional regulation skills. CBT/REBT ABCD model.    Objective #B  Client will use at least 2 new coping skills for anxiety management in the next 12 weeks.    Status: New - Date: 3/27/18     Intervention(s)  Therapist will teach emotional regulation skills. DBT Core Mindfulness, Distress Tolerance, Emotion Regulation, and Interpersonal Effectiveness skills.    Goal 3: Client will improve her interpersonal functioning/relationships by 2 points on a 1-10 Likert scale per self-report (10 = optimal interpersonal  functioning/relationships).    I will know I've met my goal when my relationships with my children have improved.      Objective #A (Client Action)    Status: New - Date: 3/27/18     Client will learn & utilize at least 2 new assertive communication skills weekly.    Intervention(s)  Therapist will teach Nonviolent Communication and DBT Interpersonal Effectiveness skills..    Client has reviewed and agreed to the above plan.      JENNIFER Horowitz  May 29, 2018

## 2018-06-05 ENCOUNTER — OFFICE VISIT (OUTPATIENT)
Dept: PSYCHOLOGY | Facility: CLINIC | Age: 48
End: 2018-06-05
Payer: COMMERCIAL

## 2018-06-05 DIAGNOSIS — F41.9 ANXIETY DISORDER, UNSPECIFIED TYPE: Primary | ICD-10-CM

## 2018-06-05 PROCEDURE — 90834 PSYTX W PT 45 MINUTES: CPT | Performed by: MARRIAGE & FAMILY THERAPIST

## 2018-06-05 ASSESSMENT — ANXIETY QUESTIONNAIRES
1. FEELING NERVOUS, ANXIOUS, OR ON EDGE: NOT AT ALL
5. BEING SO RESTLESS THAT IT IS HARD TO SIT STILL: NOT AT ALL
2. NOT BEING ABLE TO STOP OR CONTROL WORRYING: NOT AT ALL
GAD7 TOTAL SCORE: 0
6. BECOMING EASILY ANNOYED OR IRRITABLE: NOT AT ALL
7. FEELING AFRAID AS IF SOMETHING AWFUL MIGHT HAPPEN: NOT AT ALL
3. WORRYING TOO MUCH ABOUT DIFFERENT THINGS: NOT AT ALL

## 2018-06-05 ASSESSMENT — PATIENT HEALTH QUESTIONNAIRE - PHQ9: 5. POOR APPETITE OR OVEREATING: NOT AT ALL

## 2018-06-06 ASSESSMENT — ANXIETY QUESTIONNAIRES: GAD7 TOTAL SCORE: 0

## 2018-06-06 ASSESSMENT — PATIENT HEALTH QUESTIONNAIRE - PHQ9: SUM OF ALL RESPONSES TO PHQ QUESTIONS 1-9: 0

## 2018-06-06 NOTE — PROGRESS NOTES
Progress Note    Client Name: Flor Fernandez  Date: 6/5/18         Service Type: Individual      Session Start Time: 2:00  Session End Time: 2:45      Session Length: 38-52     Session #: 10     Attendees: Client attended alone    Treatment Plan Last Reviewed: N/A, next due 6/27/18.  PHQ-9 / RODRIGUE-7 : completed.     DATA      Progress Since Last Session (Related to Symptoms / Goals / Homework):   Symptoms: Improved    Homework: Achieved / completed to satisfaction      Episode of Care Goals: Satisfactory progress - ACTION (Actively working towards change); Intervened by reinforcing change plan / affirming steps taken     Current / Ongoing Stressors and Concerns:   Client reported that her son and his father have continued to be in conflict which led to her son's father not attending her son's high school commencement ceremony.  Client reported that her son got her daughter in trouble for a get-together in her apartment, and that client feels anxious about wanting to help her children.  Client reported that her mother has been giving money to her children out of a joint bank account, thus being unable to pay her bills.     Treatment Objective(s) Addressed in This Session:   practice setting boundaries 2 times in the next 12 weeks     Intervention:   Interpersonal Therapy: reviewed with client setting boundaries with her children by offering her help and then letting go of outcome, and with her mother regarding not enabling her children.        ASSESSMENT: Current Emotional / Mental Status (status of significant symptoms):   Risk status (Self / Other harm or suicidal ideation)   Client denies current fears or concerns for personal safety.   Client denies current or recent suicidal ideation or behaviors.   Client denies current or recent homicidal ideation or behaviors.   Client denies current or recent self injurious behavior or ideation.   Client denies other safety  concerns.   A safety and risk management plan has not been developed at this time, however client was given the after-hours number / 911 should there be a change in any of these risk factors.     Appearance:   Appropriate    Eye Contact:   Good    Psychomotor Behavior: Normal    Attitude:   Cooperative    Orientation:   All   Speech    Rate / Production: Normal     Volume:  Normal    Mood:    Anxious  Normal   Affect:    Appropriate    Thought Content:  Clear    Thought Form:  Coherent  Logical    Insight:    Good      Medication Review:   No current psychiatric medications prescribed     Medication Compliance:   NA     Changes in Health Issues:   None reported     Chemical Use Review:   Substance Use: Chemical use reviewed, no active concerns identified      Tobacco Use: No current tobacco use.       Collateral Reports Completed:   Not Applicable    PLAN: (Client Tasks / Therapist Tasks / Other)  Client agreed to work on setting boundaries with her children and mother between now and follow-up session next week.        Vikas Perez LMFT                                                         ________________________________________________________________________    Treatment Plan    Client's Name: Flor Fernandez  YOB: 1970    Date: 3/27/18    DSM-V Diagnoses: 300.00 (F41.9) Unspecified Anxiety Disorder  Psychosocial / Contextual Factors: problems with primary support group: conflicts with client's children  WHODAS: 15    Referral / Collaboration:  Referral to another professional/service is not indicated at this time..    Anticipated number of session or this episode of care: 11-20      MeasurableTreatment Goal(s) related to diagnosis / functional impairment(s)  Goal 1: Client will successfully process through past trauma defined as reporting 0 Subjective Units of Distress related to trauma on 0-10 scale and 7 on Validity of (positive) Cognition scale about self on 1-7 scale   I will  know I've met my goal when I feel less stuck.       Objective #A (Client Action)    Status: New - Date: 3/27/18      Client will identify past traumatic events/memories which are causing current distress.     Intervention(s)  Therapist will take client's history and facilitate client's identification of targets for EMDR.     Objective #B  Client will complete needed assessment(s) and [facility] resourcing to confirm readiness for EMDR.    Status: New - Date: 3/27/18      Intervention(s)  Therapist will administer Dissociative Experiences Scale, Multidimensional Inventory of Dissociation as needed and complete [facility] resourcing with client.     Objective #C  Client will engage in installing at least 2 EMDR resources.  Status: New - Date: 3/27/18      Intervention(s)  Therapist will complete EMDR resourcing (Container, Remote Control, grounding/progressive muscle relaxation, Light Stream, Inner Advisor) with client.     Objective #D (Client Action)    Status: New - Date: 3/27/18      Client will engage in reprocessing all past traumatic event/memory targets.     Intervention(s)   Therapist will complete EMDR reprocessing with client.    Goal 2: Client will increase overall baseline calm mindset by 2 points on a 1-10 Likert scale per self-report (10 = optimal calm mindset).    I will know I've met my goal when I feel less anxious.      Objective #A (Client Action)    Status: New - Date: 3/27/18     Client will use cognitive strategies identified in therapy to challenge anxious thoughts.    Intervention(s)  Therapist will teach emotional regulation skills. CBT/REBT ABCD model.    Objective #B  Client will use at least 2 new coping skills for anxiety management in the next 12 weeks.    Status: New - Date: 3/27/18     Intervention(s)  Therapist will teach emotional regulation skills. DBT Core Mindfulness, Distress Tolerance, Emotion Regulation, and Interpersonal Effectiveness skills.    Goal 3: Client will  improve her interpersonal functioning/relationships by 2 points on a 1-10 Likert scale per self-report (10 = optimal interpersonal functioning/relationships).    I will know I've met my goal when my relationships with my children have improved.      Objective #A (Client Action)    Status: New - Date: 3/27/18     Client will learn & utilize at least 2 new assertive communication skills weekly.    Intervention(s)  Therapist will teach Nonviolent Communication and DBT Interpersonal Effectiveness skills..    Client has reviewed and agreed to the above plan.      JENNIFER Horowitz  June 5, 2018

## 2018-06-12 ENCOUNTER — OFFICE VISIT (OUTPATIENT)
Dept: PSYCHOLOGY | Facility: CLINIC | Age: 48
End: 2018-06-12
Payer: COMMERCIAL

## 2018-06-12 DIAGNOSIS — F41.9 ANXIETY DISORDER, UNSPECIFIED TYPE: Primary | ICD-10-CM

## 2018-06-12 PROCEDURE — 90834 PSYTX W PT 45 MINUTES: CPT | Performed by: MARRIAGE & FAMILY THERAPIST

## 2018-06-12 ASSESSMENT — ANXIETY QUESTIONNAIRES
6. BECOMING EASILY ANNOYED OR IRRITABLE: NOT AT ALL
2. NOT BEING ABLE TO STOP OR CONTROL WORRYING: NOT AT ALL
1. FEELING NERVOUS, ANXIOUS, OR ON EDGE: NOT AT ALL
5. BEING SO RESTLESS THAT IT IS HARD TO SIT STILL: NOT AT ALL
7. FEELING AFRAID AS IF SOMETHING AWFUL MIGHT HAPPEN: NOT AT ALL
3. WORRYING TOO MUCH ABOUT DIFFERENT THINGS: NOT AT ALL
GAD7 TOTAL SCORE: 0

## 2018-06-12 ASSESSMENT — PATIENT HEALTH QUESTIONNAIRE - PHQ9: 5. POOR APPETITE OR OVEREATING: NOT AT ALL

## 2018-06-13 ASSESSMENT — ANXIETY QUESTIONNAIRES: GAD7 TOTAL SCORE: 0

## 2018-06-13 ASSESSMENT — PATIENT HEALTH QUESTIONNAIRE - PHQ9: SUM OF ALL RESPONSES TO PHQ QUESTIONS 1-9: 0

## 2018-06-13 NOTE — PROGRESS NOTES
Progress Note    Client Name: Flor Fernandez  Date: 6/12/18         Service Type: Individual      Session Start Time: 2:01  Session End Time: 2:45      Session Length: 38-52     Session #: 11     Attendees: Client attended alone    Treatment Plan Last Reviewed: N/A, next due 6/27/18.  PHQ-9 / RODRIGUE-7 : completed.     DATA      Progress Since Last Session (Related to Symptoms / Goals / Homework):   Symptoms: Improved    Homework: Achieved / completed to satisfaction      Episode of Care Goals: Satisfactory progress - ACTION (Actively working towards change); Intervened by reinforcing change plan / affirming steps taken     Current / Ongoing Stressors and Concerns:   Client reported regarding conflict at work amongst her co-workers.  Client reported feeling some anxiety regarding wanting to stay out of the conflict, wanting it to end, and wanting to retain her positive relationships with all of her co-workers.  Client reported regarding conflict with her mother due to their gas bill accidentally not being paid.     Treatment Objective(s) Addressed in This Session:   practice setting boundaries 2 times in the next 12 weeks     Intervention:   DBT: reviewed with client being effective in her relationships with her co-workers.  Interpersonal Therapy: reviewed with client setting boundaries with her mother regarding engaging in conflict.        ASSESSMENT: Current Emotional / Mental Status (status of significant symptoms):   Risk status (Self / Other harm or suicidal ideation)   Client denies current fears or concerns for personal safety.   Client denies current or recent suicidal ideation or behaviors.   Client denies current or recent homicidal ideation or behaviors.   Client denies current or recent self injurious behavior or ideation.   Client denies other safety concerns.   A safety and risk management plan has not been developed at this time, however client was given the  after-hours number / 911 should there be a change in any of these risk factors.     Appearance:   Appropriate    Eye Contact:   Good    Psychomotor Behavior: Normal    Attitude:   Cooperative    Orientation:   All   Speech    Rate / Production: Normal     Volume:  Normal    Mood:    Anxious  Normal   Affect:    Appropriate    Thought Content:  Clear    Thought Form:  Coherent  Logical    Insight:    Good      Medication Review:   No current psychiatric medications prescribed     Medication Compliance:   NA     Changes in Health Issues:   None reported     Chemical Use Review:   Substance Use: Chemical use reviewed, no active concerns identified      Tobacco Use: No current tobacco use.       Collateral Reports Completed:   Not Applicable    PLAN: (Client Tasks / Therapist Tasks / Other)  Client agreed to work on setting boundaries with her mother and being effective in her relationships with her co-workers between now and follow-up session in two weeks.        Vikas Perez, [facility]                                                         ________________________________________________________________________    Treatment Plan    Client's Name: Flor Fernandez  YOB: 1970    Date: 3/27/18    DSM-V Diagnoses: 300.00 (F41.9) Unspecified Anxiety Disorder  Psychosocial / Contextual Factors: problems with primary support group: conflicts with client's children  WHODAS: 15    Referral / Collaboration:  Referral to another professional/service is not indicated at this time..    Anticipated number of session or this episode of care: 11-20      MeasurableTreatment Goal(s) related to diagnosis / functional impairment(s)  Goal 1: Client will successfully process through past trauma defined as reporting 0 Subjective Units of Distress related to trauma on 0-10 scale and 7 on Validity of (positive) Cognition scale about self on 1-7 scale   I will know I've met my goal when I feel less stuck.       Objective  #A (Client Action)    Status: New - Date: 3/27/18      Client will identify past traumatic events/memories which are causing current distress.     Intervention(s)  Therapist will take client's history and facilitate client's identification of targets for EMDR.     Objective #B  Client will complete needed assessment(s) and [facility] resourcing to confirm readiness for EMDR.    Status: New - Date: 3/27/18      Intervention(s)  Therapist will administer Dissociative Experiences Scale, Multidimensional Inventory of Dissociation as needed and complete [facility] resourcing with client.     Objective #C  Client will engage in installing at least 2 EMDR resources.  Status: New - Date: 3/27/18      Intervention(s)  Therapist will complete EMDR resourcing (Container, Remote Control, grounding/progressive muscle relaxation, Light Stream, Inner Advisor) with client.     Objective #D (Client Action)    Status: New - Date: 3/27/18      Client will engage in reprocessing all past traumatic event/memory targets.     Intervention(s)   Therapist will complete EMDR reprocessing with client.    Goal 2: Client will increase overall baseline calm mindset by 2 points on a 1-10 Likert scale per self-report (10 = optimal calm mindset).    I will know I've met my goal when I feel less anxious.      Objective #A (Client Action)    Status: New - Date: 3/27/18     Client will use cognitive strategies identified in therapy to challenge anxious thoughts.    Intervention(s)  Therapist will teach emotional regulation skills. CBT/REBT ABCD model.    Objective #B  Client will use at least 2 new coping skills for anxiety management in the next 12 weeks.    Status: New - Date: 3/27/18     Intervention(s)  Therapist will teach emotional regulation skills. DBT Core Mindfulness, Distress Tolerance, Emotion Regulation, and Interpersonal Effectiveness skills.    Goal 3: Client will improve her interpersonal functioning/relationships by 2 points  on a 1-10 Likert scale per self-report (10 = optimal interpersonal functioning/relationships).    I will know I've met my goal when my relationships with my children have improved.      Objective #A (Client Action)    Status: New - Date: 3/27/18     Client will learn & utilize at least 2 new assertive communication skills weekly.    Intervention(s)  Therapist will teach Nonviolent Communication and DBT Interpersonal Effectiveness skills..    Client has reviewed and agreed to the above plan.      JENNIFER Horowitz  June 12, 2018

## 2018-06-28 ENCOUNTER — OFFICE VISIT (OUTPATIENT)
Dept: FAMILY MEDICINE | Facility: CLINIC | Age: 48
End: 2018-06-28
Payer: COMMERCIAL

## 2018-06-28 VITALS
RESPIRATION RATE: 16 BRPM | TEMPERATURE: 97 F | DIASTOLIC BLOOD PRESSURE: 66 MMHG | OXYGEN SATURATION: 97 % | SYSTOLIC BLOOD PRESSURE: 104 MMHG | HEART RATE: 86 BPM

## 2018-06-28 DIAGNOSIS — J30.2 CHRONIC SEASONAL ALLERGIC RHINITIS, UNSPECIFIED TRIGGER: Primary | ICD-10-CM

## 2018-06-28 DIAGNOSIS — R07.0 THROAT PAIN: ICD-10-CM

## 2018-06-28 LAB
DEPRECATED S PYO AG THROAT QL EIA: NORMAL
SPECIMEN SOURCE: NORMAL

## 2018-06-28 PROCEDURE — 99213 OFFICE O/P EST LOW 20 MIN: CPT | Performed by: FAMILY MEDICINE

## 2018-06-28 PROCEDURE — 87880 STREP A ASSAY W/OPTIC: CPT | Performed by: FAMILY MEDICINE

## 2018-06-28 PROCEDURE — 87081 CULTURE SCREEN ONLY: CPT | Performed by: FAMILY MEDICINE

## 2018-06-28 RX ORDER — FLUTICASONE PROPIONATE 50 MCG
1-2 SPRAY, SUSPENSION (ML) NASAL DAILY
Qty: 1 BOTTLE | Refills: 11 | Status: SHIPPED | OUTPATIENT
Start: 2018-06-28 | End: 2019-07-29

## 2018-06-28 RX ORDER — ALBUTEROL SULFATE 0.83 MG/ML
2.5 SOLUTION RESPIRATORY (INHALATION) EVERY 6 HOURS PRN
Qty: 25 VIAL | Refills: 1 | Status: SHIPPED | OUTPATIENT
Start: 2018-06-28 | End: 2018-11-05

## 2018-06-28 NOTE — PATIENT INSTRUCTIONS
* VIRAL RESPIRATORY ILLNESS w/ Wheezing [Adult]  You have an Upper Respiratory Illness (URI) caused by a virus. This illness is contagious during the first few days. It is spread through the air by coughing and sneezing or by direct contact (touching the sick person and then touching your own eyes, nose or mouth). When the infection causes a lot of irritation, the air passages can go into spasm. This causes wheezing and shortness of breath.    Most viral illnesses resolve within 7-10 days with rest and simple home remedies, although the illness may sometimes last for several weeks. Antibiotics will not kill a virus and are generally not prescribed for this condition.  HOME CARE:      If symptoms are severe, rest at home for the first 2-3 days. When resuming activity, don't let yourself become overly tired.    Avoid exposure to cigarette smoke (yours or others).    You may use acetaminophen (Tylenol) 650-1000 mg every 6 hours or ibuprofen (Motrin, Advil) 600 mg every 6-8 hours with food to control pain, if you are able to take these medicines. [ NOTE : If you have chronic liver or kidney disease or ever had a stomach ulcer or GI bleeding, talk with your doctor before using these medicines.] (Aspirin should never be used in anyone under 18 years of age who is ill with a fever. It may cause severe liver damage.    Your appetite may be poor so a light diet is fine. Avoid dehydration by drinking 6-8 glasses of fluids per day (water, soft drinks, juices, tea, soup, etc.). Extra fluids will help loosen secretions in the nose and lungs.    Over-the-counter cold medicines will not shorten the duration of the illness, but may be helpful for the following symptoms: cough (Robitussin DM); sore throat (Chloraseptic lozenges or spray); nasal and sinus congestion (Actifed or Sudafed). [NOTE: Do not use decongestants if you have high blood pressure.]  FOLLOW UP with your doctor or as advised if you are not improving over the  next week.  GET PROMPT MEDICAL ATTENTION if any of the following occur:    Cough with lots of colored sputum (mucus) or blood in your sputum    Chest pain, shortness of breath, wheezing or difficulty breathing    Severe headache; face, neck or ear pain    Fever over 101 F (38.3 C) for more than three days    Unable to swallow due to throat pain    1233-8509 [employer] [address] [place] [address]. All rights reserved. This information is not intended as a substitute for professional medical care. Always follow your healthcare professional's instructions.  This information has been modified by your health care provider with permission from the publisher.

## 2018-06-28 NOTE — MR AVS SNAPSHOT
After Visit Summary   6/28/2018    Flor Fernandez    MRN: [id number]           Patient Information     Date Of Birth          1970        Visit Information        Provider Department      6/28/2018 4:40 PM Jamshid Aldana MD [facility]        Today's Diagnoses     Chronic seasonal allergic rhinitis, unspecified trigger    -  1    Throat pain          Care Instructions       * VIRAL RESPIRATORY ILLNESS w/ Wheezing [Adult]  You have an Upper Respiratory Illness (URI) caused by a virus. This illness is contagious during the first few days. It is spread through the air by coughing and sneezing or by direct contact (touching the sick person and then touching your own eyes, nose or mouth). When the infection causes a lot of irritation, the air passages can go into spasm. This causes wheezing and shortness of breath.    Most viral illnesses resolve within 7-10 days with rest and simple home remedies, although the illness may sometimes last for several weeks. Antibiotics will not kill a virus and are generally not prescribed for this condition.  HOME CARE:      If symptoms are severe, rest at home for the first 2-3 days. When resuming activity, don't let yourself become overly tired.    Avoid exposure to cigarette smoke (yours or others).    You may use acetaminophen (Tylenol) 650-1000 mg every 6 hours or ibuprofen (Motrin, Advil) 600 mg every 6-8 hours with food to control pain, if you are able to take these medicines. [ NOTE : If you have chronic liver or kidney disease or ever had a stomach ulcer or GI bleeding, talk with your doctor before using these medicines.] (Aspirin should never be used in anyone under 18 years of age who is ill with a fever. It may cause severe liver damage.    Your appetite may be poor so a light diet is fine. Avoid dehydration by drinking 6-8 glasses of fluids per day (water, soft drinks, juices, tea, soup, etc.). Extra fluids will help  loosen secretions in the nose and lungs.    Over-the-counter cold medicines will not shorten the duration of the illness, but may be helpful for the following symptoms: cough (Robitussin DM); sore throat (Chloraseptic lozenges or spray); nasal and sinus congestion (Actifed or Sudafed). [NOTE: Do not use decongestants if you have high blood pressure.]  FOLLOW UP with your doctor or as advised if you are not improving over the next week.  GET PROMPT MEDICAL ATTENTION if any of the following occur:    Cough with lots of colored sputum (mucus) or blood in your sputum    Chest pain, shortness of breath, wheezing or difficulty breathing    Severe headache; face, neck or ear pain    Fever over 101 F (38.3 C) for more than three days    Unable to swallow due to throat pain    7450-0198 [employer]. [address] [place] [address]. All rights reserved. This information is not intended as a substitute for professional medical care. Always follow your healthcare professional's instructions.  This information has been modified by your health care provider with permission from the publisher.            Follow-ups after your visit        Follow-up notes from your care team     Return if symptoms worsen or fail to improve.      Your next 10 appointments already scheduled     Jul 17, 2018  2:00 PM CDT   Return Visit with Vikas Perez [address] [address]   [phone]            Aug 03, 2018  2:00 PM CDT   Return Visit with Vikas Perez [facility] ([address] [address]   [phone]            Aug 09, 2018 10:30 AM CDT   PHYSICAL with JAGJIT Graham CNP   [facility] ([facility])    [address] [facility] [address] [phone]            Aug 14, 2018  3:00 PM CDT   Office  "Visit with Nayeli Barnard MD   [facility]    [address]  [place] [id number] [phone]           Bring a current list of meds and any records pertaining to this visit. For Physicals, please bring immunization records and any forms needing to be filled out. Please arrive 10 minutes early to complete paperwork.            Aug 21, 2018  2:00 PM CDT   Return Visit with JENNIFER Horowitz   [facility] ([facility])    [address]  [place] [id number] [phone]              Who to contact     If you have questions or need follow up information about today's clinic visit or your schedule please contact [facility] directly at [phone].  Normal or non-critical lab and imaging results will be communicated to you by MyChart, letter or phone within 4 business days after the clinic has received the results. If you do not hear from us within 7 days, please contact the clinic through [employer] or phone. If you have a critical or abnormal lab result, we will notify you by phone as soon as possible.  Submit refill requests through [employer] or call your pharmacy and they will forward the refill request to us. Please allow 3 business days for your refill to be completed.          Additional Information About Your Visit        MyChart Information     [employer] lets you send messages to your doctor, view your test results, renew your prescriptions, schedule appointments and more. To sign up, go to [email or web address] . Click on \"Log in\" on the left side of the screen, which will take you to the Welcome page. Then click on \"Sign up Now\" on the right side of the page.     You will be asked to enter the access code listed below, as well as some personal information. Please follow the directions to create your username and password.     Your access code is: [email or web address]  Expires: 10/9/2018  6:42 PM     Your " access code will  in 90 days. If you need help or a new code, please call your [facility] clinic or [phone].        Care EveryWhere ID     This is your Care EveryWhere ID. This could be used by other organizations to access your [facility] medical records  [id number]        Your Vitals Were     Pulse Temperature Respirations Pulse Oximetry          86 97  F (36.1  C) (Oral) 16 97%         Blood Pressure from Last 3 Encounters:   18 104/66   05/15/18 124/72   04/10/18 130/60    Weight from Last 3 Encounters:   05/15/18 270 lb 8 oz (122.7 kg)   04/10/18 273 lb 8 oz (124.1 kg)   18 275 lb (124.7 kg)              We Performed the Following     Beta strep group A culture     Strep, Rapid Screen          Today's Medication Changes          These changes are accurate as of 18 11:59 PM.  If you have any questions, ask your nurse or doctor.               Start taking these medicines.        Dose/Directions    albuterol (2.5 MG/3ML) 0.083% neb solution   Used for:  Chronic seasonal allergic rhinitis, unspecified trigger   Started by:  Jamshid Aldana MD        Dose:  2.5 mg   Take 1 vial (2.5 mg) by nebulization every 6 hours as needed for shortness of breath / dyspnea or wheezing   Quantity:  25 vial   Refills:  1       fluticasone 50 MCG/ACT spray   Commonly known as:  FLONASE   Used for:  Chronic seasonal allergic rhinitis, unspecified trigger   Started by:  Jamshid Aldana MD        Dose:  1-2 spray   Spray 1-2 sprays into both nostrils daily   Quantity:  1 Bottle   Refills:  11            Where to get your medicines      These medications were sent to [facility] - [address]  [address], [place] [address]     Phone:  [phone]     albuterol (2.5 MG/3ML) 0.083% neb solution    fluticasone 50 MCG/ACT spray                Primary Care Provider Office Phone # Fax #    Nayeli Barnard MD  [phone]       [address]  [place] [place] [place] [address]        Equal Access to Services     JOSE FLORES : Nathaniel aminah mccain john Khan, walaurelda lusmooth, halita karenda cee, rosana reddy. So [facility] [phone].    ATENCIÓN: Si habla español, tiene a sung disposición servicios gratuitos de asistencia lingüística. Llame al [phone].    We comply with applicable federal civil rights laws and [place] laws. We do not discriminate on the basis of race, color, national origin, age, disability, sex, sexual orientation, or gender identity.            Thank you!     Thank you for choosing [facility]  for your care. Our goal is always to provide you with excellent care. Hearing back from our patients is one way we can continue to improve our services. Please take a few minutes to complete the written survey that you may receive in the mail after your visit with us. Thank you!             Your Updated Medication List - Protect others around you: Learn how to safely use, store and throw away your medicines at www.disposemymeds.org.          This list is accurate as of 6/28/18 11:59 PM.  Always use your most recent med list.                   Brand Name Dispense Instructions for use Diagnosis    albuterol (2.5 MG/3ML) 0.083% neb solution     25 vial    Take 1 vial (2.5 mg) by nebulization every 6 hours as needed for shortness of breath / dyspnea or wheezing    Chronic seasonal allergic rhinitis, unspecified trigger       atorvastatin 20 MG tablet    LIPITOR    90 tablet    TAKE 1 TAB BY MOUTH DAILY. INDICATIONS: CEREBROVASCULAR ACCIDENT OR STROKE    Hyperlipidemia LDL goal <100       carvedilol 6.25 MG tablet    COREG    180 tablet    Take 1 tablet (6.25 mg) by mouth 2 times daily (with meals)        chlorthalidone 25 MG tablet    HYGROTON    90 tablet    Take 1 tablet (25 mg) by mouth daily    Benign essential hypertension       [facility] ASPIRIN 325 MG tablet    Generic drug:  aspirin      TAKE 1 TABLET BY MOUTH ONCE DAILY WITH A MEAL.        cyanocobalamin 1000 MCG tablet    vitamin  B-12     Take 1,000 mcg by mouth daily        ferrous fumarate 65 mg ([place]. FE)-Vitamin C 125 mg  MG Tabs tablet    VITRON C     Take 1 tablet by mouth daily    Iron deficiency       fluticasone 50 MCG/ACT spray    FLONASE    1 Bottle    Spray 1-2 sprays into both nostrils daily    Chronic seasonal allergic rhinitis, unspecified trigger       lisinopril 10 MG tablet    PRINIVIL/ZESTRIL    90 tablet    Take 1 tablet (10 mg) by mouth daily    Benign essential hypertension       MULTI-VITAMIN GUMMIES Chew           topiramate 25 MG tablet    TOPAMAX    360 tablet    Take 1 tab in the morning and 2 in the evening for 2 weeks and then 2 tabs twice daily.    Morbid obesity (H)

## 2018-06-28 NOTE — PROGRESS NOTES
SUBJECTIVE:   Flor Fernandez is a 48 year old female who presents to clinic today for the following health issues:    ENT Symptoms             Symptoms: cc Present Absent Comment   Fever/Chills   x    Fatigue  x     Muscle Aches  x     Eye Irritation  x     Sneezing  x     Nasal Charles/Drg   x    Sinus Pressure/Pain   x    Loss of smell   x    Dental pain   x    Sore Throat  x     Swollen Glands   x    Ear Pain/Fullness  x     Cough  x     Wheeze  x     Chest Pain  x     Shortness of breath   x Did last week   Rash   x    Other         Symptom duration:  2 weeks   Symptom severity:  moderate   Treatments tried:  neb, ibuprofen, tylenol   Contacts:  work     2 weeks ago thought she was coming down with a cold, with cough and shortness of breath, used one of mom's neb treatments which helped.  Patient has been feeling tired for the past couple of weeks  Sunday, sore throat on left side with eye burning, stuffy nose and dry cough  Some ear pain for a couple of days and ok now  Body aches last night and temp of 99    Problem list and histories reviewed & adjusted, as indicated.  Additional history: as documented    BP Readings from Last 3 Encounters:   06/28/18 104/66   05/15/18 124/72   04/10/18 130/60    Wt Readings from Last 3 Encounters:   05/15/18 270 lb 8 oz (122.7 kg)   04/10/18 273 lb 8 oz (124.1 kg)   02/26/18 275 lb (124.7 kg)        Reviewed and updated as needed this visit by clinical staff  Tobacco  Allergies  Meds  Problems       Reviewed and updated as needed this visit by Provider  Allergies  Meds  Problems         ROS:  Constitutional, HEENT, cardiovascular, pulmonary, gi and gu systems are negative, except as otherwise noted.    OBJECTIVE:     /66  Pulse 86  Temp 97  F (36.1  C) (Oral)  Resp 16  SpO2 97%  There is no height or weight on file to calculate BMI.  GENERAL: healthy, alert and no distress  EYES: Eyes grossly normal to inspection, PERRL and conjunctivae and sclerae  normal  HENT: ear canals and TM's normal, nose and mouth without ulcers or lesions  NECK: no adenopathy, no asymmetry, masses, or scars and thyroid normal to palpation  RESP: lungs clear to auscultation - no rales, rhonchi or wheezes  CV: regular rate and rhythm, normal S1 S2, no S3 or S4, no murmur, click or rub, no peripheral edema and peripheral pulses strong  MS: no gross musculoskeletal defects noted, no edema  SKIN: no suspicious lesions or rashes  NEURO: Normal strength and tone, mentation intact and speech normal  PSYCH: mentation appears normal, affect normal/bright    ASSESSMENT/PLAN:     1. Chronic seasonal allergic rhinitis, unspecified trigger  - fluticasone (FLONASE) 50 MCG/ACT spray; Spray 1-2 sprays into both nostrils daily  Dispense: 1 Bottle; Refill: 11  - albuterol (2.5 MG/3ML) 0.083% neb solution; Take 1 vial (2.5 mg) by nebulization every 6 hours as needed for shortness of breath / dyspnea or wheezing  Dispense: 25 vial; Refill: 1    2. Throat pain  Negative testing  - Strep, Rapid Screen  - Beta strep group A culture    Follow up if symptoms worsen or fail to improve.     Jamshid Hernandez MD  [facility]

## 2018-06-29 ENCOUNTER — OFFICE VISIT (OUTPATIENT)
Dept: PSYCHOLOGY | Facility: CLINIC | Age: 48
End: 2018-06-29
Payer: COMMERCIAL

## 2018-06-29 DIAGNOSIS — F41.9 ANXIETY DISORDER, UNSPECIFIED TYPE: Primary | ICD-10-CM

## 2018-06-29 LAB
BACTERIA SPEC CULT: NORMAL
SPECIMEN SOURCE: NORMAL

## 2018-06-29 PROCEDURE — 90847 FAMILY PSYTX W/PT 50 MIN: CPT | Performed by: MARRIAGE & FAMILY THERAPIST

## 2018-06-29 ASSESSMENT — ANXIETY QUESTIONNAIRES
5. BEING SO RESTLESS THAT IT IS HARD TO SIT STILL: NOT AT ALL
2. NOT BEING ABLE TO STOP OR CONTROL WORRYING: NOT AT ALL
3. WORRYING TOO MUCH ABOUT DIFFERENT THINGS: NOT AT ALL
6. BECOMING EASILY ANNOYED OR IRRITABLE: NOT AT ALL
GAD7 TOTAL SCORE: 0
7. FEELING AFRAID AS IF SOMETHING AWFUL MIGHT HAPPEN: NOT AT ALL
1. FEELING NERVOUS, ANXIOUS, OR ON EDGE: NOT AT ALL

## 2018-06-29 ASSESSMENT — PATIENT HEALTH QUESTIONNAIRE - PHQ9: 5. POOR APPETITE OR OVEREATING: NOT AT ALL

## 2018-07-01 NOTE — PROGRESS NOTES
Progress Note    Client Name: Flor Fernandez  Date: 6/29/18         Service Type: Family with client present      Session Start Time: 2:00  Session End Time: 2:52      Session Length: 38-52     Session #: 12     Attendees: Client, Mother and Daughter and Son    Treatment Plan Last Reviewed: 6/29/18, next due 9/29/18.  PHQ-9 / RODRIGUE-7 : completed.     DATA      Progress Since Last Session (Related to Symptoms / Goals / Homework):   Symptoms: No change client is stable    Homework: Achieved / completed to satisfaction      Episode of Care Goals: Satisfactory progress - ACTION (Actively working towards change); Intervened by reinforcing change plan / affirming steps taken     Current / Ongoing Stressors and Concerns:   Client and her family reported regarding need for improved family communication.  Client's daughter and son are in multi-role relationship (brother/sister, surrogate mother/son, and landlord/tenant) which is causing family conflict.  Client reported that there are often family secrets kept between various family members from other family members.     Treatment Objective(s) Addressed in This Session:   learn & utilize at least 2 assertive communication skills weekly  Client and her family members identified their goals for continued therapy.     Intervention:   Interpersonal Therapy: taught/reviewed with client and her family about dual-role relationships and related challenges and triagulation concept and how to avoid it.  Motivational Interviewing: used circular/Socratic questioning to elicit client's and her family members' goals for continued therapy.        ASSESSMENT: Current Emotional / Mental Status (status of significant symptoms):   Risk status (Self / Other harm or suicidal ideation)   Client denies current fears or concerns for personal safety.   Client denies current or recent suicidal ideation or behaviors.   Client denies current or recent  homicidal ideation or behaviors.   Client denies current or recent self injurious behavior or ideation.   Client denies other safety concerns.   A safety and risk management plan has not been developed at this time, however client was given the after-hours number / 911 should there be a change in any of these risk factors.     Appearance:   Appropriate    Eye Contact:   Good    Psychomotor Behavior: Normal    Attitude:   Cooperative    Orientation:   All   Speech    Rate / Production: Normal     Volume:  Normal    Mood:    Anxious  Normal   Affect:    Appropriate    Thought Content:  Clear    Thought Form:  Coherent  Logical    Insight:    Good      Medication Review:   No current psychiatric medications prescribed     Medication Compliance:   NA     Changes in Health Issues:   None reported     Chemical Use Review:   Substance Use: Chemical use reviewed, no active concerns identified      Tobacco Use: No current tobacco use.       Collateral Reports Completed:   Not Applicable    PLAN: (Client Tasks / Therapist Tasks / Other)  Client and her family members agreed to work on establishing plan for dual-role relationships and practicing direct communication instead of triangulation in order to be effective in their relationships between now and follow-up session next week.        Vikas Perez, [facility]                                                         ________________________________________________________________________    Treatment Plan    Client's Name: Flor Fernandez  YOB: 1970    Date: 6/29/18    DSM-V Diagnoses: 300.00 (F41.9) Unspecified Anxiety Disorder  Psychosocial / Contextual Factors: problems with primary support group: conflicts with client's children  WHODAS: 15    Referral / Collaboration:  Referral to another professional/service is not indicated at this time..    Anticipated number of session or this episode of care: 11-20      MeasurableTreatment Goal(s) related to  diagnosis / functional impairment(s)  Goal 1: Client will successfully process through past trauma defined as reporting 0 Subjective Units of Distress related to trauma on 0-10 scale and 7 on Validity of (positive) Cognition scale about self on 1-7 scale   I will know I've met my goal when I feel less stuck.       Objective #A (Client Action)    Status: Continued - Date: 6/29/18      Client will identify past traumatic events/memories which are causing current distress.     Intervention(s)  Therapist will take client's history and facilitate client's identification of targets for EMDR.     Objective #B  Client will complete needed assessment(s) and [facility] EMDR resourcing to confirm readiness for EMDR.    Status: Continued - Date: 6/29/18      Intervention(s)  Therapist will administer Dissociative Experiences Scale, Multidimensional Inventory of Dissociation as needed and complete [facility] EMDR resourcing with client.     Objective #C  Client will engage in installing at least 2 EMDR resources.  Status: Continued - Date: 6/29/18      Intervention(s)  Therapist will complete EMDR resourcing (Container, Remote Control, grounding/progressive muscle relaxation, Light Stream, Inner Advisor) with client.     Objective #D (Client Action)    Status: Continued- Date: 6/29/18      Client will engage in reprocessing all past traumatic event/memory targets.     Intervention(s)   Therapist will complete EMDR reprocessing with client.    Goal 2: Client will increase overall baseline calm mindset by 2 points on a 1-10 Likert scale per self-report (10 = optimal calm mindset).    I will know I've met my goal when I feel less anxious.      Objective #A (Client Action)    Status: Continued - Date: 6/29/18     Client will use cognitive strategies identified in therapy to challenge anxious thoughts.    Intervention(s)  Therapist will teach emotional regulation skills. CBT/REBT ABCD model.    Objective #B  Client will use at least  2 new coping skills for anxiety management in the next 12 weeks.    Status: Continued - Date: 6/29/18     Intervention(s)  Therapist will teach emotional regulation skills. DBT Core Mindfulness, Distress Tolerance, Emotion Regulation, and Interpersonal Effectiveness skills.    Goal 3: Client will improve her interpersonal functioning/relationships by 2 points on a 1-10 Likert scale per self-report (10 = optimal interpersonal functioning/relationships).    I will know I've met my goal when my relationships with my children have improved.      Objective #A (Client Action)    Status: Continued - Date(s): 6/29/18     Client will learn & utilize at least 2 new assertive communication skills weekly.    Intervention(s)  Therapist will teach Nonviolent Communication and DBT Interpersonal Effectiveness skills..    Client has reviewed and agreed to the above plan.      JENNIFER Horowitz  June 29, 2018

## 2018-07-02 ASSESSMENT — PATIENT HEALTH QUESTIONNAIRE - PHQ9: SUM OF ALL RESPONSES TO PHQ QUESTIONS 1-9: 0

## 2018-07-02 ASSESSMENT — ANXIETY QUESTIONNAIRES: GAD7 TOTAL SCORE: 0

## 2018-07-03 ENCOUNTER — OFFICE VISIT (OUTPATIENT)
Dept: PSYCHOLOGY | Facility: CLINIC | Age: 48
End: 2018-07-03
Payer: COMMERCIAL

## 2018-07-03 DIAGNOSIS — F41.9 ANXIETY DISORDER, UNSPECIFIED TYPE: Primary | ICD-10-CM

## 2018-07-03 PROCEDURE — 90834 PSYTX W PT 45 MINUTES: CPT | Performed by: MARRIAGE & FAMILY THERAPIST

## 2018-07-03 ASSESSMENT — ANXIETY QUESTIONNAIRES
2. NOT BEING ABLE TO STOP OR CONTROL WORRYING: NOT AT ALL
6. BECOMING EASILY ANNOYED OR IRRITABLE: NOT AT ALL
5. BEING SO RESTLESS THAT IT IS HARD TO SIT STILL: NOT AT ALL
3. WORRYING TOO MUCH ABOUT DIFFERENT THINGS: NOT AT ALL
GAD7 TOTAL SCORE: 0
1. FEELING NERVOUS, ANXIOUS, OR ON EDGE: NOT AT ALL
7. FEELING AFRAID AS IF SOMETHING AWFUL MIGHT HAPPEN: NOT AT ALL

## 2018-07-03 ASSESSMENT — PATIENT HEALTH QUESTIONNAIRE - PHQ9: 5. POOR APPETITE OR OVEREATING: NOT AT ALL

## 2018-07-03 NOTE — PROGRESS NOTES
Progress Note    Client Name: Flor Fernandez  Date: 7/3/18         Service Type: Individual      Session Start Time: 2:01  Session End Time: 2:45      Session Length: 38-52     Session #: 13     Attendees: Client attended alone    Treatment Plan Last Reviewed: 6/29/18, next due 9/29/18.  PHQ-9 / RODRIGUE-7 : completed.     DATA      Progress Since Last Session (Related to Symptoms / Goals / Homework):   Symptoms: No change client is stable    Homework: Achieved / completed to satisfaction      Episode of Care Goals: Satisfactory progress - ACTION (Actively working towards change); Intervened by reinforcing change plan / affirming steps taken     Current / Ongoing Stressors and Concerns:   Client reported that she and her family are working on improving family communication by being more direct and keeping less secrets.  Client reported that her mother's immune system had been down, leading client's mother to believe she was dying, which led to increased anxiety for client and ultimately for client to have release to talk with her mother's doctor.  Client reported that she is working on doing what she can in her work situation and letting go of what she can't control (e.g. her co-workers actions/responses).     Treatment Objective(s) Addressed in This Session:   learn & utilize at least 2 assertive communication skills weekly  Client and her family members identified their goals for continued therapy.     Intervention:   Interpersonal Therapy: reviewed with client direct, effective communication.        ASSESSMENT: Current Emotional / Mental Status (status of significant symptoms):   Risk status (Self / Other harm or suicidal ideation)   Client denies current fears or concerns for personal safety.   Client denies current or recent suicidal ideation or behaviors.   Client denies current or recent homicidal ideation or behaviors.   Client denies current or recent self injurious  behavior or ideation.   Client denies other safety concerns.   A safety and risk management plan has not been developed at this time, however client was given the after-hours number / 911 should there be a change in any of these risk factors.     Appearance:   Appropriate    Eye Contact:   Good    Psychomotor Behavior: Normal    Attitude:   Cooperative    Orientation:   All   Speech    Rate / Production: Normal     Volume:  Normal    Mood:    Anxious  Normal   Affect:    Appropriate    Thought Content:  Clear    Thought Form:  Coherent  Logical    Insight:    Good      Medication Review:   No current psychiatric medications prescribed     Medication Compliance:   NA     Changes in Health Issues:   None reported     Chemical Use Review:   Substance Use: Chemical use reviewed, no active concerns identified      Tobacco Use: No current tobacco use.       Collateral Reports Completed:   Not Applicable    PLAN: (Client Tasks / Therapist Tasks / Other)  Client agreed to continue to work on practicing direct, effective communication between now and follow-up session in two weeks.        Vikas Perez LMFT                                                         ________________________________________________________________________    Treatment Plan    Client's Name: Flor Fernandez  YOB: 1970    Date: 6/29/18    DSM-V Diagnoses: 300.00 (F41.9) Unspecified Anxiety Disorder  Psychosocial / Contextual Factors: problems with primary support group: conflicts with client's children  WHODAS: 15    Referral / Collaboration:  Referral to another professional/service is not indicated at this time..    Anticipated number of session or this episode of care: 11-20      MeasurableTreatment Goal(s) related to diagnosis / functional impairment(s)  Goal 1: Client will successfully process through past trauma defined as reporting 0 Subjective Units of Distress related to trauma on 0-10 scale and 7 on Validity of  (positive) Cognition scale about self on 1-7 scale   I will know I've met my goal when I feel less stuck.       Objective #A (Client Action)    Status: Continued - Date: 6/29/18      Client will identify past traumatic events/memories which are causing current distress.     Intervention(s)  Therapist will take client's history and facilitate client's identification of targets for EMDR.     Objective #B  Client will complete needed assessment(s) and [facility] EMDR resourcing to confirm readiness for EMDR.    Status: Continued - Date: 6/29/18      Intervention(s)  Therapist will administer Dissociative Experiences Scale, Multidimensional Inventory of Dissociation as needed and complete [facility] EMDR resourcing with client.     Objective #C  Client will engage in installing at least 2 EMDR resources.  Status: Continued - Date: 6/29/18      Intervention(s)  Therapist will complete EMDR resourcing (Container, Remote Control, grounding/progressive muscle relaxation, Light Stream, Inner Advisor) with client.     Objective #D (Client Action)    Status: Continued- Date: 6/29/18      Client will engage in reprocessing all past traumatic event/memory targets.     Intervention(s)   Therapist will complete EMDR reprocessing with client.    Goal 2: Client will increase overall baseline calm mindset by 2 points on a 1-10 Likert scale per self-report (10 = optimal calm mindset).    I will know I've met my goal when I feel less anxious.      Objective #A (Client Action)    Status: Continued - Date: 6/29/18     Client will use cognitive strategies identified in therapy to challenge anxious thoughts.    Intervention(s)  Therapist will teach emotional regulation skills. CBT/REBT ABCD model.    Objective #B  Client will use at least 2 new coping skills for anxiety management in the next 12 weeks.    Status: Continued - Date: 6/29/18     Intervention(s)  Therapist will teach emotional regulation skills. DBT Core Mindfulness, Distress  Tolerance, Emotion Regulation, and Interpersonal Effectiveness skills.    Goal 3: Client will improve her interpersonal functioning/relationships by 2 points on a 1-10 Likert scale per self-report (10 = optimal interpersonal functioning/relationships).    I will know I've met my goal when my relationships with my children have improved.      Objective #A (Client Action)    Status: Continued - Date(s): 6/29/18     Client will learn & utilize at least 2 new assertive communication skills weekly.    Intervention(s)  Therapist will teach Nonviolent Communication and DBT Interpersonal Effectiveness skills..    Client has reviewed and agreed to the above plan.      JENNIFER Horowitz  July 3, 2018

## 2018-07-04 ASSESSMENT — PATIENT HEALTH QUESTIONNAIRE - PHQ9: SUM OF ALL RESPONSES TO PHQ QUESTIONS 1-9: 0

## 2018-07-04 ASSESSMENT — ANXIETY QUESTIONNAIRES: GAD7 TOTAL SCORE: 0

## 2018-07-17 ENCOUNTER — OFFICE VISIT (OUTPATIENT)
Dept: PSYCHOLOGY | Facility: CLINIC | Age: 48
End: 2018-07-17
Payer: COMMERCIAL

## 2018-07-17 DIAGNOSIS — F41.9 ANXIETY DISORDER, UNSPECIFIED TYPE: Primary | ICD-10-CM

## 2018-07-17 PROCEDURE — 90834 PSYTX W PT 45 MINUTES: CPT | Performed by: MARRIAGE & FAMILY THERAPIST

## 2018-07-17 ASSESSMENT — ANXIETY QUESTIONNAIRES
7. FEELING AFRAID AS IF SOMETHING AWFUL MIGHT HAPPEN: NOT AT ALL
6. BECOMING EASILY ANNOYED OR IRRITABLE: NOT AT ALL
2. NOT BEING ABLE TO STOP OR CONTROL WORRYING: NOT AT ALL
5. BEING SO RESTLESS THAT IT IS HARD TO SIT STILL: NOT AT ALL
GAD7 TOTAL SCORE: 0
1. FEELING NERVOUS, ANXIOUS, OR ON EDGE: NOT AT ALL
3. WORRYING TOO MUCH ABOUT DIFFERENT THINGS: NOT AT ALL

## 2018-07-17 ASSESSMENT — PATIENT HEALTH QUESTIONNAIRE - PHQ9: 5. POOR APPETITE OR OVEREATING: NOT AT ALL

## 2018-07-17 NOTE — MR AVS SNAPSHOT
"                  MRN:[id number]                      After Visit Summary   7/17/2018    Flor Fernandez    MRN: [id number]           Visit Information        Provider Department      7/17/2018 2:00 PM Vikas Perez, [facility]      Your next 10 appointments already scheduled     Aug 03, 2018  2:00 PM CDT   Return Visit with Vikas Perez [address] [address]   [phone]            Aug 09, 2018 10:30 AM CDT   PHYSICAL with JAGJIT Graham [facility] ([facility])    [address] [facility] [id number] [phone]            Aug 14, 2018  3:00 PM CDT   Office Visit with Nayeli Barnard MD   [facility] ([address] [address]   [phone]           Bring a current list of meds and any records pertaining to this visit. For Physicals, please bring immunization records and any forms needing to be filled out. Please arrive 10 minutes early to complete paperwork.            Aug 21, 2018  2:00 PM CDT   Return Visit with Vikas Perez [facility] ([address] [address]   [phone]            Sep 07, 2018  2:00 PM CDT   Return Visit with Vikas Perez [facility] ([facility])    [address] [place] [address]   [phone]              MyChart Information     Sw[employer]to lets you send messages to your doctor, view your test results, renew your prescriptions, schedule appointments and more. To sign up, go to [email or web address] . Click on \"Log in\" on the left side of the screen, which will take you to the Welcome page. Then click on \"Sign up Now\" on the right side of the page.     You will be asked to enter the " access code listed below, as well as some personal information. Please follow the directions to create your username and password.     Your access code is: [email or web address]  Expires: 10/9/2018  6:42 PM     Your access code will  in 90 days. If you need help or a new code, please call your [facility] clinic or [phone].        Care EveryWhere ID     This is your Care EveryWhere ID. This could be used by other organizations to access your [facility] medical records  [id number]        Equal Access to Services     [facility] : Nathaniel Khan, carlos trimble, jennifer mike, rosana ruiz . So [facility] [phone].    ATENCIÓN: Si habla español, tiene a sung disposición servicios gratuitos de asistencia lingüística. Llame al [phone].    We comply with applicable federal civil rights laws and [place] laws. We do not discriminate on the basis of race, color, national origin, age, disability, sex, sexual orientation, or gender identity.

## 2018-07-18 NOTE — PROGRESS NOTES
Progress Note    Client Name: Flor Fernandez  Date: 7/17/18         Service Type: Individual      Session Start Time: 2:02  Session End Time: 2:45      Session Length: 38-52     Session #: 14     Attendees: Client attended alone    Treatment Plan Last Reviewed: 6/29/18, next due 9/29/18.  PHQ-9 / RODRIGUE-7 : completed.     DATA      Progress Since Last Session (Related to Symptoms / Goals / Homework):   Symptoms: Improving client reported increased calm/improved family relationships    Homework: Achieved / completed to satisfaction      Episode of Care Goals: Satisfactory progress - ACTION (Actively working towards change); Intervened by reinforcing change plan / affirming steps taken     Current / Ongoing Stressors and Concerns:   Client reported that she and her mother found out from oncologist that her mother will need transfusions.  Client reported that some work stress continues, but that she is focusing on doing her job.     Treatment Objective(s) Addressed in This Session:   use cognitive strategies identified in therapy to challenge anxious thoughts     Intervention:   CBT: reviewed with client focusing on what she can control (i.e. her actions/attitudes/choices).        ASSESSMENT: Current Emotional / Mental Status (status of significant symptoms):   Risk status (Self / Other harm or suicidal ideation)   Client denies current fears or concerns for personal safety.   Client denies current or recent suicidal ideation or behaviors.   Client denies current or recent homicidal ideation or behaviors.   Client denies current or recent self injurious behavior or ideation.   Client denies other safety concerns.   A safety and risk management plan has not been developed at this time, however client was given the after-hours number / 911 should there be a change in any of these risk factors.     Appearance:   Appropriate    Eye Contact:   Good    Psychomotor Behavior: Normal     Attitude:   Cooperative    Orientation:   All   Speech    Rate / Production: Normal     Volume:  Normal    Mood:    Anxious  Normal   Affect:    Appropriate    Thought Content:  Clear    Thought Form:  Coherent  Logical    Insight:    Good      Medication Review:   No current psychiatric medications prescribed     Medication Compliance:   NA     Changes in Health Issues:   None reported     Chemical Use Review:   Substance Use: Chemical use reviewed, no active concerns identified      Tobacco Use: No current tobacco use.       Collateral Reports Completed:   Not Applicable    PLAN: (Client Tasks / Therapist Tasks / Other)  Client agreed to continue to work on focusing on what she can control between now and follow-up session in two weeks.        Vikas Perez, LMFT                                                         ________________________________________________________________________    Treatment Plan    Client's Name: Flor Fernandez  YOB: 1970    Date: 6/29/18    DSM-V Diagnoses: 300.00 (F41.9) Unspecified Anxiety Disorder  Psychosocial / Contextual Factors: problems with primary support group: conflicts with client's children  WHODAS: 15    Referral / Collaboration:  Referral to another professional/service is not indicated at this time..    Anticipated number of session or this episode of care: 11-20      MeasurableTreatment Goal(s) related to diagnosis / functional impairment(s)  Goal 1: Client will successfully process through past trauma defined as reporting 0 Subjective Units of Distress related to trauma on 0-10 scale and 7 on Validity of (positive) Cognition scale about self on 1-7 scale   I will know I've met my goal when I feel less stuck.       Objective #A (Client Action)    Status: Continued - Date: 6/29/18      Client will identify past traumatic events/memories which are causing current distress.     Intervention(s)  Therapist will take client's history and  facilitate client's identification of targets for EMDR.     Objective #B  Client will complete needed assessment(s) and [facility] EMDR resourcing to confirm readiness for EMDR.    Status: Continued - Date: 6/29/18      Intervention(s)  Therapist will administer Dissociative Experiences Scale, Multidimensional Inventory of Dissociation as needed and complete [facility] EMDR resourcing with client.     Objective #C  Client will engage in installing at least 2 EMDR resources.  Status: Continued - Date: 6/29/18      Intervention(s)  Therapist will complete EMDR resourcing (Container, Remote Control, grounding/progressive muscle relaxation, Light Stream, Inner Advisor) with client.     Objective #D (Client Action)    Status: Continued- Date: 6/29/18      Client will engage in reprocessing all past traumatic event/memory targets.     Intervention(s)   Therapist will complete EMDR reprocessing with client.    Goal 2: Client will increase overall baseline calm mindset by 2 points on a 1-10 Likert scale per self-report (10 = optimal calm mindset).    I will know I've met my goal when I feel less anxious.      Objective #A (Client Action)    Status: Continued - Date: 6/29/18     Client will use cognitive strategies identified in therapy to challenge anxious thoughts.    Intervention(s)  Therapist will teach emotional regulation skills. CBT/REBT ABCD model.    Objective #B  Client will use at least 2 new coping skills for anxiety management in the next 12 weeks.    Status: Continued - Date: 6/29/18     Intervention(s)  Therapist will teach emotional regulation skills. DBT Core Mindfulness, Distress Tolerance, Emotion Regulation, and Interpersonal Effectiveness skills.    Goal 3: Client will improve her interpersonal functioning/relationships by 2 points on a 1-10 Likert scale per self-report (10 = optimal interpersonal functioning/relationships).    I will know I've met my goal when my relationships with my children have  improved.      Objective #A (Client Action)    Status: Continued - Date(s): 6/29/18     Client will learn & utilize at least 2 new assertive communication skills weekly.    Intervention(s)  Therapist will teach Nonviolent Communication and DBT Interpersonal Effectiveness skills..    Client has reviewed and agreed to the above plan.      JENNIFER Horowitz  July 17, 2018

## 2018-07-19 ASSESSMENT — ANXIETY QUESTIONNAIRES: GAD7 TOTAL SCORE: 0

## 2018-07-19 ASSESSMENT — PATIENT HEALTH QUESTIONNAIRE - PHQ9: SUM OF ALL RESPONSES TO PHQ QUESTIONS 1-9: 0

## 2018-08-03 ENCOUNTER — OFFICE VISIT (OUTPATIENT)
Dept: PSYCHOLOGY | Facility: CLINIC | Age: 48
End: 2018-08-03
Payer: COMMERCIAL

## 2018-08-03 DIAGNOSIS — F41.9 ANXIETY DISORDER, UNSPECIFIED TYPE: Primary | ICD-10-CM

## 2018-08-03 PROCEDURE — 90834 PSYTX W PT 45 MINUTES: CPT | Performed by: MARRIAGE & FAMILY THERAPIST

## 2018-08-03 ASSESSMENT — ANXIETY QUESTIONNAIRES
5. BEING SO RESTLESS THAT IT IS HARD TO SIT STILL: NOT AT ALL
GAD7 TOTAL SCORE: 0
3. WORRYING TOO MUCH ABOUT DIFFERENT THINGS: NOT AT ALL
2. NOT BEING ABLE TO STOP OR CONTROL WORRYING: NOT AT ALL
6. BECOMING EASILY ANNOYED OR IRRITABLE: NOT AT ALL
1. FEELING NERVOUS, ANXIOUS, OR ON EDGE: NOT AT ALL
7. FEELING AFRAID AS IF SOMETHING AWFUL MIGHT HAPPEN: NOT AT ALL

## 2018-08-03 ASSESSMENT — PATIENT HEALTH QUESTIONNAIRE - PHQ9: 5. POOR APPETITE OR OVEREATING: NOT AT ALL

## 2018-08-03 NOTE — MR AVS SNAPSHOT
"                  MRN:[id number]                      After Visit Summary   8/3/2018    Flor Fernandez    MRN: [id number]           Visit Information        Provider Department      8/3/2018 2:00 PM Vikas Perez, [facility]        Your next 10 appointments already scheduled     Aug 09, 2018 10:30 AM CDT   PHYSICAL with JAGJIT Graham [facility] ([facility])    [address] [facility] [id number] [phone]            Aug 14, 2018  3:00 PM CDT   Office Visit with Nayeli Barnard MD   [facility] ([address] [address]   [phone]           Bring a current list of meds and any records pertaining to this visit. For Physicals, please bring immunization records and any forms needing to be filled out. Please arrive 10 minutes early to complete paperwork.            Aug 21, 2018  2:00 PM CDT   Return Visit with Vikas Perez [facility] ([address] [address]   [phone]            Sep 07, 2018  2:00 PM CDT   Return Visit with Vikas Perez [facility] ([address] [address]   [phone]            Sep 18, 2018  2:00 PM CDT   Return Visit with Vikas Perez [facility] ([address] [address]   [phone]              [employer] Information     "[employer]" lets you send messages to your doctor, view your test results, renew your prescriptions, schedule appointments and more. To sign up, go to [email or web address] . Click on \"Log in\" on the left side of the screen, which will take you to the Welcome page. Then click on \"Sign up Now\" on the right side of the page.     You will be asked to enter the access code " listed below, as well as some personal information. Please follow the directions to create your username and password.     Your access code is: [email or web address]  Expires: 10/23/2018 12:28 PM     Your access code will  in 90 days. If you need help or a new code, please call your [facility] clinic or [phone].        Care EveryWhere ID     This is your Care EveryWhere ID. This could be used by other organizations to access your [facility] medical records  [id number]        Equal Access to Services     JOSE FLORES : Hadscarlett Khan, walavonne trimble, jennifer kaalmalu mike, rosana ruiz . So [facility] [phone].    ATENCIÓN: Si habla español, tiene a sung disposición servicios gratuitos de asistencia lingüística. Llame al [phone].    We comply with applicable federal civil rights laws and [place] laws. We do not discriminate on the basis of race, color, national origin, age, disability, sex, sexual orientation, or gender identity.

## 2018-08-06 NOTE — PROGRESS NOTES
Progress Note    Client Name: Flor Fernandez  Date: 8/3/18         Service Type: Individual      Session Start Time: 2:00  Session End Time: 2:45      Session Length: 38-52     Session #: 15     Attendees: Client attended alone    Treatment Plan Last Reviewed: 6/29/18, next due 9/29/18.  PHQ-9 / RODRIGUE-7 : completed.     DATA      Progress Since Last Session (Related to Symptoms / Goals / Homework):   Symptoms: Improving client reported increased calm/improved family relationships    Homework: Achieved / completed to satisfaction      Episode of Care Goals: Satisfactory progress - ACTION (Actively working towards change); Intervened by reinforcing change plan / affirming steps taken     Current / Ongoing Stressors and Concerns:   Client reported that she continues to work on letting go of things outside of her control (i.e. her family members' issues) and focusing on what is within her control (i.e. her actions/responses).     Treatment Objective(s) Addressed in This Session:   use cognitive strategies identified in therapy to challenge anxious thoughts     Intervention:   CBT: re-reviewed with client letting go of things outside of her control (i.e. her family members' issues) and focusing on what she can control (i.e. her actions/attitudes/choices).        ASSESSMENT: Current Emotional / Mental Status (status of significant symptoms):   Risk status (Self / Other harm or suicidal ideation)   Client denies current fears or concerns for personal safety.   Client denies current or recent suicidal ideation or behaviors.   Client denies current or recent homicidal ideation or behaviors.   Client denies current or recent self injurious behavior or ideation.   Client denies other safety concerns.   A safety and risk management plan has not been developed at this time, however client was given the after-hours number / 911 should there be a change in any of these risk  factors.     Appearance:   Appropriate    Eye Contact:   Good    Psychomotor Behavior: Normal    Attitude:   Cooperative    Orientation:   All   Speech    Rate / Production: Normal     Volume:  Normal    Mood:    Anxious  Normal   Affect:    Appropriate    Thought Content:  Clear    Thought Form:  Coherent  Logical    Insight:    Good      Medication Review:   No current psychiatric medications prescribed     Medication Compliance:   NA     Changes in Health Issues:   None reported     Chemical Use Review:   Substance Use: Chemical use reviewed, no active concerns identified      Tobacco Use: No current tobacco use.       Collateral Reports Completed:   Not Applicable    PLAN: (Client Tasks / Therapist Tasks / Other)  Client agreed to continue to work on letting go of things outside of her control (i.e. her family members' issues) and focusing on what she can control (i.e. her actions/attitudes/choices) between now and follow-up session in three weeks.        Vikas Perez LMFT                                                         ________________________________________________________________________    Treatment Plan    Client's Name: Flor Fernandez  YOB: 1970    Date: 6/29/18    DSM-V Diagnoses: 300.00 (F41.9) Unspecified Anxiety Disorder  Psychosocial / Contextual Factors: problems with primary support group: conflicts with client's children  WHODAS: 15    Referral / Collaboration:  Referral to another professional/service is not indicated at this time..    Anticipated number of session or this episode of care: 11-20      MeasurableTreatment Goal(s) related to diagnosis / functional impairment(s)  Goal 1: Client will successfully process through past trauma defined as reporting 0 Subjective Units of Distress related to trauma on 0-10 scale and 7 on Validity of (positive) Cognition scale about self on 1-7 scale   I will know I've met my goal when I feel less stuck.       Objective #A  (Client Action)    Status: Continued - Date: 6/29/18      Client will identify past traumatic events/memories which are causing current distress.     Intervention(s)  Therapist will take client's history and facilitate client's identification of targets for EMDR.     Objective #B  Client will complete needed assessment(s) and [facility] EMDR resourcing to confirm readiness for EMDR.    Status: Continued - Date: 6/29/18      Intervention(s)  Therapist will administer Dissociative Experiences Scale, Multidimensional Inventory of Dissociation as needed and complete [facility] EMDR resourcing with client.     Objective #C  Client will engage in installing at least 2 EMDR resources.  Status: Continued - Date: 6/29/18      Intervention(s)  Therapist will complete EMDR resourcing (Container, Remote Control, grounding/progressive muscle relaxation, Light Stream, Inner Advisor) with client.     Objective #D (Client Action)    Status: Continued- Date: 6/29/18      Client will engage in reprocessing all past traumatic event/memory targets.     Intervention(s)   Therapist will complete EMDR reprocessing with client.    Goal 2: Client will increase overall baseline calm mindset by 2 points on a 1-10 Likert scale per self-report (10 = optimal calm mindset).    I will know I've met my goal when I feel less anxious.      Objective #A (Client Action)    Status: Continued - Date: 6/29/18     Client will use cognitive strategies identified in therapy to challenge anxious thoughts.    Intervention(s)  Therapist will teach emotional regulation skills. CBT/REBT ABCD model.    Objective #B  Client will use at least 2 new coping skills for anxiety management in the next 12 weeks.    Status: Continued - Date: 6/29/18     Intervention(s)  Therapist will teach emotional regulation skills. DBT Core Mindfulness, Distress Tolerance, Emotion Regulation, and Interpersonal Effectiveness skills.    Goal 3: Client will improve her interpersonal  functioning/relationships by 2 points on a 1-10 Likert scale per self-report (10 = optimal interpersonal functioning/relationships).    I will know I've met my goal when my relationships with my children have improved.      Objective #A (Client Action)    Status: Continued - Date(s): 6/29/18     Client will learn & utilize at least 2 new assertive communication skills weekly.    Intervention(s)  Therapist will teach Nonviolent Communication and DBT Interpersonal Effectiveness skills..    Client has reviewed and agreed to the above plan.      JENNIFER Horowitz  August 3, 2018

## 2018-08-07 ASSESSMENT — ANXIETY QUESTIONNAIRES: GAD7 TOTAL SCORE: 0

## 2018-08-07 ASSESSMENT — PATIENT HEALTH QUESTIONNAIRE - PHQ9: SUM OF ALL RESPONSES TO PHQ QUESTIONS 1-9: 0

## 2018-08-09 ENCOUNTER — OFFICE VISIT (OUTPATIENT)
Dept: OBGYN | Facility: CLINIC | Age: 48
End: 2018-08-09
Payer: COMMERCIAL

## 2018-08-09 VITALS
SYSTOLIC BLOOD PRESSURE: 128 MMHG | TEMPERATURE: 97.9 F | BODY MASS INDEX: 43.42 KG/M2 | DIASTOLIC BLOOD PRESSURE: 73 MMHG | HEART RATE: 83 BPM | HEIGHT: 66 IN | OXYGEN SATURATION: 95 % | WEIGHT: 270.2 LBS

## 2018-08-09 DIAGNOSIS — Z01.419 ENCOUNTER FOR GYNECOLOGICAL EXAMINATION WITHOUT ABNORMAL FINDING: Primary | ICD-10-CM

## 2018-08-09 DIAGNOSIS — Z12.39 BREAST CANCER SCREENING: ICD-10-CM

## 2018-08-09 PROCEDURE — 99386 PREV VISIT NEW AGE 40-64: CPT | Performed by: NURSE PRACTITIONER

## 2018-08-09 ASSESSMENT — PAIN SCALES - GENERAL: PAINLEVEL: NO PAIN (0)

## 2018-08-09 NOTE — PROGRESS NOTES
SUBJECTIVE:   CC: Flor Fernandez is an 48 year old woman who presents for preventive health visit.     Physical   Annual:     Getting at least 3 servings of Calcium per day:  Yes    Bi-annual eye exam:  Yes    Dental care twice a year:  NO    Sleep apnea or symptoms of sleep apnea:  None    Diet:  Low salt    Frequency of exercise:  2-3 days/week    Duration of exercise:  45-60 minutes    Taking medications regularly:  Yes    Medication side effects:  Not applicable    Additional concerns today:  YES      Today's PHQ-2 Score:   PHQ-2 ( 1999 [employer]) 8/9/2018   Q1: Little interest or pleasure in doing things 0   Q2: Feeling down, depressed or hopeless 0   PHQ-2 Score 0   Q1: Little interest or pleasure in doing things Not at all   Q2: Feeling down, depressed or hopeless Not at all   PHQ-2 Score 0       Abuse: Current or Past(Physical, Sexual or Emotional)- No  Do you feel safe in your environment - Yes    Social History   Substance Use Topics     Smoking status: Former Smoker     Types: Cigarettes     Smokeless tobacco: Never Used     Alcohol use No     Alcohol Use 8/9/2018   If you drink alcohol do you typically have greater than 3 drinks per day OR greater than 7 drinks per week? No   No flowsheet data found.    Reviewed orders with patient.  Reviewed health maintenance and updated orders accordingly - Yes  Patient Active Problem List   Diagnosis     Vertigo     Hyperlipidemia LDL goal <100     Tachycardia     Benign essential hypertension     History of stroke     Iron deficiency     Thrombocytosis (H)     Morbid obesity (H)     Craving for particular food     Anxiety disorder, unspecified type     Past Surgical History:   Procedure Laterality Date     APPENDECTOMY       CL AFF SURGICAL PATHOLOGY  12/29/2016     MAMMOPLASTY REDUCTION BILATERAL         Social History   Substance Use Topics     Smoking status: Former Smoker     Types: Cigarettes     Smokeless tobacco: Never Used     Alcohol use No     Family  "History   Problem Relation Age of Onset     Other Cancer Mother      Asthma Mother      Diabetes Father      Glaucoma No family hx of      Macular Degeneration No family hx of            Patient under age 50, mutual decision reflected in health maintenance.      Pertinent mammograms are reviewed under the imaging tab.  History of abnormal Pap smear: NO - age 30-65 PAP every 5 years with negative HPV co-testing recommended     Reviewed and updated as needed this visit by clinical staff  Tobacco  Allergies  Meds  Med Hx  Surg Hx  Fam Hx  Soc Hx        Reviewed and updated as needed this visit by Provider        Past Medical History:   Diagnosis Date     Cerebral infarction (H)      Hypertension       Past Surgical History:   Procedure Laterality Date     APPENDECTOMY       CL AFF SURGICAL PATHOLOGY  12/29/2016     MAMMOPLASTY REDUCTION BILATERAL         Review of Systems  CONSTITUTIONAL: NEGATIVE for fever, chills, change in weight  INTEGUMENTARU/SKIN: NEGATIVE for worrisome rashes, moles or lesions  EYES: NEGATIVE for vision changes or irritation  ENT: NEGATIVE for ear, mouth and throat problems  RESP: NEGATIVE for significant cough or SOB  BREAST: NEGATIVE for masses, tenderness or discharge  CV: NEGATIVE for chest pain, palpitations or peripheral edema  GI: NEGATIVE for nausea, abdominal pain, heartburn, or change in bowel habits  : NEGATIVE for unusual urinary or vaginal symptoms. Periods are regular-few times in the last 18 months that she skipped 1 cycle. Can be heavy some months, light some months. Manageable at this time.  MUSCULOSKELETAL: NEGATIVE for significant arthralgias or myalgia  NEURO: NEGATIVE for weakness, dizziness or paresthesias  PSYCHIATRIC: NEGATIVE for changes in mood or affect     OBJECTIVE:   /73  Pulse 83  Temp 97.9  F (36.6  C) (Oral)  Ht 5' 5.5\" (1.664 m)  Wt 270 lb 3.2 oz (122.6 kg)  SpO2 95%  BMI 44.28 kg/m2  Physical Exam  GENERAL: healthy, alert and no " "distress  EYES: Eyes grossly normal to inspection, PERRL and conjunctivae and sclerae normal  HENT: ear canals and TM's normal, nose and mouth without ulcers or lesions  NECK: no adenopathy, no asymmetry, masses, or scars and thyroid normal to palpation  RESP: lungs clear to auscultation - no rales, rhonchi or wheezes  BREAST: normal without masses, tenderness or nipple discharge and no palpable axillary masses or adenopathy  CV: regular rate and rhythm, normal S1 S2, no S3 or S4, no murmur, click or rub, no peripheral edema and peripheral pulses strong  ABDOMEN: soft, nontender, no hepatosplenomegaly, no masses and bowel sounds normal   (female): normal female external genitalia, normal urethral meatus, vaginal mucosa pink, moist, well rugated, and normal cervix/adnexa/uterus without masses or discharge  MS: no gross musculoskeletal defects noted, no edema  SKIN: no suspicious lesions or rashes  NEURO: Normal strength and tone, mentation intact and speech normal  PSYCH: mentation appears normal, affect normal/bright    ASSESSMENT/PLAN:   1. Encounter for gynecological examination without abnormal finding  Pap smear due in 2019. Continue follow up with Dr. Barnard for primary care.    2. Breast cancer screening  - MA Screening Digital Bilateral; Future    COUNSELING:  Reviewed preventive health counseling, as reflected in patient instructions  Special attention given to:        Regular exercise       Healthy diet/nutrition       HIV screeninx in teen years, 1x in adult years, and at intervals if high risk       (Teresa)menopause management    BP Readings from Last 1 Encounters:   18 128/73     Estimated body mass index is 44.28 kg/(m^2) as calculated from the following:    Height as of this encounter: 5' 5.5\" (1.664 m).    Weight as of this encounter: 270 lb 3.2 oz (122.6 kg).      Weight management plan: Pt working with PCP     reports that she has quit smoking. Her smoking use included Cigarettes. " She has never used smokeless tobacco.      Counseling Resources:  ATP IV Guidelines  Pooled Cohorts Equation Calculator  Breast Cancer Risk Calculator  FRAX Risk Assessment  ICSI Preventive Guidelines  Dietary Guidelines for Americans, 2010  USDA's MyPlate  ASA Prophylaxis  Lung CA Screening    JAGJIT Graham CNP  [facility]

## 2018-08-09 NOTE — NURSING NOTE
"Chief Complaint   Patient presents with     Physical       Initial /73  Pulse 83  Temp 97.9  F (36.6  C) (Oral)  Ht 5' 5.5\" (1.664 m)  Wt 270 lb 3.2 oz (122.6 kg)  SpO2 95%  BMI 44.28 kg/m2 Estimated body mass index is 44.28 kg/(m^2) as calculated from the following:    Height as of this encounter: 5' 5.5\" (1.664 m).    Weight as of this encounter: 270 lb 3.2 oz (122.6 kg)..  BP completed using cuff size: regular on forearm      Laurel Beth CMA    "

## 2018-08-09 NOTE — MR AVS SNAPSHOT
After Visit Summary   8/9/2018    Flor Fernandez    MRN: [id number]           Patient Information     Date Of Birth          1970        Visit Information        Provider Department      8/9/2018 10:30 AM Delisa Stallworth APRN CNP [facility]        Today's Diagnoses     Encounter for gynecological examination without abnormal finding    -  1    Breast cancer screening           Follow-ups after your visit        Your next 10 appointments already scheduled     Aug 14, 2018  3:00 PM CDT   Office Visit with Nayeli Barnard MD   [facility] ([address] [address]   [phone]           Bring a current list of meds and any records pertaining to this visit. For Physicals, please bring immunization records and any forms needing to be filled out. Please arrive 10 minutes early to complete paperwork.            Aug 21, 2018  2:00 PM CDT   Return Visit with Vikas Perez [facility]    [address] [place] [address]   [phone]            Sep 07, 2018  2:00 PM CDT   Return Visit with Vikas Perez [facility]    [address] [facility] [address]   [phone]            Sep 18, 2018  2:00 PM CDT   Return Visit with Vikas Perez [facility] ([facility]    [address] [place] [address]   [phone]              Future tests that were ordered for you today     Open Future Orders        Priority Expected Expires Ordered    MA Screening Digital Bilateral Routine  8/9/2019 8/9/2018            Who to contact     If you have questions or need follow up information about today's clinic visit or your schedule please contact [facility] directly at [phone].  Normal or non-critical lab and imaging results will  "be communicated to you by MyChart, letter or phone within 4 business days after the clinic has received the results. If you do not hear from us within 7 days, please contact the clinic through Podio or phone. If you have a critical or abnormal lab result, we will notify you by phone as soon as possible.  Submit refill requests through Podio or call your pharmacy and they will forward the refill request to us. Please allow 3 business days for your refill to be completed.          Additional Information About Your Visit        Podio Information     Podio lets you send messages to your doctor, view your test results, renew your prescriptions, schedule appointments and more. To sign up, go to [email or web address] . Click on \"Log in\" on the left side of the screen, which will take you to the Welcome page. Then click on \"Sign up Now\" on the right side of the page.     You will be asked to enter the access code listed below, as well as some personal information. Please follow the directions to create your username and password.     Your access code is: [email or web address]  Expires: 10/23/2018 12:28 PM     Your access code will  in 90 days. If you need help or a new code, please call your [facility] clinic or [phone].        Care EveryWhere ID     This is your Care EveryWhere ID. This could be used by other organizations to access your [facility] medical records  [id number]        Your Vitals Were     Pulse Temperature Height Pulse Oximetry BMI (Body Mass Index)       83 97.9  F (36.6  C) (Oral) 5' 5.5\" (1.664 m) 95% 44.28 kg/m2        Blood Pressure from Last 3 Encounters:   18 128/73   18 104/66   05/15/18 124/72    Weight from Last 3 Encounters:   18 270 lb 3.2 oz (122.6 kg)   05/15/18 270 lb 8 oz (122.7 kg)   04/10/18 273 lb 8 oz (124.1 kg)               Primary Care Provider Office Phone # Fax #    Nayeli Barnard [phone]       [address] [facility] [place] " [id number]        Equal Access to Services     [facility]: Hadii aminah mccain hebertsantosh Bill, walaurelda luqadaha, qaybta karenprasanna mike, rosana reddy. So [facility] [phone].    ATENCIÓN: Si habla español, tiene a sung disposición servicios gratuitos de asistencia lingüística. Asimame al [phone].    We comply with applicable federal civil rights laws and [place] laws. We do not discriminate on the basis of race, color, national origin, age, disability, sex, sexual orientation, or gender identity.            Thank you!     Thank you for choosing [facility]  [facility]. Our goal is always to provide you with excellent care. Hearing back from our patients is one way we can continue to improve our services. Please take a few minutes to complete the written survey that you may receive in the mail after your visit with us. Thank you!             Your Updated Medication List - Protect others around you: Learn how to safely use, store and throw away your medicines at www.disposemymeds.org.          This list is accurate as of 8/9/18 10:47 AM.  Always use your most recent med list.                   Brand Name Dispense Instructions for use Diagnosis    albuterol (2.5 MG/3ML) 0.083% neb solution     25 vial    Take 1 vial (2.5 mg) by nebulization every 6 hours as needed for shortness of breath / dyspnea or wheezing    Chronic seasonal allergic rhinitis, unspecified trigger       atorvastatin 20 MG tablet    LIPITOR    90 tablet    TAKE 1 TAB BY MOUTH DAILY. INDICATIONS: CEREBROVASCULAR ACCIDENT OR STROKE    Hyperlipidemia LDL goal <100       carvedilol 6.25 MG tablet    COREG    180 tablet    Take 1 tablet (6.25 mg) by mouth 2 times daily (with meals)        chlorthalidone 25 MG tablet    HYGROTON    90 tablet    Take 1 tablet (25 mg) by mouth daily    Benign essential hypertension       [employer] ASPIRIN 325 MG tablet   Generic drug:  aspirin      TAKE 1 TABLET BY MOUTH ONCE DAILY WITH A  MEAL.        cyanocobalamin 1000 MCG tablet    vitamin  B-12     Take 1,000 mcg by mouth daily        ferrous fumarate 65 mg ([place]. FE)-Vitamin C 125 mg  MG Tabs tablet    VITRON C     Take 1 tablet by mouth daily    Iron deficiency       fluticasone 50 MCG/ACT spray    FLONASE    1 Bottle    Spray 1-2 sprays into both nostrils daily    Chronic seasonal allergic rhinitis, unspecified trigger       lisinopril 10 MG tablet    PRINIVIL/ZESTRIL    90 tablet    Take 1 tablet (10 mg) by mouth daily    Benign essential hypertension       MULTI-VITAMIN GUMMIES Chew           topiramate 25 MG tablet    TOPAMAX    360 tablet    Take 1 tab in the morning and 2 in the evening for 2 weeks and then 2 tabs twice daily.    Morbid obesity (H)

## 2018-08-10 ENCOUNTER — RADIANT APPOINTMENT (OUTPATIENT)
Dept: MAMMOGRAPHY | Facility: CLINIC | Age: 48
End: 2018-08-10
Payer: COMMERCIAL

## 2018-08-10 DIAGNOSIS — Z12.39 BREAST CANCER SCREENING: ICD-10-CM

## 2018-08-10 PROCEDURE — 77063 BREAST TOMOSYNTHESIS BI: CPT | Mod: TC

## 2018-08-10 PROCEDURE — 77067 SCR MAMMO BI INCL CAD: CPT | Mod: TC

## 2018-08-14 ENCOUNTER — OFFICE VISIT (OUTPATIENT)
Dept: INTERNAL MEDICINE | Facility: CLINIC | Age: 48
End: 2018-08-14
Payer: COMMERCIAL

## 2018-08-14 VITALS
TEMPERATURE: 98.8 F | DIASTOLIC BLOOD PRESSURE: 78 MMHG | RESPIRATION RATE: 16 BRPM | HEART RATE: 88 BPM | SYSTOLIC BLOOD PRESSURE: 132 MMHG | WEIGHT: 271.5 LBS | BODY MASS INDEX: 43.63 KG/M2 | HEIGHT: 66 IN | OXYGEN SATURATION: 96 %

## 2018-08-14 DIAGNOSIS — G89.29 CHRONIC LOW BACK PAIN WITHOUT SCIATICA, UNSPECIFIED BACK PAIN LATERALITY: ICD-10-CM

## 2018-08-14 DIAGNOSIS — I10 BENIGN ESSENTIAL HYPERTENSION: ICD-10-CM

## 2018-08-14 DIAGNOSIS — F41.9 ANXIETY DISORDER, UNSPECIFIED TYPE: ICD-10-CM

## 2018-08-14 DIAGNOSIS — E61.1 IRON DEFICIENCY: ICD-10-CM

## 2018-08-14 DIAGNOSIS — M54.50 CHRONIC LOW BACK PAIN WITHOUT SCIATICA, UNSPECIFIED BACK PAIN LATERALITY: ICD-10-CM

## 2018-08-14 DIAGNOSIS — E66.01 MORBID OBESITY (H): ICD-10-CM

## 2018-08-14 DIAGNOSIS — E78.5 HYPERLIPIDEMIA LDL GOAL <100: Primary | ICD-10-CM

## 2018-08-14 PROBLEM — R63.8 CRAVING FOR PARTICULAR FOOD: Status: RESOLVED | Noted: 2018-01-18 | Resolved: 2018-08-14

## 2018-08-14 PROBLEM — D75.839 THROMBOCYTOSIS: Status: RESOLVED | Noted: 2017-05-03 | Resolved: 2018-08-14

## 2018-08-14 PROCEDURE — 99214 OFFICE O/P EST MOD 30 MIN: CPT | Performed by: INTERNAL MEDICINE

## 2018-08-14 RX ORDER — CYCLOBENZAPRINE HCL 10 MG
5-10 TABLET ORAL 3 TIMES DAILY PRN
Qty: 30 TABLET | Refills: 1 | Status: SHIPPED | OUTPATIENT
Start: 2018-08-14 | End: 2018-08-24

## 2018-08-14 NOTE — PROGRESS NOTES
"  SUBJECTIVE:   Flor Fernandez is a 48 year old female who presents to clinic today for the following health issues:    Follow-Up Weight  Flor is here to follow up for her chronic medications and discuss her weight loss goals. She stopped the Topamax as it was giving her too many GI side effects. She is eating well, but \"fell off the wagon\" with exercise for a while. She is trying to get back into exercising at the gym as she feels better after being on an exercise routine. She has considered weight loss surgery and is interested in further discussion of this.   Anxiety  Her anxiety has improved since starting to see her counselor, Vikas Perez.  Hypertension  No problems taking current medications.       Problem list and histories reviewed & adjusted, as indicated.  Additional history: as documented    Labs reviewed in EPIC    Reviewed and updated as needed this visit by clinical staff  Tobacco  Allergies  Meds  Problems  Med Hx  Surg Hx  Fam Hx  Soc Hx        Reviewed and updated as needed this visit by Provider  Problems         ROS:  CONSTITUTIONAL: NEGATIVE for fever, chills, change in weight  RESP: NEGATIVE for significant cough or SOB  CV: POSITIVE for peripheral edema  GI: POSITIVE for nausea, change in bowel habits with Topamax  PSYCHIATRIC: NEGATIVE for changes in mood or affect    This document serves as a record of the services and decisions personally performed and made by Nayeli Barnard MD. It was created on her behalf by Meche Martin, a trained medical scribe. The creation of this document is based the provider's statements to the medical scribe.  Meche Martin, August 14, 2018 3:37 PM     OBJECTIVE:     /78  Pulse 94  Temp 98.8  F (37.1  C) (Oral)  Resp 16  Ht 1.664 m (5' 5.5\")  Wt 123.2 kg (271 lb 8 oz)  SpO2 96%  BMI 44.49 kg/m2  Body mass index is 44.49 kg/(m^2).  GENERAL: healthy, alert and no distress, morbidly obese  RESP: lungs clear to auscultation - no rales, rhonchi " or wheezes  CV: regular rate and rhythm, normal S1 S2, no S3 or S4, no murmur, click or rub, peripheral pulses strong  MS: no gross musculoskeletal defects noted, +1 edema in bilateral ankles  SKIN: no suspicious lesions or rashes to visible skin  PSYCH: mentation appears normal, affect normal/bright    Diagnostic Test Results:  No results found for this or any previous visit (from the past 24 hour(s)).    ASSESSMENT/PLAN:     1. Hyperlipidemia LDL goal <100  Due for cholesterol labs in November.   - Lipid panel reflex to direct LDL Fasting; Future  - **ALT FUTURE anytime; Future    2. Benign essential hypertension  Due for BMP labs in November.  - **Basic metabolic panel FUTURE anytime; Future    3. Iron deficiency  Pt doing well on current medication and treatment plan. No change at this time. Refilled medications.   - ferrous fumarate 65 mg, [place]. FE,-Vitamin C 125 mg (VITRON C)  MG TABS tablet; Take 1 tablet by mouth every other day    4. Anxiety disorder, unspecified type  Continue with counselor as needed.     5. Chronic low back pain without sciatica, unspecified back pain laterality  She asks what kind of OTC Nsaid is okay to take for her back pain. Recommended that none of these are recommended giving her medical hx. Gave a prescription of flexeril to help with her back pain. Follow-up for this in one month. Plan to follow up to discuss bariatric surgery in December or January.   - cyclobenzaprine (FLEXERIL) 10 MG tablet; Take 0.5-1 tablets (5-10 mg) by mouth 3 times daily as needed for muscle spasms  Dispense: 30 tablet; Refill: 1    Morbid obesity - Per patient instructions. Is going to try diet and exercise further.  Failed topamax due to side effects.     I spent 17 minutes of time with the patient and >50% of it was in education and counseling regarding weight loss and counseling.       Patient Instructions   Follow up in December or January.  We will discuss bariatric surgery at that time.  Labs  in November.  You don't need to see me then.  Fasting.    The information in this document, created by the medical scribe for me, accurately reflects the services I personally performed and the decisions made by me. I have reviewed and approved this document for accuracy.     Nayeli Barnard MD  [facility]    IN: 3:13 PM  OUT: 3:25 PM

## 2018-08-14 NOTE — PATIENT INSTRUCTIONS
Follow up in December or January.  We will discuss bariatric surgery at that time.  Labs in November.  You don't need to see me then.  Fasting.    [facility]    If you have any questions regarding to your visit please contact your care team:     Team Pink:   Clinic Hours Telephone Number   Internal Medicine:  Dr. Nayeli Hi, NP       7am-7pm  Monday - Thursday   7am-5pm  Fridays  [phone]  (Appointment scheduling available 24/7)    Questions about your recent visit?  Team Line  [phone]   Urgent Care - [place] - 11am-9pm Monday-Friday Saturday-Sunday- 9am-5pm   [place] - 5pm-9pm Monday-Friday Saturday-Sunday- 9am-5pm  [phone] - [place]  [phone] - [place]       What options do I have for a visit other than an office visit? We offer electronic visits (e-visits) and telephone visits, when medically appropriate.  Please check with your medical insurance to see if these types of visits are covered, as you will be responsible for any charges that are not paid by your insurance.      You can use [employer] (secure electronic communication) to access to your chart, send your primary care provider a message, or make an appointment. Ask a team member how to get started.     For a price quote for your services, please call our Consumer Price Line at [phone] or our Imaging Cost estimation line at [phone] (for imaging tests).

## 2018-08-14 NOTE — MR AVS SNAPSHOT
After Visit Summary   8/14/2018    Flor Fernandez    MRN: [id number]           Patient Information     Date Of Birth          1970        Visit Information        Provider Department      8/14/2018 3:00 PM Nayeli Barnard MD [facility]        Today's Diagnoses     Hyperlipidemia LDL goal <100    -  1    Benign essential hypertension        Iron deficiency        Anxiety disorder, unspecified type        Chronic low back pain without sciatica, unspecified back pain laterality          Care Instructions    Follow up in December or January.  We will discuss bariatric surgery at that time.  Labs in November.  You don't need to see me then.  Fasting.    [facility]    If you have any questions regarding to your visit please contact your care team:     Team Pink:   Clinic Hours Telephone Number   Internal Medicine:  Dr. Nayeli Hi, NP       7am-7pm  Monday - Thursday   7am-5pm  Fridays  [phone]  (Appointment scheduling available 24/7)    Questions about your recent visit?  Team Line  [phone]   Urgent Care - [place] [facility] - 11am-9pm Monday-Friday Saturday-Sunday- 9am-5pm   [place] - 5pm-9pm Monday-Friday Saturday-Sunday- 9am-5pm  [phone] - [place]  [phone] - [place]       What options do I have for a visit other than an office visit? We offer electronic visits (e-visits) and telephone visits, when medically appropriate.  Please check with your medical insurance to see if these types of visits are covered, as you will be responsible for any charges that are not paid by your insurance.      You can use [employer] (secure electronic communication) to access to your chart, send your primary care provider a message, or make an appointment. Ask a team member how to get started.     For a price quote for your services, please call our Consumer Price Line at [phone] or our Imaging Cost  estimation line at [phone] (for imaging tests).            Follow-ups after your visit        Your next 10 appointments already scheduled     Aug 21, 2018  2:00 PM CDT   Return Visit with Vikas Perez, [facility] ([facility])    [address] [place] [address] [place]  [place] [address]   [phone]            Sep 07, 2018  2:00 PM CDT   Return Visit with [facility]as [facility] ([facility])    [address] [place], [place] [address]  [place] [address]   [phone]            Sep 18, 2018  2:00 PM CDT   Return Visit with Vikas Perez [facility] ([facility])    [address] [place] [address]  [place] [address]   [phone]              Future tests that were ordered for you today     Open Future Orders        Priority Expected Expires Ordered    **ALT FUTURE anytime Routine 8/14/2018 8/14/2019 8/14/2018    Lipid panel reflex to direct LDL Fasting Routine 8/14/2018 8/14/2019 8/14/2018    **Basic metabolic panel FUTURE anytime Routine 8/14/2018 8/14/2019 8/14/2018            Who to contact     If you have questions or need follow up information about today's clinic visit or your schedule please contact [facility] directly at [phone].  Normal or non-critical lab and imaging results will be communicated to you by [employer], letter or phone within 4 business days after the clinic has received the results. If you do not hear from us within 7 days, please contact the clinic through [employer] or phone. If you have a critical or abnormal lab result, we will notify you by phone as soon as possible.  Submit refill requests through [employer] or call your pharmacy and they will forward the refill request to us. Please allow 3 business days for your refill to be completed.          Additional Information About Your Visit        [employer] Information     [employer] lets you send messages to your doctor, view  "your test results, renew your prescriptions, schedule appointments and more. To sign up, go to [email or web address] . Click on \"Log in\" on the left side of the screen, which will take you to the Welcome page. Then click on \"Sign up Now\" on the right side of the page.     You will be asked to enter the access code listed below, as well as some personal information. Please follow the directions to create your username and password.     Your access code is: [email or web address]  Expires: 10/23/2018 12:28 PM     Your access code will  in 90 days. If you need help or a new code, please call your [place] clinic or [phone].        Care EveryWhere ID     This is your Care EveryWhere ID. This could be used by other organizations to access your [place] medical records  [id number]        Your Vitals Were     Pulse Temperature Respirations Height Pulse Oximetry BMI (Body Mass Index)    94 98.8  F (37.1  C) (Oral) 16 5' 5.5\" (1.664 m) 96% 44.49 kg/m2       Blood Pressure from Last 3 Encounters:   18 132/78   18 128/73   18 104/66    Weight from Last 3 Encounters:   18 271 lb 8 oz (123.2 kg)   18 270 lb 3.2 oz (122.6 kg)   05/15/18 270 lb 8 oz (122.7 kg)                 Today's Medication Changes          These changes are accurate as of 18  3:24 PM.  If you have any questions, ask your nurse or doctor.               Start taking these medicines.        Dose/Directions    cyclobenzaprine 10 MG tablet   Commonly known as:  FLEXERIL   Used for:  Chronic low back pain without sciatica, unspecified back pain laterality   Started by:  Nayeli Barnard MD        Dose:  5-10 mg   Take 0.5-1 tablets (5-10 mg) by mouth 3 times daily as needed for muscle spasms   Quantity:  30 tablet   Refills:  1         These medicines have changed or have updated prescriptions.        Dose/Directions    ferrous fumarate 65 mg ([place]. FE)-Vitamin C 125 mg  MG Tabs tablet   Commonly known as:  VITRON C "   This may have changed:  when to take this   Used for:  Iron deficiency   Changed by:  Nayeli Barnard MD        Dose:  1 tablet   Take 1 tablet by mouth every other day   Refills:  0         Stop taking these medicines if you haven't already. Please contact your care team if you have questions.     topiramate 25 MG tablet   Commonly known as:  TOPAMAX   Stopped by:  Nayeli Barnard MD                Where to get your medicines      These medications were sent to [facility] [place], [place] - [address] [place] [address]  [address] [facility] [address], [facility] [address]     Phone:  [phone]     cyclobenzaprine 10 MG tablet                Primary Care Provider Office Phone # Fax #    Nayeli Barnard [phone] [address] [facility] [address]        Equal Access to Services     [facility]: Hadii aad ku hadasho Soomaali, waaxda luqadaha, qaybta kaalmada adeegyada, waxay devantein hayaan anusha jacobsonararupesh knightharinderHugh [facility]. So [facility] [phone].    ATENCIÓN: Si habla español, tiene a sung disposición servicios gratuitos de asistencia lingüística. Ll[facility] [phone].    We comply with applicable federal civil rights laws and [place] laws. We do not discriminate on the basis of race, color, national origin, age, disability, sex, sexual orientation, or gender identity.            Thank you!     Thank you for choosing [facility]  for your care. Our goal is always to provide you with excellent care. Hearing back from our patients is one way we can continue to improve our services. Please take a few minutes to complete the written survey that you may receive in the mail after your visit with us. Thank you!             Your Updated Medication List - Protect others around you: Learn how to safely use, store and throw away your medicines at www.disposemymeds.org.          This list is accurate as of 8/14/18  3:24 PM.  Always use your most recent med list.                    Brand Name Dispense Instructions for use Diagnosis    albuterol (2.5 MG/3ML) 0.083% neb solution     25 vial    Take 1 vial (2.5 mg) by nebulization every 6 hours as needed for shortness of breath / dyspnea or wheezing    Chronic seasonal allergic rhinitis, unspecified trigger       atorvastatin 20 MG tablet    LIPITOR    90 tablet    TAKE 1 TAB BY MOUTH DAILY. INDICATIONS: CEREBROVASCULAR ACCIDENT OR STROKE    Hyperlipidemia LDL goal <100       carvedilol 6.25 MG tablet    COREG    180 tablet    Take 1 tablet (6.25 mg) by mouth 2 times daily (with meals)        chlorthalidone 25 MG tablet    HYGROTON    90 tablet    Take 1 tablet (25 mg) by mouth daily    Benign essential hypertension       [facility] ASPIRIN 325 MG tablet   Generic drug:  aspirin      TAKE 1 TABLET BY MOUTH ONCE DAILY WITH A MEAL.        cyanocobalamin 1000 MCG tablet    vitamin  B-12     Take 1,000 mcg by mouth daily        cyclobenzaprine 10 MG tablet    FLEXERIL    30 tablet    Take 0.5-1 tablets (5-10 mg) by mouth 3 times daily as needed for muscle spasms    Chronic low back pain without sciatica, unspecified back pain laterality       ferrous fumarate 65 mg ([place]. FE)-Vitamin C 125 mg  MG Tabs tablet    VITRON C     Take 1 tablet by mouth every other day    Iron deficiency       fluticasone 50 MCG/ACT spray    FLONASE    1 Bottle    Spray 1-2 sprays into both nostrils daily    Chronic seasonal allergic rhinitis, unspecified trigger       lisinopril 10 MG tablet    PRINIVIL/ZESTRIL    90 tablet    Take 1 tablet (10 mg) by mouth daily    Benign essential hypertension       MULTI-VITAMIN GUMMIES Chew

## 2018-08-15 PROBLEM — M54.50 CHRONIC LOW BACK PAIN WITHOUT SCIATICA, UNSPECIFIED BACK PAIN LATERALITY: Status: ACTIVE | Noted: 2018-08-15

## 2018-08-15 PROBLEM — G89.29 CHRONIC LOW BACK PAIN WITHOUT SCIATICA, UNSPECIFIED BACK PAIN LATERALITY: Status: ACTIVE | Noted: 2018-08-15

## 2018-08-21 ENCOUNTER — OFFICE VISIT (OUTPATIENT)
Dept: PSYCHOLOGY | Facility: CLINIC | Age: 48
End: 2018-08-21
Payer: COMMERCIAL

## 2018-08-21 DIAGNOSIS — F41.9 ANXIETY DISORDER, UNSPECIFIED TYPE: Primary | ICD-10-CM

## 2018-08-21 PROCEDURE — 90834 PSYTX W PT 45 MINUTES: CPT | Performed by: MARRIAGE & FAMILY THERAPIST

## 2018-08-21 ASSESSMENT — ANXIETY QUESTIONNAIRES
6. BECOMING EASILY ANNOYED OR IRRITABLE: NOT AT ALL
3. WORRYING TOO MUCH ABOUT DIFFERENT THINGS: NOT AT ALL
7. FEELING AFRAID AS IF SOMETHING AWFUL MIGHT HAPPEN: NOT AT ALL
1. FEELING NERVOUS, ANXIOUS, OR ON EDGE: NOT AT ALL
5. BEING SO RESTLESS THAT IT IS HARD TO SIT STILL: NOT AT ALL
2. NOT BEING ABLE TO STOP OR CONTROL WORRYING: NOT AT ALL
GAD7 TOTAL SCORE: 0

## 2018-08-21 ASSESSMENT — PATIENT HEALTH QUESTIONNAIRE - PHQ9: 5. POOR APPETITE OR OVEREATING: NOT AT ALL

## 2018-08-21 NOTE — PROGRESS NOTES
Progress Note    Client Name: Flor Fernandez  Date: 8/21/18         Service Type: Individual      Session Start Time: 2:05  Session End Time: 2:50      Session Length: 38-52     Session #: 16     Attendees: Client attended alone    Treatment Plan Last Reviewed: 6/29/18, next due 9/29/18.  PHQ-9 / RODRIGUE-7 : completed.     DATA      Progress Since Last Session (Related to Symptoms / Goals / Homework):   Symptoms: Improving client reported continued increased calm/improved family relationships    Homework: Achieved / completed to satisfaction      Episode of Care Goals: Satisfactory progress - ACTION (Actively working towards change); Intervened by reinforcing change plan / affirming steps taken     Current / Ongoing Stressors and Concerns:   Client reported that her son started college yesterday, and that he is working as well, so she experiences some normal anxiety about this.  Client reported that her son has been talking about getting his own place, about which client feels anxious as he doesn't understand financial realities.  Client reported some minor conflict with her mother regarding client helping out with her mother's finances.     Treatment Objective(s) Addressed in This Session:   use cognitive strategies identified in therapy to challenge anxious thoughts     Intervention:   Interpersonal Therapy: reviewed with client maintaining boundaries with family members.        ASSESSMENT: Current Emotional / Mental Status (status of significant symptoms):   Risk status (Self / Other harm or suicidal ideation)   Client denies current fears or concerns for personal safety.   Client denies current or recent suicidal ideation or behaviors.   Client denies current or recent homicidal ideation or behaviors.   Client denies current or recent self injurious behavior or ideation.   Client denies other safety concerns.   A safety and risk management plan has not been developed at  this time, however client was given the after-hours number / 911 should there be a change in any of these risk factors.     Appearance:   Appropriate    Eye Contact:   Good    Psychomotor Behavior: Normal    Attitude:   Cooperative    Orientation:   All   Speech    Rate / Production: Normal     Volume:  Normal    Mood:    Anxious  Normal   Affect:    Appropriate    Thought Content:  Clear    Thought Form:  Coherent  Logical    Insight:    Good      Medication Review:   No current psychiatric medications prescribed     Medication Compliance:   NA     Changes in Health Issues:   None reported     Chemical Use Review:   Substance Use: Chemical use reviewed, no active concerns identified      Tobacco Use: No current tobacco use.       Collateral Reports Completed:   Not Applicable    PLAN: (Client Tasks / Therapist Tasks / Other)  Client agreed to continue to work on maintaining appropriate boundaries with her family members between now and follow-up session in two weeks.        Vikas Perez [facility]                                                         ________________________________________________________________________    Treatment Plan    Client's Name: Flor Fernandez  YOB: 1970    Date: 6/29/18    DSM-V Diagnoses: 300.00 (F41.9) Unspecified Anxiety Disorder  Psychosocial / Contextual Factors: problems with primary support group: conflicts with client's children  WHODAS: 15    Referral / Collaboration:  Referral to another professional/service is not indicated at this time..    Anticipated number of session or this episode of care: 11-20      MeasurableTreatment Goal(s) related to diagnosis / functional impairment(s)  Goal 1: Client will successfully process through past trauma defined as reporting 0 Subjective Units of Distress related to trauma on 0-10 scale and 7 on Validity of (positive) Cognition scale about self on 1-7 scale   I will know I've met my goal when I feel less stuck.        Objective #A (Client Action)    Status: Continued - Date: 6/29/18      Client will identify past traumatic events/memories which are causing current distress.     Intervention(s)  Therapist will take client's history and facilitate client's identification of targets for EMDR.     Objective #B  Client will complete needed assessment(s) and [facility] EMDR resourcing to confirm readiness for EMDR.    Status: Continued - Date: 6/29/18      Intervention(s)  Therapist will administer Dissociative Experiences Scale, Multidimensional Inventory of Dissociation as needed and complete [facility] EMDR resourcing with client.     Objective #C  Client will engage in installing at least 2 EMDR resources.  Status: Continued - Date: 6/29/18      Intervention(s)  Therapist will complete EMDR resourcing (Container, Remote Control, grounding/progressive muscle relaxation, Light Stream, Inner Advisor) with client.     Objective #D (Client Action)    Status: Continued- Date: 6/29/18      Client will engage in reprocessing all past traumatic event/memory targets.     Intervention(s)   Therapist will complete EMDR reprocessing with client.    Goal 2: Client will increase overall baseline calm mindset by 2 points on a 1-10 Likert scale per self-report (10 = optimal calm mindset).    I will know I've met my goal when I feel less anxious.      Objective #A (Client Action)    Status: Continued - Date: 6/29/18     Client will use cognitive strategies identified in therapy to challenge anxious thoughts.    Intervention(s)  Therapist will teach emotional regulation skills. CBT/REBT ABCD model.    Objective #B  Client will use at least 2 new coping skills for anxiety management in the next 12 weeks.    Status: Continued - Date: 6/29/18     Intervention(s)  Therapist will teach emotional regulation skills. DBT Core Mindfulness, Distress Tolerance, Emotion Regulation, and Interpersonal Effectiveness skills.    Goal 3: Client will improve her  interpersonal functioning/relationships by 2 points on a 1-10 Likert scale per self-report (10 = optimal interpersonal functioning/relationships).    I will know I've met my goal when my relationships with my children have improved.      Objective #A (Client Action)    Status: Continued - Date(s): 6/29/18     Client will learn & utilize at least 2 new assertive communication skills weekly.    Intervention(s)  Therapist will teach Nonviolent Communication and DBT Interpersonal Effectiveness skills..    Client has reviewed and agreed to the above plan.      JENNIFER Horowitz  August 21, 2018

## 2018-08-21 NOTE — MR AVS SNAPSHOT
"                  MRN:[id number]                      After Visit Summary   2018    Flor Fernandez    MRN: [id number]           Visit Information        Provider Department      2018 2:00 PM Chris Vikasfior Matthews, [facility]      Your next 10 appointments already scheduled     Sep 07, 2018  2:00 PM CDT   Return Visit with Vikas Perez [facility] ([facility])    [address] [place] [address]  [place] [address]   [phone]            Sep 18, 2018  2:00 PM CDT   Return Visit with Vikas Perez [facility] ([facility])    [address] [facility]  [place] [address]   [phone]            Oct 02, 2018  2:00 PM CDT   Return Visit with Vikas Perez [facility] ([facility]    [address] [place] [address]   [phone]              MyChart Information     [employer] lets you send messages to your doctor, view your test results, renew your prescriptions, schedule appointments and more. To sign up, go to [email or web address] . Click on \"Log in\" on the left side of the screen, which will take you to the Welcome page. Then click on \"Sign up Now\" on the right side of the page.     You will be asked to enter the access code listed below, as well as some personal information. Please follow the directions to create your username and password.     Your access code is: [email or web address]  Expires: 10/23/2018 12:28 PM     Your access code will  in 90 days. If you need help or a new code, please call your [place] clinic or [phone].        Care EveryWhere ID     This is your Care EveryWhere ID. This could be used by other organizations to access your [facility] medical records  [id number]        Equal Access to Services     JOSE FLORES AH: Nathaniel Khan, audreyda atilio, qaybrosana alvarado" robert trevizoaaray ah. So [facility] [phone].    ATENCIÓN: Si habla español, tiene a sung disposición servicios gratuitos de asistencia lingüística. Llame al [phone].    We comply with applicable federal civil rights laws and [place] laws. We do not discriminate on the basis of race, color, national origin, age, disability, sex, sexual orientation, or gender identity.

## 2018-08-22 ASSESSMENT — PATIENT HEALTH QUESTIONNAIRE - PHQ9: SUM OF ALL RESPONSES TO PHQ QUESTIONS 1-9: 0

## 2018-08-22 ASSESSMENT — ANXIETY QUESTIONNAIRES: GAD7 TOTAL SCORE: 0

## 2018-08-24 ENCOUNTER — RADIANT APPOINTMENT (OUTPATIENT)
Dept: GENERAL RADIOLOGY | Facility: CLINIC | Age: 48
End: 2018-08-24
Attending: NURSE PRACTITIONER
Payer: COMMERCIAL

## 2018-08-24 ENCOUNTER — OFFICE VISIT (OUTPATIENT)
Dept: FAMILY MEDICINE | Facility: CLINIC | Age: 48
End: 2018-08-24
Payer: COMMERCIAL

## 2018-08-24 VITALS
SYSTOLIC BLOOD PRESSURE: 134 MMHG | HEART RATE: 97 BPM | BODY MASS INDEX: 43.62 KG/M2 | RESPIRATION RATE: 19 BRPM | DIASTOLIC BLOOD PRESSURE: 78 MMHG | WEIGHT: 271.4 LBS | TEMPERATURE: 97.9 F | OXYGEN SATURATION: 97 % | HEIGHT: 66 IN

## 2018-08-24 DIAGNOSIS — M54.6 ACUTE MIDLINE THORACIC BACK PAIN: Primary | ICD-10-CM

## 2018-08-24 DIAGNOSIS — M54.6 ACUTE MIDLINE THORACIC BACK PAIN: ICD-10-CM

## 2018-08-24 PROCEDURE — 72072 X-RAY EXAM THORAC SPINE 3VWS: CPT

## 2018-08-24 PROCEDURE — 99213 OFFICE O/P EST LOW 20 MIN: CPT | Performed by: NURSE PRACTITIONER

## 2018-08-24 RX ORDER — LIDOCAINE 50 MG/G
PATCH TOPICAL
Qty: 30 PATCH | Refills: 0 | Status: SHIPPED | OUTPATIENT
Start: 2018-08-24 | End: 2019-07-29

## 2018-08-24 RX ORDER — METHOCARBAMOL 500 MG/1
500-1000 TABLET, FILM COATED ORAL 3 TIMES DAILY PRN
Qty: 30 TABLET | Refills: 1 | Status: SHIPPED | OUTPATIENT
Start: 2018-08-24 | End: 2019-04-08

## 2018-08-24 NOTE — MR AVS SNAPSHOT
After Visit Summary   8/24/2018    Flor Fernandez    MRN: [id number]           Patient Information     Date Of Birth          1970        Visit Information        Provider Department      8/24/2018 3:40 PM Jayna Hi APRN [facility]        Today's Diagnoses     Acute midline thoracic back pain    -  1      Care Instructions    [facility]    If you have any questions regarding to your visit please contact your care team:     Team Pink:   Clinic Hours Telephone Number   Internal Medicine:  Dr. Nayeli Hi, NP       7am-7pm  Monday - Thursday   7am-5pm  Fridays  [phone]  (Appointment scheduling available 24/7)    Questions about your recent visit?  Team Line  [phone]   Urgent Care - [place] and [facility] - 11am-9pm Monday-Friday Saturday-Sunday- 9am-5pm   [place] - 5pm-9pm Monday-Friday Saturday-Sunday- 9am-5pm  [phone] - [place]  [phone] - [place]       What options do I have for a visit other than an office visit? We offer electronic visits (e-visits) and telephone visits, when medically appropriate.  Please check with your medical insurance to see if these types of visits are covered, as you will be responsible for any charges that are not paid by your insurance.      You can use [employer] (secure electronic communication) to access to your chart, send your primary care provider a message, or make an appointment. Ask a team member how to get started.     For a price quote for your services, please call our Consumer Price Line at [phone] or our Imaging Cost estimation line at [phone] (for imaging tests).            Follow-ups after your visit        Additional Services     [facility] PT, HAND, AND CHIROPRACTIC REFERRAL       **This order will print in the [facility] Scheduling Office**    Physical Therapy, Hand Therapy and Chiropractic Care are available  through:    *[facility]  *[facility]  *[facility]    Call one number to schedule at any of the above locations: [phone].    Your provider has referred you to: Physical Therapy at [facility] or [facility]    Indication/Reason for Referral: thoracic back pain  Onset of Illness: 2 weeks  Therapy Orders: Evaluate and Treat  Special Programs: None  Special Request: None    Jenni Arteaga      Additional Comments for the Therapist or Chiropractor:     Please be aware that coverage of these services is subject to the terms and limitations of your health insurance plan.  Call member services at your health plan with any benefit or coverage questions.      Please bring the following to your appointment:    *Your personal calendar for scheduling future appointments  *Comfortable clothing                  Your next 10 appointments already scheduled     Sep 07, 2018  2:00 PM CDT   Return Visit with Vikas Perez,  ([facility])    [address] [place] [address] [address]   [phone]            Sep 18, 2018  2:00 PM CDT   Return Visit with Vikas Perez,  ([facility])    [address] [place] [address]   [phone]            Oct 02, 2018  2:00 PM CDT   Return Visit with Vikas Perez,  ([facility])    [address] [place] [address]   [phone]              Future tests that were ordered for you today     Open Future Orders        Priority Expected Expires Ordered    XR Thoracic Spine 3 Views Routine 8/24/2018 8/24/2019 8/24/2018            Who to contact     If you have questions or need follow up information about today's clinic visit or your schedule please contact [facility] directly at [phone].  Normal or non-critical lab and imaging results will be  "communicated to you by MyChart, letter or phone within 4 business days after the clinic has received the results. If you do not hear from us within 7 days, please contact the clinic through MyChart or phone. If you have a critical or abnormal lab result, we will notify you by phone as soon as possible.  Submit refill requests through [employer] or call your pharmacy and they will forward the refill request to us. Please allow 3 business days for your refill to be completed.          Additional Information About Your Visit        Care EveryWhere ID     This is your Care EveryWhere ID. This could be used by other organizations to access your [facility] medical records  [id number]        Your Vitals Were     Pulse Temperature Respirations Height Pulse Oximetry BMI (Body Mass Index)    97 97.9  F (36.6  C) (Oral) 19 5' 5.5\" (1.664 m) 97% 44.48 kg/m2       Blood Pressure from Last 3 Encounters:   08/24/18 134/78   08/14/18 132/78   08/09/18 128/73    Weight from Last 3 Encounters:   08/24/18 271 lb 6.4 oz (123.1 kg)   08/14/18 271 lb 8 oz (123.2 kg)   08/09/18 270 lb 3.2 oz (122.6 kg)              We Performed the Following     [facility], HAND, AND CHIROPRACTIC REFERRAL          Today's Medication Changes          These changes are accurate as of 8/24/18  4:06 PM.  If you have any questions, ask your nurse or doctor.               Start taking these medicines.        Dose/Directions    lidocaine 5 % Patch   Commonly known as:  LIDODERM   Used for:  Acute midline thoracic back pain   Started by:  Jayna Hi APRN CNP        Apply up to 3 patches to painful area at once for up to 12 h within a 24 h period.  Remove after 12 hours.   Quantity:  30 patch   Refills:  0       methocarbamol 500 MG tablet   Commonly known as:  ROBAXIN   Used for:  Acute midline thoracic back pain   Started by:  Jayna Hi APRN CNP        Dose:  500-1000 mg   Take 1-2 tablets (500-1,000 mg) by mouth 3 times daily as needed " for muscle spasms   Quantity:  30 tablet   Refills:  1            Where to get your medicines      These medications were sent to [facility] - [place], [place] - [address]  [address], [place] [address]     Phone:  [phone]     lidocaine 5 % Patch    methocarbamol 500 MG tablet                Primary Care Provider Office Phone # Fax #    Nayeli Barnard [phone] [address]  [place] [address]        Equal Access to Services     [facility]: Hadii aad ku hadasho Soomaali, waaxda luqadaha, qaybta kaalmada adeegyada, waxay idiin hayaan adeeg khararupesh ruiz . So [facility] [phone].    ATENCIÓN: Si habla español, tiene a sung disposición servicios gratuitos de asistencia lingüística. [facility] [phone].    We comply with applicable federal civil rights laws and [place] laws. We do not discriminate on the basis of race, color, national origin, age, disability, sex, sexual orientation, or gender identity.            Thank you!     Thank you for choosing [facility]  for your care. Our goal is always to provide you with excellent care. Hearing back from our patients is one way we can continue to improve our services. Please take a few minutes to complete the written survey that you may receive in the mail after your visit with us. Thank you!             Your Updated Medication List - Protect others around you: Learn how to safely use, store and throw away your medicines at www.disposemymeds.org.          This list is accurate as of 8/24/18  4:06 PM.  Always use your most recent med list.                   Brand Name Dispense Instructions for use Diagnosis    albuterol (2.5 MG/3ML) 0.083% neb solution     25 vial    Take 1 vial (2.5 mg) by nebulization every 6 hours as needed for shortness of breath / dyspnea or wheezing    Chronic seasonal allergic rhinitis, unspecified trigger       atorvastatin 20 MG tablet    LIPITOR     90 tablet    TAKE 1 TAB BY MOUTH DAILY. INDICATIONS: CEREBROVASCULAR ACCIDENT OR STROKE    Hyperlipidemia LDL goal <100       carvedilol 6.25 MG tablet    COREG    180 tablet    Take 1 tablet (6.25 mg) by mouth 2 times daily (with meals)        chlorthalidone 25 MG tablet    HYGROTON    90 tablet    Take 1 tablet (25 mg) by mouth daily    Benign essential hypertension       [facility] ASPIRIN 325 MG tablet   Generic drug:  aspirin      TAKE 1 TABLET BY MOUTH ONCE DAILY WITH A MEAL.        cyanocobalamin 1000 MCG tablet    vitamin  B-12     Take 1,000 mcg by mouth daily        ferrous fumarate 65 mg ([place]. FE)-Vitamin C 125 mg  MG Tabs tablet    VITRON C     Take 1 tablet by mouth every other day    Iron deficiency       fluticasone 50 MCG/ACT spray    FLONASE    1 Bottle    Spray 1-2 sprays into both nostrils daily    Chronic seasonal allergic rhinitis, unspecified trigger       lidocaine 5 % Patch    LIDODERM    30 patch    Apply up to 3 patches to painful area at once for up to 12 h within a 24 h period.  Remove after 12 hours.    Acute midline thoracic back pain       lisinopril 10 MG tablet    PRINIVIL/ZESTRIL    90 tablet    Take 1 tablet (10 mg) by mouth daily    Benign essential hypertension       methocarbamol 500 MG tablet    ROBAXIN    30 tablet    Take 1-2 tablets (500-1,000 mg) by mouth 3 times daily as needed for muscle spasms    Acute midline thoracic back pain       MULTI-VITAMIN GUMMIES Chew

## 2018-08-24 NOTE — PROGRESS NOTES
SUBJECTIVE:   Flor Fernandez is a 48 year old female who presents to clinic today for the following health issues:      Back Pain       Duration: Patient states about 2 weeks         Specific cause: Patient states could have been from when her friend tried cracking her back     Description:   Location of pain: middle of back bilateral  Character of pain: sharp, dull ache and stabbing  Pain radiation:none  New numbness or weakness in legs, not attributed to pain:  no     Intensity: Currently 7/10, At its worst 10/10    History:   Pain interferes with job: Patient states she is unsure but noticed she has been having to get up and stretch more   History of back problems: no prior back problems  Any previous MRI or X-rays: None  Sees a specialist for back pain:  No  Therapies tried without relief: none    Alleviating factors:   Improved by:  muscle relaxants, tylenol      Precipitating factors:  Worsened by: Walking and certain movements    Functional and Psychosocial Screen (Jenni STarT Back):      Not performed today          Accompanying Signs & Symptoms:  Risk of Fracture:  None  Risk of Cauda Equina:  None  Risk of Infection:  None  Risk of Cancer:  None  Risk of Ankylosing Spondylitis:  Onset at age <35, male, AND morning back stiffness. no       Problem list and histories reviewed & adjusted, as indicated.  Additional history: as documented    Patient Active Problem List   Diagnosis     Vertigo     Hyperlipidemia LDL goal <100     Tachycardia     Benign essential hypertension     History of stroke     Iron deficiency     Morbid obesity (H)     Anxiety disorder, unspecified type     Chronic low back pain without sciatica, unspecified back pain laterality     Past Surgical History:   Procedure Laterality Date     APPENDECTOMY       [facility] SURGICAL PATHOLOGY  12/29/2016     MAMMOPLASTY REDUCTION BILATERAL         Social History   Substance Use Topics     Smoking status: Former Smoker     Types: Cigarettes      Smokeless tobacco: Never Used     Alcohol use No     Family History   Problem Relation Age of Onset     Other Cancer Mother      Asthma Mother      Diabetes Father      Glaucoma No family hx of      Macular Degeneration No family hx of          Current Outpatient Prescriptions   Medication Sig Dispense Refill     albuterol (2.5 MG/3ML) 0.083% neb solution Take 1 vial (2.5 mg) by nebulization every 6 hours as needed for shortness of breath / dyspnea or wheezing 25 vial 1     atorvastatin (LIPITOR) 20 MG tablet TAKE 1 TAB BY MOUTH DAILY. INDICATIONS: CEREBROVASCULAR ACCIDENT OR STROKE 90 tablet 3     carvedilol (COREG) 6.25 MG tablet Take 1 tablet (6.25 mg) by mouth 2 times daily (with meals) 180 tablet 3     chlorthalidone (HYGROTON) 25 MG tablet Take 1 tablet (25 mg) by mouth daily 90 tablet 3     CVS ASPIRIN 325 MG tablet TAKE 1 TABLET BY MOUTH ONCE DAILY WITH A MEAL.  0     cyanocobalamin (VITAMIN  B-12) 1000 MCG tablet Take 1,000 mcg by mouth daily       ferrous fumarate 65 mg, [place]. FE,-Vitamin C 125 mg (VITRON C)  MG TABS tablet Take 1 tablet by mouth every other day       fluticasone (FLONASE) 50 MCG/ACT spray Spray 1-2 sprays into both nostrils daily 1 Bottle 11     lidocaine (LIDODERM) 5 % Patch Apply up to 3 patches to painful area at once for up to 12 h within a 24 h period.  Remove after 12 hours. 30 patch 0     lisinopril (PRINIVIL/ZESTRIL) 10 MG tablet Take 1 tablet (10 mg) by mouth daily 90 tablet 3     methocarbamol (ROBAXIN) 500 MG tablet Take 1-2 tablets (500-1,000 mg) by mouth 3 times daily as needed for muscle spasms 30 tablet 1     Multiple Vitamins-Minerals (MULTI-VITAMIN GUMMIES) CHEW        No Known Allergies  BP Readings from Last 3 Encounters:   08/24/18 134/78   08/14/18 132/78   08/09/18 128/73    Wt Readings from Last 3 Encounters:   08/24/18 271 lb 6.4 oz (123.1 kg)   08/14/18 271 lb 8 oz (123.2 kg)   08/09/18 270 lb 3.2 oz (122.6 kg)                  Labs reviewed in  "EPIC    Reviewed and updated as needed this visit by clinical staff       Reviewed and updated as needed this visit by Provider         ROS:  Constitutional, HEENT, cardiovascular, pulmonary, gi and gu systems are negative, except as otherwise noted.    OBJECTIVE:     /78  Pulse 97  Temp 97.9  F (36.6  C) (Oral)  Resp 19  Ht 5' 5.5\" (1.664 m)  Wt 271 lb 6.4 oz (123.1 kg)  SpO2 97%  BMI 44.48 kg/m2  Body mass index is 44.48 kg/(m^2).  GENERAL: healthy, alert and no distress  RESP: lungs clear to auscultation - no rales, rhonchi or wheezes  CV: regular rate and rhythm, normal S1 S2, no S3 or S4, no murmur, click or rub, no peripheral edema and peripheral pulses strong  MS: Tenderness to palpation of thoracic spine, bilateral parathoracic muscles at T8-T10; full range of motion of thoracic spine, pain present with lateral turning; full range of motion of bilateral upper extremities.    Diagnostic Test Results:  none     ASSESSMENT/PLAN:     1. Acute midline thoracic back pain  Patient to try less sedating muscle relaxer, lidocaine patch.  Will check x-ray due to midline pain.  Patient is agreeable to physical therapy.  - XR Thoracic Spine 3 Views; Future  - methocarbamol (ROBAXIN) 500 MG tablet; Take 1-2 tablets (500-1,000 mg) by mouth 3 times daily as needed for muscle spasms  Dispense: 30 tablet; Refill: 1  - lidocaine (LIDODERM) 5 % Patch; Apply up to 3 patches to painful area at once for up to 12 h within a 24 h period.  Remove after 12 hours.  Dispense: 30 patch; Refill: 0  - [facility] PT, HAND, AND CHIROPRACTIC REFERRAL    FUTURE APPOINTMENTS:       - Follow-up for annual visit or as needed    JAGJIT [facility]erg[facility]    "

## 2018-08-24 NOTE — PATIENT INSTRUCTIONS
[facility]    If you have any questions regarding to your visit please contact your care team:     Team Pink:   Clinic Hours Telephone Number   Internal Medicine:  Dr. Nayeli Hi NP       7am-7pm  Monday - Thursday   7am-5pm  Fridays  [phone]  (Appointment scheduling available 24/7)    Questions about your recent visit?  Team Line  [phone]   Urgent Care - [place] and [place] - 11am-9pm Monday-Friday Saturday-Sunday- 9am-5pm   [place] - 5pm-9pm Monday-Friday Saturday-Sunday- 9am-5pm  [phone] - [place]  [phone] - [place]       What options do I have for a visit other than an office visit? We offer electronic visits (e-visits) and telephone visits, when medically appropriate.  Please check with your medical insurance to see if these types of visits are covered, as you will be responsible for any charges that are not paid by your insurance.      You can use [employer] (secure electronic communication) to access to your chart, send your primary care provider a message, or make an appointment. Ask a team member how to get started.     For a price quote for your services, please call our Consumer Price Line at [phone] or our Imaging Cost estimation line at [phone] (for imaging tests).

## 2018-08-27 NOTE — PROGRESS NOTES
Dear Flor,    Your recent test results are attached.      No acute findings.  You do have some arthritis changes present to your spine.    If you have any questions please feel free to contact [phone] or myself via [employer].    Sincerely,  Jayna Hi, CNP

## 2018-08-29 ENCOUNTER — THERAPY VISIT (OUTPATIENT)
Dept: PHYSICAL THERAPY | Facility: CLINIC | Age: 48
End: 2018-08-29
Attending: NURSE PRACTITIONER
Payer: COMMERCIAL

## 2018-08-29 DIAGNOSIS — M54.6 PAIN IN THORACIC SPINE: Primary | ICD-10-CM

## 2018-08-29 PROCEDURE — 97140 MANUAL THERAPY 1/> REGIONS: CPT | Mod: GP | Performed by: PHYSICAL THERAPIST

## 2018-08-29 PROCEDURE — 97110 THERAPEUTIC EXERCISES: CPT | Mod: GP | Performed by: PHYSICAL THERAPIST

## 2018-08-29 PROCEDURE — 97161 PT EVAL LOW COMPLEX 20 MIN: CPT | Mod: GP | Performed by: PHYSICAL THERAPIST

## 2018-08-29 NOTE — LETTER
[facility]  [address]  [address]  [place] [address]  [phone]    2018    Re: Flor Fernandez   :   1970  MRN:  [id number]   REFERRING PHYSICIAN:   Jayna Hi, SAJI [facility] PT  Date of Initial Evaluation:  2018  Visits:  Rxs Used: 1  Reason for Referral:  Pain in thoracic spine    EVALUATION SUMMARY    [facility] Initial Evaluation  Subjective:  Patient is a 48 year old female presenting with rehab cervical spine hpi.   Flor Fernandez is a 48 year old female with a thoracic spine condition.  Condition occurred with:  Contact with object.  Condition occurred: in the community.  This is a new condition  Patient reports onset of thoracic spine pain 3 weeks ago after walking for a long period of time while on vacation. Patient reports feeling that her back needed to be cracked and her friend pushed on her back which provoked a cavitation and from that point she noticed more pain.    Patient reports pain:  Thoracic right side.  Radiates to:  None.  Pain is described as shooting and aching and is constant and reported as 7/10.  Associated symptoms:  Loss of motion/stiffness. Pain is worse during the day.  Exacerbated by: end of the work day (sitting for >8 hours). lying on right side. and relieved by muscle relaxants (thoracic extension).  Since onset symptoms are unchanged.  Special tests:  X-ray.  General health as reported by patient is good.                  Red flags:  None as reported by the patient.    Objective:  Standing Alignment:    Cervical/Thoracic:  Forward head  Shoulder/UE:  Rounded shoulders       Cervical/Thoracic Evaluation  Cervical AROM: normal     AROM:  AROM Thoracic:  Flexion:               WNL  Extension:          Moderate loss, painful  Rotation:            Left: WNL     Right: WNL      Headaches: none  Cervical Myotomes:  Cervical myotomes: grossly 4+/5 bilaterally.  Cervical Dermatomes:  normal  Re:  Flor R Jim   :   1970    Cervical Palpation:  : Middle trap on right. Right thoracic paraspinals.  Tenderness present at Right:    Rhomboids and Erector Spinae    Cervical Stability/Joint Clearing:    Right positive at:  Mobility    Spinal Segmental Conclusions:    Level:  Hypo at T3, T4, T5, T6, T7, T8, T2 and T1       Shoulder Evaluation:  ROM:  AROM:  normal    Strength:  : grossly 4+/5 bilaterally.    Assessment/Plan:    Patient is a 48 year old female with thoracic complaints.    Patient has the following significant findings with corresponding treatment plan.                Diagnosis 1:  Thoracic spine  Pain -  hot/cold therapy, manual therapy, self management, education and home program  Decreased ROM/flexibility - manual therapy, therapeutic exercise, therapeutic activity and home program  Decreased joint mobility - manual therapy, therapeutic exercise, therapeutic activity and home program  Decreased strength - therapeutic exercise, therapeutic activities and home program  Decreased function - therapeutic activities and home program  Impaired posture - neuro re-education, therapeutic activities and home program    Therapy Evaluation Codes:   1) History comprised of:   Personal factors that impact the plan of care:      None.    Comorbidity factors that impact the plan of care are:      None.     Medications impacting care: Muscle relaxant.  2) Examination of Body Systems comprised of:   Body structures and functions that impact the plan of care:      Thoracic Spine.   Activity limitations that impact the plan of care are:      Bending, Lifting, Reading/Computer work, Sitting, Sleeping and Laying down.  3) Clinical presentation characteristics are:   Stable/Uncomplicated.  4) Decision-Making    Low complexity using standardized patient assessment instrument and/or measureable assessment of functional outcome.  Cumulative Therapy Evaluation is: Low complexity.    Previous and current functional  limitations:  (See Goal Flow Sheet for this information)    Short term and Long term goals: (See Goal Flow Sheet for this information)     Re: Flor Fernandez   :   1970    Communication ability:  Patient appears to be able to clearly communicate and understand verbal and written communication and follow directions correctly.  Treatment Explanation - The following has been discussed with the patient:   RX ordered/plan of care  Anticipated outcomes  Possible risks and side effects  This patient would benefit from PT intervention to resume normal activities.   Rehab potential is good.    Frequency:  1 X week, once daily  Duration:  for 6 weeks  Discharge Plan:  Achieve all LTG.  Independent in home treatment program.  Reach maximal therapeutic benefit.    Please refer to the daily flowsheet for treatment today, total treatment time and time spent performing 1:1 timed codes.       Thank you for your referral.    INQUIRIES  Therapist: VICENTE Quinteros PT  [address]  [address]  [place] [address]  Phone: [phone]  Fax: [phone]

## 2018-08-29 NOTE — PROGRESS NOTES
[facility] Initial Evaluation  Subjective:  Patient is a 48 year old female presenting with rehab left ankle/foot hpi.                                      Pertinent medical history includes:  High blood pressure, overweight and stroke.  Medical allergies: no.  Other surgeries include:  Other.  Current medications:  High blood pressure medication and muscle relaxants.  Current occupation is Patient representative.    Primary job tasks include:  Prolonged sitting, repetitive tasks and other.                                Objective:  System    Physical Exam    General     ROS    Assessment/Plan:

## 2018-08-29 NOTE — PROGRESS NOTES
[facility] Initial Evaluation  Subjective:  Patient is a 48 year old female presenting with rehab cervical spine hpi.   Flor Fernandez is a 48 year old female with a thoracic spine condition.  Condition occurred with:  Contact with object.  Condition occurred: in the community.  This is a new condition  Patient reports onset of thoracic spine pain 3 weeks ago after walking for a long period of time while on vacation. Patient reports feeling that her back needed to be cracked and her friend pushed on her back which provoked a cavitation and from that point she noticed more pain.    Patient reports pain:  Thoracic right side.  Radiates to:  None.  Pain is described as shooting and aching and is constant and reported as 7/10.  Associated symptoms:  Loss of motion/stiffness. Pain is worse during the day.  Exacerbated by: end of the work day (sitting for >8 hours). lying on right side. and relieved by muscle relaxants (thoracic extension).  Since onset symptoms are unchanged.  Special tests:  X-ray.      General health as reported by patient is good.                      Red flags:  None as reported by the patient.                        Objective:  Standing Alignment:    Cervical/Thoracic:  Forward head  Shoulder/UE:  Rounded shoulders                                  Cervical/Thoracic Evaluation  Cervical AROM: normal     AROM:    AROM Thoracic:    Flexion:               WNL  Extension:          Moderate loss, painful  Rotation:            Left: WNL     Right: WNL      Headaches: none  Cervical Myotomes:  Cervical myotomes: grossly 4+/5 bilaterally.                      Cervical Dermatomes:  normal                    Cervical Palpation:  : Middle trap on right. Right thoracic paraspinals.    Tenderness present at Right:    Rhomboids and Erector Spinae    Cervical Stability/Joint Clearing:      Right positive at:  Mobility  Spinal Segmental Conclusions:    Level:  Hypo at T3, T4, T5, T6, T7, T8,  T2 and T1             Shoulder Evaluation:  ROM:  AROM:  normal                                  Strength:  : grossly 4+/5 bilaterally.                                                               General     ROS    Assessment/Plan:    Patient is a 48 year old female with thoracic complaints.    Patient has the following significant findings with corresponding treatment plan.                Diagnosis 1:  Thoracic spine  Pain -  hot/cold therapy, manual therapy, self management, education and home program  Decreased ROM/flexibility - manual therapy, therapeutic exercise, therapeutic activity and home program  Decreased joint mobility - manual therapy, therapeutic exercise, therapeutic activity and home program  Decreased strength - therapeutic exercise, therapeutic activities and home program  Decreased function - therapeutic activities and home program  Impaired posture - neuro re-education, therapeutic activities and home program    Therapy Evaluation Codes:   1) History comprised of:   Personal factors that impact the plan of care:      None.    Comorbidity factors that impact the plan of care are:      None.     Medications impacting care: Muscle relaxant.  2) Examination of Body Systems comprised of:   Body structures and functions that impact the plan of care:      Thoracic Spine.   Activity limitations that impact the plan of care are:      Bending, Lifting, Reading/Computer work, Sitting, Sleeping and Laying down.  3) Clinical presentation characteristics are:   Stable/Uncomplicated.  4) Decision-Making    Low complexity using standardized patient assessment instrument and/or measureable assessment of functional outcome.  Cumulative Therapy Evaluation is: Low complexity.    Previous and current functional limitations:  (See Goal Flow Sheet for this information)    Short term and Long term goals: (See Goal Flow Sheet for this information)     Communication ability:  Patient appears to be able to clearly  communicate and understand verbal and written communication and follow directions correctly.  Treatment Explanation - The following has been discussed with the patient:   RX ordered/plan of care  Anticipated outcomes  Possible risks and side effects  This patient would benefit from PT intervention to resume normal activities.   Rehab potential is good.    Frequency:  1 X week, once daily  Duration:  for 6 weeks  Discharge Plan:  Achieve all LTG.  Independent in home treatment program.  Reach maximal therapeutic benefit.    Please refer to the daily flowsheet for treatment today, total treatment time and time spent performing 1:1 timed codes.

## 2018-09-07 ENCOUNTER — OFFICE VISIT (OUTPATIENT)
Dept: PSYCHOLOGY | Facility: CLINIC | Age: 48
End: 2018-09-07
Payer: COMMERCIAL

## 2018-09-07 DIAGNOSIS — F41.9 ANXIETY DISORDER, UNSPECIFIED TYPE: Primary | ICD-10-CM

## 2018-09-07 PROCEDURE — 90834 PSYTX W PT 45 MINUTES: CPT | Performed by: MARRIAGE & FAMILY THERAPIST

## 2018-09-07 ASSESSMENT — ANXIETY QUESTIONNAIRES
6. BECOMING EASILY ANNOYED OR IRRITABLE: NOT AT ALL
1. FEELING NERVOUS, ANXIOUS, OR ON EDGE: NOT AT ALL
7. FEELING AFRAID AS IF SOMETHING AWFUL MIGHT HAPPEN: NOT AT ALL
2. NOT BEING ABLE TO STOP OR CONTROL WORRYING: NOT AT ALL
5. BEING SO RESTLESS THAT IT IS HARD TO SIT STILL: NOT AT ALL
GAD7 TOTAL SCORE: 0
3. WORRYING TOO MUCH ABOUT DIFFERENT THINGS: NOT AT ALL

## 2018-09-07 ASSESSMENT — PATIENT HEALTH QUESTIONNAIRE - PHQ9: 5. POOR APPETITE OR OVEREATING: NOT AT ALL

## 2018-09-07 NOTE — MR AVS SNAPSHOT
MRN:[id number]                      After Visit Summary   9/7/2018    Flor Fernandez    MRN: [id number]           Visit Information        Provider Department      9/7/2018 2:00 PM [place] Vikas Byron, [facility] Generic      Your next 10 appointments already scheduled     Sep 18, 2018  2:00 PM CDT   Return Visit with Vikas Perez [facility] ([facility])    [address]  [place] [address]   [phone]            Oct 02, 2018  2:00 PM CDT   Return Visit with Vikas Perez [facility] ([facility])    [address]  [place] [address]   [phone]            Nov 06, 2018  2:00 PM CST   Return Visit with Vikas Perez [facility] ([facility])    [address] [place] [address]  [place] [address]   [phone]              MyChart Information     [employer] gives you secure access to your electronic health record. If you see a primary care provider, you can also send messages to your care team and make appointments. If you have questions, please call your primary care clinic.  If you do not have a primary care provider, please call [phone] and they will assist you.        Care EveryWhere ID     This is your Care EveryWhere ID. This could be used by other organizations to access your [place] medical records  [id number]        Equal Access to Services     JOSE FLORES AH: Hadii aad ku hadasho Soomaali, waaxda luqadaha, qaybta kaalmada adeegyada, waxedin robert ruiz . So [facility] [phone].    ATENCIÓN: Si habla español, tiene a sung disposición servicios gratuitos de asistencia lingüística. Llame al [phone].    We comply with applicable federal civil rights laws and [place] laws. We do not discriminate on the basis of race, color, national origin, age, disability, sex, sexual  orientation, or gender identity.

## 2018-09-07 NOTE — PROGRESS NOTES
"                                             Progress Note    Client Name: Flor Fernandez  Date: 9/7/18         Service Type: Individual      Session Start Time: 2:00  Session End Time: 2:50      Session Length: 38-52     Session #: 17     Attendees: Client attended alone    Treatment Plan Last Reviewed: 6/29/18, next due 9/29/18.  PHQ-9 / RODRIGUE-7 : completed.     DATA      Progress Since Last Session (Related to Symptoms / Goals / Homework):   Symptoms: Improving client reported continued increased calm/improved family relationships    Homework: Achieved / completed to satisfaction      Episode of Care Goals: Satisfactory progress - ACTION (Actively working towards change); Intervened by reinforcing change plan / affirming steps taken     Current / Ongoing Stressors and Concerns:   Client reported experiencing some stress with her son, as he just informed her that he was really struggling, crying, feeling anxiety \"like someone is sitting on my chest,\" and needing to engage in therapy and to get on psychotropic medications.  Client reported that she is doing better in her ability to handle his stress, and that it is still stressful.     Treatment Objective(s) Addressed in This Session:   use cognitive strategies identified in therapy to challenge anxious thoughts     Intervention:   CBT: reviewed with client letting of of things outside of her control (i.e. her son's actions/life decisions).  Interpersonal Therapy: re-reviewed with client maintaining boundaries with family members.        ASSESSMENT: Current Emotional / Mental Status (status of significant symptoms):   Risk status (Self / Other harm or suicidal ideation)   Client denies current fears or concerns for personal safety.   Client denies current or recent suicidal ideation or behaviors.   Client denies current or recent homicidal ideation or behaviors.   Client denies current or recent self injurious behavior or ideation.   Client denies other safety " concerns.   A safety and risk management plan has not been developed at this time, however client was given the after-hours number / 911 should there be a change in any of these risk factors.     Appearance:   Appropriate    Eye Contact:   Good    Psychomotor Behavior: Normal    Attitude:   Cooperative    Orientation:   All   Speech    Rate / Production: Normal     Volume:  Normal    Mood:    Anxious  Normal   Affect:    Appropriate    Thought Content:  Clear    Thought Form:  Coherent  Logical    Insight:    Good      Medication Review:   No current psychiatric medications prescribed     Medication Compliance:   NA     Changes in Health Issues:   None reported     Chemical Use Review:   Substance Use: Chemical use reviewed, no active concerns identified      Tobacco Use: No current tobacco use.       Collateral Reports Completed:   Not Applicable    PLAN: (Client Tasks / Therapist Tasks / Other)  Client agreed to continue to work on letting go of things outside of her control and maintaining appropriate boundaries with her family members between now and follow-up session in two weeks.        Vikas Perez, LMFT                                                         ________________________________________________________________________    Treatment Plan    Client's Name: Flor Fernandez  YOB: 1970    Date: 6/29/18    DSM-V Diagnoses: 300.00 (F41.9) Unspecified Anxiety Disorder  Psychosocial / Contextual Factors: problems with primary support group: conflicts with client's children  WHODAS: 15    Referral / Collaboration:  Referral to another professional/service is not indicated at this time..    Anticipated number of session or this episode of care: 11-20      MeasurableTreatment Goal(s) related to diagnosis / functional impairment(s)  Goal 1: Client will successfully process through past trauma defined as reporting 0 Subjective Units of Distress related to trauma on 0-10 scale and 7 on  Validity of (positive) Cognition scale about self on 1-7 scale   I will know I've met my goal when I feel less stuck.       Objective #A (Client Action)    Status: Continued - Date: 6/29/18      Client will identify past traumatic events/memories which are causing current distress.     Intervention(s)  Therapist will take client's history and facilitate client's identification of targets for EMDR.     Objective #B  Client will complete needed assessment(s) and [facility] EMDR resourcing to confirm readiness for EMDR.    Status: Continued - Date: 6/29/18      Intervention(s)  Therapist will administer Dissociative Experiences Scale, Multidimensional Inventory of Dissociation as needed and complete [facility] EMDR resourcing with client.     Objective #C  Client will engage in installing at least 2 EMDR resources.  Status: Continued - Date: 6/29/18      Intervention(s)  Therapist will complete EMDR resourcing (Container, Remote Control, grounding/progressive muscle relaxation, Light Stream, Inner Advisor) with client.     Objective #D (Client Action)    Status: Continued- Date: 6/29/18      Client will engage in reprocessing all past traumatic event/memory targets.     Intervention(s)   Therapist will complete EMDR reprocessing with client.    Goal 2: Client will increase overall baseline calm mindset by 2 points on a 1-10 Likert scale per self-report (10 = optimal calm mindset).    I will know I've met my goal when I feel less anxious.      Objective #A (Client Action)    Status: Continued - Date: 6/29/18     Client will use cognitive strategies identified in therapy to challenge anxious thoughts.    Intervention(s)  Therapist will teach emotional regulation skills. CBT/REBT ABCD model.    Objective #B  Client will use at least 2 new coping skills for anxiety management in the next 12 weeks.    Status: Continued - Date: 6/29/18     Intervention(s)  Therapist will teach emotional regulation skills. DBT Core  Mindfulness, Distress Tolerance, Emotion Regulation, and Interpersonal Effectiveness skills.    Goal 3: Client will improve her interpersonal functioning/relationships by 2 points on a 1-10 Likert scale per self-report (10 = optimal interpersonal functioning/relationships).    I will know I've met my goal when my relationships with my children have improved.      Objective #A (Client Action)    Status: Continued - Date(s): 6/29/18     Client will learn & utilize at least 2 new assertive communication skills weekly.    Intervention(s)  Therapist will teach Nonviolent Communication and DBT Interpersonal Effectiveness skills..    Client has reviewed and agreed to the above plan.      Vikas Perez, LMFT  September 7, 2018

## 2018-09-08 ASSESSMENT — PATIENT HEALTH QUESTIONNAIRE - PHQ9: SUM OF ALL RESPONSES TO PHQ QUESTIONS 1-9: 0

## 2018-09-08 ASSESSMENT — ANXIETY QUESTIONNAIRES: GAD7 TOTAL SCORE: 0

## 2018-10-02 ENCOUNTER — OFFICE VISIT (OUTPATIENT)
Dept: PSYCHOLOGY | Facility: CLINIC | Age: 48
End: 2018-10-02
Payer: COMMERCIAL

## 2018-10-02 DIAGNOSIS — F41.9 ANXIETY DISORDER, UNSPECIFIED TYPE: Primary | ICD-10-CM

## 2018-10-02 PROCEDURE — 90834 PSYTX W PT 45 MINUTES: CPT | Performed by: MARRIAGE & FAMILY THERAPIST

## 2018-10-02 ASSESSMENT — ANXIETY QUESTIONNAIRES
7. FEELING AFRAID AS IF SOMETHING AWFUL MIGHT HAPPEN: NOT AT ALL
2. NOT BEING ABLE TO STOP OR CONTROL WORRYING: NOT AT ALL
GAD7 TOTAL SCORE: 0
5. BEING SO RESTLESS THAT IT IS HARD TO SIT STILL: NOT AT ALL
3. WORRYING TOO MUCH ABOUT DIFFERENT THINGS: NOT AT ALL
6. BECOMING EASILY ANNOYED OR IRRITABLE: NOT AT ALL
1. FEELING NERVOUS, ANXIOUS, OR ON EDGE: NOT AT ALL

## 2018-10-02 ASSESSMENT — PATIENT HEALTH QUESTIONNAIRE - PHQ9: 5. POOR APPETITE OR OVEREATING: NOT AT ALL

## 2018-10-02 NOTE — PROGRESS NOTES
Progress Note    Client Name: Flor Fernandez  Date: 10/2/18         Service Type: Individual      Session Start Time: 2:08  Session End Time: 2:55      Session Length: 38-52     Session #: 18     Attendees: Client attended alone    Treatment Plan Last Reviewed: 10/2/18, next due 1/2/19.  PHQ-9 / RODRIGUE-7 : completed.     DATA      Progress Since Last Session (Related to Symptoms / Goals / Homework):   Symptoms: Improving client reported continued increased calm in family relationships    Homework: Achieved / completed to satisfaction      Episode of Care Goals: Satisfactory progress - ACTION (Actively working towards change); Intervened by reinforcing change plan / affirming steps taken     Current / Ongoing Stressors and Concerns:   Client reported that her son has been struggling with his father being jailed and his significant other breaking up with him, such that he went to the hosptial.  Client reported that her mother has been anxious and crying about client's son's status.  Client reported struggling with how to handle her mother's anxiety.     Treatment Objective(s) Addressed in This Session:   practice setting boundaries 2 times in the next 12 weeks   Client identified her goals for continued therapy.     Intervention:   Interpersonal Therapy: re-reviewed with client maintaining boundaries with how much she involves herself in family members' issues, offereing support and leaving it there.  Motivational Interviewing: used circular/Socratic questioning to elicit client's identification of her desired continued therapy goals.        ASSESSMENT: Current Emotional / Mental Status (status of significant symptoms):   Risk status (Self / Other harm or suicidal ideation)   Client denies current fears or concerns for personal safety.   Client denies current or recent suicidal ideation or behaviors.   Client denies current or recent homicidal ideation or behaviors.   Client  denies current or recent self injurious behavior or ideation.   Client denies other safety concerns.   A safety and risk management plan has not been developed at this time, however client was given the after-hours number / 911 should there be a change in any of these risk factors.     Appearance:   Appropriate    Eye Contact:   Good    Psychomotor Behavior: Normal    Attitude:   Cooperative    Orientation:   All   Speech    Rate / Production: Normal     Volume:  Normal    Mood:    Anxious  Normal   Affect:    Appropriate    Thought Content:  Clear    Thought Form:  Coherent  Logical    Insight:    Good      Medication Review:   No current psychiatric medications prescribed     Medication Compliance:   NA     Changes in Health Issues:   None reported     Chemical Use Review:   Substance Use: Chemical use reviewed, no active concerns identified      Tobacco Use: No current tobacco use.       Collateral Reports Completed:   Not Applicable    PLAN: (Client Tasks / Therapist Tasks / Other)  Client agreed to continue to work on setting and maintaining appropriate boundaries with her involvement in her family members' issues while offering support between now and follow-up session in five weeks.        Vikas Perez, [facility]                                                         ________________________________________________________________________    Treatment Plan    Client's Name: Flor Fernandez  YOB: 1970    Date: 10/2/18    DSM-V Diagnoses: 300.00 (F41.9) Unspecified Anxiety Disorder  Psychosocial / Contextual Factors: problems with primary support group: conflicts with client's children  WHODAS: 15    Referral / Collaboration:  Referral to another professional/service is not indicated at this time..    Anticipated number of session or this episode of care: 11-20      MeasurableTreatment Goal(s) related to diagnosis / functional impairment(s)  Goal 1: Client will successfully process through  past trauma defined as reporting 0 Subjective Units of Distress related to trauma on 0-10 scale and 7 on Validity of (positive) Cognition scale about self on 1-7 scale   I will know I've met my goal when I feel less stuck.       Objective #A (Client Action)    Status: Continued - Date: 10/2/18      Client will identify past traumatic events/memories which are causing current distress.     Intervention(s)  Therapist will take client's history and facilitate client's identification of targets for EMDR.     Objective #B  Client will complete needed assessment(s) and [facility] EMDR resourcing to confirm readiness for EMDR.    Status: Continued - Date: 10/2/18      Intervention(s)  Therapist will administer Dissociative Experiences Scale, Multidimensional Inventory of Dissociation as needed and complete [facility] EMDR resourcing with client.     Objective #C  Client will engage in installing at least 2 EMDR resources.  Status: Continued - Date: 10/2/18      Intervention(s)  Therapist will complete EMDR resourcing (Container, Remote Control, grounding/progressive muscle relaxation, Light Stream, Inner Advisor) with client.     Objective #D (Client Action)    Status: Continued- Date: 10/2/18      Client will engage in reprocessing all past traumatic event/memory targets.     Intervention(s)   Therapist will complete EMDR reprocessing with client.    Goal 2: Client will increase overall baseline calm mindset by 2 points on a 1-10 Likert scale per self-report (10 = optimal calm mindset).    I will know I've met my goal when I feel less anxious.      Objective #A (Client Action)    Status: Continued - Date: 10/2/18     Client will use cognitive strategies identified in therapy to challenge anxious thoughts.    Intervention(s)  Therapist will teach emotional regulation skills. CBT/REBT ABCD model.    Objective #B  Client will use at least 2 new coping skills for anxiety management in the next 12 weeks.    Status: Continued -  Date: 10/2/18     Intervention(s)  Therapist will teach emotional regulation skills. DBT Core Mindfulness, Distress Tolerance, Emotion Regulation, and Interpersonal Effectiveness skills.    Goal 3: Client will improve her interpersonal functioning/relationships by 2 points on a 1-10 Likert scale per self-report (10 = optimal interpersonal functioning/relationships).    I will know I've met my goal when my relationships with my children have improved.      Objective #A (Client Action)    Status: Continued - Date(s): 10/2/18     Client will learn & utilize at least 2 new assertive communication skills weekly.    Intervention(s)  Therapist will teach Nonviolent Communication and DBT Interpersonal Effectiveness skills..    Client has reviewed and agreed to the above plan.      JENNIFER Horowitz  October 2, 2018

## 2018-10-02 NOTE — MR AVS SNAPSHOT
MRN:[id number]                      After Visit Summary   10/2/2018    Flor Fernandez    MRN: [id number]           Visit Information        Provider Department      10/2/2018 2:00 PM Vikas Perez, [facility] Generic      Your next 10 appointments already scheduled     Nov 06, 2018  2:00 PM CST   Return Visit with Vikas Perez [facility] ([facility])    [address]  [place] [address]   [phone]            Dec 04, 2018  2:00 PM CST   Return Visit with Vikas Perez [facility] ([facility])    [address]  [place] [address]   [phone]              MyChart Information     [employer] gives you secure access to your electronic health record. If you see a primary care provider, you can also send messages to your care team and make appointments. If you have questions, please call your primary care clinic.  If you do not have a primary care provider, please call [phone] and they will assist you.        Care EveryWhere ID     This is your Care EveryWhere ID. This could be used by other organizations to access your [place] medical records  [id number]        Equal Access to Services     JOSE FLORES : Nathaniel ambrosioo Sojung, waaxda luqadaha, qaybta kaalmada adeegyada, rosana reddy. So [facility] [phone].    ATENCIÓN: Si habla español, tiene a sung disposición servicios gratuitos de asistencia lingüística. Llame al [phone].    We comply with applicable federal civil rights laws and [place] laws. We do not discriminate on the basis of race, color, national origin, age, disability, sex, sexual orientation, or gender identity.

## 2018-10-03 ASSESSMENT — PATIENT HEALTH QUESTIONNAIRE - PHQ9: SUM OF ALL RESPONSES TO PHQ QUESTIONS 1-9: 0

## 2018-10-03 ASSESSMENT — ANXIETY QUESTIONNAIRES: GAD7 TOTAL SCORE: 0

## 2018-11-05 DIAGNOSIS — J30.2 CHRONIC SEASONAL ALLERGIC RHINITIS: Primary | ICD-10-CM

## 2018-11-05 RX ORDER — ALBUTEROL SULFATE 0.83 MG/ML
2.5 SOLUTION RESPIRATORY (INHALATION) EVERY 6 HOURS PRN
Qty: 25 VIAL | Refills: 1 | Status: SHIPPED | OUTPATIENT
Start: 2018-11-05 | End: 2019-02-13

## 2018-11-05 NOTE — TELEPHONE ENCOUNTER
Reason for Call:  Other prescription    Detailed comments: Patient is requesting a refill of nebulizer albuterol vile's.     Phone Number Patient can be reached at: Home number on file [phone] (home)    Best Time: any    Can we leave a detailed message on this number? YES    Call taken on 11/5/2018 at 10:34 AM by SYD ELAINE

## 2018-11-06 ENCOUNTER — OFFICE VISIT (OUTPATIENT)
Dept: PSYCHOLOGY | Facility: CLINIC | Age: 48
End: 2018-11-06
Payer: COMMERCIAL

## 2018-11-06 DIAGNOSIS — F41.9 ANXIETY DISORDER, UNSPECIFIED TYPE: Primary | ICD-10-CM

## 2018-11-06 PROCEDURE — 90834 PSYTX W PT 45 MINUTES: CPT | Performed by: MARRIAGE & FAMILY THERAPIST

## 2018-11-06 ASSESSMENT — ANXIETY QUESTIONNAIRES
5. BEING SO RESTLESS THAT IT IS HARD TO SIT STILL: NOT AT ALL
GAD7 TOTAL SCORE: 0
1. FEELING NERVOUS, ANXIOUS, OR ON EDGE: NOT AT ALL
3. WORRYING TOO MUCH ABOUT DIFFERENT THINGS: NOT AT ALL
7. FEELING AFRAID AS IF SOMETHING AWFUL MIGHT HAPPEN: NOT AT ALL
6. BECOMING EASILY ANNOYED OR IRRITABLE: NOT AT ALL
2. NOT BEING ABLE TO STOP OR CONTROL WORRYING: NOT AT ALL

## 2018-11-06 ASSESSMENT — PATIENT HEALTH QUESTIONNAIRE - PHQ9: 5. POOR APPETITE OR OVEREATING: NOT AT ALL

## 2018-11-06 NOTE — MR AVS SNAPSHOT
MRN:[id number]                      After Visit Summary   11/6/2018    Flor Fernandez    MRN: [id number]           Visit Information        Provider Department      11/6/2018 2:00 PM [place]kias Perez, [facility] Generic      Your next 10 appointments already scheduled     Dec 04, 2018  2:00 PM CST   Return Visit with Vikas Perez [facility] ([facility])    [address]  [place] [address]   [phone]            Jan 04, 2019  2:00 PM CST   Return Visit with Vikas Perez [facility] ([facility])    [address]  [place] [address]   [phone]              MyChart Information     [employer] gives you secure access to your electronic health record. If you see a primary care provider, you can also send messages to your care team and make appointments. If you have questions, please call your primary care clinic.  If you do not have a primary care provider, please call [phone] and they will assist you.        Care EveryWhere ID     This is your Care EveryWhere ID. This could be used by other organizations to access your [place] medical records  [id number]        Equal Access to Services     JOSE FLORES : Nathaniel ambrosioo Sojung, waaxda luqadaha, qaybta kaalmada adeegyada, rosana reddy. So [facility] [phone].    ATENCIÓN: Si habla español, tiene a sung disposición servicios gratuitos de asistencia lingüística. Llame al [phone].    We comply with applicable federal civil rights laws and [place] laws. We do not discriminate on the basis of race, color, national origin, age, disability, sex, sexual orientation, or gender identity.

## 2018-11-07 ASSESSMENT — PATIENT HEALTH QUESTIONNAIRE - PHQ9: SUM OF ALL RESPONSES TO PHQ QUESTIONS 1-9: 0

## 2018-11-07 NOTE — PROGRESS NOTES
Progress Note    Client Name: Flor Fernandez  Date: 11/6/18         Service Type: Individual      Session Start Time: 2:02  Session End Time: 2:54      Session Length: 38-52     Session #: 19     Attendees: Client attended alone    Treatment Plan Last Reviewed: 10/2/18, next due 1/2/19.  PHQ-9 / RODRIGUE-7 : completed.     DATA      Progress Since Last Session (Related to Symptoms / Goals / Homework):   Symptoms: Improving client reported continued increased calm in family relationships    Homework: Achieved / completed to satisfaction      Episode of Care Goals: Satisfactory progress - ACTION (Actively working towards change); Intervened by reinforcing change plan / affirming steps taken     Current / Ongoing Stressors and Concerns:   Client reported that her son has been talking back and forth about moving to [place], and also considering moving home and looking into other jobs.  Client reported that her mother has been anxious and depressed about client's son's plans, and that client worries about her son's decision-making and outcomes.  Client reported continuing to struggle with how to handle her mother's anxiety.     Treatment Objective(s) Addressed in This Session:   practice setting boundaries 2 times in the next 12 weeks      Intervention:   Interpersonal Therapy: re-reviewed with client setting/maintaining boundaries regarding how much she engages in family members' concerns, not taking ownership of her family members' emotions/outcomes.        ASSESSMENT: Current Emotional / Mental Status (status of significant symptoms):   Risk status (Self / Other harm or suicidal ideation)   Client denies current fears or concerns for personal safety.   Client denies current or recent suicidal ideation or behaviors.   Client denies current or recent homicidal ideation or behaviors.   Client denies current or recent self injurious behavior or ideation.   Client denies other  safety concerns.   A safety and risk management plan has not been developed at this time, however client was given the after-hours number / 911 should there be a change in any of these risk factors.     Appearance:   Appropriate    Eye Contact:   Good    Psychomotor Behavior: Normal    Attitude:   Cooperative    Orientation:   All   Speech    Rate / Production: Normal     Volume:  Normal    Mood:    Anxious  Normal   Affect:    Appropriate    Thought Content:  Clear    Thought Form:  Coherent  Logical    Insight:    Good      Medication Review:   No current psychiatric medications prescribed     Medication Compliance:   NA     Changes in Health Issues:   None reported     Chemical Use Review:   Substance Use: Chemical use reviewed, no active concerns identified      Tobacco Use: No current tobacco use.       Collateral Reports Completed:   Not Applicable    PLAN: (Client Tasks / Therapist Tasks / Other)  Client agreed to continue to work on setting and maintaining appropriate boundaries regarding her engagement in her family members' concerns and not owning their emotions/outcomes between now and follow-up session in four weeks.        Vikas Perez, [facility]                                                         ________________________________________________________________________    Treatment Plan    Client's Name: Flor Fernandez  YOB: 1970    Date: 10/2/18    DSM-V Diagnoses: 300.00 (F41.9) Unspecified Anxiety Disorder  Psychosocial / Contextual Factors: problems with primary support group: conflicts with client's children  WHODAS: 15    Referral / Collaboration:  Referral to another professional/service is not indicated at this time..    Anticipated number of session or this episode of care: 11-20      MeasurableTreatment Goal(s) related to diagnosis / functional impairment(s)  Goal 1: Client will successfully process through past trauma defined as reporting 0 Subjective Units of  Distress related to trauma on 0-10 scale and 7 on Validity of (positive) Cognition scale about self on 1-7 scale   I will know I've met my goal when I feel less stuck.       Objective #A (Client Action)    Status: Continued - Date: 10/2/18      Client will identify past traumatic events/memories which are causing current distress.     Intervention(s)  Therapist will take client's history and facilitate client's identification of targets for EMDR.     Objective #B  Client will complete needed assessment(s) and [facility] EM[facility] resourcing to confirm readiness for EMDR.    Status: Continued - Date: 10/2/18      Intervention(s)  Therapist will administer Dissociative Experiences Scale, Multidimensional Inventory of Dissociation as needed and complete [facility] EMDR resourcing with client.     Objective #C  Client will engage in installing at least 2 EMDR resources.  Status: Continued - Date: 10/2/18      Intervention(s)  Therapist will complete EMDR resourcing (Container, Remote Control, grounding/progressive muscle relaxation, Light Stream, Inner Advisor) with client.     Objective #D (Client Action)    Status: Continued- Date: 10/2/18      Client will engage in reprocessing all past traumatic event/memory targets.     Intervention(s)   Therapist will complete EMDR reprocessing with client.    Goal 2: Client will increase overall baseline calm mindset by 2 points on a 1-10 Likert scale per self-report (10 = optimal calm mindset).    I will know I've met my goal when I feel less anxious.      Objective #A (Client Action)    Status: Continued - Date: 10/2/18     Client will use cognitive strategies identified in therapy to challenge anxious thoughts.    Intervention(s)  Therapist will teach emotional regulation skills. CBT/REBT ABCD model.    Objective #B  Client will use at least 2 new coping skills for anxiety management in the next 12 weeks.    Status: Continued - Date: 10/2/18     Intervention(s)  Therapist will  teach emotional regulation skills. DBT Core Mindfulness, Distress Tolerance, Emotion Regulation, and Interpersonal Effectiveness skills.    Goal 3: Client will improve her interpersonal functioning/relationships by 2 points on a 1-10 Likert scale per self-report (10 = optimal interpersonal functioning/relationships).    I will know I've met my goal when my relationships with my children have improved.      Objective #A (Client Action)    Status: Continued - Date(s): 10/2/18     Client will learn & utilize at least 2 new assertive communication skills weekly.    Intervention(s)  Therapist will teach Nonviolent Communication and DBT Interpersonal Effectiveness skills..    Client has reviewed and agreed to the above plan.      Vikas Perez, LMFT  November 6, 2018

## 2018-11-08 ASSESSMENT — ANXIETY QUESTIONNAIRES: GAD7 TOTAL SCORE: 0

## 2018-12-03 DIAGNOSIS — I10 BENIGN ESSENTIAL HYPERTENSION: ICD-10-CM

## 2018-12-03 RX ORDER — LISINOPRIL 10 MG/1
10 TABLET ORAL DAILY
Qty: 30 TABLET | Refills: 0 | Status: SHIPPED | OUTPATIENT
Start: 2018-12-03 | End: 2019-01-02

## 2018-12-03 NOTE — TELEPHONE ENCOUNTER
"Prescription approved per [facility] Refill Protocol.  Requested Prescriptions   Pending Prescriptions Disp Refills     lisinopril (PRINIVIL/ZESTRIL) 10 MG tablet [Pharmacy Med Name: LISINOPRIL 10MG TABS] 30 tablet 0     Sig: Take 1 tablet (10 mg) by mouth daily Need to have labs drawn for further refills    ACE Inhibitors (Including Combos) Protocol Failed    12/3/2018  8:34 AM       Failed - Normal serum creatinine on file in past 12 months    Recent Labs   Lab Test  11/08/17   1407   CR  0.51*            Failed - Normal serum potassium on file in past 12 months    Recent Labs   Lab Test  11/08/17   1407   POTASSIUM  3.5            Passed - Blood pressure under 140/90 in past 12 months    BP Readings from Last 3 Encounters:   08/24/18 134/78   08/14/18 132/78   08/09/18 128/73                Passed - Recent (12 mo) or future (30 days) visit within the authorizing provider's specialty    Patient had office visit in the last 12 months or has a visit in the next 30 days with authorizing provider or within the authorizing provider's specialty.  See \"Patient Info\" tab in inbasket, or \"Choose Columns\" in Meds & Orders section of the refill encounter.             Passed - Patient is age 18 or older       Passed - No active pregnancy on record       Passed - No positive pregnancy test in past 12 months        Sheila Saavedra RN - BC      "

## 2018-12-04 ENCOUNTER — OFFICE VISIT (OUTPATIENT)
Dept: PSYCHOLOGY | Facility: CLINIC | Age: 48
End: 2018-12-04
Payer: COMMERCIAL

## 2018-12-04 DIAGNOSIS — F41.9 ANXIETY DISORDER, UNSPECIFIED TYPE: Primary | ICD-10-CM

## 2018-12-04 PROCEDURE — 90834 PSYTX W PT 45 MINUTES: CPT | Performed by: MARRIAGE & FAMILY THERAPIST

## 2018-12-04 ASSESSMENT — ANXIETY QUESTIONNAIRES
1. FEELING NERVOUS, ANXIOUS, OR ON EDGE: NOT AT ALL
7. FEELING AFRAID AS IF SOMETHING AWFUL MIGHT HAPPEN: NOT AT ALL
3. WORRYING TOO MUCH ABOUT DIFFERENT THINGS: NOT AT ALL
5. BEING SO RESTLESS THAT IT IS HARD TO SIT STILL: NOT AT ALL
GAD7 TOTAL SCORE: 0
2. NOT BEING ABLE TO STOP OR CONTROL WORRYING: NOT AT ALL
6. BECOMING EASILY ANNOYED OR IRRITABLE: NOT AT ALL

## 2018-12-04 ASSESSMENT — PATIENT HEALTH QUESTIONNAIRE - PHQ9: 5. POOR APPETITE OR OVEREATING: NOT AT ALL

## 2018-12-04 NOTE — MR AVS SNAPSHOT
MRN:[id number]                      After Visit Summary   12/4/2018    Flor Fernandez    MRN: [id number]           Visit Information        Provider Department      12/4/2018 2:00 PM Vikas Perez, [facility] Generic      Your next 10 appointments already scheduled     Dec 07, 2018  8:00 AM CST   LAB with  LAB   [facility] ([address] [address]   [phone]           Please do not eat 10-12 hours before your appointment if you are coming in fasting for labs on lipids, cholesterol, or glucose (sugar). This does not apply to pregnant women. Water, hot tea and black coffee (with nothing added) are okay. Do not drink other fluids, diet soda or chew gum.            Jan 04, 2019  2:00 PM CST   Return Visit with Vikas Perez  ([address] [address]   [phone]            Feb 08, 2019  1:00 PM CST   Return Visit with Vikas Perez  ([facility])    [address] [place] [address]   [phone]              MyChart Information     [employer] gives you secure access to your electronic health record. If you see a primary care provider, you can also send messages to your care team and make appointments. If you have questions, please call your primary care clinic.  If you do not have a primary care provider, please call [phone] and they will assist you.        Care EveryWhere ID     This is your Care EveryWhere ID. This could be used by other organizations to access your [facility] medical records  [id number]        Equal Access to Services     JOSE FLORES : Nathaniel Khan, walavonne trimble, qaybta kaalrosana summers. So [facility] [phone].    ATENCIÓN: Joe layton,  tiene a sung disposición servicios gratuitos de asistencia lingüística. Llame al [phone].    We comply with applicable federal civil rights laws and [place] laws. We do not discriminate on the basis of race, color, national origin, age, disability, sex, sexual orientation, or gender identity.

## 2018-12-05 DIAGNOSIS — I10 BENIGN ESSENTIAL HYPERTENSION: ICD-10-CM

## 2018-12-05 DIAGNOSIS — R73.9 HYPERGLYCEMIA: Primary | ICD-10-CM

## 2018-12-05 DIAGNOSIS — E78.5 HYPERLIPIDEMIA LDL GOAL <100: ICD-10-CM

## 2018-12-05 LAB
ALT SERPL W P-5'-P-CCNC: 32 U/L (ref 0–50)
ANION GAP SERPL CALCULATED.3IONS-SCNC: 7 MMOL/L (ref 3–14)
BUN SERPL-MCNC: 9 MG/DL (ref 7–30)
CALCIUM SERPL-MCNC: 8.9 MG/DL (ref 8.5–10.1)
CHLORIDE SERPL-SCNC: 100 MMOL/L (ref 94–109)
CHOLEST SERPL-MCNC: 121 MG/DL
CO2 SERPL-SCNC: 29 MMOL/L (ref 20–32)
CREAT SERPL-MCNC: 0.59 MG/DL (ref 0.52–1.04)
GFR SERPL CREATININE-BSD FRML MDRD: >90 ML/MIN/1.7M2
GLUCOSE SERPL-MCNC: 136 MG/DL (ref 70–99)
HDLC SERPL-MCNC: 45 MG/DL
LDLC SERPL CALC-MCNC: 47 MG/DL
NONHDLC SERPL-MCNC: 76 MG/DL
POTASSIUM SERPL-SCNC: 3.7 MMOL/L (ref 3.4–5.3)
SODIUM SERPL-SCNC: 136 MMOL/L (ref 133–144)
TRIGL SERPL-MCNC: 144 MG/DL

## 2018-12-05 PROCEDURE — 36415 COLL VENOUS BLD VENIPUNCTURE: CPT | Performed by: INTERNAL MEDICINE

## 2018-12-05 PROCEDURE — 84460 ALANINE AMINO (ALT) (SGPT): CPT | Performed by: INTERNAL MEDICINE

## 2018-12-05 PROCEDURE — 80061 LIPID PANEL: CPT | Performed by: INTERNAL MEDICINE

## 2018-12-05 PROCEDURE — 80048 BASIC METABOLIC PNL TOTAL CA: CPT | Performed by: INTERNAL MEDICINE

## 2018-12-05 NOTE — PROGRESS NOTES
Stable cholesterol.  Normal electrolytes. Normal kidney function. Normal liver blood test. Blood sugar is in the diabetic range so more testing is needed to confirm if she has diabetes.  Please see if the lab can add on an A1c and if they can't then she will need seen in the lab for a repeat fasting draw (glucose and A1c if she is redrawn).  Indication hyperglycemia

## 2018-12-06 ASSESSMENT — PATIENT HEALTH QUESTIONNAIRE - PHQ9: SUM OF ALL RESPONSES TO PHQ QUESTIONS 1-9: 0

## 2018-12-06 NOTE — PROGRESS NOTES
"                                             Progress Note    Client Name: Flor Fernandez  Date: 12/4/18         Service Type: Individual      Session Start Time: 2:00  Session End Time: 2:45      Session Length: 38-52     Session #: 20     Attendees: Client attended alone    Treatment Plan Last Reviewed: 10/2/18, next due 1/2/19.  PHQ-9 / RODRIGUE-7 : completed.     DATA      Progress Since Last Session (Related to Symptoms / Goals / Homework):   Symptoms: Improving client reported continued increased calm in handling family stress    Homework: Achieved / completed to satisfaction      Episode of Care Goals: Satisfactory progress - ACTION (Actively working towards change); Intervened by reinforcing change plan / affirming steps taken     Current / Ongoing Stressors and Concerns:   Client reported that her mother has been doing better but not getting help and worrying about client's son.  Client reported that her son has been \"all over the board\" with his plans, and that he is now considering joining the [employer].     Treatment Objective(s) Addressed in This Session:   use cognitive strategies identified in therapy to challenge anxious thoughts      Intervention:   CBT: reviewed with client letting go of things outside of her control (e.g. her family's decision and actions).        ASSESSMENT: Current Emotional / Mental Status (status of significant symptoms):   Risk status (Self / Other harm or suicidal ideation)   Client denies current fears or concerns for personal safety.   Client denies current or recent suicidal ideation or behaviors.   Client denies current or recent homicidal ideation or behaviors.   Client denies current or recent self injurious behavior or ideation.   Client denies other safety concerns.   A safety and risk management plan has not been developed at this time, however client was given the after-hours number / 911 should there be a change in any of these risk factors.     Appearance:   Appropriate "    Eye Contact:   Good    Psychomotor Behavior: Normal    Attitude:   Cooperative    Orientation:   All   Speech    Rate / Production: Normal     Volume:  Normal    Mood:    Anxious  Normal   Affect:    Appropriate    Thought Content:  Clear    Thought Form:  Coherent  Logical    Insight:    Good      Medication Review:   No current psychiatric medications prescribed     Medication Compliance:   NA     Changes in Health Issues:   None reported     Chemical Use Review:   Substance Use: Chemical use reviewed, no active concerns identified      Tobacco Use: No current tobacco use.       Collateral Reports Completed:   Not Applicable    PLAN: (Client Tasks / Therapist Tasks / Other)  Client agreed to continue to work on letting go of things outside of her control (e.g. her family members' re/actions/outcomes) between now and follow-up session in four weeks.        Vikas Perez, LMFT                                                         ________________________________________________________________________    Treatment Plan    Client's Name: Flor Fernandez  YOB: 1970    Date: 10/2/18    DSM-V Diagnoses: 300.00 (F41.9) Unspecified Anxiety Disorder  Psychosocial / Contextual Factors: problems with primary support group: conflicts with client's children  WHODAS: 15    Referral / Collaboration:  Referral to another professional/service is not indicated at this time..    Anticipated number of session or this episode of care: 11-20      MeasurableTreatment Goal(s) related to diagnosis / functional impairment(s)  Goal 1: Client will successfully process through past trauma defined as reporting 0 Subjective Units of Distress related to trauma on 0-10 scale and 7 on Validity of (positive) Cognition scale about self on 1-7 scale   I will know I've met my goal when I feel less stuck.       Objective #A (Client Action)    Status: Continued - Date: 10/2/18      Client will identify past traumatic  events/memories which are causing current distress.     Intervention(s)  Therapist will take client's history and facilitate client's identification of targets for EMDR.     Objective #B  Client will complete needed assessment(s) and [facility] EMDR resourcing to confirm readiness for EMDR.    Status: Continued - Date: 10/2/18      Intervention(s)  Therapist will administer Dissociative Experiences Scale, Multidimensional Inventory of Dissociation as needed and complete [facility] EMDR resourcing with client.     Objective #C  Client will engage in installing at least 2 EMDR resources.  Status: Continued - Date: 10/2/18      Intervention(s)  Therapist will complete EMDR resourcing (Container, Remote Control, grounding/progressive muscle relaxation, Light Stream, Inner Advisor) with client.     Objective #D (Client Action)    Status: Continued- Date: 10/2/18      Client will engage in reprocessing all past traumatic event/memory targets.     Intervention(s)   Therapist will complete EMDR reprocessing with client.    Goal 2: Client will increase overall baseline calm mindset by 2 points on a 1-10 Likert scale per self-report (10 = optimal calm mindset).    I will know I've met my goal when I feel less anxious.      Objective #A (Client Action)    Status: Continued - Date: 10/2/18     Client will use cognitive strategies identified in therapy to challenge anxious thoughts.    Intervention(s)  Therapist will teach emotional regulation skills. CBT/REBT ABCD model.    Objective #B  Client will use at least 2 new coping skills for anxiety management in the next 12 weeks.    Status: Continued - Date: 10/2/18     Intervention(s)  Therapist will teach emotional regulation skills. DBT Core Mindfulness, Distress Tolerance, Emotion Regulation, and Interpersonal Effectiveness skills.    Goal 3: Client will improve her interpersonal functioning/relationships by 2 points on a 1-10 Likert scale per self-report (10 = optimal  interpersonal functioning/relationships).    I will know I've met my goal when my relationships with my children have improved.      Objective #A (Client Action)    Status: Continued - Date(s): 10/2/18     Client will learn & utilize at least 2 new assertive communication skills weekly.    Intervention(s)  Therapist will teach Nonviolent Communication and DBT Interpersonal Effectiveness skills..    Client has reviewed and agreed to the above plan.      JENNIFER Horowitz  December 4, 2018

## 2018-12-07 DIAGNOSIS — R73.9 HYPERGLYCEMIA: ICD-10-CM

## 2018-12-07 LAB — HBA1C MFR BLD: 6.1 % (ref 0–5.6)

## 2018-12-07 PROCEDURE — 36415 COLL VENOUS BLD VENIPUNCTURE: CPT | Performed by: INTERNAL MEDICINE

## 2018-12-07 PROCEDURE — 83036 HEMOGLOBIN GLYCOSYLATED A1C: CPT | Performed by: INTERNAL MEDICINE

## 2018-12-07 ASSESSMENT — ANXIETY QUESTIONNAIRES: GAD7 TOTAL SCORE: 0

## 2018-12-07 NOTE — PROGRESS NOTES
The A1c is not in the diabetic range but it is much higher than it was in the past.  I suspect you will soon become diabetic so we need to watch this closely.  Would you like to attend the prediabetes education classes with the diabetes educator?  I highly recommend this.  Let's also have you follow up in 3 months.    Dr. Barnard

## 2018-12-21 ENCOUNTER — OFFICE VISIT (OUTPATIENT)
Dept: OPTOMETRY | Facility: CLINIC | Age: 48
End: 2018-12-21
Payer: COMMERCIAL

## 2018-12-21 DIAGNOSIS — H04.123 DRY EYES: ICD-10-CM

## 2018-12-21 DIAGNOSIS — B30.9 VIRAL CONJUNCTIVITIS OF LEFT EYE: Primary | ICD-10-CM

## 2018-12-21 PROCEDURE — 99213 OFFICE O/P EST LOW 20 MIN: CPT | Performed by: OPTOMETRIST

## 2018-12-21 RX ORDER — OFLOXACIN 3 MG/ML
1 SOLUTION/ DROPS OPHTHALMIC 4 TIMES DAILY
Status: DISPENSED | OUTPATIENT
Start: 2018-12-21

## 2018-12-21 ASSESSMENT — VISUAL ACUITY
OS_SC: 20/30
METHOD: SNELLEN - LINEAR
OS_SC+: -1
OD_SC+: -1
OD_SC: 20/25
OS_PH_SC: 20/20

## 2018-12-21 ASSESSMENT — CONF VISUAL FIELD
OD_NORMAL: 1
OS_NORMAL: 1

## 2018-12-21 ASSESSMENT — SLIT LAMP EXAM - LIDS: COMMENTS: NORMAL

## 2018-12-21 ASSESSMENT — EXTERNAL EXAM - LEFT EYE: OS_EXAM: NORMAL

## 2018-12-21 ASSESSMENT — EXTERNAL EXAM - RIGHT EYE: OD_EXAM: NORMAL

## 2018-12-21 NOTE — LETTER
"    12/21/2018         RE: Flor Fernandez  [address]  [place] [address]        Dear Colleague,    Thank you for referring your patient, Flro Fernandez, to the [facility]. Please see a copy of my visit note below.    Chief Complaint   Patient presents with     Eye Problem Left Eye       Do you wear contact lenses? No        Abner Sr, OD     See Review Of Systems   Eyes: positive for visual blurring, redness  Constitutional: No fevers, chills, or weight changes. Patient experiencing early \"cold\" symptoms- congestion, cough      HPI performed by Optometrist  scribed by Colleen Apple Optometric Assistant     Medical, surgical and family histories reviewed and updated 12/21/2018.         OBJECTIVE: See Ophthalmology exam    ASSESSMENT:    ICD-10-CM    1. Viral conjunctivitis of left eye B30.9 ofloxacin (OCUFLOX) 0.3 % ophthalmic solution 1 drop   2. Dry eyes H04.123       PLAN:    Patient Instructions   Likely viral conjunctivitis, possible bacterial component.   Begin ofloxacin 4x daily in the left eye for 7-10 days.   Begin artificial tears in each eye 2x daily; use at least 20 minutes apart from using the ofloxacin in the left eye.     Return if no improvement in ~1 week. Otherwise return for comprehensive exam at your convenience.       Abner Sr O.D.  [address] [place]  [address]    [phone]           Again, thank you for allowing me to participate in the care of your patient.        Sincerely,        Abner Sr, OD    "

## 2018-12-21 NOTE — PROGRESS NOTES
"Chief Complaint   Patient presents with     Eye Problem Left Eye       Do you wear contact lenses? No        Abner Sr, OD     See Review Of Systems   Eyes: positive for visual blurring, redness  Constitutional: No fevers, chills, or weight changes. Patient experiencing early \"cold\" symptoms- congestion, cough      HPI performed by Optometrist  scribed by Colleen Briones Optometric Assistant     Medical, surgical and family histories reviewed and updated 12/21/2018.         OBJECTIVE: See Ophthalmology exam    ASSESSMENT:    ICD-10-CM    1. Viral conjunctivitis of left eye B30.9 ofloxacin (OCUFLOX) 0.3 % ophthalmic solution 1 drop   2. Dry eyes H04.123       PLAN:    Patient Instructions   Likely viral conjunctivitis, possible bacterial component.   Begin ofloxacin 4x daily in the left eye for 7-10 days.   Begin artificial tears in each eye 2x daily; use at least 20 minutes apart from using the ofloxacin in the left eye.     Return if no improvement in ~1 week. Otherwise return for comprehensive exam at your convenience.       Abner Sr O.D.  [address] [place]  [address]    [phone]       "

## 2018-12-21 NOTE — PATIENT INSTRUCTIONS
Likely viral conjunctivitis, possible bacterial component.   Begin ofloxacin 4x daily in the left eye for 7-10 days.   Begin artificial tears in each eye 2x daily; use at least 20 minutes apart from using the ofloxacin in the left eye.     Return if no improvement in ~1 week. Otherwise return for comprehensive exam at your convenience.       Abner Sr O.D.  [address]  [place]  [address]    [phone]

## 2019-01-02 DIAGNOSIS — E78.5 HYPERLIPIDEMIA LDL GOAL <100: ICD-10-CM

## 2019-01-02 DIAGNOSIS — I10 BENIGN ESSENTIAL HYPERTENSION: ICD-10-CM

## 2019-01-04 ENCOUNTER — OFFICE VISIT (OUTPATIENT)
Dept: PSYCHOLOGY | Facility: CLINIC | Age: 49
End: 2019-01-04
Payer: COMMERCIAL

## 2019-01-04 DIAGNOSIS — F41.9 ANXIETY DISORDER, UNSPECIFIED TYPE: Primary | ICD-10-CM

## 2019-01-04 PROCEDURE — 90834 PSYTX W PT 45 MINUTES: CPT | Performed by: MARRIAGE & FAMILY THERAPIST

## 2019-01-04 RX ORDER — LISINOPRIL 10 MG/1
10 TABLET ORAL DAILY
Qty: 90 TABLET | Refills: 1 | Status: SHIPPED | OUTPATIENT
Start: 2019-01-04 | End: 2019-03-08

## 2019-01-04 RX ORDER — ATORVASTATIN CALCIUM 20 MG/1
TABLET, FILM COATED ORAL
Qty: 90 TABLET | Refills: 2 | Status: SHIPPED | OUTPATIENT
Start: 2019-01-04 | End: 2019-03-08

## 2019-01-04 ASSESSMENT — ANXIETY QUESTIONNAIRES
1. FEELING NERVOUS, ANXIOUS, OR ON EDGE: NOT AT ALL
GAD7 TOTAL SCORE: 0
3. WORRYING TOO MUCH ABOUT DIFFERENT THINGS: NOT AT ALL
5. BEING SO RESTLESS THAT IT IS HARD TO SIT STILL: NOT AT ALL
6. BECOMING EASILY ANNOYED OR IRRITABLE: NOT AT ALL
7. FEELING AFRAID AS IF SOMETHING AWFUL MIGHT HAPPEN: NOT AT ALL
2. NOT BEING ABLE TO STOP OR CONTROL WORRYING: NOT AT ALL

## 2019-01-04 ASSESSMENT — PATIENT HEALTH QUESTIONNAIRE - PHQ9
SUM OF ALL RESPONSES TO PHQ QUESTIONS 1-9: 0
5. POOR APPETITE OR OVEREATING: NOT AT ALL

## 2019-01-04 NOTE — TELEPHONE ENCOUNTER
Prescriptions approved per [facility] Refill Protocol.    Nikkie Garcia RN

## 2019-01-04 NOTE — PROGRESS NOTES
Progress Note    Client Name: Flor Fernandez  Date: 1/4/19         Service Type: Individual      Session Start Time: 2:01  Session End Time: 2:50      Session Length: 38-52     Session #: 21     Attendees: Client attended alone    Treatment Plan Last Reviewed: 1/4/19, next due 4/4/19.  PHQ-9 / RODRIGUE-7 : completed.     DATA      Progress Since Last Session (Related to Symptoms / Goals / Homework):   Symptoms: No change client is stable    Homework: Achieved / completed to satisfaction      Episode of Care Goals: Satisfactory progress - ACTION (Actively working towards change); Intervened by reinforcing change plan / affirming steps taken     Current / Ongoing Stressors and Concerns:   Client reported that her family holidays were stressful due to family members' moods.  Client reported that her mother had bladder infection for 4 days before going to doctor due to financial concerns, about which client reported being anxious/concerned.  Client reported that her son did not join the [employer], but has gotten into a new relationship and was let go from his job, about which client reported being anxious/concerned.  Client reported increased work stress and considering a transfer to a new position, about which she feels uncertain.     Treatment Objective(s) Addressed in This Session:   use at least 2 coping skills for anxiety management in the next 12 weeks   Client identified her desired continued therapy goals.     Intervention:   CBT: re-reviewed with client letting go of things outside of her control (e.g. her family's decision and actions).  DBT: reviewed with client using Pros and Cons List skill to aid in her work decision-making process.  Motivational Interviewing: used circular/Socratic questioning to elicit client's identification of her desired continued therapy goals.        ASSESSMENT: Current Emotional / Mental Status (status of significant symptoms):   Risk status  (Self / Other harm or suicidal ideation)   Client denies current fears or concerns for personal safety.   Client denies current or recent suicidal ideation or behaviors.   Client denies current or recent homicidal ideation or behaviors.   Client denies current or recent self injurious behavior or ideation.   Client denies other safety concerns.   A safety and risk management plan has not been developed at this time, however client was given the after-hours number / 911 should there be a change in any of these risk factors.     Appearance:   Appropriate    Eye Contact:   Good    Psychomotor Behavior: Normal    Attitude:   Cooperative    Orientation:   All   Speech    Rate / Production: Normal     Volume:  Normal    Mood:    Anxious  Normal   Affect:    Appropriate    Thought Content:  Clear    Thought Form:  Coherent  Logical    Insight:    Good      Medication Review:   No current psychiatric medications prescribed     Medication Compliance:   NA     Changes in Health Issues:   None reported     Chemical Use Review:   Substance Use: Chemical use reviewed, no active concerns identified      Tobacco Use: No current tobacco use.       Collateral Reports Completed:   Not Applicable    PLAN: (Client Tasks / Therapist Tasks / Other)  Client agreed to continue to work on letting go of things outside of her control (e.g. her family members' re/actions/outcomes) and to complete Pros and Cons List regarding her work decision options between now and follow-up session in five weeks.        Vikas Perez, LMFT                                                         ________________________________________________________________________    Treatment Plan    Client's Name: Flor Fernandez  YOB: 1970    Date: 1/4/19    DSM-V Diagnoses: 300.00 (F41.9) Unspecified Anxiety Disorder  Psychosocial / Contextual Factors: problems with primary support group: conflicts with client's children  WHODAS: 15    Referral  / Collaboration:  Referral to another professional/service is not indicated at this time..    Anticipated number of session or this episode of care: 11-20      MeasurableTreatment Goal(s) related to diagnosis / functional impairment(s)  Goal 1: Client will successfully process through past trauma defined as reporting 0 Subjective Units of Distress related to trauma on 0-10 scale and 7 on Validity of (positive) Cognition scale about self on 1-7 scale   I will know I've met my goal when I feel less stuck.       Objective #A (Client Action)    Status: Continued - Date: 1/4/19      Client will identify past traumatic events/memories which are causing current distress.     Intervention(s)  Therapist will take client's history and facilitate client's identification of targets for EMDR.     Objective #B  Client will complete needed assessment(s) and [facility] resourcing to confirm readiness for EMDR.    Status: Continued - Date: 1/4/19      Intervention(s)  Therapist will administer Dissociative Experiences Scale, Multidimensional Inventory of Dissociation as needed and complete [facility] resourcing with client.     Objective #C  Client will engage in installing at least 2 EMDR resources.  Status: Continued - Date: 1/4/19      Intervention(s)  Therapist will complete EMDR resourcing (Container, Remote Control, grounding/progressive muscle relaxation, Light Stream, Inner Advisor) with client.     Objective #D (Client Action)    Status: Continued- Date: 1/4/19      Client will engage in reprocessing all past traumatic event/memory targets.     Intervention(s)   Therapist will complete EMDR reprocessing with client.    Goal 2: Client will increase overall baseline calm mindset by 2 points on a 1-10 Likert scale per self-report (10 = optimal calm mindset).    I will know I've met my goal when I feel less anxious.      Objective #A (Client Action)    Status: Continued - Date: 1/4/19     Client will use cognitive  strategies identified in therapy to challenge anxious thoughts.    Intervention(s)  Therapist will teach emotional regulation skills. CBT/REBT ABCD model.    Objective #B  Client will use at least 2 new coping skills for anxiety management in the next 12 weeks.    Status: Continued - Date: 1/4/19     Intervention(s)  Therapist will teach emotional regulation skills. DBT Core Mindfulness, Distress Tolerance, Emotion Regulation, and Interpersonal Effectiveness skills.    Goal 3: Client will improve her interpersonal functioning/relationships by 2 points on a 1-10 Likert scale per self-report (10 = optimal interpersonal functioning/relationships).    I will know I've met my goal when my relationships with my children have improved.      Objective #A (Client Action)    Status: Continued - Date(s): 1/4/19     Client will learn & utilize at least 2 new assertive communication skills weekly.    Intervention(s)  Therapist will teach Nonviolent Communication and DBT Interpersonal Effectiveness skills..    Client has reviewed and agreed to the above plan.      JENNIFER Horowitz  January 4, 2019

## 2019-01-05 ASSESSMENT — ANXIETY QUESTIONNAIRES: GAD7 TOTAL SCORE: 0

## 2019-01-08 PROBLEM — M54.6 PAIN IN THORACIC SPINE: Status: RESOLVED | Noted: 2018-08-29 | Resolved: 2019-01-08

## 2019-01-08 NOTE — PROGRESS NOTES
Patient did not return to physical therapy following the initial evaluation. Patient will be discharged from PT at this time.

## 2019-01-15 DIAGNOSIS — R00.0 TACHYCARDIA: Primary | ICD-10-CM

## 2019-01-15 RX ORDER — CARVEDILOL 6.25 MG/1
TABLET ORAL
Qty: 180 TABLET | Refills: 0 | Status: SHIPPED | OUTPATIENT
Start: 2019-01-15 | End: 2019-03-08

## 2019-02-08 ENCOUNTER — OFFICE VISIT (OUTPATIENT)
Dept: PSYCHOLOGY | Facility: CLINIC | Age: 49
End: 2019-02-08
Payer: COMMERCIAL

## 2019-02-08 DIAGNOSIS — F41.9 ANXIETY DISORDER, UNSPECIFIED TYPE: Primary | ICD-10-CM

## 2019-02-08 PROCEDURE — 90834 PSYTX W PT 45 MINUTES: CPT | Performed by: MARRIAGE & FAMILY THERAPIST

## 2019-02-08 ASSESSMENT — ANXIETY QUESTIONNAIRES
7. FEELING AFRAID AS IF SOMETHING AWFUL MIGHT HAPPEN: NOT AT ALL
6. BECOMING EASILY ANNOYED OR IRRITABLE: NOT AT ALL
5. BEING SO RESTLESS THAT IT IS HARD TO SIT STILL: NOT AT ALL
2. NOT BEING ABLE TO STOP OR CONTROL WORRYING: NOT AT ALL
1. FEELING NERVOUS, ANXIOUS, OR ON EDGE: NOT AT ALL
GAD7 TOTAL SCORE: 0
3. WORRYING TOO MUCH ABOUT DIFFERENT THINGS: NOT AT ALL

## 2019-02-08 ASSESSMENT — PATIENT HEALTH QUESTIONNAIRE - PHQ9
5. POOR APPETITE OR OVEREATING: NOT AT ALL
SUM OF ALL RESPONSES TO PHQ QUESTIONS 1-9: 0

## 2019-02-08 NOTE — PROGRESS NOTES
"                                             Progress Note    Client Name: Flor Fernandez  Date: 2/8/19         Service Type: Individual      Session Start Time: 1:00  Session End Time: 1:50      Session Length: 38-52     Session #: 22     Attendees: Client attended alone    Treatment Plan Last Reviewed: 1/4/19, next due 4/4/19.  PHQ-9 / RODRIGUE-7 : completed.     DATA      Progress Since Last Session (Related to Symptoms / Goals / Homework):   Symptoms: No change client is stable    Homework: Achieved / completed to satisfaction      Episode of Care Goals: Satisfactory progress - ACTION (Actively working towards change); Intervened by reinforcing change plan / affirming steps taken     Current / Ongoing Stressors and Concerns:   Client reported that her son has moved back in with her, and that he did not pay his bills at his sister's since October 2018.  Client reported that her son's father is facing penitentiary time and is selling his house.  Client reported being worried that her son will not have relationship with his father.     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   Client denies current or recent suicidal ideation or behaviors.   Client denies current or recent homicidal ideation or behaviors.   Client denies current or recent self injurious behavior or ideation.   Client denies other safety concerns.   A safety and risk management plan has not been developed at this time, however client was given the after-hours number / 911 should there be a change in any of these risk factors.     Appearance:   Appropriate    Eye " "Contact:   Good    Psychomotor Behavior: Normal    Attitude:   Cooperative    Orientation:   All   Speech    Rate / Production: Normal     Volume:  Normal    Mood:    Anxious  Normal   Affect:    Appropriate    Thought Content:  Clear    Thought Form:  Coherent  Logical    Insight:    Good      Medication Review:   No current psychiatric medications prescribed     Medication Compliance:   NA     Changes in Health Issues:   None reported     Chemical Use Review:   Substance Use: Chemical use reviewed, no active concerns identified      Tobacco Use: No current tobacco use.       Collateral Reports Completed:   Not Applicable    PLAN: (Client Tasks / Therapist Tasks / Other)  Client agreed to work on setting boundaries/expectations with her son as his \"landlord\" between now and follow-up session in three weeks.        Vikas Byron Perez, LMFT                                                         ________________________________________________________________________    Treatment Plan    Client's Name: Flor Fernandez  YOB: 1970    Date: 1/4/19    DSM-V Diagnoses: 300.00 (F41.9) Unspecified Anxiety Disorder  Psychosocial / Contextual Factors: problems with primary support group: conflicts with client's children  WHODAS: 15    Referral / Collaboration:  Referral to another professional/service is not indicated at this time..    Anticipated number of session or this episode of care: 11-20      MeasurableTreatment Goal(s) related to diagnosis / functional impairment(s)  Goal 1: Client will successfully process through past trauma defined as reporting 0 Subjective Units of Distress related to trauma on 0-10 scale and 7 on Validity of (positive) Cognition scale about self on 1-7 scale   I will know I've met my goal when I feel less stuck.       Objective #A (Client Action)    Status: Continued - Date: 1/4/19      Client will identify past traumatic events/memories which are causing current " distress.     Intervention(s)  Therapist will take client's history and facilitate client's identification of targets for EMDR.     Objective #B  Client will complete needed assessment(s) and [facility] resourcing to confirm readiness for EMDR.    Status: Continued - Date: 1/4/19      Intervention(s)  Therapist will administer Dissociative Experiences Scale, Multidimensional Inventory of Dissociation as needed and complete [facility] resourcing with client.     Objective #C  Client will engage in installing at least 2 EMDR resources.  Status: Continued - Date: 1/4/19      Intervention(s)  Therapist will complete EMDR resourcing (Container, Remote Control, grounding/progressive muscle relaxation, Light Stream, Inner Advisor) with client.     Objective #D (Client Action)    Status: Continued- Date: 1/4/19      Client will engage in reprocessing all past traumatic event/memory targets.     Intervention(s)   Therapist will complete EMDR reprocessing with client.    Goal 2: Client will increase overall baseline calm mindset by 2 points on a 1-10 Likert scale per self-report (10 = optimal calm mindset).    I will know I've met my goal when I feel less anxious.      Objective #A (Client Action)    Status: Continued - Date: 1/4/19     Client will use cognitive strategies identified in therapy to challenge anxious thoughts.    Intervention(s)  Therapist will teach emotional regulation skills. CBT/REBT ABCD model.    Objective #B  Client will use at least 2 new coping skills for anxiety management in the next 12 weeks.    Status: Continued - Date: 1/4/19     Intervention(s)  Therapist will teach emotional regulation skills. DBT Core Mindfulness, Distress Tolerance, Emotion Regulation, and Interpersonal Effectiveness skills.    Goal 3: Client will improve her interpersonal functioning/relationships by 2 points on a 1-10 Likert scale per self-report (10 = optimal interpersonal functioning/relationships).    I will  know I've met my goal when my relationships with my children have improved.      Objective #A (Client Action)    Status: Continued - Date(s): 1/4/19     Client will learn & utilize at least 2 new assertive communication skills weekly.    Intervention(s)  Therapist will teach Nonviolent Communication and DBT Interpersonal Effectiveness skills..    Client has reviewed and agreed to the above plan.      JENNIFER Horowitz  February 8, 2019

## 2019-02-09 ASSESSMENT — ANXIETY QUESTIONNAIRES: GAD7 TOTAL SCORE: 0

## 2019-02-11 ENCOUNTER — OFFICE VISIT (OUTPATIENT)
Dept: FAMILY MEDICINE | Facility: CLINIC | Age: 49
End: 2019-02-11
Payer: COMMERCIAL

## 2019-02-11 VITALS
DIASTOLIC BLOOD PRESSURE: 76 MMHG | OXYGEN SATURATION: 98 % | HEIGHT: 66 IN | BODY MASS INDEX: 45 KG/M2 | HEART RATE: 109 BPM | SYSTOLIC BLOOD PRESSURE: 132 MMHG | WEIGHT: 280 LBS | TEMPERATURE: 97.3 F

## 2019-02-11 DIAGNOSIS — J02.0 STREP THROAT: Primary | ICD-10-CM

## 2019-02-11 LAB
DEPRECATED S PYO AG THROAT QL EIA: ABNORMAL
SPECIMEN SOURCE: ABNORMAL

## 2019-02-11 PROCEDURE — 99213 OFFICE O/P EST LOW 20 MIN: CPT | Performed by: NURSE PRACTITIONER

## 2019-02-11 PROCEDURE — 87880 STREP A ASSAY W/OPTIC: CPT | Performed by: NURSE PRACTITIONER

## 2019-02-11 RX ORDER — PENICILLIN V POTASSIUM 500 MG/1
500 TABLET, FILM COATED ORAL 2 TIMES DAILY
Qty: 20 TABLET | Refills: 0 | Status: SHIPPED | OUTPATIENT
Start: 2019-02-11 | End: 2019-04-08

## 2019-02-11 ASSESSMENT — PAIN SCALES - GENERAL: PAINLEVEL: NO PAIN (0)

## 2019-02-11 ASSESSMENT — MIFFLIN-ST. JEOR: SCORE: 1913.88

## 2019-02-11 NOTE — LETTER
February 11, 2019      Flor R Jim  [address]  [place] [address]        To Whom It May Concern:    Flor Fernandez  was seen on 02/11/19.  Please excuse her on Tuesday due to illness.        Sincerely,        JAGJIT Ortiz CNP

## 2019-02-11 NOTE — PROGRESS NOTES
SUBJECTIVE:   Flor Fernandez is a 49 year old female who presents to clinic today for the following health issues:      RESPIRATORY SYMPTOMS      Duration: started last Tuesday    Description  fever, chills, cough, sore throat, headaches, hard to breath    Severity: moderate    Accompanying signs and symptoms: None    History (predisposing factors):  Former smoker    Precipitating or alleviating factors: None    Therapies tried and outcome:  Tylenol which has a little    10 days ago had diarrhea for 24 hours, better the next day but then 2 days later woke up with sore throat and worsened over the next few days. Had body aches, cold sweats for 3 days. Coughing at night- dry, barky cough. Nasal congestion. Has Albuterol neb prescription for wheezing with URIs in the past- has not used yet although feels more SOB today.    Problem list and histories reviewed & adjusted, as indicated.  Additional history: as documented    Patient Active Problem List   Diagnosis     Vertigo     Hyperlipidemia LDL goal <100     Tachycardia     Benign essential hypertension     History of stroke     Iron deficiency     Morbid obesity (H)     Anxiety disorder, unspecified type     Chronic low back pain without sciatica, unspecified back pain laterality     Past Surgical History:   Procedure Laterality Date     APPENDECTOMY       [facility]  12/29/2016     MAMMOPLASTY REDUCTION BILATERAL         Social History     Tobacco Use     Smoking status: Former Smoker     Types: Cigarettes     Smokeless tobacco: Never Used   Substance Use Topics     Alcohol use: No     Family History   Problem Relation Age of Onset     Other Cancer Mother      Asthma Mother      Diabetes Father      Glaucoma No family hx of      Macular Degeneration No family hx of            Reviewed and updated as needed this visit by clinical staff       Reviewed and updated as needed this visit by Provider         ROS:  Constitutional, HEENT, cardiovascular,  "pulmonary, GI, , musculoskeletal, neuro, skin, endocrine and psych systems are negative, except as otherwise noted.    OBJECTIVE:     /76 (BP Location: Left arm, Patient Position: Chair, Cuff Size: Adult Large)   Pulse 109   Temp 97.3  F (36.3  C) (Oral)   Ht 1.664 m (5' 5.5\")   Wt 127 kg (280 lb)   SpO2 98%   BMI 45.89 kg/m    Body mass index is 45.89 kg/m .  GENERAL: healthy, alert and no distress  EYES: Eyes grossly normal to inspection, PERRL and conjunctivae and sclerae normal  HENT: normal cephalic/atraumatic, ear canals and TM's normal, nose and mouth without ulcers or lesions, oropharynx clear, oral mucous membranes moist and mild erythema posterior pharynx  NECK: no adenopathy, no asymmetry, masses, or scars and thyroid normal to palpation  RESP: lungs clear to auscultation - no rales, rhonchi or wheezes  CV: regular rate and rhythm, normal S1 S2, no S3 or S4, no murmur, click or rub, no peripheral edema and peripheral pulses strong    Diagnostic Test Results:  Results for orders placed or performed in visit on 02/11/19   Rapid strep screen   Result Value Ref Range    Specimen Description Throat     Rapid Strep A Screen (A)      POSITIVE: Group A Streptococcal antigen detected by immunoassay.       ASSESSMENT/PLAN:       1. Strep throat  Start antibiotics, may continue tylenol/ibuprofen as needed for pain, tea with honey and pushing fluids recommended. Remain home until on antibiotics for 24 hours.  - Rapid strep screen  - penicillin V (VEETID) 500 MG tablet; Take 1 tablet (500 mg) by mouth 2 times daily for 10 days  Dispense: 20 tablet; Refill: 0    See Patient Instructions    JAGJIT Ortiz [facility]    "

## 2019-02-13 ENCOUNTER — TELEPHONE (OUTPATIENT)
Dept: FAMILY MEDICINE | Facility: CLINIC | Age: 49
End: 2019-02-13

## 2019-02-13 DIAGNOSIS — J30.2 CHRONIC SEASONAL ALLERGIC RHINITIS: ICD-10-CM

## 2019-02-13 RX ORDER — ALBUTEROL SULFATE 0.83 MG/ML
2.5 SOLUTION RESPIRATORY (INHALATION) EVERY 6 HOURS PRN
Qty: 25 VIAL | Refills: 1 | Status: SHIPPED | OUTPATIENT
Start: 2019-02-13 | End: 2019-03-08

## 2019-02-13 NOTE — TELEPHONE ENCOUNTER
Reason for Call:  Medication or medication refill:    Do you use a [facility]?  Name of the pharmacy and phone number for the current request:  [facility] [phone]    Name of the medication requested: Viles for nebulizer    Other request: Am I to go thru you to refill my viles for nebulizer or Dr Hernandez? I am running low only a couple left    Can we leave a detailed message on this number? NO    Phone number patient can be reached at: Work number on file:  [phone] (work)    Best Time: any time been 7:30am to 4:00 PM    Call taken on 2/13/2019 at 1:42 PM by Olga Cristobal

## 2019-02-25 ENCOUNTER — OFFICE VISIT (OUTPATIENT)
Dept: FAMILY MEDICINE | Facility: CLINIC | Age: 49
End: 2019-02-25
Payer: COMMERCIAL

## 2019-02-25 VITALS
HEART RATE: 99 BPM | HEIGHT: 66 IN | BODY MASS INDEX: 47.09 KG/M2 | TEMPERATURE: 96.8 F | OXYGEN SATURATION: 97 % | RESPIRATION RATE: 16 BRPM | WEIGHT: 293 LBS | SYSTOLIC BLOOD PRESSURE: 130 MMHG | DIASTOLIC BLOOD PRESSURE: 76 MMHG

## 2019-02-25 DIAGNOSIS — J03.90 TONSILLITIS: Primary | ICD-10-CM

## 2019-02-25 LAB
DEPRECATED S PYO AG THROAT QL EIA: NORMAL
SPECIMEN SOURCE: NORMAL

## 2019-02-25 PROCEDURE — 87880 STREP A ASSAY W/OPTIC: CPT | Performed by: PHYSICIAN ASSISTANT

## 2019-02-25 PROCEDURE — 99213 OFFICE O/P EST LOW 20 MIN: CPT | Performed by: PHYSICIAN ASSISTANT

## 2019-02-25 PROCEDURE — 87081 CULTURE SCREEN ONLY: CPT | Performed by: PHYSICIAN ASSISTANT

## 2019-02-25 ASSESSMENT — PAIN SCALES - GENERAL: PAINLEVEL: MODERATE PAIN (4)

## 2019-02-25 ASSESSMENT — MIFFLIN-ST. JEOR: SCORE: 1991.9

## 2019-02-25 NOTE — PROGRESS NOTES
"  SUBJECTIVE:   Flor Fernandez is a 49 year old female who presents to clinic today for the following health issues:      Chief Complaint   Patient presents with     Follow up     on strep,  felt better for few days after taking all meds but then started having low grade fever yesterday, sore throat, and headaches.          Problem list and histories reviewed & adjusted, as indicated.  Additional history: Patient completed the full course of PCN.      Reviewed and updated as needed this visit by clinical staff  Allergies  Meds       ROS:  Constitutional, HEENT, cardiovascular, pulmonary, gi and gu systems are negative, except as otherwise noted.    OBJECTIVE:     /76   Pulse 99   Temp 96.8  F (36  C) (Oral)   Resp 16   Ht 1.664 m (5' 5.5\")   Wt 134.8 kg (297 lb 3.2 oz)   SpO2 97%   BMI 48.70 kg/m    Body mass index is 48.7 kg/m .  GEN: Well developed, well nourished in NAD.  HEENT: Normocephalic. Eyes: sl conjunctival flushing noted. EARS: TMs WNL, Canals clear.  Nose: Edematous mucosa without lesion. Clear rhinorrhea. Mouth/Pharynx: Erythema with + 2 tonsils bilat and pharyngeal cobblestoning and telangiectasia.   NECK: Supple with + anterior cervical lymphadenopathy.  No Thyromegaly  RESP: CTA with good air entry all fields.  SKIN: No Exanthem noted.      Diagnostic Test Results:  Results for orders placed or performed in visit on 02/25/19 (from the past 24 hour(s))   Strep, Rapid Screen   Result Value Ref Range    Specimen Description Throat     Rapid Strep A Screen       NEGATIVE: No Group A streptococcal antigen detected by immunoassay, await culture report.       ASSESSMENT/PLAN:   1. Tonsillitis  - Strep, Rapid Screen  - Beta strep group A culture    Follow up on TC  Continue supportive OTC measures.  Follow up if symptoms should persist, change or worsen.  Patient amenable to this follow up plan.     Seth Vazquez PA-C  [facility]    "

## 2019-02-26 LAB
BACTERIA SPEC CULT: NORMAL
SPECIMEN SOURCE: NORMAL

## 2019-03-01 ENCOUNTER — OFFICE VISIT (OUTPATIENT)
Dept: PSYCHOLOGY | Facility: CLINIC | Age: 49
End: 2019-03-01
Payer: COMMERCIAL

## 2019-03-01 ENCOUNTER — TELEPHONE (OUTPATIENT)
Dept: FAMILY MEDICINE | Facility: CLINIC | Age: 49
End: 2019-03-01

## 2019-03-01 DIAGNOSIS — F41.9 ANXIETY DISORDER, UNSPECIFIED TYPE: Primary | ICD-10-CM

## 2019-03-01 PROCEDURE — 90834 PSYTX W PT 45 MINUTES: CPT | Performed by: MARRIAGE & FAMILY THERAPIST

## 2019-03-01 ASSESSMENT — ANXIETY QUESTIONNAIRES
3. WORRYING TOO MUCH ABOUT DIFFERENT THINGS: NOT AT ALL
2. NOT BEING ABLE TO STOP OR CONTROL WORRYING: NOT AT ALL
5. BEING SO RESTLESS THAT IT IS HARD TO SIT STILL: NOT AT ALL
1. FEELING NERVOUS, ANXIOUS, OR ON EDGE: NOT AT ALL
GAD7 TOTAL SCORE: 0
6. BECOMING EASILY ANNOYED OR IRRITABLE: NOT AT ALL
7. FEELING AFRAID AS IF SOMETHING AWFUL MIGHT HAPPEN: NOT AT ALL

## 2019-03-01 ASSESSMENT — PATIENT HEALTH QUESTIONNAIRE - PHQ9: 5. POOR APPETITE OR OVEREATING: NOT AT ALL

## 2019-03-01 NOTE — TELEPHONE ENCOUNTER
Reason for Call:  Other prescription    Detailed comments: Patient states she is still not feeling well and having a hard time with coughing. Coughing is interfering with her work and sleeping, Patient states the regular OTC cough medicine/drops is not helping and is requesting something to take. Please call patient to discuss. Thank you.     Phone Number Patient can be reached at: Work number on file:  [phone] (work)    Best Time: 03/01/2019    Can we leave a detailed message on this number? Not Applicable    Call taken on 3/1/2019 at 7:45 AM by SYD ELAINE

## 2019-03-01 NOTE — TELEPHONE ENCOUNTER
Encounter was started under Seth Vazquez PA-C's name  Seth Vazquez PA-C saw patient on 2/25/19 but for tonsillitis   Symptoms below were not noted during this visit    Huddled with Dr. Barnard and she advise that we check with Seth Vazquez PA-C to see if she has any recommendations but likely need seen    Patient notified but she stated that she is unable to leave work right now to be seen and would like some further recommendations from Dr. Barnard.   Is there anything else OTC she can try?    Michael Schwartz RN

## 2019-03-01 NOTE — TELEPHONE ENCOUNTER
Per Dr. Barnard: nothing over the counter will cure a common cold. She can try vapor rubs. Can go to urgent care after work    Updated patient that meds will not cure the common cold. She can try robitussin, mucinex, coricidin, vapor rub, etc OTC. Go to UC if needed. If symptoms persist/worsen, need seen for further assessment    Michael Schwartz RN

## 2019-03-01 NOTE — TELEPHONE ENCOUNTER
Spoke with pt. States she has had a cough for over a week. Is getting worse.  Get's coughing fits. Is not sleeping at night. Cough is sometimes loose, but sometimes is not. Not coughing anything up. Has a runny nose. When she blows her nose, mucous is green in color. Has sinus pressure, no pain. Has had headaches, but not sure if this is from coughing. Has tried warm fluids and honey with no relief. Has also tried a humidifier with no relief. Has tried otc obitussin and cough drops with no relief. No difficulty breathing or wheezing. No fever or chills. Pt is requesting a stronger cough medicine. Please advise.    Nikky Galan RN  [facility]

## 2019-03-03 ASSESSMENT — PATIENT HEALTH QUESTIONNAIRE - PHQ9: SUM OF ALL RESPONSES TO PHQ QUESTIONS 1-9: 0

## 2019-03-03 NOTE — PROGRESS NOTES
Progress Note    Client Name: Flor Fernandez  Date: 3/1/19         Service Type: Individual      Session Start Time: 1:00  Session End Time: 1:52      Session Length: 38-52     Session #: 23     Attendees: Client attended alone    Treatment Plan Last Reviewed: 19, next due 19.  PHQ-9 / RODRIGUE-7 : completed.     DATA      Progress Since Last Session (Related to Symptoms / Goals / Homework):   Symptoms: No change client is stable    Homework: Achieved / completed to satisfaction      Episode of Care Goals: Satisfactory progress - ACTION (Actively working towards change); Intervened by reinforcing change plan / affirming steps taken     Current / Ongoing Stressors and Concerns:   Client reported that her daughter and son-in-law confronted her son regarding his non-payment of his bills while he lived with her.  Client reported that her son's paternal grandfather .  Client reported her son's father is still planning on leaving.     Treatment Objective(s) Addressed in This Session:   use cognitive strategies identified in therapy to challenge anxious thoughts      Intervention:   CBT: reviewed with client letting go of things outside of her control (i.e. others' actions).        ASSESSMENT: Current Emotional / Mental Status (status of significant symptoms):   Risk status (Self / Other harm or suicidal ideation)   Client denies current fears or concerns for personal safety.   Client denies current or recent suicidal ideation or behaviors.   Client denies current or recent homicidal ideation or behaviors.   Client denies current or recent self injurious behavior or ideation.   Client denies other safety concerns.   A safety and risk management plan has not been developed at this time, however client was given the after-hours number / 911 should there be a change in any of these risk factors.     Appearance:   Appropriate    Eye Contact:   Good    Psychomotor  Behavior: Normal    Attitude:   Cooperative    Orientation:   All   Speech    Rate / Production: Normal     Volume:  Normal    Mood:    Anxious  Normal   Affect:    Appropriate    Thought Content:  Clear    Thought Form:  Coherent  Logical    Insight:    Good      Medication Review:   No current psychiatric medications prescribed     Medication Compliance:   NA     Changes in Health Issues:   None reported     Chemical Use Review:   Substance Use: Chemical use reviewed, no active concerns identified      Tobacco Use: No current tobacco use.       Collateral Reports Completed:   Not Applicable    PLAN: (Client Tasks / Therapist Tasks / Other)  Client agreed to continue to work on letting go of things outside of her control (I.e. others' actions) between now and follow-up session in four weeks.        Vikas Byron Perez, LMFT                                                         ________________________________________________________________________    Treatment Plan    Client's Name: Flor Fernandez  YOB: 1970    Date: 1/4/19    DSM-V Diagnoses: 300.00 (F41.9) Unspecified Anxiety Disorder  Psychosocial / Contextual Factors: problems with primary support group: conflicts with client's children  WHODAS: 15    Referral / Collaboration:  Referral to another professional/service is not indicated at this time..    Anticipated number of session or this episode of care: 11-20      MeasurableTreatment Goal(s) related to diagnosis / functional impairment(s)  Goal 1: Client will successfully process through past trauma defined as reporting 0 Subjective Units of Distress related to trauma on 0-10 scale and 7 on Validity of (positive) Cognition scale about self on 1-7 scale   I will know I've met my goal when I feel less stuck.       Objective #A (Client Action)    Status: Continued - Date: 1/4/19      Client will identify past traumatic events/memories which are causing current  distress.     Intervention(s)  Therapist will take client's history and facilitate client's identification of targets for EMDR.     Objective #B  Client will complete needed assessment(s) and [facility] resourcing to confirm readiness for EMDR.    Status: Continued - Date: 1/4/19      Intervention(s)  Therapist will administer Dissociative Experiences Scale, Multidimensional Inventory of Dissociation as needed and complete [facility] resourcing with client.     Objective #C  Client will engage in installing at least 2 EMDR resources.  Status: Continued - Date: 1/4/19      Intervention(s)  Therapist will complete EMDR resourcing (Container, Remote Control, grounding/progressive muscle relaxation, Light Stream, Inner Advisor) with client.     Objective #D (Client Action)    Status: Continued- Date: 1/4/19      Client will engage in reprocessing all past traumatic event/memory targets.     Intervention(s)   Therapist will complete EMDR reprocessing with client.    Goal 2: Client will increase overall baseline calm mindset by 2 points on a 1-10 Likert scale per self-report (10 = optimal calm mindset).    I will know I've met my goal when I feel less anxious.      Objective #A (Client Action)    Status: Continued - Date: 1/4/19     Client will use cognitive strategies identified in therapy to challenge anxious thoughts.    Intervention(s)  Therapist will teach emotional regulation skills. CBT/REBT ABCD model.    Objective #B  Client will use at least 2 new coping skills for anxiety management in the next 12 weeks.    Status: Continued - Date: 1/4/19     Intervention(s)  Therapist will teach emotional regulation skills. DBT Core Mindfulness, Distress Tolerance, Emotion Regulation, and Interpersonal Effectiveness skills.    Goal 3: Client will improve her interpersonal functioning/relationships by 2 points on a 1-10 Likert scale per self-report (10 = optimal interpersonal functioning/relationships).    I will  know I've met my goal when my relationships with my children have improved.      Objective #A (Client Action)    Status: Continued - Date(s): 1/4/19     Client will learn & utilize at least 2 new assertive communication skills weekly.    Intervention(s)  Therapist will teach Nonviolent Communication and DBT Interpersonal Effectiveness skills..    Client has reviewed and agreed to the above plan.      JENNIFER Horowitz  March 1, 2019

## 2019-03-04 ASSESSMENT — ANXIETY QUESTIONNAIRES: GAD7 TOTAL SCORE: 0

## 2019-03-08 ENCOUNTER — OFFICE VISIT (OUTPATIENT)
Dept: INTERNAL MEDICINE | Facility: CLINIC | Age: 49
End: 2019-03-08
Payer: COMMERCIAL

## 2019-03-08 VITALS
HEART RATE: 103 BPM | RESPIRATION RATE: 18 BRPM | BODY MASS INDEX: 46.71 KG/M2 | SYSTOLIC BLOOD PRESSURE: 132 MMHG | OXYGEN SATURATION: 97 % | TEMPERATURE: 96.8 F | DIASTOLIC BLOOD PRESSURE: 78 MMHG | WEIGHT: 285 LBS

## 2019-03-08 DIAGNOSIS — E78.5 HYPERLIPIDEMIA LDL GOAL <100: ICD-10-CM

## 2019-03-08 DIAGNOSIS — I10 BENIGN ESSENTIAL HYPERTENSION: ICD-10-CM

## 2019-03-08 DIAGNOSIS — R00.0 TACHYCARDIA: ICD-10-CM

## 2019-03-08 DIAGNOSIS — R73.03 PREDIABETES: ICD-10-CM

## 2019-03-08 DIAGNOSIS — B34.9 ACUTE BRONCHOSPASM DUE TO VIRAL INFECTION: Primary | ICD-10-CM

## 2019-03-08 DIAGNOSIS — J30.2 CHRONIC SEASONAL ALLERGIC RHINITIS: ICD-10-CM

## 2019-03-08 DIAGNOSIS — J98.01 ACUTE BRONCHOSPASM DUE TO VIRAL INFECTION: Primary | ICD-10-CM

## 2019-03-08 PROCEDURE — 99214 OFFICE O/P EST MOD 30 MIN: CPT | Performed by: INTERNAL MEDICINE

## 2019-03-08 RX ORDER — LISINOPRIL 10 MG/1
10 TABLET ORAL DAILY
Qty: 90 TABLET | Refills: 3 | Status: SHIPPED | OUTPATIENT
Start: 2019-03-08 | End: 2020-02-17

## 2019-03-08 RX ORDER — ALBUTEROL SULFATE 0.83 MG/ML
2.5 SOLUTION RESPIRATORY (INHALATION) EVERY 6 HOURS PRN
Qty: 25 VIAL | Refills: 1 | Status: SHIPPED | OUTPATIENT
Start: 2019-03-08 | End: 2020-01-18

## 2019-03-08 RX ORDER — CHLORTHALIDONE 25 MG/1
25 TABLET ORAL DAILY
Qty: 90 TABLET | Refills: 3 | Status: SHIPPED | OUTPATIENT
Start: 2019-03-08 | End: 2020-02-17

## 2019-03-08 RX ORDER — ATORVASTATIN CALCIUM 20 MG/1
TABLET, FILM COATED ORAL
Qty: 90 TABLET | Refills: 2 | Status: SHIPPED | OUTPATIENT
Start: 2019-03-08 | End: 2019-12-23

## 2019-03-08 RX ORDER — PREDNISONE 10 MG/1
10 TABLET ORAL DAILY
Qty: 5 TABLET | Refills: 0 | Status: SHIPPED | OUTPATIENT
Start: 2019-03-08 | End: 2019-04-08

## 2019-03-08 RX ORDER — CODEINE PHOSPHATE AND GUAIFENESIN 10; 100 MG/5ML; MG/5ML
1-2 SOLUTION ORAL
Qty: 236 ML | Refills: 0 | Status: SHIPPED | OUTPATIENT
Start: 2019-03-08 | End: 2019-04-08

## 2019-03-08 RX ORDER — CARVEDILOL 6.25 MG/1
6.25 TABLET ORAL 2 TIMES DAILY WITH MEALS
Qty: 180 TABLET | Refills: 11 | Status: SHIPPED | OUTPATIENT
Start: 2019-03-08 | End: 2020-02-17

## 2019-03-08 NOTE — PROGRESS NOTES
SUBJECTIVE:   Flor Fernandez is a 49 year old female who presents to clinic today for the following health issues:      Chief Complaint   Patient presents with     RECHECK     labs and discuss health     She still has a residual cough and has been blowing her nose.  Her face is full under her eyes as well.  Doing nebs at night so that she can sleep so that the cough doesn't wake her up.  She will feel short of breath sometimes like at night.  No wheezing. She has tried mucinex.  This didn't help.  She feels exhusted.  She was treated for strep on 2/11/19 and she has never fully resolved since then.     She is exercising and has a renewed effort planned. She found out bariatric surgery is now covered under her insurance.  She has failed topamax and has been seeing me for weight management since 12/2017.      Hyperlipidemia Follow-Up      Rate your low fat/cholesterol diet?: poor    Taking statin?  Yes, no muscle aches from statin    Other lipid medications/supplements?:  none    Hypertension Follow-up      Outpatient blood pressures are not being checked.    Low Salt Diet: no added salt      Amount of exercise or physical activity: 2 days/week for an average of 30 minutes    Problems taking medications regularly: No    Medication side effects: none    Diet: no added salt.            Problem list and histories reviewed & adjusted, as indicated.  Additional history: as documented    Patient Active Problem List   Diagnosis     Vertigo     Hyperlipidemia LDL goal <100     Tachycardia     Benign essential hypertension     History of stroke     Iron deficiency     Morbid obesity (H)     Anxiety disorder, unspecified type     Chronic low back pain without sciatica, unspecified back pain laterality     BMI 45.0-49.9, adult (H)     Past Surgical History:   Procedure Laterality Date     APPENDECTOMY       [facility] SURGICAL PATHOLOGY  12/29/2016     MAMMOPLASTY REDUCTION BILATERAL         Social History     Tobacco Use      Smoking status: Former Smoker     Types: Cigarettes     Smokeless tobacco: Never Used   Substance Use Topics     Alcohol use: No     Family History   Problem Relation Age of Onset     Other Cancer Mother      Asthma Mother      Diabetes Father      Glaucoma No family hx of      Macular Degeneration No family hx of          Current Outpatient Medications   Medication Sig Dispense Refill     albuterol (PROVENTIL) (2.5 MG/3ML) 0.083% neb solution Take 1 vial (2.5 mg) by nebulization every 6 hours as needed for shortness of breath / dyspnea or wheezing 25 vial 1     atorvastatin (LIPITOR) 20 MG tablet TAKE ONE TABLET BY MOUTH EVERY DAY 90 tablet 2     carvedilol (COREG) 6.25 MG tablet Take 1 tablet (6.25 mg) by mouth 2 times daily (with meals) 180 tablet 11     chlorthalidone (HYGROTON) 25 MG tablet Take 1 tablet (25 mg) by mouth daily 90 tablet 3     [facility] ASPIRIN 325 MG tablet TAKE 1 TABLET BY MOUTH ONCE DAILY WITH A MEAL.  0     cyanocobalamin (VITAMIN  B-12) 1000 MCG tablet Take 1,000 mcg by mouth daily       ferrous fumarate 65 mg, [place]. FE,-Vitamin C 125 mg (VITRON C)  MG TABS tablet Take 1 tablet by mouth every other day       guaiFENesin-codeine (ROBITUSSIN AC) 100-10 MG/5ML solution Take 5-10 mLs by mouth nightly as needed for cough 236 mL 0     lisinopril (PRINIVIL/ZESTRIL) 10 MG tablet Take 1 tablet (10 mg) by mouth daily 90 tablet 3     Multiple Vitamins-Minerals (MULTI-VITAMIN GUMMIES) CHEW        predniSONE (DELTASONE) 10 MG tablet Take 10 mg by mouth daily for 5 days. 5 tablet 0     fluticasone (FLONASE) 50 MCG/ACT spray Spray 1-2 sprays into both nostrils daily (Patient not taking: Reported on 3/8/2019) 1 Bottle 11     lidocaine (LIDODERM) 5 % Patch Apply up to 3 patches to painful area at once for up to 12 h within a 24 h period.  Remove after 12 hours. (Patient not taking: Reported on 3/8/2019) 30 patch 0     methocarbamol (ROBAXIN) 500 MG tablet Take 1-2 tablets (500-1,000 mg) by mouth 3 times  daily as needed for muscle spasms (Patient not taking: Reported on 3/8/2019) 30 tablet 1     No Known Allergies  Recent Labs   Lab Test 12/07/18  0804 12/05/18  0732 05/15/18  1534 11/08/17  1407  02/17/17  0804  04/25/16   A1C 6.1*  --   --   --   --   --   --  5.3   LDL  --  47 68  --   --  46   < >  --    HDL  --  45* 44*  --   --  46*   < >  --    TRIG  --  144 130  --   --  181*   < >  --    ALT  --  32  --   --   --  33  --   --    CR  --  0.59  --  0.51*   < >  --    < >  --    GFRESTIMATED  --  >90  --  >90   < >  --    < >  --    GFRESTBLACK  --  >90  --  >90   < >  --    < >  --    POTASSIUM  --  3.7  --  3.5   < >  --    < >  --    TSH  --   --   --   --   --  2.84  --   --     < > = values in this interval not displayed.      BP Readings from Last 3 Encounters:   03/08/19 132/78   02/25/19 130/76   02/11/19 132/76    Wt Readings from Last 3 Encounters:   03/08/19 129.3 kg (285 lb)   02/25/19 134.8 kg (297 lb 3.2 oz)   02/11/19 127 kg (280 lb)                  Labs reviewed in EPIC    Reviewed and updated as needed this visit by clinical staff  Tobacco  Allergies  Meds  Problems  Med Hx  Surg Hx  Fam Hx       Reviewed and updated as needed this visit by Provider  Tobacco  Allergies  Meds  Problems  Med Hx  Surg Hx  Fam Hx         ROS:   ROS: 10 point ROS neg other than the symptoms noted above in the HPI.     OBJECTIVE:     /78   Pulse 103   Temp 96.8  F (36  C) (Oral)   Resp 18   Wt 129.3 kg (285 lb)   SpO2 97%   BMI 46.71 kg/m    Body mass index is 46.71 kg/m .  GENERAL APPEARANCE: healthy, alert and no distress  NECK: no adenopathy, no asymmetry, masses, or scars and thyroid normal to palpation  RESP: lungs clear to auscultation - no rales, rhonchi or wheezes  CV: regular rates and rhythm and normal S1 S2, no S3 or S4  SKIN: no suspicious lesions or rashes  PSYCH: mentation appears normal. and affect normal/bright  No edema   EARS: canals clear, TM retracted not injected  .  Throat exam normal. Oral cavity, tongue, pharynx and palate have no inflammation or suspicious lesions.       Diagnostic Test Results:  Results for orders placed or performed in visit on 02/25/19   Strep, Rapid Screen   Result Value Ref Range    Specimen Description Throat     Rapid Strep A Screen       NEGATIVE: No Group A streptococcal antigen detected by immunoassay, await culture report.   Beta strep group A culture   Result Value Ref Range    Specimen Description Throat     Culture Micro No beta hemolytic Streptococcus Group A isolated        ASSESSMENT/PLAN:             1. Chronic seasonal allergic rhinitis    - albuterol (PROVENTIL) (2.5 MG/3ML) 0.083% neb solution; Take 1 vial (2.5 mg) by nebulization every 6 hours as needed for shortness of breath / dyspnea or wheezing  Dispense: 25 vial; Refill: 1    2. BMI 45.0-49.9, adult (H)  Agree with need for bariatric surgery.  Has failed topamax and has been under my care for weight management since 12/17 and has not been able to reach her goal   - BARIATRIC ADULT REFERRAL  - **A1C FUTURE anytime; Future  - **Glucose FUTURE anytime; Future    3. Acute bronchospasm due to viral infection    - predniSONE (DELTASONE) 10 MG tablet; Take 10 mg by mouth daily for 5 days.  Dispense: 5 tablet; Refill: 0  - guaiFENesin-codeine (ROBITUSSIN AC) 100-10 MG/5ML solution; Take 5-10 mLs by mouth nightly as needed for cough  Dispense: 236 mL; Refill: 0    4. Tachycardia    - carvedilol (COREG) 6.25 MG tablet; Take 1 tablet (6.25 mg) by mouth 2 times daily (with meals)  Dispense: 180 tablet; Refill: 11    5. Benign essential hypertension    - chlorthalidone (HYGROTON) 25 MG tablet; Take 1 tablet (25 mg) by mouth daily  Dispense: 90 tablet; Refill: 3  - lisinopril (PRINIVIL/ZESTRIL) 10 MG tablet; Take 1 tablet (10 mg) by mouth daily  Dispense: 90 tablet; Refill: 3    6. Hyperlipidemia LDL goal <100  Well controlled with medications without side effects.   - atorvastatin (LIPITOR)  20 MG tablet; TAKE ONE TABLET BY MOUTH EVERY DAY  Dispense: 90 tablet; Refill: 2    7. Prediabetes  Needs a recheck.  At risk for progression to dm   - **A1C FUTURE anytime; Future  - **Glucose FUTURE anytime; Future    Patient Instructions   Prednisone 1 tab daily for 5 days.  Robitussin with codeine at bedtime.  Make an appointment with bariatrics.     Nayeli Barnard MD  [facility]

## 2019-03-08 NOTE — PATIENT INSTRUCTIONS
Prednisone 1 tab daily for 5 days.  Robitussin with codeine at bedtime.  Make an appointment with bariatrics.

## 2019-04-05 ENCOUNTER — OFFICE VISIT (OUTPATIENT)
Dept: PSYCHOLOGY | Facility: CLINIC | Age: 49
End: 2019-04-05
Payer: COMMERCIAL

## 2019-04-05 DIAGNOSIS — F41.9 ANXIETY DISORDER, UNSPECIFIED TYPE: Primary | ICD-10-CM

## 2019-04-05 PROCEDURE — 90834 PSYTX W PT 45 MINUTES: CPT | Performed by: MARRIAGE & FAMILY THERAPIST

## 2019-04-05 ASSESSMENT — PATIENT HEALTH QUESTIONNAIRE - PHQ9
SUM OF ALL RESPONSES TO PHQ QUESTIONS 1-9: 0
5. POOR APPETITE OR OVEREATING: NOT AT ALL

## 2019-04-05 ASSESSMENT — ANXIETY QUESTIONNAIRES
5. BEING SO RESTLESS THAT IT IS HARD TO SIT STILL: NOT AT ALL
GAD7 TOTAL SCORE: 0
3. WORRYING TOO MUCH ABOUT DIFFERENT THINGS: NOT AT ALL
1. FEELING NERVOUS, ANXIOUS, OR ON EDGE: NOT AT ALL
2. NOT BEING ABLE TO STOP OR CONTROL WORRYING: NOT AT ALL
6. BECOMING EASILY ANNOYED OR IRRITABLE: NOT AT ALL
7. FEELING AFRAID AS IF SOMETHING AWFUL MIGHT HAPPEN: NOT AT ALL

## 2019-04-05 NOTE — PROGRESS NOTES
Progress Note    Client Name: Flor Fernandez  Date: 4/5/19         Service Type: Individual      Session Start Time: 12:00  Session End Time: 12:45      Session Length: 38-52     Session #: 24     Attendees: Client attended alone    Treatment Plan Last Reviewed: 4/5/19, next due 7/5/19.  PHQ-9 / RODRIGUE-7 : completed.     DATA      Progress Since Last Session (Related to Symptoms / Goals / Homework):   Symptoms: client reported increased anxiety.    Homework: Achieved / completed to satisfaction      Episode of Care Goals: Minimal progress - ACTION (Actively working towards change); Intervened by reinforcing change plan / affirming steps taken     Current / Ongoing Stressors and Concerns:   Client reported that her son and mother continue to struggle with their anxiety, which has led to an increase in client's anxiety.  Client reported that she has also experienced car problems.  Client also reported regarding work stressors/conflicts which she is trying to stay out of.     Treatment Objective(s) Addressed in This Session:   use cognitive strategies identified in therapy to challenge anxious thoughts   Client identified her continued therapy goals.     Intervention:   CBT: reviewed with client keeping positive thinking and acting towards other.  Motivational Interviewing: used circular/Socratic questioning to elicit client's identification of her continued therapy goals.        ASSESSMENT: Current Emotional / Mental Status (status of significant symptoms):   Risk status (Self / Other harm or suicidal ideation)   Client denies current fears or concerns for personal safety.   Client denies current or recent suicidal ideation or behaviors.   Client denies current or recent homicidal ideation or behaviors.   Client denies current or recent self injurious behavior or ideation.   Client denies other safety concerns.   A safety and risk management plan has not been developed at  this time, however client was given the after-hours number / 911 should there be a change in any of these risk factors.     Appearance:   Appropriate    Eye Contact:   Good    Psychomotor Behavior: Normal    Attitude:   Cooperative    Orientation:   All   Speech    Rate / Production: Normal     Volume:  Normal    Mood:    Anxious  Normal   Affect:    Appropriate    Thought Content:  Clear    Thought Form:  Coherent  Logical    Insight:    Good      Medication Review:   No current psychiatric medications prescribed     Medication Compliance:   NA     Changes in Health Issues:   None reported     Chemical Use Review:   Substance Use: Chemical use reviewed, no active concerns identified      Tobacco Use: No current tobacco use.       Collateral Reports Completed:   Not Applicable    PLAN: (Client Tasks / Therapist Tasks / Other)  Client agreed to continue to work to maintain positive thinking and acting towards others between now and follow-up session in seven weeks.        Vikas Perez [facility]                                                         ________________________________________________________________________    Treatment Plan    Client's Name: Flor Fernandez  YOB: 1970    Date: 4/5/19    DSM-V Diagnoses: 300.00 (F41.9) Unspecified Anxiety Disorder  Psychosocial / Contextual Factors: problems with primary support group: conflicts with client's children  WHODAS: 15    Referral / Collaboration:  Referral to another professional/service is not indicated at this time..    Anticipated number of session or this episode of care: 21-30      MeasurableTreatment Goal(s) related to diagnosis / functional impairment(s)  Goal 1: Client will successfully process through past trauma defined as reporting 0 Subjective Units of Distress related to trauma on 0-10 scale and 7 on Validity of (positive) Cognition scale about self on 1-7 scale   I will know I've met my goal when I feel less stuck.        Objective #A (Client Action)    Status: Continued - Date: 4/5/19      Client will identify past traumatic events/memories which are causing current distress.     Intervention(s)  Therapist will take client's history and facilitate client's identification of targets for EMDR.     Objective #B  Client will complete needed assessment(s) and [facility] EM[facility] resourcing to confirm readiness for EMDR.    Status: Continued - Date: 4/5/19      Intervention(s)  Therapist will administer Dissociative Experiences Scale, Multidimensional Inventory of Dissociation as needed and complete [facility] EMDR resourcing with client.     Objective #C  Client will engage in installing at least 2 EMDR resources.  Status: Continued - Date: 4/5/19      Intervention(s)  Therapist will complete EMDR resourcing (Container, Remote Control, grounding/progressive muscle relaxation, Light Stream, Inner Advisor) with client.     Objective #D (Client Action)    Status: Continued- Date: 4/5/19      Client will engage in reprocessing all past traumatic event/memory targets.     Intervention(s)   Therapist will complete EMDR reprocessing with client.    Goal 2: Client will increase overall baseline calm mindset by 2 points on a 1-10 Likert scale per self-report (10 = optimal calm mindset).    I will know I've met my goal when I feel less anxious.      Objective #A (Client Action)    Status: Continued - Date: 4/5/19     Client will use cognitive strategies identified in therapy to challenge anxious thoughts.    Intervention(s)  Therapist will teach emotional regulation skills. CBT/REBT ABCD model.    Objective #B  Client will use at least 2 new coping skills for anxiety management in the next 12 weeks.    Status: Continued - Date: 4/5/19     Intervention(s)  Therapist will teach emotional regulation skills. DBT Core Mindfulness, Distress Tolerance, Emotion Regulation, and Interpersonal Effectiveness skills.    Goal 3: Client will improve her  interpersonal functioning/relationships by 2 points on a 1-10 Likert scale per self-report (10 = optimal interpersonal functioning/relationships).    I will know I've met my goal when my relationships with my children have improved.      Objective #A (Client Action)    Status: Continued - Date(s): 4/5/19     Client will learn & utilize at least 2 new assertive communication skills weekly.    Intervention(s)  Therapist will teach Nonviolent Communication and DBT Interpersonal Effectiveness skills..    Client has reviewed and agreed to the above plan.      JENNIFER Horowitz  April 5, 2019

## 2019-04-06 ASSESSMENT — ANXIETY QUESTIONNAIRES: GAD7 TOTAL SCORE: 0

## 2019-04-08 ENCOUNTER — OFFICE VISIT (OUTPATIENT)
Dept: FAMILY MEDICINE | Facility: CLINIC | Age: 49
End: 2019-04-08
Payer: COMMERCIAL

## 2019-04-08 VITALS
SYSTOLIC BLOOD PRESSURE: 145 MMHG | DIASTOLIC BLOOD PRESSURE: 83 MMHG | HEART RATE: 104 BPM | TEMPERATURE: 97.8 F | OXYGEN SATURATION: 96 %

## 2019-04-08 DIAGNOSIS — R07.9 ACUTE CHEST PAIN: Primary | ICD-10-CM

## 2019-04-08 PROBLEM — E66.01 MORBID OBESITY (H): Status: RESOLVED | Noted: 2017-11-09 | Resolved: 2019-04-08

## 2019-04-08 PROCEDURE — 93000 ELECTROCARDIOGRAM COMPLETE: CPT | Performed by: FAMILY MEDICINE

## 2019-04-08 PROCEDURE — 99215 OFFICE O/P EST HI 40 MIN: CPT | Performed by: FAMILY MEDICINE

## 2019-04-09 ENCOUNTER — TELEPHONE (OUTPATIENT)
Dept: FAMILY MEDICINE | Facility: CLINIC | Age: 49
End: 2019-04-09

## 2019-04-09 NOTE — PROGRESS NOTES
SUBJECTIVE:  Flor Fernandez is a 49 year old obese female with Hx of recurrent stroke, hypertension, hypercholesterolemia, family Hx of CAD and Hx of smoking who presents with sudden onset of anterior chest pain while at work 5 minutes ago. Symptom onset has been sudden, improving for a time period of 5 minutes. Severity is described as moderate. Course of her symptoms over time is improving.  She denies shortness of breath, palpitations, cough, wheezing, fever, leg pain, swelling, or exertional symptoms. She endorses anxiety. She took  mg this morning.     I have reviewed the patient's medical history in detail and updated the computerized patient record.     Patient Active Problem List    Diagnosis Date Noted     BMI 45.0-49.9, adult (H) 03/08/2019     Priority: Medium     Chronic low back pain without sciatica, unspecified back pain laterality 08/15/2018     Priority: Medium     Anxiety disorder, unspecified type 03/19/2018     Priority: Medium     Iron deficiency 05/03/2017     Priority: Medium     Vertigo 02/13/2017     Priority: Medium     Hyperlipidemia LDL goal <100 02/13/2017     Priority: Medium     Tachycardia 02/13/2017     Priority: Medium     Benign essential hypertension 02/13/2017     Priority: Medium     History of stroke 02/13/2017     Priority: Medium       Past Medical History:   Diagnosis Date     Anxiety      Cerebral infarction (H)      Hypertension      Morbid obesity (H)      Tachycardia      Vertigo        Past Surgical History:   Procedure Laterality Date     APPENDECTOMY       [facility]FF SURGICAL PATHOLOGY  12/29/2016     MAMMOPLASTY REDUCTION BILATERAL         Social History     Socioeconomic History     Marital status: Single     Spouse name: Not on file     Number of children: Not on file     Years of education: Not on file     Highest education level: Not on file   Occupational History     Employer: [employer]     Financial resource strain: Not on file     Food  insecurity:     Worry: Not on file     Inability: Not on file     Transportation needs:     Medical: Not on file     Non-medical: Not on file   Tobacco Use     Smoking status: Former Smoker     Packs/day: 0.50     Years: 27.00     Pack years: 13.50     Types: Cigarettes     Last attempt to quit: 2014     Years since quittin.0     Smokeless tobacco: Never Used   Substance and Sexual Activity     Alcohol use: No     Drug use: No     Sexual activity: Not Currently     Birth control/protection: Abstinence   Lifestyle     Physical activity:     Days per week: Not on file     Minutes per session: Not on file     Stress: Not on file   Relationships     Social connections:     Talks on phone: Not on file     Gets together: Not on file     Attends Islam service: Not on file     Active member of club or organization: Not on file     Attends meetings of clubs or organizations: Not on file     Relationship status: Not on file     Intimate partner violence:     Fear of current or ex partner: Not on file     Emotionally abused: Not on file     Physically abused: Not on file     Forced sexual activity: Not on file   Other Topics Concern     Parent/sibling w/ CABG, MI or angioplasty before 65F 55M? Not Asked   Social History Narrative     Not on file       Family History   Problem Relation Age of Onset     Other Cancer Mother      Asthma Mother      Diabetes Father      Glaucoma No family hx of      Macular Degeneration No family hx of        Current Outpatient Medications   Medication     albuterol (PROVENTIL) (2.5 MG/3ML) 0.083% neb solution     atorvastatin (LIPITOR) 20 MG tablet     carvedilol (COREG) 6.25 MG tablet     chlorthalidone (HYGROTON) 25 MG tablet     [facility] ASPIRIN 325 MG tablet     cyanocobalamin (VITAMIN  B-12) 1000 MCG tablet     ferrous fumarate 65 mg, [place]. FE,-Vitamin C 125 mg (VITRON C)  MG TABS tablet     fluticasone (FLONASE) 50 MCG/ACT spray     guaiFENesin-codeine (ROBITUSSIN AC) 100-10  MG/5ML solution     lidocaine (LIDODERM) 5 % Patch     lisinopril (PRINIVIL/ZESTRIL) 10 MG tablet     methocarbamol (ROBAXIN) 500 MG tablet     Multiple Vitamins-Minerals (MULTI-VITAMIN GUMMIES) CHEW     Current Facility-Administered Medications   Medication     ofloxacin (OCUFLOX) 0.3 % ophthalmic solution 1 drop       Allergies:  Patient has no known allergies.    ROS:  GENERAL: No change in weight, sleep or appetite.  Normal energy.  No fever or chills  RESP: No coughing, wheezing or shortness of breath  CV: see HPI   GI: No heartburn   MUSCULOSKELETAL: No significant muscle or joint pains  Psychiatric: anxiety  HEME/IMMUNE/ALLERGY: No history of bleeding or clotting problems or anemia.  No allergies or immune system problems  ENDOCRINE: No history of thyroid disease, diabetes or other endocrine disorders     EXAM:  /83 (BP Location: Right arm, Patient Position: Sitting, Cuff Size: Adult Large)   Pulse 104   Temp 97.8  F (36.6  C) (Oral)   SpO2 96%   GENERAL APPEARANCE: alert, no distress, cooperative and over weight  EYES: EOMI,  PERRL  NECK: no adenopathy, no asymmetry, masses, or scars and thyroid normal to palpation  RESP: lungs clear to auscultation - no rales, rhonchi or wheezes  CV: regular rates and rhythm, normal S1 S2, no S3 or S4 and no murmur, click or rub -  MS: extremities normal- no gross deformities noted, no evidence of inflammation in joints, FROM in all extremities.  PSYCH: mentation appears normal and affect normal/bright    ASSESSMENT/PLAN:  (R07.9) Acute chest pain  (primary encounter diagnosis)  Comment: no evidence of infection, injury, or GI etiology; given risk factors, further cardiac evaluation is recommended   Plan: EKG 12-lead complete w/read - Clinics        Patient agrees to transfer to ED for further evaluation via EMT transport         Jacqueline Nicholson MD  Department of [address]. [place] [id number] [phone]  (voicemail)  [phone] (pager)

## 2019-04-09 NOTE — TELEPHONE ENCOUNTER
She can have a same day on the 19th, wait for cancellations (there will likely be some with the snow) or will need to see another provider, unfortunately.  Dr. Barnard

## 2019-04-09 NOTE — TELEPHONE ENCOUNTER
Reason for Call:  Other appointment    Detailed comments: Flor is needing to schedule a hospital follow up appointment within a week  From 4-8-19 with you specifically and the schedule is full.    Phone Number Patient can be reached at: Work number on file:  [phone] (work)    Best Time: any    Can we leave a detailed message on this number? Not Applicable    Call taken on 4/9/2019 at 7:52 AM by Olga Cristobal

## 2019-04-10 ENCOUNTER — OFFICE VISIT (OUTPATIENT)
Dept: INTERNAL MEDICINE | Facility: CLINIC | Age: 49
End: 2019-04-10
Payer: COMMERCIAL

## 2019-04-10 VITALS
RESPIRATION RATE: 12 BRPM | WEIGHT: 281 LBS | SYSTOLIC BLOOD PRESSURE: 132 MMHG | HEIGHT: 66 IN | BODY MASS INDEX: 45.16 KG/M2 | HEART RATE: 102 BPM | TEMPERATURE: 97.6 F | OXYGEN SATURATION: 98 % | DIASTOLIC BLOOD PRESSURE: 78 MMHG

## 2019-04-10 DIAGNOSIS — R42 VERTIGO: ICD-10-CM

## 2019-04-10 DIAGNOSIS — I51.7 CARDIOMEGALY: Primary | ICD-10-CM

## 2019-04-10 DIAGNOSIS — D72.829 LEUKOCYTOSIS, UNSPECIFIED TYPE: ICD-10-CM

## 2019-04-10 DIAGNOSIS — E87.6 HYPOKALEMIA: ICD-10-CM

## 2019-04-10 DIAGNOSIS — D75.839 THROMBOCYTOSIS: ICD-10-CM

## 2019-04-10 DIAGNOSIS — R07.9 CHEST PAIN, UNSPECIFIED TYPE: ICD-10-CM

## 2019-04-10 PROCEDURE — 99214 OFFICE O/P EST MOD 30 MIN: CPT | Performed by: INTERNAL MEDICINE

## 2019-04-10 ASSESSMENT — MIFFLIN-ST. JEOR: SCORE: 1919.38

## 2019-04-10 NOTE — LETTER
[place] [id number] [facility]. [place] [address]    April 15, 2019    Ronal ANGI Fernandez  [address] [facility] [address]      Dear Timothy Ray echo.    Enclosed is a copy of your results.     Results for orders placed or performed in visit on 04/10/19   Echocardiogram Complete    Narrative    [id number]  [id number]  [id number]  [id number]^CAROLYNE^GEMINI^ANGELA           [facility], Echocardiography Laboratory  [address] [place] [address], [place]  [place] [address]        Name: RONAL FERNANDEZ  MRN: [id number]  : 1970  Study Date: 2019 10:53 AM  Age: 49 yrs  Gender: Female  Patient Location: [facility]  Reason For Study: Cardiomegaly, Chest pain, unspecified type  Ordering Physician: GEMINI BARFIELD  Performed By: Nancy Melton RDCS     BSA: 2.3 m2  Height: 66 in  Weight: 281 lb  BP: 132/78 mmHg  _____________________________________________________________________________  __        Procedure  Echocardiogram with two-dimensional, color and spectral Doppler performed.  Technically difficult study. Poor acoustic windows. Optison (NDC #1347-3583-  03) given intravenously. Patient was given 3.0 ml mixture of 3 ml Optison and  6 ml saline. 6.0 ml wasted. IV start location L Hand .  _____________________________________________________________________________  __        Interpretation Summary     Technically difficult study.  Left ventricular function, chamber size, wall motion, and wall thickness are  normal.The EF is 60-65%.  RV size and function are probably normal.  No significant valvular abnormalities were noted.  There is no prior study for direct comparison.  _____________________________________________________________________________  __        Left Ventricle  Left ventricular function, chamber size, wall motion, and wall thickness are  normal.The EF is 60-65%. Left ventricular diastolic function is indeterminate.     Right  Ventricle  RV size and function are probably normal.     Atria  The atria cannot be assessed. The atrial septum is intact as assessed by color  Doppler .        Mitral Valve  The mitral valve is normal.     Aortic Valve  Aortic valve is normal in structure and function.     Tricuspid Valve  The tricuspid valve is normal. Mild tricuspid insufficiency is present. The  right ventricular systolic pressure is approximated at 21.6 mmHg plus the  right atrial pressure.     Pulmonic Valve  The pulmonic valve is normal.     Vessels  The aorta root is normal. The inferior vena cava cannot be assessed. The  pulmonary artery cannot be assessed.     Pericardium  No pericardial effusion is present. Prominent epicardial fat is noted.        Compared to Previous Study  There is no prior study for direct comparison.  _____________________________________________________________________________  __     MMode/2D Measurements & Calculations  IVSd: 1.4 cm  LVIDd: 3.7 cm  LVIDs: 2.3 cm  LVPWd: 1.1 cm  FS: 38.6 %  LV mass(C)d: 159.2 grams  LV mass(C)dI: 68.9 grams/m2  Ao root diam: 2.9 cm  LA dimension: 3.8 cm  asc Aorta Diam: 2.9 cm  LA/Ao: 1.3  LVOT diam: 2.1 cm  LVOT area: 3.3 cm2  RWT: 0.61        Doppler Measurements & Calculations  MV E max shelton: 64.4 cm/sec  MV A max shelton: 54.2 cm/sec  MV E/A: 1.2  MV dec time: 0.17 sec  TR max shelton: 232.1 cm/sec  TR max P.6 mmHg  E/E' av.1  Lateral E/e': 5.7     Medial E/e': 8.5           _____________________________________________________________________________  __           Report approved by: Teri Carbone 2019 01:35 PM          If you have any questions or concerns, please call myself or my nurse at [phone].      Sincerely,        Nayeli Barnard MD/pires

## 2019-04-10 NOTE — PROGRESS NOTES
SUBJECTIVE:   Flor Fernandez is a 49 year old female who presents to clinic today for the following   health issues:        ED/UC Followup:  Facility:  [facility] Emergency Department  Date of visit: 4/8/2019  Reason for visit: Chest pain   Current Status: better      Stress at work.it initially started as a sharp pain in her left upper back.  She thought it was esophogeal pain.  Then it radiated to the front.  She felt like she was going to pass out and also felt panicking.  Legs were very week.  She has been going through hot flashes and night sweats.  These are getting worse.  She will get heart palpation with them as well.  Her periods are getting less and less each month.    She hasn't had much in the way of panick attacks and felt it was isolated.     With exercise she feels well.  Patient denies any exertional chest pain, dyspnea, palpitations, syncope, orthopnea, edema or paroxysmal nocturnal dyspnea. She hasn't had this pain that sent her to the ER again.      White count was found to be elevated as well as the platelet count.  Potassium was low.    Has some dizziness but feels like this is her chronic vertigot      Additional history: as documented    Reviewed  and updated as needed this visit by clinical staff  Tobacco  Allergies  Meds  Med Hx  Surg Hx  Fam Hx  Soc Hx        Reviewed and updated as needed this visit by Provider         Patient Active Problem List   Diagnosis     Vertigo     Hyperlipidemia LDL goal <100     Tachycardia     Benign essential hypertension     History of stroke     Iron deficiency     Anxiety disorder, unspecified type     Chronic low back pain without sciatica, unspecified back pain laterality     BMI 45.0-49.9, adult (H)     Past Surgical History:   Procedure Laterality Date     APPENDECTOMY       CL AFF SURGICAL PATHOLOGY  12/29/2016     MAMMOPLASTY REDUCTION BILATERAL         Social History     Tobacco Use     Smoking status: Former Smoker      "Packs/day: 0.50     Years: 27.00     Pack years: 13.50     Types: Cigarettes     Last attempt to quit: 2014     Years since quittin.0     Smokeless tobacco: Never Used   Substance Use Topics     Alcohol use: No     Family History   Problem Relation Age of Onset     Other Cancer Mother      Asthma Mother      Diabetes Father      Glaucoma No family hx of      Macular Degeneration No family hx of          Current Outpatient Medications   Medication Sig Dispense Refill     albuterol (PROVENTIL) (2.5 MG/3ML) 0.083% neb solution Take 1 vial (2.5 mg) by nebulization every 6 hours as needed for shortness of breath / dyspnea or wheezing 25 vial 1     atorvastatin (LIPITOR) 20 MG tablet TAKE ONE TABLET BY MOUTH EVERY DAY 90 tablet 2     carvedilol (COREG) 6.25 MG tablet Take 1 tablet (6.25 mg) by mouth 2 times daily (with meals) 180 tablet 11     chlorthalidone (HYGROTON) 25 MG tablet Take 1 tablet (25 mg) by mouth daily 90 tablet 3     CVS ASPIRIN 325 MG tablet TAKE 1 TABLET BY MOUTH ONCE DAILY WITH A MEAL.  0     cyanocobalamin (VITAMIN  B-12) 1000 MCG tablet Take 1,000 mcg by mouth daily       ferrous fumarate 65 mg, [place]. FE,-Vitamin C 125 mg (VITRON C)  MG TABS tablet Take 1 tablet by mouth every other day       fluticasone (FLONASE) 50 MCG/ACT spray Spray 1-2 sprays into both nostrils daily 1 Bottle 11     lidocaine (LIDODERM) 5 % Patch Apply up to 3 patches to painful area at once for up to 12 h within a 24 h period.  Remove after 12 hours. 30 patch 0     lisinopril (PRINIVIL/ZESTRIL) 10 MG tablet Take 1 tablet (10 mg) by mouth daily 90 tablet 3     Multiple Vitamins-Minerals (MULTI-VITAMIN GUMMIES) CHEW        No Known Allergies    ROS:   ROS: 10 point ROS neg other than the symptoms noted above in the HPI.     OBJECTIVE:     /78   Pulse 102   Temp 97.6  F (36.4  C) (Oral)   Resp 12   Ht 1.665 m (5' 5.56\")   Wt 127.5 kg (281 lb)   SpO2 98%   BMI 45.97 kg/m    Body mass index is 45.97 " kg/m .  GENERAL APPEARANCE: healthy, alert and no distress  NECK: no adenopathy, no asymmetry, masses, or scars and thyroid normal to palpation  RESP: lungs clear to auscultation - no rales, rhonchi or wheezes  CV: regular rates and rhythm and normal S1 S2, no S3 or S4  ABDOMEN:  soft, nontender, no HSM or masses and bowel sounds normal  SKIN: no suspicious lesions or rashes  PSYCH: mentation appears normal. and affect normal/bright  No pain to palpation along the chest wall    Diagnostic Test Results:  Results for orders placed or performed in visit on 02/25/19   Strep, Rapid Screen   Result Value Ref Range    Specimen Description Throat     Rapid Strep A Screen       NEGATIVE: No Group A streptococcal antigen detected by immunoassay, await culture report.   Beta strep group A culture   Result Value Ref Range    Specimen Description Throat     Culture Micro No beta hemolytic Streptococcus Group A isolated        ASSESSMENT/PLAN:             1. Cardiomegaly  Noted on chest xray  No features on exam that she has this.    - Echocardiogram Complete    2. Chest pain, unspecified type  Unclear etiology.  Now resolved.  Could be musculoskeletal, gi or anxiety  - Echocardiogram Complete    3. Thrombocytosis (H)  Likely due to being off her iron.  Will recheck after being on iron for a week   - CBC with platelets differential; Future    4. Leukocytosis, unspecified type    - CBC with platelets differential; Future    5. Hypokalemia  Per patient instructions.   - **Potassium FUTURE anytime; Future    6. Vertigo  Feels like her chronic vertigo - no further workup for now       Patient Instructions       Recheck bloodwork in 1 week.  Restart iron.  Eat high potassium foods.  Schedule your echo.    High potassium foods:  Bananas  Oranges  Apricots  Avocado  Strawberries  Potatoes  Tomatoes  Cucumber  Cabbage  Cauliflower  Chard  Preston pepper  Eggplant  Squash  Crimini mushrooms  [place]  sprouts  Turmeric  Parsley  Spinach  Broccoli  Tuna  Halibut    [facility]    If you have any questions regarding to your visit please contact your care team:     Team Pink:   Clinic Hours Telephone Number   Internal Medicine:  Dr. Nayeli Hi NP 7am-7pm  Monday - Thursday   7am-5pm  Fridays  [phone]  (Appointment scheduling available 24/7)   Urgent Care - [place] and [facility] - 11am-9pm Monday-Friday Saturday-Sunday- 9am-5pm   [place] - 5pm-9pm Monday-Friday Saturday-Sunday- 9am-5pm  [phone] - [place]  [phone] - [place]       What options do I have for a visit other than an office visit? We offer electronic visits (e-visits) and telephone visits, when medically appropriate.  Please check with your medical insurance to see if these types of visits are covered, as you will be responsible for any charges that are not paid by your insurance.      You can use [employer] (secure electronic communication) to access to your chart, send your primary care provider a message, or make an appointment. Ask a team member how to get started.     For a price quote for your services, please call our Consumer Price Line at [phone] or our Imaging Cost estimation line at [phone] (for imaging tests).       Nayeli Barnard MD  [facility]

## 2019-04-10 NOTE — PATIENT INSTRUCTIONS
Recheck bloodwork in 1 week.  Restart iron.  Eat high potassium foods.  Schedule your echo.    High potassium foods:  Bananas  Oranges  Apricots  Avocado  Strawberries  Potatoes  Tomatoes  Cucumber  Cabbage  Cauliflower  Chard  Preston pepper  Eggplant  Squash  Crimini mushrooms  [place] sprouts  Turmeric  Parsley  Spinach  Broccoli  Tuna  Halibut    [facility]    If you have any questions regarding to your visit please contact your care team:     Team Pink:   Clinic Hours Telephone Number   Internal Medicine:  Dr. Nayeli iH, NP 7am-7pm  Monday - Thursday   7am-5pm  Fridays  [phone]  (Appointment scheduling available 24/7)   Urgent Care - [place] - 11am-9pm Monday-Friday Saturday-Sunday- 9am-5pm   [place] - 5pm-9pm Monday-Friday Saturday-Sunday- 9am-5pm  [phone] - [place]  [phone] - [place]       What options do I have for a visit other than an office visit? We offer electronic visits (e-visits) and telephone visits, when medically appropriate.  Please check with your medical insurance to see if these types of visits are covered, as you will be responsible for any charges that are not paid by your insurance.      You can use [employer] (secure electronic communication) to access to your chart, send your primary care provider a message, or make an appointment. Ask a team member how to get started.     For a price quote for your services, please call our Consumer Price Line at [phone] or our Imaging Cost estimation line at [phone] (for imaging tests).

## 2019-04-12 ENCOUNTER — ANCILLARY PROCEDURE (OUTPATIENT)
Dept: CARDIOLOGY | Facility: CLINIC | Age: 49
End: 2019-04-12
Attending: INTERNAL MEDICINE
Payer: COMMERCIAL

## 2019-04-12 PROCEDURE — 40000264 ZZHC STATISTIC IV PUSH SINGLE INITIAL SUBSTANCE: Performed by: INTERNAL MEDICINE

## 2019-04-12 PROCEDURE — 93306 TTE W/DOPPLER COMPLETE: CPT | Performed by: INTERNAL MEDICINE

## 2019-04-12 RX ADMIN — Medication 3 ML: at 11:45

## 2019-04-16 ENCOUNTER — TELEPHONE (OUTPATIENT)
Dept: FAMILY MEDICINE | Facility: CLINIC | Age: 49
End: 2019-04-16

## 2019-04-16 NOTE — TELEPHONE ENCOUNTER
Reason for Call:  Other call back    Detailed comments: Echocardiogram results. Unable to get into [employer]    Phone Number Patient can be reached at: Work number on file:  [phone] (work)    Best Time: any    Can we leave a detailed message on this number? Not Applicable    Call taken on 4/16/2019 at 11:09 AM by Olga Cristobal

## 2019-04-16 NOTE — TELEPHONE ENCOUNTER
Called and spoke with patient & notified of provider's results as written.   Verbalizes understanding & no further questions.     Daisy Nolasco RN

## 2019-04-22 DIAGNOSIS — E11.9 TYPE 2 DIABETES MELLITUS (H): Primary | ICD-10-CM

## 2019-04-22 DIAGNOSIS — E87.6 HYPOKALEMIA: ICD-10-CM

## 2019-04-22 DIAGNOSIS — R73.03 PREDIABETES: ICD-10-CM

## 2019-04-22 DIAGNOSIS — D72.829 LEUKOCYTOSIS, UNSPECIFIED TYPE: ICD-10-CM

## 2019-04-22 DIAGNOSIS — D75.839 THROMBOCYTOSIS: ICD-10-CM

## 2019-04-22 LAB
BASOPHILS # BLD AUTO: 0 10E9/L (ref 0–0.2)
BASOPHILS NFR BLD AUTO: 0.3 %
DIFFERENTIAL METHOD BLD: NORMAL
EOSINOPHIL # BLD AUTO: 0.2 10E9/L (ref 0–0.7)
EOSINOPHIL NFR BLD AUTO: 1.7 %
ERYTHROCYTE [DISTWIDTH] IN BLOOD BY AUTOMATED COUNT: 13.8 % (ref 10–15)
GLUCOSE SERPL-MCNC: 154 MG/DL (ref 70–99)
HBA1C MFR BLD: 6.5 % (ref 0–5.6)
HCT VFR BLD AUTO: 42.8 % (ref 35–47)
HGB BLD-MCNC: 13.6 G/DL (ref 11.7–15.7)
LYMPHOCYTES # BLD AUTO: 2.5 10E9/L (ref 0.8–5.3)
LYMPHOCYTES NFR BLD AUTO: 26.9 %
MCH RBC QN AUTO: 29.4 PG (ref 26.5–33)
MCHC RBC AUTO-ENTMCNC: 31.8 G/DL (ref 31.5–36.5)
MCV RBC AUTO: 93 FL (ref 78–100)
MONOCYTES # BLD AUTO: 0.6 10E9/L (ref 0–1.3)
MONOCYTES NFR BLD AUTO: 6 %
NEUTROPHILS # BLD AUTO: 6.1 10E9/L (ref 1.6–8.3)
NEUTROPHILS NFR BLD AUTO: 65.1 %
PLATELET # BLD AUTO: 384 10E9/L (ref 150–450)
POTASSIUM SERPL-SCNC: 3.5 MMOL/L (ref 3.4–5.3)
RBC # BLD AUTO: 4.62 10E12/L (ref 3.8–5.2)
WBC # BLD AUTO: 9.3 10E9/L (ref 4–11)

## 2019-04-22 PROCEDURE — 83036 HEMOGLOBIN GLYCOSYLATED A1C: CPT | Performed by: INTERNAL MEDICINE

## 2019-04-22 PROCEDURE — 82947 ASSAY GLUCOSE BLOOD QUANT: CPT | Performed by: INTERNAL MEDICINE

## 2019-04-22 PROCEDURE — 36415 COLL VENOUS BLD VENIPUNCTURE: CPT | Performed by: INTERNAL MEDICINE

## 2019-04-22 PROCEDURE — 84132 ASSAY OF SERUM POTASSIUM: CPT | Performed by: INTERNAL MEDICINE

## 2019-04-22 PROCEDURE — 85025 COMPLETE CBC W/AUTO DIFF WBC: CPT | Performed by: INTERNAL MEDICINE

## 2019-04-22 RX ORDER — BLOOD-GLUCOSE CONTROL, NORMAL
EACH MISCELLANEOUS
Qty: 2 EACH | Refills: 1 | Status: SHIPPED | OUTPATIENT
Start: 2019-04-22

## 2019-04-22 NOTE — RESULT ENCOUNTER NOTE
3 month sugar average is in the diabetic range and confirms a diagnosis of Type 2 Diabetes that is mild.  I would like you to have a full assessment and education with our diabetic educator.  No medication needed at this time.  Keep follow up appointment in early June.     RN- order is pended and ready to be signed once patient is notified.  Please also send a blood glucose kit with once daily testing, per insurance, to her preferred pharmacy.  The diabetes educator will show her how to use this.

## 2019-04-22 NOTE — LETTER
[address] [place]  [place] [address]    April 23, 2019    Flor Fernandez  [address]  [place] [address]          Dear Flor,  Normal potassium. Blood sugar in the diabetic range. Normal blood count.   Enclosed is a copy of your results.     Results for orders placed or performed in visit on 04/22/19   **Potassium FUTURE anytime   Result Value Ref Range    Potassium 3.5 3.4 - 5.3 mmol/L   CBC with platelets differential   Result Value Ref Range    WBC 9.3 4.0 - 11.0 10e9/L    RBC Count 4.62 3.8 - 5.2 10e12/L    Hemoglobin 13.6 11.7 - 15.7 g/dL    Hematocrit 42.8 35.0 - 47.0 %    MCV 93 78 - 100 fl    MCH 29.4 26.5 - 33.0 pg    MCHC 31.8 31.5 - 36.5 g/dL    RDW 13.8 10.0 - 15.0 %    Platelet Count 384 150 - 450 10e9/L    % Neutrophils 65.1 %    % Lymphocytes 26.9 %    % Monocytes 6.0 %    % Eosinophils 1.7 %    % Basophils 0.3 %    Absolute Neutrophil 6.1 1.6 - 8.3 10e9/L    Absolute Lymphocytes 2.5 0.8 - 5.3 10e9/L    Absolute Monocytes 0.6 0.0 - 1.3 10e9/L    Absolute Eosinophils 0.2 0.0 - 0.7 10e9/L    Absolute Basophils 0.0 0.0 - 0.2 10e9/L    Diff Method Automated Method    **Glucose FUTURE anytime   Result Value Ref Range    Glucose 154 (H) 70 - 99 mg/dL   **A1C FUTURE anytime   Result Value Ref Range    Hemoglobin A1C 6.5 (H) 0 - 5.6 %       If you have any questions or concerns, please call myself or my nurse at [phone].      Sincerely,        Nayeli Barnard MD/pb

## 2019-05-01 ENCOUNTER — ALLIED HEALTH/NURSE VISIT (OUTPATIENT)
Dept: EDUCATION SERVICES | Facility: CLINIC | Age: 49
End: 2019-05-01
Payer: COMMERCIAL

## 2019-05-01 VITALS — WEIGHT: 282 LBS | BODY MASS INDEX: 46.13 KG/M2

## 2019-05-01 DIAGNOSIS — E11.9 DIABETES MELLITUS (H): Primary | ICD-10-CM

## 2019-05-01 PROCEDURE — G0108 DIAB MANAGE TRN  PER INDIV: HCPCS

## 2019-05-01 NOTE — PROGRESS NOTES
"Diabetes Self-Management Education & Support    Diabetes Education Self Management & Training    SUBJECTIVE/OBJECTIVE:  Accompanied by: Self  Diabetes education in the past 24mo: No  Focus of Visit: Monitoring, Healthy Eating  Diabetes type: Type 2  Disease course: Stable  How confident are you filling out medical forms by yourself:: Extremely  Transportation concerns: No  Other concerns:: None  Cultural Influences/Ethnic Background:  American      Diabetes Symptoms & Complications  Fatigue: Yes  Neuropathy: Yes  Foot ulcerations: No  Polydipsia: Yes  Polyphagia: Yes  Polyuria: No  Visual change: No  Weakness: No  Weight loss: No  Slow healing wounds: No  Recent Infection(s): No  Symptom course: Stable  Weight trend: Fluctuating minimally  Autonomic neuropathy: No  CVA: Yes  Heart disease: No  Nephropathy: No  Peripheral neuropathy: Yes  Peripheral Vascular Disease: No  Retinopathy: Yes  Sexual dysfunction: No    Patient Problem List and Family Medical History reviewed for relevant medical history, current medical status, and diabetes risk factors.    Vitals:  Wt 127.9 kg (282 lb)   BMI 46.13 kg/m    Estimated body mass index is 46.13 kg/m  as calculated from the following:    Height as of 4/10/19: 1.665 m (5' 5.56\").    Weight as of this encounter: 127.9 kg (282 lb).   Last 3 BP:   BP Readings from Last 3 Encounters:   04/10/19 132/78   04/08/19 145/83   03/08/19 132/78       History   Smoking Status     Former Smoker     Packs/day: 0.50     Years: 27.00     Types: Cigarettes     Quit date: 4/8/2014   Smokeless Tobacco     Never Used       Labs:  Lab Results   Component Value Date    A1C 6.5 04/22/2019     Lab Results   Component Value Date     04/22/2019     Lab Results   Component Value Date    LDL 47 12/05/2018     HDL Cholesterol   Date Value Ref Range Status   12/05/2018 45 (L) >49 mg/dL Final   ]  GFR Estimate   Date Value Ref Range Status   12/05/2018 >90 >60 mL/min/1.7m2 Final     Comment:     Non "  GFR Calc     GFR Estimate If Black   Date Value Ref Range Status   12/05/2018 >90 >60 mL/min/1.7m2 Final     Comment:      GFR Calc     Lab Results   Component Value Date    CR 0.59 12/05/2018     No results found for: MICROALBUMIN    Healthy Eating  Healthy Eating Assessed Today: Yes  Cultural/Jehovah's witness diet restrictions?: No  Meals include: Breakfast  Breakfast: 8:30 am- fruit  or a yogurt Yoplait on the weekdays but skips of the weekends.  Lunch: 12-leftovers like pasta or chicken, vegetable like bussel sprouts, green beans, water,  Dinner: 6 pm- gluten free pizza 3 pieces,  Barqs Root beer regular, orange juice 8 oz  Snacks: fruit and veggies or meat and cheese with crackers or chips  Other: summertime- popsicles up to 4 at a time. Likes Jolly Ranchers brand  Beverages: Soda, Water, Milk, Juice, Alcohol  Has patient met with a dietitian in the past?: No    Being Active  Being Active Assessed Today: Yes  Exercise:: Currently not exercising  Barrier to exercise: None  Pt states that she plans to start to be more active this week.    Monitoring  Monitoring Assessed Today: Yes  Did patient bring glucose meter to appointment? : Yes  Blood Glucose Meter: One Touch(Verio)    Taking Medications    NA       Healthy Coping  Emotional response to diabetes: Ready to learn  Patient Activation Measure Survey Score:  No flowsheet data found.    ASSESSMENT:  Pt newly diagnosed. Pt has family history of diabetes and is working with Dr Barnard to lose weight.  Pt drinks regular soda and/or orange juice most days. Pt willing to work to decrease this.  Pt can benefit from consistent meals and snacks and less intake of regular soda and juice.    Goals Addressed as of 5/1/2019 at 5:34 PM       Healthy Eating (pt-stated)     Added 5/1/19 by Bianka Ball RN     My Goal: I will decrease my regular soda intake down to every other day 1 can.    What I need to meet my goal:  Motivation to improve my  diabetes control    I plan to meet my goal by this date: 2 weeks            Patient's most recent   Lab Results   Component Value Date    A1C 6.5 04/22/2019    is meeting goal of <7.0    INTERVENTION:   Diabetes knowledge and skills assessment:     Patient is knowledgeable in diabetes management concepts related to: none    Patient needs further education on the following diabetes management concepts: Healthy Eating, Being Active, Monitoring, Taking Medication, Problem Solving, Reducing Risks and Healthy Coping    Based on learning assessment above, most appropriate setting for further diabetes education would be: Group class or Individual setting.    Education provided today on:  AADE Self-Care Behaviors:  Diabetes Pathophysiology  Healthy Eating: carbohydrate counting, consistency in amount, composition, and timing of food intake, weight reduction, portion control, plate planning method and label reading  Being Active: relationship to blood glucose  Monitoring: purpose, proper technique, log and interpret results, individual blood glucose targets, frequency of monitoring and proper sharps disposal  Patient was instructed on One Touch Verio meter and was able to provide an accurate return demonstration. Patient's blood glucose reading today was 108 mg/dL.    Opportunities for ongoing education and support in diabetes-self management were discussed.    Pt verbalized understanding of concepts discussed and recommendations provided today.       Education Materials Provided:  [place] Understanding Diabetes Booklet, Safe Disposal Options for Needles & Syringes, BG Log Sheet and My Plate Planner    PLAN:  See Patient Instructions for co-developed, patient-stated behavior change goals.  AVS printed and provided to patient today. See Follow-Up section for recommended follow-up.  Pt unable to attend group classes due to work schedule.  Will follow up 1:1 to complete education.    Bianka Ball RN  BSN CDE    Time Spent: 60  minutes  Encounter Type: Individual    Any diabetes medication dose changes were made via the CDE Protocol and Collaborative Practice Agreement with the patient's referring provider. A copy of this encounter was shared with the provider.

## 2019-05-01 NOTE — PATIENT INSTRUCTIONS
Recommend to eat 3 meals per day and a snack if desired.  Recommend 3-4 carb choices per meal and 1 carb choice for a snack.  Call if questions.

## 2019-05-16 DIAGNOSIS — E11.9 TYPE 2 DIABETES MELLITUS (H): ICD-10-CM

## 2019-05-16 NOTE — TELEPHONE ENCOUNTER
Duplicate 1st request    blood glucose monitoring (NO BRAND SPECIFIED) meter device kit 1 kit 0 4/22/2019  --   Sig: Use to test blood sugar 1 time daily or as directed.   Sent to pharmacy as: blood glucose monitoring (NO BRAND SPECIFIED) meter device kit   Class: E-Prescribe   Notes to Pharmacy: Please dispense brand preferred by insurance.   Order: [id number]   E-Prescribing Status: Receipt confirmed by pharmacy (4/22/2019  2:26 PM CDT)     Nikky FLYNN CMA ([facility])

## 2019-05-17 RX ORDER — BLOOD-GLUCOSE METER
EACH MISCELLANEOUS
Refills: 0 | OUTPATIENT
Start: 2019-05-17

## 2019-05-22 ENCOUNTER — ALLIED HEALTH/NURSE VISIT (OUTPATIENT)
Dept: EDUCATION SERVICES | Facility: CLINIC | Age: 49
End: 2019-05-22
Payer: COMMERCIAL

## 2019-05-22 VITALS — BODY MASS INDEX: 46.62 KG/M2 | WEIGHT: 285 LBS

## 2019-05-22 DIAGNOSIS — E11.9 DIABETES MELLITUS (H): Primary | ICD-10-CM

## 2019-05-22 PROCEDURE — G0108 DIAB MANAGE TRN  PER INDIV: HCPCS

## 2019-05-22 NOTE — PROGRESS NOTES
"Diabetes Self-Management Education & Support    Diabetes Education Self Management & Training    SUBJECTIVE/OBJECTIVE:  Accompanied by: Self  Diabetes education in the past 24 mo: No  Focus of Visit: Monitoring, Healthy Eating  Diabetes type: Type 2  Disease course: Stable  How confident are you filling out medical forms by yourself:: Extremely  Transportation concerns: No  Other concerns:: None  Cultural Influences/Ethnic Background:  American    Diabetes Symptoms & Complications  Fatigue: Yes  Neuropathy: Yes  Foot ulcerations: No  Polydipsia: Yes  Polyphagia: Yes  Polyuria: No  Visual change: No  Weakness: No  Weight loss: No  Slow healing wounds: No  Recent Infection(s): No  Symptom course: Stable  Weight trend: Fluctuating minimally  Autonomic neuropathy: No  CVA: Yes  Heart disease: No  Nephropathy: No  Peripheral neuropathy: Yes  Peripheral Vascular Disease: No  Retinopathy: Yes  Sexual dysfunction: No    Patient Problem List and Family Medical History reviewed for relevant medical history, current medical status, and diabetes risk factors.    Vitals:  Wt 129.3 kg (285 lb)   BMI 46.62 kg/m    Estimated body mass index is 46.62 kg/m  as calculated from the following:    Height as of 4/10/19: 1.665 m (5' 5.56\").    Weight as of this encounter: 129.3 kg (285 lb).   Last 3 BP:   BP Readings from Last 3 Encounters:   04/10/19 132/78   04/08/19 145/83   03/08/19 132/78       History   Smoking Status     Former Smoker     Packs/day: 0.50     Years: 27.00     Types: Cigarettes     Quit date: 4/8/2014   Smokeless Tobacco     Never Used       Labs:  Lab Results   Component Value Date    A1C 6.5 04/22/2019     Lab Results   Component Value Date     04/22/2019     Lab Results   Component Value Date    LDL 47 12/05/2018     HDL Cholesterol   Date Value Ref Range Status   12/05/2018 45 (L) >49 mg/dL Final   ]  GFR Estimate   Date Value Ref Range Status   12/05/2018 >90 >60 mL/min/1.7m2 Final     Comment:     Non "  GFR Calc     GFR Estimate If Black   Date Value Ref Range Status   2018 >90 >60 mL/min/1.7m2 Final     Comment:      GFR Calc     Lab Results   Component Value Date    CR 0.59 2018     No results found for: MICROALBUMIN    Healthy Eating  Healthy Eating Assessed Today: Yes  Cultural/Sikhism diet restrictions?: No  Meals include: Breakfast, lunch, dinner  Breakfast: 8-8:30 am- fruit  or a yogurt Yoplait on the weekdays but now has started to eat 1 toast and 2 eggs on the weekends.  Lunch: 12-leftovers like pasta or chicken, vegetable like brussel sprouts, green beans, water,  or  2 tacos or spaghetti   Dinner: 6 pm- gluten free pizza 3 pieces,  Barqs Root beer regular,  or sweet and sour chicken  Snacks: fruit and veggies or meat and cheese with crackers or chips  Other: summertime- popsicles up to 4 at a time. Likes Jolly Ranchers brand  Beverages: Soda, Water, Milk, Alcohol  Has patient met with a dietitian in the past?: No    Being Active  Being Active Assessed Today: Yes  Exercise:: Yes  Days per week of moderate to strenuous exercise (like a brisk walk): 2  On average, minutes per day of exercise at this level: 70  How intense was your typical exercise? : Moderate (like brisk walking)(walk on the treadmill for 30 minutes and lifts weights.)  Exercise Minutes per Week: 140  Barrier to exercise: None    Monitoring  Monitoring Assessed Today: Yes  Did patient bring glucose meter to appointment? : No  Blood Glucose Meter: One Touch, (Verio)  Home Glucose (Sugar) Monitorin-2 times per day  Blood glucose trend: Decreasing steadily  Low Glucose Range (mg/dL): 110-130  High Glucose Range (mg/dL): 130-140      Taking Medications    Taking Medication Assessed Today: Yes  Current Treatments: No medications for diabetes       Healthy Coping  Emotional response to diabetes: Ready to learn  Patient Activation Measure Survey Score:  No flowsheet data found.    ASSESSMENT:  Pt  feeling good about the changes she has been able to make and continues to work on. She feels supported by her friends.  Pt states that she feels really good after she works out.  She states that she has been able to drink more water and less regular soda and wants to continue to work on this.  Pt can benefit from consistent meals and snacks and continuing to exercise.    Goals Addressed as of 5/22/2019 at 4:55 PM                 Today      Healthy Eating (pt-stated)   100%    Added 5/1/19 by Bianka Ball RN     My Goal: I will decrease my regular soda intake down to every other day 1 can.    What I need to meet my goal:  Motivation to improve my diabetes control    I plan to meet my goal by this date: 2 weeks          Healthy Eating (pt-stated)   0%    Added 5/22/19 by Bianka Ball RN     My Goal: I will continue to work to decrease my regular soda intake. Goal is to eliminate it from my diet.    What I need to meet my goal: Family support and motivation to improve my health.    I plan to meet my goal by this date: June 23, 2019            Patient's most recent   Lab Results   Component Value Date    A1C 6.5 04/22/2019    is meeting goal of <7.0    INTERVENTION:   Diabetes knowledge and skills assessment:     Patient is knowledgeable in diabetes management concepts related to: Healthy Eating, Being Active and Monitoring    Patient needs further education on the following diabetes management concepts: Healthy Eating, Taking Medication, Problem Solving, Reducing Risks and Healthy Coping    Based on learning assessment above, most appropriate setting for further diabetes education would be: Individual setting.    Education provided today on:  AADE Self-Care Behaviors:  Healthy Eating: consistency in amount, composition, and timing of food intake, weight reduction and portion control  Being Active: relationship to blood glucose  Monitoring: individual blood glucose targets,   Taking Medication: medications  that are available to help control blood glucose levels, when people need them and side effects, actions  Healthy Coping: benefits of making appropriate lifestyle changes and utilize support systems    Opportunities for ongoing education and support in diabetes-self management were discussed.    Pt verbalized understanding of concepts discussed and recommendations provided today.       Education Materials Provided:  [place] Taking Charge of Your Diabetes Book    PLAN:  See Patient Instructions for co-developed, patient-stated behavior change goals.  AVS printed and provided to patient today. See Follow-Up section for recommended follow-up.  Pt to call if concerns and follow up in a month.    Bianka Ball RN  BSN CDE    Time Spent: 45 minutes  Encounter Type: Individual    Any diabetes medication dose changes were made via the CDE Protocol and Collaborative Practice Agreement with the patient's referring provider.

## 2019-05-23 ENCOUNTER — OFFICE VISIT (OUTPATIENT)
Dept: PSYCHOLOGY | Facility: CLINIC | Age: 49
End: 2019-05-23
Payer: COMMERCIAL

## 2019-05-23 DIAGNOSIS — F41.9 ANXIETY DISORDER, UNSPECIFIED TYPE: Primary | ICD-10-CM

## 2019-05-23 PROCEDURE — 90834 PSYTX W PT 45 MINUTES: CPT | Performed by: MARRIAGE & FAMILY THERAPIST

## 2019-05-23 ASSESSMENT — ANXIETY QUESTIONNAIRES
3. WORRYING TOO MUCH ABOUT DIFFERENT THINGS: NOT AT ALL
5. BEING SO RESTLESS THAT IT IS HARD TO SIT STILL: NOT AT ALL
7. FEELING AFRAID AS IF SOMETHING AWFUL MIGHT HAPPEN: NOT AT ALL
6. BECOMING EASILY ANNOYED OR IRRITABLE: NOT AT ALL
GAD7 TOTAL SCORE: 0
2. NOT BEING ABLE TO STOP OR CONTROL WORRYING: NOT AT ALL
1. FEELING NERVOUS, ANXIOUS, OR ON EDGE: NOT AT ALL

## 2019-05-23 ASSESSMENT — PATIENT HEALTH QUESTIONNAIRE - PHQ9
5. POOR APPETITE OR OVEREATING: NOT AT ALL
SUM OF ALL RESPONSES TO PHQ QUESTIONS 1-9: 0

## 2019-05-23 NOTE — PROGRESS NOTES
Progress Note    Client Name: Flor Fernandez  Date: 5/23/19         Service Type: Individual      Session Start Time: 12:01  Session End Time: 12:53      Session Length: 38-52     Session #: 25     Attendees: Client attended alone    Treatment Plan Last Reviewed: 4/5/19, next due 7/5/19.  PHQ-9 / RODRIGUE-7 : completed.     DATA      Progress Since Last Session (Related to Symptoms / Goals / Homework):   Symptoms: client reported increased anxiety.    Homework: Achieved / completed to satisfaction      Episode of Care Goals: Minimal progress - ACTION (Actively working towards change); Intervened by reinforcing change plan / affirming steps taken     Current / Ongoing Stressors and Concerns:   Client reported that her son had a panic attack regarding getting 3 speeding tickets, warrants for arrest, suspended license, and fines.  Client reported that she has been diagnosed with diabetes.  Client reported feeling overwhelmed by her family members not contributing.     Treatment Objective(s) Addressed in This Session:   use cognitive strategies identified in therapy to challenge anxious thoughts  learn & utilize at least some assertive communication skills weekly      Intervention:   CBT: reviewed with client letting go of things outside of her control (e.g. family members' outcomes).  Interpersonal Therapy: reviewed with client plan for family therapy session.        ASSESSMENT: Current Emotional / Mental Status (status of significant symptoms):   Risk status (Self / Other harm or suicidal ideation)   Client denies current fears or concerns for personal safety.   Client denies current or recent suicidal ideation or behaviors.   Client denies current or recent homicidal ideation or behaviors.   Client denies current or recent self injurious behavior or ideation.   Client denies other safety concerns.   A safety and risk management plan has not been developed at this time,  however client was given the after-hours number / 911 should there be a change in any of these risk factors.     Appearance:   Appropriate    Eye Contact:   Good    Psychomotor Behavior: Normal    Attitude:   Cooperative    Orientation:   All   Speech    Rate / Production: Normal     Volume:  Normal    Mood:    Anxious  Normal   Affect:    Appropriate    Thought Content:  Clear    Thought Form:  Coherent  Logical    Insight:    Good      Medication Review:   No current psychiatric medications prescribed     Medication Compliance:   NA     Changes in Health Issues:   None reported     Chemical Use Review:   Substance Use: Chemical use reviewed, no active concerns identified      Tobacco Use: No current tobacco use.       Collateral Reports Completed:   Not Applicable    PLAN: (Client Tasks / Therapist Tasks / Other)  Client agreed to continue to work to let go of things outside of her control (other family members's outcomes) between now and follow-up family therapy session in five weeks.        JENNIFER Horowitz                                                         ________________________________________________________________________    Treatment Plan    Client's Name: Flor Fernandez  YOB: 1970    Date: 4/5/19    DSM-V Diagnoses: 300.00 (F41.9) Unspecified Anxiety Disorder  Psychosocial / Contextual Factors: problems with primary support group: conflicts with client's children  WHODAS: 15    Referral / Collaboration:  Referral to another professional/service is not indicated at this time..    Anticipated number of session or this episode of care: 21-30      MeasurableTreatment Goal(s) related to diagnosis / functional impairment(s)  Goal 1: Client will successfully process through past trauma defined as reporting 0 Subjective Units of Distress related to trauma on 0-10 scale and 7 on Validity of (positive) Cognition scale about self on 1-7 scale   I will know I've met my goal when I  feel less stuck.       Objective #A (Client Action)    Status: Continued - Date: 4/5/19      Client will identify past traumatic events/memories which are causing current distress.     Intervention(s)  Therapist will take client's history and facilitate client's identification of targets for EMDR.     Objective #B  Client will complete needed assessment(s) and [facility] EMDR resourcing to confirm readiness for EMDR.    Status: Continued - Date: 4/5/19      Intervention(s)  Therapist will administer Dissociative Experiences Scale, Multidimensional Inventory of Dissociation as needed and complete [facility] EMDR resourcing with client.     Objective #C  Client will engage in installing at least 2 EMDR resources.  Status: Continued - Date: 4/5/19      Intervention(s)  Therapist will complete EMDR resourcing (Container, Remote Control, grounding/progressive muscle relaxation, Light Stream, Inner Advisor) with client.     Objective #D (Client Action)    Status: Continued- Date: 4/5/19      Client will engage in reprocessing all past traumatic event/memory targets.     Intervention(s)   Therapist will complete EMDR reprocessing with client.    Goal 2: Client will increase overall baseline calm mindset by 2 points on a 1-10 Likert scale per self-report (10 = optimal calm mindset).    I will know I've met my goal when I feel less anxious.      Objective #A (Client Action)    Status: Continued - Date: 4/5/19     Client will use cognitive strategies identified in therapy to challenge anxious thoughts.    Intervention(s)  Therapist will teach emotional regulation skills. CBT/REBT ABCD model.    Objective #B  Client will use at least 2 new coping skills for anxiety management in the next 12 weeks.    Status: Continued - Date: 4/5/19     Intervention(s)  Therapist will teach emotional regulation skills. DBT Core Mindfulness, Distress Tolerance, Emotion Regulation, and Interpersonal Effectiveness skills.    Goal 3: Client will  improve her interpersonal functioning/relationships by 2 points on a 1-10 Likert scale per self-report (10 = optimal interpersonal functioning/relationships).    I will know I've met my goal when my relationships with my children have improved.      Objective #A (Client Action)    Status: Continued - Date(s): 4/5/19     Client will learn & utilize at least 2 new assertive communication skills weekly.    Intervention(s)  Therapist will teach Nonviolent Communication and DBT Interpersonal Effectiveness skills..    Client has reviewed and agreed to the above plan.      Vikas Perez, LMFT  May 23, 2019

## 2019-05-24 ASSESSMENT — ANXIETY QUESTIONNAIRES: GAD7 TOTAL SCORE: 0

## 2019-06-07 ENCOUNTER — OFFICE VISIT (OUTPATIENT)
Dept: INTERNAL MEDICINE | Facility: CLINIC | Age: 49
End: 2019-06-07
Payer: COMMERCIAL

## 2019-06-07 VITALS
OXYGEN SATURATION: 97 % | BODY MASS INDEX: 45 KG/M2 | HEIGHT: 66 IN | DIASTOLIC BLOOD PRESSURE: 68 MMHG | WEIGHT: 280 LBS | SYSTOLIC BLOOD PRESSURE: 134 MMHG | RESPIRATION RATE: 16 BRPM | HEART RATE: 109 BPM | TEMPERATURE: 98.3 F

## 2019-06-07 DIAGNOSIS — E11.9 TYPE 2 DIABETES MELLITUS WITHOUT COMPLICATION, WITHOUT LONG-TERM CURRENT USE OF INSULIN (H): Primary | ICD-10-CM

## 2019-06-07 DIAGNOSIS — N95.1 MENOPAUSAL SYNDROME (HOT FLASHES): ICD-10-CM

## 2019-06-07 DIAGNOSIS — R20.2 PARESTHESIA: ICD-10-CM

## 2019-06-07 PROCEDURE — 99207 C FOOT EXAM  NO CHARGE: CPT | Performed by: INTERNAL MEDICINE

## 2019-06-07 PROCEDURE — 99213 OFFICE O/P EST LOW 20 MIN: CPT | Performed by: INTERNAL MEDICINE

## 2019-06-07 RX ORDER — METFORMIN HCL 500 MG
500 TABLET, EXTENDED RELEASE 24 HR ORAL
Qty: 30 TABLET | Refills: 3 | Status: SHIPPED | OUTPATIENT
Start: 2019-06-07 | End: 2019-09-24

## 2019-06-07 ASSESSMENT — MIFFLIN-ST. JEOR: SCORE: 1914.84

## 2019-06-07 ASSESSMENT — PAIN SCALES - GENERAL: PAINLEVEL: MODERATE PAIN (4)

## 2019-06-07 NOTE — PATIENT INSTRUCTIONS
Make a diabetic eye exam.    Start metformin 1 tab with dinner.  Call with blood sugars in 2 weeks.  We can add on Ozempic at that time.  Labs end of July.  Urine sample asap.

## 2019-06-07 NOTE — PROGRESS NOTES
Subjective     Flor Fernandez is a 49 year old female who presents to clinic today for the following health issues:    HPI   Diabetes Follow-up      How often are you checking your blood sugar? One time daily    What time of day are you checking your blood sugars (select all that apply)?  Before meals    Have you had any blood sugars above 200?  No    Have you had any blood sugars below 70?  No    What symptoms do you notice when your blood sugar is low?  Shaky    What concerns do you have today about your diabetes? None and Other: numbness and tingling in her toes, can be pin needle like.  They are sensitive.  Walking makes it worse.       Do you have any of these symptoms? (Select all that apply)  Numbness in feet     Have you had a diabetic eye exam in the last 12 months? No     110-156 blood sugar, in the afternoon she is lower.      BP Readings from Last 2 Encounters:   06/07/19 134/68   04/10/19 132/78     Hemoglobin A1C (%)   Date Value   04/22/2019 6.5 (H)   12/07/2018 6.1 (H)     LDL Cholesterol Calculated (mg/dL)   Date Value   12/05/2018 47   05/15/2018 68       Diabetes Management Resources    Amount of exercise or physical activity: 2-3 days/week for an average of 30-45 minutes    Problems taking medications regularly: No    Medication side effects: none    Diet: lower sugar diet.    Constantly on edge.  Attributes this to menopause.  She is more tired.  Her periods are irregular  She is having hot flashes and night sweats.  She is getting less sleep at night as well.  Max sleep is 5 hours.       Patient Active Problem List   Diagnosis     Vertigo     Hyperlipidemia LDL goal <100     Tachycardia     Benign essential hypertension     History of stroke     Iron deficiency     Anxiety disorder, unspecified type     Chronic low back pain without sciatica, unspecified back pain laterality     BMI 45.0-49.9, adult (H)     Diabetes mellitus, type 2 (H)     Past Surgical History:   Procedure Laterality Date      APPENDECTOMY       [facility] SURGICAL PATHOLOGY  2016     MAMMOPLASTY REDUCTION BILATERAL         Social History     Tobacco Use     Smoking status: Former Smoker     Packs/day: 0.50     Years: 27.00     Pack years: 13.50     Types: Cigarettes     Last attempt to quit: 2014     Years since quittin.1     Smokeless tobacco: Never Used   Substance Use Topics     Alcohol use: No     Family History   Problem Relation Age of Onset     Other Cancer Mother      Asthma Mother      Diabetes Father      Glaucoma No family hx of      Macular Degeneration No family hx of          Current Outpatient Medications   Medication Sig Dispense Refill     albuterol (PROVENTIL) (2.5 MG/3ML) 0.083% neb solution Take 1 vial (2.5 mg) by nebulization every 6 hours as needed for shortness of breath / dyspnea or wheezing 25 vial 1     atorvastatin (LIPITOR) 20 MG tablet TAKE ONE TABLET BY MOUTH EVERY DAY 90 tablet 2     blood glucose (NO BRAND SPECIFIED) lancets standard Use to test blood sugar 1 time daily or as directed. 100 each 1     blood glucose (NO BRAND SPECIFIED) test strip Use to test blood sugar 1 time daily or as directed. 100 each 1     blood glucose calibration (NO BRAND SPECIFIED) solution Use to calibrate blood glucose monitor as needed as directed. 2 each 1     blood glucose monitoring (NO BRAND SPECIFIED) meter device kit Use to test blood sugar 1 time daily or as directed. 1 kit 0     carvedilol (COREG) 6.25 MG tablet Take 1 tablet (6.25 mg) by mouth 2 times daily (with meals) 180 tablet 11     chlorthalidone (HYGROTON) 25 MG tablet Take 1 tablet (25 mg) by mouth daily 90 tablet 3     [facility] ASPIRIN 325 MG tablet TAKE 1 TABLET BY MOUTH ONCE DAILY WITH A MEAL.  0     cyanocobalamin (VITAMIN  B-12) 1000 MCG tablet Take 1,000 mcg by mouth daily       ferrous fumarate 65 mg, [place]. FE,-Vitamin C 125 mg (VITRON C)  MG TABS tablet Take 1 tablet by mouth every other day       fluticasone (FLONASE) 50 MCG/ACT spray  "Spray 1-2 sprays into both nostrils daily 1 Bottle 11     lisinopril (PRINIVIL/ZESTRIL) 10 MG tablet Take 1 tablet (10 mg) by mouth daily 90 tablet 3     metFORMIN (GLUCOPHAGE-XR) 500 MG 24 hr tablet Take 1 tablet (500 mg) by mouth daily (with dinner) 30 tablet 3     Multiple Vitamins-Minerals (MULTI-VITAMIN GUMMIES) CHEW        lidocaine (LIDODERM) 5 % Patch Apply up to 3 patches to painful area at once for up to 12 h within a 24 h period.  Remove after 12 hours. (Patient not taking: Reported on 5/1/2019) 30 patch 0     No Known Allergies  Recent Labs   Lab Test 04/22/19  0821 12/07/18  0804 12/05/18  0732 05/15/18  1534 11/08/17  1407  02/17/17  0804  04/25/16   A1C 6.5* 6.1*  --   --   --   --   --   --  5.3   LDL  --   --  47 68  --   --  46   < >  --    HDL  --   --  45* 44*  --   --  46*   < >  --    TRIG  --   --  144 130  --   --  181*   < >  --    ALT  --   --  32  --   --   --  33  --   --    CR  --   --  0.59  --  0.51*   < >  --    < >  --    GFRESTIMATED  --   --  >90  --  >90   < >  --    < >  --    GFRESTBLACK  --   --  >90  --  >90   < >  --    < >  --    POTASSIUM 3.5  --  3.7  --  3.5   < >  --    < >  --    TSH  --   --   --   --   --   --  2.84  --   --     < > = values in this interval not displayed.          Reviewed and updated as needed this visit by Provider  Tobacco  Allergies  Meds  Problems  Med Hx  Surg Hx  Fam Hx         Review of Systems    ROS: 10 point ROS neg other than the symptoms noted above in the HPI.       Objective    /68 (BP Location: Right arm, Cuff Size: Adult Large)   Pulse 109   Temp 98.3  F (36.8  C) (Oral)   Resp 16   Ht 1.665 m (5' 5.56\")   Wt 127 kg (280 lb)   SpO2 97%   BMI 45.80 kg/m    Body mass index is 45.8 kg/m .  Physical Exam   GENERAL APPEARANCE: healthy, alert and no distress  RESP: lungs clear to auscultation - no rales, rhonchi or wheezes  CV: regular rates and rhythm and normal S1 S2, no S3 or S4  PSYCH: mentation appears normal. and " affect normal/bright  No edema   Sensory exam of the foot is normal, tested with the monofilament. Good pulses, no lesions or ulcers, good peripheral pulses.     Diagnostic Test Results:  Labs reviewed in Epic  Results for orders placed or performed in visit on 04/22/19   **Potassium FUTURE anytime   Result Value Ref Range    Potassium 3.5 3.4 - 5.3 mmol/L   CBC with platelets differential   Result Value Ref Range    WBC 9.3 4.0 - 11.0 10e9/L    RBC Count 4.62 3.8 - 5.2 10e12/L    Hemoglobin 13.6 11.7 - 15.7 g/dL    Hematocrit 42.8 35.0 - 47.0 %    MCV 93 78 - 100 fl    MCH 29.4 26.5 - 33.0 pg    MCHC 31.8 31.5 - 36.5 g/dL    RDW 13.8 10.0 - 15.0 %    Platelet Count 384 150 - 450 10e9/L    % Neutrophils 65.1 %    % Lymphocytes 26.9 %    % Monocytes 6.0 %    % Eosinophils 1.7 %    % Basophils 0.3 %    Absolute Neutrophil 6.1 1.6 - 8.3 10e9/L    Absolute Lymphocytes 2.5 0.8 - 5.3 10e9/L    Absolute Monocytes 0.6 0.0 - 1.3 10e9/L    Absolute Eosinophils 0.2 0.0 - 0.7 10e9/L    Absolute Basophils 0.0 0.0 - 0.2 10e9/L    Diff Method Automated Method    **Glucose FUTURE anytime   Result Value Ref Range    Glucose 154 (H) 70 - 99 mg/dL   **A1C FUTURE anytime   Result Value Ref Range    Hemoglobin A1C 6.5 (H) 0 - 5.6 %           Assessment & Plan     1. Type 2 diabetes mellitus without complication, without long-term current use of insulin (H)  Per patient instructions. Will add on glp1 agonist to help with weight loss if possible Per patient instructions.   - metFORMIN (GLUCOPHAGE-XR) 500 MG 24 hr tablet; Take 1 tablet (500 mg) by mouth daily (with dinner)  Dispense: 30 tablet; Refill: 3  - Albumin Random Urine Quantitative with Creat Ratio; Future  - FOOT EXAM  - Basic metabolic panel; Future  - Hemoglobin A1c; Future  - TSH with free T4 reflex; Future    2. Paresthesia  Will monitor.  Could be related to diabetes    3. Menopausal syndrome (hot flashes)  Discussed with patient options.  Would avoid hrt due to stroke  "history.  Ssri or gabapentin are options.  She will discuss with gyn        BMI:   Estimated body mass index is 45.8 kg/m  as calculated from the following:    Height as of this encounter: 1.665 m (5' 5.56\").    Weight as of this encounter: 127 kg (280 lb).   Weight management plan: glp1 agonist planned        Patient Instructions   Make a diabetic eye exam.    Start metformin 1 tab with dinner.  Call with blood sugars in 2 weeks.  We can add on Ozempic at that time.  Labs end of July.  Urine sample asap.     Return in about 2 months (around 8/9/2019) for Diabetic check.    Nayeli Barnard MD  [facility]        "

## 2019-06-10 ENCOUNTER — TELEPHONE (OUTPATIENT)
Dept: INTERNAL MEDICINE | Facility: CLINIC | Age: 49
End: 2019-06-10

## 2019-06-10 NOTE — TELEPHONE ENCOUNTER
Panel Management Review      Patient has the following on her problem list:     Diabetes    ASA: Passed    Last A1C  Lab Results   Component Value Date    A1C 6.5 04/22/2019    A1C 6.1 12/07/2018    A1C 5.3 04/25/2016     A1C tested: FAILED    Last LDL:    Lab Results   Component Value Date    CHOL 121 12/05/2018     Lab Results   Component Value Date    HDL 45 12/05/2018     Lab Results   Component Value Date    LDL 47 12/05/2018     Lab Results   Component Value Date    TRIG 144 12/05/2018     No results found for: CHOLHDLRATIO  Lab Results   Component Value Date    NHDL 76 12/05/2018       Is the patient on a Statin? YES             Is the patient on Aspirin? YES    Medications     HMG CoA Reductase Inhibitors     atorvastatin (LIPITOR) 20 MG tablet       Salicylates     [facility] ASPIRIN 325 MG tablet             Last three blood pressure readings:  BP Readings from Last 3 Encounters:   06/07/19 134/68   04/10/19 132/78   04/08/19 145/83       Date of last diabetes office visit: 6/7/2019     Tobacco History:     History   Smoking Status     Former Smoker     Packs/day: 0.50     Years: 27.00     Types: Cigarettes     Quit date: 4/8/2014   Smokeless Tobacco     Never Used         Hypertension   Last three blood pressure readings:  BP Readings from Last 3 Encounters:   06/07/19 134/68   04/10/19 132/78   04/08/19 145/83     Blood pressure: MONITOR    HTN Guidelines:  Less than 140/90      Composite cancer screening  Chart review shows that this patient is due/due soon for the following None  Summary:    Patient is due/failing the following:   Diabetes eye exam     Action needed:   None, patient has diabetes eye exam scheduled for 7/2/2019    Type of outreach:    None, patient has eye appointment scheduled for 7/2/2019    Questions for provider review:    None                                                                                                                                    LS     Chart routed to none  .

## 2019-07-01 ENCOUNTER — OFFICE VISIT (OUTPATIENT)
Dept: PSYCHOLOGY | Facility: CLINIC | Age: 49
End: 2019-07-01
Payer: COMMERCIAL

## 2019-07-01 DIAGNOSIS — F41.9 ANXIETY DISORDER, UNSPECIFIED TYPE: Primary | ICD-10-CM

## 2019-07-01 PROCEDURE — 90847 FAMILY PSYTX W/PT 50 MIN: CPT | Performed by: MARRIAGE & FAMILY THERAPIST

## 2019-07-01 ASSESSMENT — ANXIETY QUESTIONNAIRES
6. BECOMING EASILY ANNOYED OR IRRITABLE: SEVERAL DAYS
1. FEELING NERVOUS, ANXIOUS, OR ON EDGE: NOT AT ALL
GAD7 TOTAL SCORE: 1
2. NOT BEING ABLE TO STOP OR CONTROL WORRYING: NOT AT ALL
4. TROUBLE RELAXING: NOT AT ALL
3. WORRYING TOO MUCH ABOUT DIFFERENT THINGS: NOT AT ALL
7. FEELING AFRAID AS IF SOMETHING AWFUL MIGHT HAPPEN: NOT AT ALL
GAD7 TOTAL SCORE: 1
5. BEING SO RESTLESS THAT IT IS HARD TO SIT STILL: NOT AT ALL
7. FEELING AFRAID AS IF SOMETHING AWFUL MIGHT HAPPEN: NOT AT ALL
GAD7 TOTAL SCORE: 1

## 2019-07-01 ASSESSMENT — PATIENT HEALTH QUESTIONNAIRE - PHQ9
10. IF YOU CHECKED OFF ANY PROBLEMS, HOW DIFFICULT HAVE THESE PROBLEMS MADE IT FOR YOU TO DO YOUR WORK, TAKE CARE OF THINGS AT HOME, OR GET ALONG WITH OTHER PEOPLE: NOT DIFFICULT AT ALL
SUM OF ALL RESPONSES TO PHQ QUESTIONS 1-9: 0
SUM OF ALL RESPONSES TO PHQ QUESTIONS 1-9: 0

## 2019-07-01 NOTE — PROGRESS NOTES
"                                             Progress Note    Client Name: Flor Fernandez  Date: 7/1/19         Service Type: Family with client present      Session Start Time: 5:00  Session End Time: 5:52      Session Length: 38-52     Session #: 26     Attendees: Client, Mother and Daughter    Treatment Plan Last Reviewed: 4/5/19, next due 7/5/19.  PHQ-9 / RODRIGUE-7 : completed.     DATA      Progress Since Last Session (Related to Symptoms / Goals / Homework):   Symptoms: Worsening client reported increased RODRIGUE-7 score.    Homework: Partially completed             Episode of Care Goals: Minimal progress - ACTION (Actively working towards change); Intervened by reinforcing change plan / affirming steps taken     Current / Ongoing Stressors and Concerns:   Client and her mother and daughter reported regarding frustrations that client's son did not attend session and related conflicts.  Client reported that she has been increasingly anxious regarding family dynamics and wanting improvement.  Client's daughter reported feeling overwhelmed by feeling that her family members need her to \"fix\" said dynamics.     Treatment Objective(s) Addressed in This Session:   learn & utilize at least some assertive communication skills weekly      Intervention:   CBT: re-reviewed with client and mother and daughter letting go of things outside of their control (e.g. other family members' outcomes, especially client's son).  Interpersonal Therapy: reviewed with client and her mother and daughter assertively/directly communicating their needs and wants from one another..        ASSESSMENT: Current Emotional / Mental Status (status of significant symptoms):   Risk status (Self / Other harm or suicidal ideation)   Client denies current fears or concerns for personal safety.   Client denies current or recent suicidal ideation or behaviors.   Client denies current or recent homicidal ideation or behaviors.   Client denies current or recent " self injurious behavior or ideation.   Client denies other safety concerns.   A safety and risk management plan has not been developed at this time, however client was given the after-hours number / 911 should there be a change in any of these risk factors.     Appearance:   Appropriate    Eye Contact:   Good    Psychomotor Behavior: Normal    Attitude:   Cooperative    Orientation:   All   Speech    Rate / Production: Normal     Volume:  Normal    Mood:    Anxious  Normal   Affect:    Appropriate    Thought Content:  Clear    Thought Form:  Coherent  Logical    Insight:    Fair      Medication Review:   No current psychiatric medications prescribed     Medication Compliance:   NA     Changes in Health Issues:   None reported     Chemical Use Review:   Substance Use: Chemical use reviewed, no active concerns identified      Tobacco Use: No current tobacco use.       Collateral Reports Completed:   Not Applicable    PLAN: (Client Tasks / Therapist Tasks / Other)  Client and her mother and daughter agreed to continue to work to let go of things outside of their control (other family members's outcomes) and to assertively/directly communicating their needs and wants from one another between now and follow-up family therapy session in three weeks.        Vikas Perez, [facility]                                                         ________________________________________________________________________    Treatment Plan    Client's Name: Flor Fernandez  YOB: 1970    Date: 4/5/19    DSM-V Diagnoses: 300.00 (F41.9) Unspecified Anxiety Disorder  Psychosocial / Contextual Factors: problems with primary support group: conflicts with client's children  WHODAS: 15    Referral / Collaboration:  Referral to another professional/service is not indicated at this time..    Anticipated number of session or this episode of care: 21-30      MeasurableTreatment Goal(s) related to diagnosis / functional  impairment(s)  Goal 1: Client will successfully process through past trauma defined as reporting 0 Subjective Units of Distress related to trauma on 0-10 scale and 7 on Validity of (positive) Cognition scale about self on 1-7 scale   I will know I've met my goal when I feel less stuck.       Objective #A (Client Action)    Status: Continued - Date: 4/5/19      Client will identify past traumatic events/memories which are causing current distress.     Intervention(s)  Therapist will take client's history and facilitate client's identification of targets for EMDR.     Objective #B  Client will complete needed assessment(s) and [facility] EMDR resourcing to confirm readiness for EMDR.    Status: Continued - Date: 4/5/19      Intervention(s)  Therapist will administer Dissociative Experiences Scale, Multidimensional Inventory of Dissociation as needed and complete [facility] EMDR resourcing with client.     Objective #C  Client will engage in installing at least 2 EMDR resources.  Status: Continued - Date: 4/5/19      Intervention(s)  Therapist will complete EMDR resourcing (Container, Remote Control, grounding/progressive muscle relaxation, Light Stream, Inner Advisor) with client.     Objective #D (Client Action)    Status: Continued- Date: 4/5/19      Client will engage in reprocessing all past traumatic event/memory targets.     Intervention(s)   Therapist will complete EMDR reprocessing with client.    Goal 2: Client will increase overall baseline calm mindset by 2 points on a 1-10 Likert scale per self-report (10 = optimal calm mindset).    I will know I've met my goal when I feel less anxious.      Objective #A (Client Action)    Status: Continued - Date: 4/5/19     Client will use cognitive strategies identified in therapy to challenge anxious thoughts.    Intervention(s)  Therapist will teach emotional regulation skills. CBT/REBT ABCD model.    Objective #B  Client will use at least 2 new coping skills for  anxiety management in the next 12 weeks.    Status: Continued - Date: 4/5/19     Intervention(s)  Therapist will teach emotional regulation skills. DBT Core Mindfulness, Distress Tolerance, Emotion Regulation, and Interpersonal Effectiveness skills.    Goal 3: Client will improve her interpersonal functioning/relationships by 2 points on a 1-10 Likert scale per self-report (10 = optimal interpersonal functioning/relationships).    I will know I've met my goal when my relationships with my children have improved.      Objective #A (Client Action)    Status: Continued - Date(s): 4/5/19     Client will learn & utilize at least 2 new assertive communication skills weekly.    Intervention(s)  Therapist will teach Nonviolent Communication and DBT Interpersonal Effectiveness skills..    Client has reviewed and agreed to the above plan.      JENNIFER Horowitz  July 1, 2019

## 2019-07-02 ENCOUNTER — OFFICE VISIT (OUTPATIENT)
Dept: OPTOMETRY | Facility: CLINIC | Age: 49
End: 2019-07-02
Payer: COMMERCIAL

## 2019-07-02 DIAGNOSIS — E11.9 TYPE 2 DIABETES MELLITUS WITHOUT RETINOPATHY (H): ICD-10-CM

## 2019-07-02 DIAGNOSIS — H52.4 PRESBYOPIA: ICD-10-CM

## 2019-07-02 DIAGNOSIS — Z01.01 ENCOUNTER FOR EXAMINATION OF EYES AND VISION WITH ABNORMAL FINDINGS: Primary | ICD-10-CM

## 2019-07-02 DIAGNOSIS — H52.13 MYOPIA OF BOTH EYES: ICD-10-CM

## 2019-07-02 DIAGNOSIS — H52.223 REGULAR ASTIGMATISM OF BOTH EYES: ICD-10-CM

## 2019-07-02 PROCEDURE — 92014 COMPRE OPH EXAM EST PT 1/>: CPT | Performed by: OPTOMETRIST

## 2019-07-02 PROCEDURE — 92015 DETERMINE REFRACTIVE STATE: CPT | Performed by: OPTOMETRIST

## 2019-07-02 ASSESSMENT — REFRACTION_WEARINGRX
OS_CYLINDER: +0.50
OS_SPHERE: -1.00
OD_ADD: +1.25
OD_SPHERE: -0.50
OS_ADD: +1.25
OD_SPHERE: +0.75
OS_SPHERE: +0.25
OD_AXIS: 180
OD_CYLINDER: +0.50
OS_CYLINDER: +0.50
OS_AXIS: 180
OS_AXIS: 180
OD_AXIS: 180
OD_CYLINDER: +0.50

## 2019-07-02 ASSESSMENT — REFRACTION_MANIFEST
OD_ADD: +2.00
OS_CYLINDER: +0.50
OS_ADD: +2.00
OS_AXIS: 175
OS_SPHERE: -1.25
OD_CYLINDER: +0.75
OD_SPHERE: -0.75
OD_AXIS: 175

## 2019-07-02 ASSESSMENT — PATIENT HEALTH QUESTIONNAIRE - PHQ9: SUM OF ALL RESPONSES TO PHQ QUESTIONS 1-9: 0

## 2019-07-02 ASSESSMENT — CONF VISUAL FIELD
OS_NORMAL: 1
OD_NORMAL: 1

## 2019-07-02 ASSESSMENT — TONOMETRY
IOP_METHOD: APPLANATION
OD_IOP_MMHG: 15
OS_IOP_MMHG: 16

## 2019-07-02 ASSESSMENT — VISUAL ACUITY
OD_SC: 20/40
METHOD: SNELLEN - LINEAR
OD_SC: 20/25
OS_SC: 20/40
OS_SC: 20/30 -1

## 2019-07-02 ASSESSMENT — EXTERNAL EXAM - RIGHT EYE: OD_EXAM: NORMAL

## 2019-07-02 ASSESSMENT — SLIT LAMP EXAM - LIDS
COMMENTS: NORMAL
COMMENTS: NORMAL,

## 2019-07-02 ASSESSMENT — CUP TO DISC RATIO
OD_RATIO: 0.3
OS_RATIO: 0.3

## 2019-07-02 ASSESSMENT — ANXIETY QUESTIONNAIRES: GAD7 TOTAL SCORE: 1

## 2019-07-02 ASSESSMENT — EXTERNAL EXAM - LEFT EYE: OS_EXAM: NORMAL

## 2019-07-02 NOTE — PATIENT INSTRUCTIONS
Patient educated on importance of good blood sugar control.  Letter sent to primary care provider with diabetic eye exam report.     Flor was advised of today's exam findings.  Optional to fill new glasses prescription, mild glasses prescription   Okay to use over the counter reading glasses; recommend +1.00 power  Copy of glasses Rx provided today.  Return in 1 year for eye exam, or sooner if needed.    The effects of the dilating drops last for 4- 6 hours.  You will be more sensitive to light and vision will be blurry up close.  Mydriatic sunglasses were given if needed.      Abner Sr O.D.  [address] [place]  [address]    [phone]

## 2019-07-02 NOTE — LETTER
7/2/2019         RE: Flor Fernandez  [address]  [place] [address]        Dear Colleague,    Thank you for referring your patient, Flor Fernandez, to the [facility]. Please see a copy of my visit note below.    Chief Complaint   Patient presents with     Diabetic Eye Exam     Accompanied by self  Lab Results   Component Value Date    A1C 6.5 04/22/2019    A1C 6.1 12/07/2018    A1C 5.3 04/25/2016       Last Eye Exam: 2 years ago  Dilated Previously: Yes    What are you currently using to see?  does not use glasses or contacts    Distance Vision Acuity: Satisfied with vision    Near Vision Acuity: Not satisfied     Eye Comfort: dry and itchy  Do you use eye drops? 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       OBJECTIVE: See Ophthalmology exam    ASSESSMENT:    ICD-10-CM    1. Encounter for examination of eyes and vision with abnormal findings Z01.01    2. Type 2 diabetes mellitus without retinopathy (H) E11.9    3. Myopia of both eyes H52.13    4. Regular astigmatism of both eyes H52.223    5. Presbyopia H52.4       PLAN:    Flor Fernandez aware  eye exam results will be sent to Nayeli Barnard.  Patient Instructions   Patient educated on importance of good blood sugar control.  Letter sent to primary care provider with diabetic eye exam report.     Flor was advised of today's exam findings.  Optional to fill new glasses prescription, mild glasses prescription   Okay to use over the counter reading glasses; recommend +1.00 power  Copy of glasses Rx provided today.  Return in 1 year for eye exam, or sooner if needed.    The effects of the dilating drops last for 4- 6 hours.  You will be more sensitive to light and vision will be blurry up close.  Mydriatic sunglasses were given if needed.      Abner Sr O.D.  [facility]  [address] [place], [place] [address].  [place]  [id number]    [phone]             Again, thank you for allowing me to participate in the care of your patient.        Sincerely,        Abner Sr OD

## 2019-07-22 ENCOUNTER — OFFICE VISIT (OUTPATIENT)
Dept: PSYCHOLOGY | Facility: CLINIC | Age: 49
End: 2019-07-22
Payer: COMMERCIAL

## 2019-07-22 DIAGNOSIS — F41.9 ANXIETY DISORDER, UNSPECIFIED TYPE: Primary | ICD-10-CM

## 2019-07-22 PROCEDURE — 90834 PSYTX W PT 45 MINUTES: CPT | Performed by: MARRIAGE & FAMILY THERAPIST

## 2019-07-22 ASSESSMENT — ANXIETY QUESTIONNAIRES
7. FEELING AFRAID AS IF SOMETHING AWFUL MIGHT HAPPEN: NOT AT ALL
2. NOT BEING ABLE TO STOP OR CONTROL WORRYING: NOT AT ALL
6. BECOMING EASILY ANNOYED OR IRRITABLE: NOT AT ALL
GAD7 TOTAL SCORE: 0
GAD7 TOTAL SCORE: 0
7. FEELING AFRAID AS IF SOMETHING AWFUL MIGHT HAPPEN: NOT AT ALL
3. WORRYING TOO MUCH ABOUT DIFFERENT THINGS: NOT AT ALL
1. FEELING NERVOUS, ANXIOUS, OR ON EDGE: NOT AT ALL
5. BEING SO RESTLESS THAT IT IS HARD TO SIT STILL: NOT AT ALL
4. TROUBLE RELAXING: NOT AT ALL
GAD7 TOTAL SCORE: 0

## 2019-07-22 ASSESSMENT — PATIENT HEALTH QUESTIONNAIRE - PHQ9
SUM OF ALL RESPONSES TO PHQ QUESTIONS 1-9: 0
SUM OF ALL RESPONSES TO PHQ QUESTIONS 1-9: 0
10. IF YOU CHECKED OFF ANY PROBLEMS, HOW DIFFICULT HAVE THESE PROBLEMS MADE IT FOR YOU TO DO YOUR WORK, TAKE CARE OF THINGS AT HOME, OR GET ALONG WITH OTHER PEOPLE: NOT DIFFICULT AT ALL

## 2019-07-22 NOTE — PROGRESS NOTES
"                                             Progress Note    Client Name: Flor Fernandez  Date: 7/22/19         Service Type: Individual      Session Start Time: 4:00  Session End Time: 4:52      Session Length: 38-52     Session #: 27     Attendees: Client attended alone    Treatment Plan Last Reviewed: 7/22/19, next due 10/22/19.  PHQ-9 / RODRIGUE-7 : completed.     DATA      Progress Since Last Session (Related to Symptoms / Goals / Homework):   Symptoms: Client reported decreased RODRIGUE-7 score while also endorsing increased anxiety.    Homework: Partially completed             Episode of Care Goals: Minimal progress - ACTION (Actively working towards change); Intervened by reinforcing change plan / affirming steps taken     Current / Ongoing Stressors and Concerns:   Client reported regarding increased work stress.  Client reported experiencing significant financial stress as well.  Client reported that her son continues to be stressor as he is not effectively handling his issues and asking client for money.     Treatment Objective(s) Addressed in This Session:   use at least some coping skills for anxiety management in the next four weeks  practice setting boundaries some times in the next four weeks      Intervention:   CBT: reviewed with client \"observe, don't absorb\" approach to others' negativity.  DBT: reviewed with client practicing self-care first approach.  Interpersonal Therapy: reviewed with client setting boundaries with her son.  Motivational Interviewing: used circular/Socratic questioning to elicit client's identification of her desired continued therapy goals.        ASSESSMENT: Current Emotional / Mental Status (status of significant symptoms):   Risk status (Self / Other harm or suicidal ideation)   Client denies current fears or concerns for personal safety.   Client denies current or recent suicidal ideation or behaviors.   Client denies current or recent homicidal ideation or behaviors.   Client " "denies current or recent self injurious behavior or ideation.   Client denies other safety concerns.   A safety and risk management plan has not been developed at this time, however client was given the after-hours number / 911 should there be a change in any of these risk factors.     Appearance:   Appropriate    Eye Contact:   Good    Psychomotor Behavior: Normal    Attitude:   Cooperative    Orientation:   All   Speech    Rate / Production: Normal     Volume:  Normal    Mood:    Anxious  Normal   Affect:    Appropriate    Thought Content:  Clear    Thought Form:  Coherent  Logical    Insight:    Fair      Medication Review:   No current psychiatric medications prescribed     Medication Compliance:   NA     Changes in Health Issues:   None reported     Chemical Use Review:   Substance Use: Chemical use reviewed, no active concerns identified      Tobacco Use: No current tobacco use.       Collateral Reports Completed:   Not Applicable    PLAN: (Client Tasks / Therapist Tasks / Other)  Client agreed to continue to work practicing \"observe, don't absorb\" and self-care first approaches, and to continue to maintain effective boundaries with her son between now and follow-up therapy session in four weeks.        Vikas Perez, [facility]                                                         ________________________________________________________________________    Treatment Plan    Client's Name: Flor Fernandez  YOB: 1970    Date: 7/22/19    DSM-V Diagnoses: 300.00 (F41.9) Unspecified Anxiety Disorder  Psychosocial / Contextual Factors: problems with primary support group: conflicts with client's children  WHODAS: 15    Referral / Collaboration:  Referral to another professional/service is not indicated at this time..    Anticipated number of session or this episode of care: 31+      MeasurableTreatment Goal(s) related to diagnosis / functional impairment(s)  Goal 1: Client will successfully " process through past trauma defined as reporting 0 Subjective Units of Distress related to trauma on 0-10 scale and 7 on Validity of (positive) Cognition scale about self on 1-7 scale   I will know I've met my goal when I feel less stuck.       Objective #A (Client Action)    Status: Continued - Date: 7/22/19      Client will identify past traumatic events/memories which are causing current distress.     Intervention(s)  Therapist will take client's history and facilitate client's identification of targets for EMDR.     Objective #B  Client will complete needed assessment(s) and [facility] EMDR resourcing to confirm readiness for EMDR.    Status: Continued - Date: 7/22/19      Intervention(s)  Therapist will administer Dissociative Experiences Scale, Multidimensional Inventory of Dissociation as needed and complete [facility] EMDR resourcing with client.     Objective #C  Client will engage in installing at least 2 EMDR resources.  Status: Continued - Date: 7/22/19      Intervention(s)  Therapist will complete EMDR resourcing (Container, Remote Control, grounding/progressive muscle relaxation, Light Stream, Inner Advisor) with client.     Objective #D (Client Action)    Status: Continued- Date: 7/22/19      Client will engage in reprocessing all past traumatic event/memory targets.     Intervention(s)   Therapist will complete EMDR reprocessing with client.    Goal 2: Client will increase overall baseline calm mindset by 2 points on a 1-10 Likert scale per self-report (10 = optimal calm mindset).    I will know I've met my goal when I feel less anxious.      Objective #A (Client Action)    Status: Continued - Date: 7/22/19     Client will use cognitive strategies identified in therapy to challenge anxious thoughts.    Intervention(s)  Therapist will teach emotional regulation skills. CBT/REBT ABCD model.    Objective #B  Client will use at least 2 new coping skills for anxiety management in the next 12  weeks.    Status: Continued - Date: 7/22/19     Intervention(s)  Therapist will teach emotional regulation skills. DBT Core Mindfulness, Distress Tolerance, Emotion Regulation, and Interpersonal Effectiveness skills.    Goal 3: Client will improve her interpersonal functioning/relationships by 2 points on a 1-10 Likert scale per self-report (10 = optimal interpersonal functioning/relationships).    I will know I've met my goal when my relationships with my children have improved.      Objective #A (Client Action)    Status: Continued - Date(s): 7/22/19     Client will learn & utilize at least 2 new assertive communication skills weekly.    Intervention(s)  Therapist will teach Nonviolent Communication and DBT Interpersonal Effectiveness skills..    Client has reviewed and agreed to the above plan.      Vikas Perez, LMFT  July 22, 2019

## 2019-07-23 ASSESSMENT — PATIENT HEALTH QUESTIONNAIRE - PHQ9: SUM OF ALL RESPONSES TO PHQ QUESTIONS 1-9: 0

## 2019-07-23 ASSESSMENT — ANXIETY QUESTIONNAIRES: GAD7 TOTAL SCORE: 0

## 2019-07-29 ENCOUNTER — OFFICE VISIT (OUTPATIENT)
Dept: INTERNAL MEDICINE | Facility: CLINIC | Age: 49
End: 2019-07-29
Payer: COMMERCIAL

## 2019-07-29 ENCOUNTER — ALLIED HEALTH/NURSE VISIT (OUTPATIENT)
Dept: NURSING | Facility: CLINIC | Age: 49
End: 2019-07-29
Payer: COMMERCIAL

## 2019-07-29 VITALS
SYSTOLIC BLOOD PRESSURE: 118 MMHG | OXYGEN SATURATION: 96 % | HEART RATE: 63 BPM | BODY MASS INDEX: 45 KG/M2 | DIASTOLIC BLOOD PRESSURE: 70 MMHG | WEIGHT: 280 LBS | HEIGHT: 66 IN

## 2019-07-29 DIAGNOSIS — E61.1 IRON DEFICIENCY: ICD-10-CM

## 2019-07-29 DIAGNOSIS — R42 DIZZINESS: Primary | ICD-10-CM

## 2019-07-29 DIAGNOSIS — H53.8 BLURRED VISION: ICD-10-CM

## 2019-07-29 DIAGNOSIS — F41.0 PANIC ATTACK: Primary | ICD-10-CM

## 2019-07-29 PROCEDURE — 99214 OFFICE O/P EST MOD 30 MIN: CPT | Performed by: INTERNAL MEDICINE

## 2019-07-29 PROCEDURE — 99207 ZZC NO CHARGE NURSE ONLY: CPT

## 2019-07-29 RX ORDER — LORAZEPAM 0.5 MG/1
0.5 TABLET ORAL EVERY 8 HOURS PRN
Qty: 30 TABLET | Refills: 0 | Status: SHIPPED | OUTPATIENT
Start: 2019-07-29 | End: 2020-02-17

## 2019-07-29 RX ORDER — HYDROXYZINE HYDROCHLORIDE 25 MG/1
25 TABLET, FILM COATED ORAL EVERY 8 HOURS PRN
Qty: 30 TABLET | Refills: 1 | Status: SHIPPED | OUTPATIENT
Start: 2019-07-29 | End: 2019-08-12

## 2019-07-29 ASSESSMENT — MIFFLIN-ST. JEOR: SCORE: 1913.88

## 2019-07-29 ASSESSMENT — ANXIETY QUESTIONNAIRES
6. BECOMING EASILY ANNOYED OR IRRITABLE: NOT AT ALL
1. FEELING NERVOUS, ANXIOUS, OR ON EDGE: SEVERAL DAYS
3. WORRYING TOO MUCH ABOUT DIFFERENT THINGS: SEVERAL DAYS
2. NOT BEING ABLE TO STOP OR CONTROL WORRYING: NOT AT ALL
IF YOU CHECKED OFF ANY PROBLEMS ON THIS QUESTIONNAIRE, HOW DIFFICULT HAVE THESE PROBLEMS MADE IT FOR YOU TO DO YOUR WORK, TAKE CARE OF THINGS AT HOME, OR GET ALONG WITH OTHER PEOPLE: SOMEWHAT DIFFICULT
5. BEING SO RESTLESS THAT IT IS HARD TO SIT STILL: NOT AT ALL
GAD7 TOTAL SCORE: 2
7. FEELING AFRAID AS IF SOMETHING AWFUL MIGHT HAPPEN: NOT AT ALL

## 2019-07-29 ASSESSMENT — PATIENT HEALTH QUESTIONNAIRE - PHQ9: 5. POOR APPETITE OR OVEREATING: NOT AT ALL

## 2019-07-29 NOTE — NURSING NOTE
Flor Fernandez is a 49 year old female who presents with blurred vision, dizziness, shakiness.     NURSING ASSESSMENT:  Description:  Blurred vision started this am. Felt like HR was elevated. Dizziness that feels like vertigo. Has been under a lot of stress. Denies chest pain, SOB. No headaches. BG taken and was 120. BP and HR WNL  Onset/duration:  Today  Precip. factors:     Associated symptoms:  See above  Improves/worsens symptoms:     LMP/preg/breast feeding:  NA  Last exam/Treatment:  6/7/19  Allergies: No Known Allergies     MEDICATIONS:   Taking medication(s) as prescribed? N/A  Taking over the counter medication(s?) N/A  Any medication side effects? Not Applicable    Any barriers to taking medication(s) as prescribed?  No  Medication(s) improving/managing symptoms?  N/A           NURSING PLAN: Huddle with provider, plan includes Pt to see provider today      RECOMMENDED DISPOSITION:  To office now, another person to drive - Pt present in clinic and will see provider  Will comply with recommendation: Yes  If further questions/concerns or if symptoms do not improve, worsen or new symptoms develop, call your PCP or [facility] as soon as possible.        Guideline used:  Telephone Triage Protocols for Nurses, Fifth Edition, Maite Galan RN

## 2019-07-29 NOTE — PATIENT INSTRUCTIONS
- Prescription for Zoloft ordered today. This is a daily medication regardless of how you feel.  It can take 4-6 weeks to take effect.  Start tomorrow morning.  - Ativan is habit forming but very effective for panic attacks.  - Hydroxyzine is non habit forming but can cause drowsiness.

## 2019-07-29 NOTE — LETTER
July 29, 2019      Flor Fernandez  [address]  [place] [id number]        To Whom It May Concern:    Flor Fernandez was seen in our clinic. She may return to work without restrictions on July 31, 2019.      Sincerely,        Nayeli Barnard MD

## 2019-07-29 NOTE — PROGRESS NOTES
Subjective     Flor Fernandez is a 49 year old female who presents to clinic today for the following health issues:    HPI   Chief Complaint   Patient presents with     Eye Problem      Blurred vision     Dizziness     Patient notes feeling stressed and anxiety. She felt tired coming into work today. She reports that she had blurred vision and felt like she was going to pass out. She didn't eat breakfast today and her blood sugar was 120, 30 minutes after eating lunch. Her blood pressure was 117/70 when checked at the pharmacy. She notes constant anxiety today. She feels overwhelmed not back to her usual self. She notes that her  job contributes to her stress and anxiety, especially when they are short in staff and when nothing gets done when she tries to say something. Patient denies chest pain but has a little shortness of breath. She feels classic panic attacks one to two times a day in the past 3 weeks. She sometimes feel her heart racing but the symptoms are no present today. She reports that she her anxiety and stress is associated with the stressors from work and home. She has to put down one of her dog and doesn't have a car to drive to work currently. She is out of vitron for three weeks due to the cost of it, and would like a refill for the prescription.  She feels that her symptoms are very consistent with her prior panic attacks.     Patient Active Problem List   Diagnosis     Vertigo     Hyperlipidemia LDL goal <100     Tachycardia     Benign essential hypertension     History of stroke     Iron deficiency     Anxiety disorder, unspecified type     Chronic low back pain without sciatica, unspecified back pain laterality     BMI 45.0-49.9, adult (H)     Diabetes mellitus, type 2 (H)     Past Surgical History:   Procedure Laterality Date     APPENDECTOMY       [facility] SURGICAL PATHOLOGY  12/29/2016     MAMMOPLASTY REDUCTION BILATERAL         Social History     Tobacco Use     Smoking status:  Former Smoker     Packs/day: 0.50     Years: 27.00     Pack years: 13.50     Types: Cigarettes     Last attempt to quit: 2014     Years since quittin.3     Smokeless tobacco: Never Used   Substance Use Topics     Alcohol use: No     Family History   Problem Relation Age of Onset     Other Cancer Mother      Asthma Mother      Diabetes Father      Glaucoma No family hx of      Macular Degeneration No family hx of          Current Outpatient Medications   Medication Sig Dispense Refill     albuterol (PROVENTIL) (2.5 MG/3ML) 0.083% neb solution Take 1 vial (2.5 mg) by nebulization every 6 hours as needed for shortness of breath / dyspnea or wheezing 25 vial 1     atorvastatin (LIPITOR) 20 MG tablet TAKE ONE TABLET BY MOUTH EVERY DAY 90 tablet 2     blood glucose (NO BRAND SPECIFIED) lancets standard Use to test blood sugar 1 time daily or as directed. 100 each 1     blood glucose (NO BRAND SPECIFIED) test strip Use to test blood sugar 1 time daily or as directed. 100 each 1     blood glucose calibration (NO BRAND SPECIFIED) solution Use to calibrate blood glucose monitor as needed as directed. 2 each 1     blood glucose monitoring (NO BRAND SPECIFIED) meter device kit Use to test blood sugar 1 time daily or as directed. 1 kit 0     carvedilol (COREG) 6.25 MG tablet Take 1 tablet (6.25 mg) by mouth 2 times daily (with meals) 180 tablet 11     chlorthalidone (HYGROTON) 25 MG tablet Take 1 tablet (25 mg) by mouth daily 90 tablet 3     [facility] ASPIRIN 325 MG tablet TAKE 1 TABLET BY MOUTH ONCE DAILY WITH A MEAL.  0     cyanocobalamin (VITAMIN  B-12) 1000 MCG tablet Take 1,000 mcg by mouth daily       ferrous fumarate 65 mg, [place]. FE,-Vitamin C 125 mg (VITRON C)  MG TABS tablet Take 1 tablet by mouth every other day 45 tablet 3     hydrOXYzine (ATARAX) 25 MG tablet Take 1 tablet (25 mg) by mouth every 8 hours as needed for anxiety 30 tablet 1     lisinopril (PRINIVIL/ZESTRIL) 10 MG tablet Take 1 tablet (10 mg) by  "mouth daily 90 tablet 3     LORazepam (ATIVAN) 0.5 MG tablet Take 1 tablet (0.5 mg) by mouth every 8 hours as needed for anxiety 30 tablet 0     metFORMIN (GLUCOPHAGE-XR) 500 MG 24 hr tablet Take 1 tablet (500 mg) by mouth daily (with dinner) 30 tablet 3     Multiple Vitamins-Minerals (MULTI-VITAMIN GUMMIES) CHEW        sertraline (ZOLOFT) 50 MG tablet Take 1/2 tab daily for 1 week, then 1 tab daily. 30 tablet 1     No Known Allergies    Reviewed and updated as needed this visit by Provider  Tobacco  Allergies  Meds  Problems  Med Hx  Surg Hx  Fam Hx         Review of Systems   ROS COMP: Constitutional, HEENT, cardiovascular, pulmonary, GI, , musculoskeletal, neuro, skin, endocrine and psych systems are negative, except as otherwise noted.    This document serves as a record of the services and decisions personally performed and made by Nayeli Barnard MD. It was created on her behalf by Idalia Lazo, a trained medical scribe. The creation of this document is based on the provider's statements to the medical scribe.  Idalia Lazo 2:19 PM July 29, 2019      Objective    /70 (Patient Position: Sitting)   Pulse 63   Ht 1.664 m (5' 5.5\")   Wt 127 kg (280 lb)   SpO2 96%   Breastfeeding? No   BMI 45.89 kg/m    Body mass index is 45.89 kg/m .  Physical Exam   GENERAL: healthy, alert and mild distress  RESP: lungs clear to auscultation - no rales, rhonchi or wheezes  CV: regular rate and rhythm, normal S1 S2, no S3 or S4, no murmur, click or rub, no peripheral edema and peripheral pulses strong  MS: no gross musculoskeletal defects noted, no edema  SKIN: no suspicious lesions or rashes  NEURO: Normal strength and tone, mentation intact and speech normal  PSYCH: mentation appears normal, affect normal/bright, anxiety    Diagnostic Test Results:  Labs reviewed in Epic  No results found for this or any previous visit (from the past 24 hour(s)).        Assessment & Plan    . Panic attack  Patient feels her current " symptoms are related to a panic attack and I would agree.   Plan has been discussed with patient- per instructions.  Prescription for zoloft has been ordered today.  Refills have been placed today.  Work note referral has written for patient.  Will continue to monitor conditions.    2. Iron deficiency  Patient has history of.  Well managed with medication without side effects.  Refills have been placed today.  Will continue to monitor.  - ferrous fumarate 65 mg, [place]. FE,-Vitamin C 125 mg (VITRON C)  MG TABS tablet; Take 1 tablet by mouth every other day  Dispense: 45 tablet; Refill: 3      - sertraline (ZOLOFT) 50 MG tablet; Take 1/2 tab daily for 1 week, then 1 tab daily.  Dispense: 30 tablet; Refill: 1  - LORazepam (ATIVAN) 0.5 MG tablet; Take 1 tablet (0.5 mg) by mouth every 8 hours as needed for anxiety  Dispense: 30 tablet; Refill: 0  - hydrOXYzine (ATARAX) 25 MG tablet; Take 1 tablet (25 mg) by mouth every 8 hours as needed for anxiety  Dispense: 30 tablet; Refill: 1       Patient Instructions   - Prescription for Zoloft ordered today. This is a daily medication regardless of how you feel.  It can take 4-6 weeks to take effect.  Start tomorrow morning.  - Ativan is habit forming but very effective for panic attacks.  - Hydroxyzine is non habit forming but can cause drowsiness.      The information in this document, created by the medical scribe for me, accurately reflects the services I personally performed and the decisions made by me. I have reviewed and approved this document for accuracy prior to leaving the patient care area.  July 29, 2019 2:11 PM    I spent 16 minutes of time with the patient and >50% of it was in education and counseling regarding treatment of panic attack.      Nayeli Barnard MD  [facility]

## 2019-07-29 NOTE — NURSING NOTE
Flor Fernandez is a 49 year old female who presents with blurred vision, dizziness, shakiness.    NURSING ASSESSMENT:  Description:  Blurred vision started this am. Felt like HR was elevated. Dizziness that feels like vertigo. Has been under a lot of stress. Denies chest pain, SOB. No headaches. BG taken and was 120. BP and HR WNL  Onset/duration:  Today  Precip. factors:     Associated symptoms:  See above  Improves/worsens symptoms:     LMP/preg/breast feeding:  NA  Last exam/Treatment:  6/7/19  Allergies: No Known Allergies    MEDICATIONS:   Taking medication(s) as prescribed? N/A  Taking over the counter medication(s?) N/A  Any medication side effects? Not Applicable    Any barriers to taking medication(s) as prescribed?  No  Medication(s) improving/managing symptoms?  N/A        NURSING PLAN: Huddle with provider, plan includes Pt to see provider today     RECOMMENDED DISPOSITION:  To office now, another person to drive - Pt present in clinic and will see provider  Will comply with recommendation: Yes  If further questions/concerns or if symptoms do not improve, worsen or new symptoms develop, call your PCP or [facility] as soon as possible.      Guideline used:  Telephone Triage Protocols for Nurses, Fifth Edition, Maite Galan RN

## 2019-07-30 ASSESSMENT — ANXIETY QUESTIONNAIRES: GAD7 TOTAL SCORE: 2

## 2019-08-12 ENCOUNTER — OFFICE VISIT (OUTPATIENT)
Dept: INTERNAL MEDICINE | Facility: CLINIC | Age: 49
End: 2019-08-12
Payer: COMMERCIAL

## 2019-08-12 VITALS
WEIGHT: 277 LBS | RESPIRATION RATE: 13 BRPM | OXYGEN SATURATION: 98 % | BODY MASS INDEX: 44.52 KG/M2 | HEIGHT: 66 IN | DIASTOLIC BLOOD PRESSURE: 68 MMHG | HEART RATE: 88 BPM | TEMPERATURE: 97.3 F | SYSTOLIC BLOOD PRESSURE: 122 MMHG

## 2019-08-12 DIAGNOSIS — F41.0 PANIC ATTACK: Primary | ICD-10-CM

## 2019-08-12 DIAGNOSIS — E11.9 TYPE 2 DIABETES MELLITUS WITHOUT COMPLICATION, WITHOUT LONG-TERM CURRENT USE OF INSULIN (H): ICD-10-CM

## 2019-08-12 LAB
ANION GAP SERPL CALCULATED.3IONS-SCNC: 7 MMOL/L (ref 3–14)
BUN SERPL-MCNC: 15 MG/DL (ref 7–30)
CALCIUM SERPL-MCNC: 9.3 MG/DL (ref 8.5–10.1)
CHLORIDE SERPL-SCNC: 98 MMOL/L (ref 94–109)
CO2 SERPL-SCNC: 29 MMOL/L (ref 20–32)
CREAT SERPL-MCNC: 0.58 MG/DL (ref 0.52–1.04)
GFR SERPL CREATININE-BSD FRML MDRD: >90 ML/MIN/{1.73_M2}
GLUCOSE SERPL-MCNC: 127 MG/DL (ref 70–99)
HBA1C MFR BLD: 6.1 % (ref 0–5.6)
POTASSIUM SERPL-SCNC: 3.5 MMOL/L (ref 3.4–5.3)
SODIUM SERPL-SCNC: 134 MMOL/L (ref 133–144)
TSH SERPL DL<=0.005 MIU/L-ACNC: 3.82 MU/L (ref 0.4–4)

## 2019-08-12 PROCEDURE — 99213 OFFICE O/P EST LOW 20 MIN: CPT | Performed by: INTERNAL MEDICINE

## 2019-08-12 PROCEDURE — 80048 BASIC METABOLIC PNL TOTAL CA: CPT | Performed by: INTERNAL MEDICINE

## 2019-08-12 PROCEDURE — 84443 ASSAY THYROID STIM HORMONE: CPT | Performed by: INTERNAL MEDICINE

## 2019-08-12 PROCEDURE — 36415 COLL VENOUS BLD VENIPUNCTURE: CPT | Performed by: INTERNAL MEDICINE

## 2019-08-12 PROCEDURE — 83036 HEMOGLOBIN GLYCOSYLATED A1C: CPT | Performed by: INTERNAL MEDICINE

## 2019-08-12 RX ORDER — HYDROXYZINE HYDROCHLORIDE 25 MG/1
25 TABLET, FILM COATED ORAL EVERY 8 HOURS PRN
Qty: 30 TABLET | Refills: 1 | Status: SHIPPED | OUTPATIENT
Start: 2019-08-12 | End: 2019-10-01

## 2019-08-12 ASSESSMENT — MIFFLIN-ST. JEOR: SCORE: 1900.27

## 2019-08-12 ASSESSMENT — PAIN SCALES - GENERAL: PAINLEVEL: NO PAIN (0)

## 2019-08-12 NOTE — PROGRESS NOTES
Subjective     Flor Fernandez is a 49 year old female who presents to clinic today for the following health issues:      HPI   Medication Recheck  Patient notes that she is feeling better- her anxiety medicaions atarax and ativan made her sleepy so she takes them at night. Zoloft is going well.  No side effects other than the tiredness.  Her a1c is 6.1 today. Patient reports that her blood glucose numbers are low when she hasn't eaten.      Patient Active Problem List   Diagnosis     Vertigo     Hyperlipidemia LDL goal <100     Tachycardia     Benign essential hypertension     History of stroke     Iron deficiency     Anxiety disorder, unspecified type     Chronic low back pain without sciatica, unspecified back pain laterality     BMI 45.0-49.9, adult (H)     Diabetes mellitus, type 2 (H)     Past Surgical History:   Procedure Laterality Date     APPENDECTOMY       [facility] PATHOLOGY  2016     MAMMOPLASTY REDUCTION BILATERAL         Social History     Tobacco Use     Smoking status: Former Smoker     Packs/day: 0.50     Years: 27.00     Pack years: 13.50     Types: Cigarettes     Last attempt to quit: 2014     Years since quittin.3     Smokeless tobacco: Never Used   Substance Use Topics     Alcohol use: No     Family History   Problem Relation Age of Onset     Other Cancer Mother      Asthma Mother      Diabetes Father      Glaucoma No family hx of      Macular Degeneration No family hx of          Current Outpatient Medications   Medication Sig Dispense Refill     albuterol (PROVENTIL) (2.5 MG/3ML) 0.083% neb solution Take 1 vial (2.5 mg) by nebulization every 6 hours as needed for shortness of breath / dyspnea or wheezing 25 vial 1     atorvastatin (LIPITOR) 20 MG tablet TAKE ONE TABLET BY MOUTH EVERY DAY 90 tablet 2     blood glucose (NO BRAND SPECIFIED) lancets standard Use to test blood sugar 1 time daily or as directed. 100 each 1     blood glucose (NO BRAND SPECIFIED) test strip Use  to test blood sugar 1 time daily or as directed. 100 each 1     blood glucose calibration (NO BRAND SPECIFIED) solution Use to calibrate blood glucose monitor as needed as directed. 2 each 1     blood glucose monitoring (NO BRAND SPECIFIED) meter device kit Use to test blood sugar 1 time daily or as directed. 1 kit 0     carvedilol (COREG) 6.25 MG tablet Take 1 tablet (6.25 mg) by mouth 2 times daily (with meals) 180 tablet 11     chlorthalidone (HYGROTON) 25 MG tablet Take 1 tablet (25 mg) by mouth daily 90 tablet 3     [facility] ASPIRIN 325 MG tablet TAKE 1 TABLET BY MOUTH ONCE DAILY WITH A MEAL.  0     cyanocobalamin (VITAMIN  B-12) 1000 MCG tablet Take 1,000 mcg by mouth daily       ferrous fumarate 65 mg, [place]. FE,-Vitamin C 125 mg (VITRON C)  MG TABS tablet Take 1 tablet by mouth every other day 45 tablet 3     hydrOXYzine (ATARAX) 25 MG tablet Take 1 tablet (25 mg) by mouth every 8 hours as needed for anxiety 30 tablet 1     lisinopril (PRINIVIL/ZESTRIL) 10 MG tablet Take 1 tablet (10 mg) by mouth daily 90 tablet 3     LORazepam (ATIVAN) 0.5 MG tablet Take 1 tablet (0.5 mg) by mouth every 8 hours as needed for anxiety 30 tablet 0     metFORMIN (GLUCOPHAGE-XR) 500 MG 24 hr tablet Take 1 tablet (500 mg) by mouth daily (with dinner) 30 tablet 3     Multiple Vitamins-Minerals (MULTI-VITAMIN GUMMIES) CHEW        sertraline (ZOLOFT) 50 MG tablet Take 1/2 tab daily for 1 week, then 1 tab daily. 30 tablet 1     No Known Allergies    Reviewed and updated as needed this visit by Provider         Review of Systems   ROS COMP: Constitutional, HEENT, cardiovascular, pulmonary, GI, , musculoskeletal, neuro, skin, endocrine and psych systems are negative, except as otherwise noted.    This document serves as a record of the services and decisions personally performed and made by Nayeli Barnard MD. It was created on her behalf by Idalia Lazo, a trained medical scribe. The creation of this document is based on the provider's  "statements to the medical scribe.  Idalia Lazo 3:57 PM August 12, 2019        Objective    /68 (BP Location: Left arm, Cuff Size: Adult Large)   Pulse 88   Temp 97.3  F (36.3  C) (Oral)   Resp 13   Ht 1.664 m (5' 5.5\")   Wt 125.6 kg (277 lb)   SpO2 98%   BMI 45.39 kg/m    Body mass index is 45.39 kg/m .  Physical Exam   GENERAL: healthy, alert and no distress  RESP: lungs clear to auscultation - no rales, rhonchi or wheezes  CV: regular rate and rhythm, normal S1 S2, no S3 or S4, no murmur, click or rub, no peripheral edema and peripheral pulses strong  NEURO: Normal strength and tone, mentation intact and speech normal  PSYCH: mentation appears normal, affect normal/bright    Diagnostic Test Results:  Labs reviewed in Epic  Results for orders placed or performed in visit on 08/12/19 (from the past 24 hour(s))   Hemoglobin A1c   Result Value Ref Range    Hemoglobin A1C 6.1 (H) 0 - 5.6 %           Assessment & Plan     1. Type 2 diabetes mellitus without complication, without long-term current use of insulin (H)  Stable - patient's a1c improved from 6.5 to 6.1 in the span of four months.  Labs and tests taken today.  Needs to eat regular meals   Will continue to monitor for conditions.  Follow-up in 6 months.  - TSH with free T4 reflex  - Hemoglobin A1c  - Basic metabolic panel  - Albumin Random Urine Quantitative with Creat Ratio    2. Panic attack  Patient notes improvements with her current medications.   She notes that her anxiety medications make her drowsy so she takes them a night.  Plan was discussed with patient- will try to reduce atarax to 1/2 tab slowly to see if patient gets the same benefits but with less side effects.  Refills ordered today.  Will continue to monitor with current measures and adjust accordingly.  - sertraline (ZOLOFT) 50 MG tablet; Take 1 tablet (50 mg) by mouth daily  Dispense: 90 tablet; Refill: 1  - hydrOXYzine (ATARAX) 25 MG tablet; Take 1 tablet (25 mg) by mouth every 8 " "hours as needed for anxiety  Dispense: 30 tablet; Refill: 1       BMI:   Estimated body mass index is 45.89 kg/m  as calculated from the following:    Height as of 7/29/19: 1.664 m (5' 5.5\").    Weight as of 7/29/19: 127 kg (280 lb).   Weight management plan: Discussed healthy diet and exercise guidelines        Patient Instructions   Try 1/2 tab of hydroxyzine to see if you have the same benefit but less side effects.       The information in this document, created by the medical scribe for me, accurately reflects the services I personally performed and the decisions made by me. I have reviewed and approved this document for accuracy prior to leaving the patient care area.  August 12, 2019 3:57 PM    I spent 10 minutes of time with the patient and >50% of it was in education and counseling regarding health maintenance and medication recheck.    Nayeli Barnard MD  [facility]    "

## 2019-08-13 DIAGNOSIS — E11.9 TYPE 2 DIABETES MELLITUS WITHOUT COMPLICATION, WITHOUT LONG-TERM CURRENT USE OF INSULIN (H): ICD-10-CM

## 2019-08-13 LAB
CREAT UR-MCNC: 187 MG/DL
MICROALBUMIN UR-MCNC: 28 MG/L
MICROALBUMIN/CREAT UR: 14.81 MG/G CR (ref 0–25)

## 2019-08-13 PROCEDURE — 82043 UR ALBUMIN QUANTITATIVE: CPT | Performed by: INTERNAL MEDICINE

## 2019-08-19 ENCOUNTER — OFFICE VISIT (OUTPATIENT)
Dept: PSYCHOLOGY | Facility: CLINIC | Age: 49
End: 2019-08-19
Payer: COMMERCIAL

## 2019-08-19 DIAGNOSIS — F41.9 ANXIETY DISORDER, UNSPECIFIED TYPE: Primary | ICD-10-CM

## 2019-08-19 PROCEDURE — 90834 PSYTX W PT 45 MINUTES: CPT | Performed by: MARRIAGE & FAMILY THERAPIST

## 2019-08-19 ASSESSMENT — PATIENT HEALTH QUESTIONNAIRE - PHQ9
SUM OF ALL RESPONSES TO PHQ QUESTIONS 1-9: 1
10. IF YOU CHECKED OFF ANY PROBLEMS, HOW DIFFICULT HAVE THESE PROBLEMS MADE IT FOR YOU TO DO YOUR WORK, TAKE CARE OF THINGS AT HOME, OR GET ALONG WITH OTHER PEOPLE: NOT DIFFICULT AT ALL
SUM OF ALL RESPONSES TO PHQ QUESTIONS 1-9: 1

## 2019-08-19 ASSESSMENT — ANXIETY QUESTIONNAIRES
3. WORRYING TOO MUCH ABOUT DIFFERENT THINGS: NOT AT ALL
6. BECOMING EASILY ANNOYED OR IRRITABLE: NOT AT ALL
7. FEELING AFRAID AS IF SOMETHING AWFUL MIGHT HAPPEN: NOT AT ALL
GAD7 TOTAL SCORE: 1
2. NOT BEING ABLE TO STOP OR CONTROL WORRYING: NOT AT ALL
GAD7 TOTAL SCORE: 1
GAD7 TOTAL SCORE: 1
5. BEING SO RESTLESS THAT IT IS HARD TO SIT STILL: NOT AT ALL
1. FEELING NERVOUS, ANXIOUS, OR ON EDGE: NOT AT ALL
7. FEELING AFRAID AS IF SOMETHING AWFUL MIGHT HAPPEN: NOT AT ALL
4. TROUBLE RELAXING: SEVERAL DAYS

## 2019-08-19 NOTE — PROGRESS NOTES
Progress Note    Client Name: Flor Fernandez  Date: 8/19/19         Service Type: Individual      Session Start Time: 4:00  Session End Time: 4:52      Session Length: 38-52     Session #: 28     Attendees: Client attended alone    Treatment Plan Last Reviewed: 7/22/19, next due 10/22/19.  PHQ-9 / RODRIGUE-7 : completed.     DATA      Progress Since Last Session (Related to Symptoms / Goals / Homework):   Symptoms: Improving client reported decreased RODRIGUE-7 score.    Homework: Partially completed             Episode of Care Goals: Minimal progress - ACTION (Actively working towards change); Intervened by reinforcing change plan / affirming steps taken     Current / Ongoing Stressors and Concerns:   Client reported regarding continued increased work stress.  Client reported that she had to put her second dog down as well.  Client reported experiencing increasing panic attacks as a result, and that she was put on anti-anxiety medication by her PCP, which has reduced her anxiety.  Client reported continuing relationship stress with both her father and her son.     Treatment Objective(s) Addressed in This Session:   use at least some coping skills for anxiety management in the next four weeks  practice setting boundaries some times in the next four weeks      Intervention:   DBT: reviewed with client using Pros and Cons List skill to evaluate her response options for her family stress.  Interpersonal Therapy: taught/reviewed with client Review Interpersonal Priorities skill for helping her to choose an effective approach wiht her family members.        ASSESSMENT: Current Emotional / Mental Status (status of significant symptoms):   Risk status (Self / Other harm or suicidal ideation)   Client denies current fears or concerns for personal safety.   Client denies current or recent suicidal ideation or behaviors.   Client denies current or recent homicidal ideation or  behaviors.   Client denies current or recent self injurious behavior or ideation.   Client denies other safety concerns.   A safety and risk management plan has not been developed at this time, however client was given the after-hours number / 911 should there be a change in any of these risk factors.     Appearance:   Appropriate    Eye Contact:   Good    Psychomotor Behavior: Normal    Attitude:   Cooperative    Orientation:   All   Speech    Rate / Production: Normal     Volume:  Normal    Mood:    Anxious  Normal   Affect:    Appropriate    Thought Content:  Clear    Thought Form:  Coherent  Logical    Insight:    Good      Medication Review:   No current psychiatric medications prescribed     Medication Compliance:   NA     Changes in Health Issues:   None reported     Chemical Use Review:   Substance Use: Chemical use reviewed, no active concerns identified      Tobacco Use: No current tobacco use.       Collateral Reports Completed:   Not Applicable    PLAN: (Client Tasks / Therapist Tasks / Other)  Client agreed to work using Pros and Cons List and Review Interpersonal Priorities skills to effectively navigate her family member relationships between now and follow-up therapy session in five weeks.        Vikas Perez, [facility]                                                         ________________________________________________________________________    Treatment Plan    Client's Name: Flor Fernandez  YOB: 1970    Date: 7/22/19    DSM-V Diagnoses: 300.00 (F41.9) Unspecified Anxiety Disorder  Psychosocial / Contextual Factors: problems with primary support group: conflicts with client's children  WHODAS: 15    Referral / Collaboration:  Referral to another professional/service is not indicated at this time..    Anticipated number of session or this episode of care: 31+      MeasurableTreatment Goal(s) related to diagnosis / functional impairment(s)  Goal 1: Client will successfully  process through past trauma defined as reporting 0 Subjective Units of Distress related to trauma on 0-10 scale and 7 on Validity of (positive) Cognition scale about self on 1-7 scale   I will know I've met my goal when I feel less stuck.       Objective #A (Client Action)    Status: Continued - Date: 7/22/19      Client will identify past traumatic events/memories which are causing current distress.     Intervention(s)  Therapist will take client's history and facilitate client's identification of targets for EMDR.     Objective #B  Client will complete needed assessment(s) and [facility] EM[facility] resourcing to confirm readiness for EMDR.    Status: Continued - Date: 7/22/19      Intervention(s)  Therapist will administer Dissociative Experiences Scale, Multidimensional Inventory of Dissociation as needed and complete [facility] EM[facility] resourcing with client.     Objective #C  Client will engage in installing at least 2 EMDR resources.  Status: Continued - Date: 7/22/19      Intervention(s)  Therapist will complete EMDR resourcing (Container, Remote Control, grounding/progressive muscle relaxation, Light Stream, Inner Advisor) with client.     Objective #D (Client Action)    Status: Continued- Date: 7/22/19      Client will engage in reprocessing all past traumatic event/memory targets.     Intervention(s)   Therapist will complete EMDR reprocessing with client.    Goal 2: Client will increase overall baseline calm mindset by 2 points on a 1-10 Likert scale per self-report (10 = optimal calm mindset).    I will know I've met my goal when I feel less anxious.      Objective #A (Client Action)    Status: Continued - Date: 7/22/19     Client will use cognitive strategies identified in therapy to challenge anxious thoughts.    Intervention(s)  Therapist will teach emotional regulation skills. CBT/REBT ABCD model.    Objective #B  Client will use at least 2 new coping skills for anxiety management in the next 12  weeks.    Status: Continued - Date: 7/22/19     Intervention(s)  Therapist will teach emotional regulation skills. DBT Core Mindfulness, Distress Tolerance, Emotion Regulation, and Interpersonal Effectiveness skills.    Goal 3: Client will improve her interpersonal functioning/relationships by 2 points on a 1-10 Likert scale per self-report (10 = optimal interpersonal functioning/relationships).    I will know I've met my goal when my relationships with my children have improved.      Objective #A (Client Action)    Status: Continued - Date(s): 7/22/19     Client will learn & utilize at least 2 new assertive communication skills weekly.    Intervention(s)  Therapist will teach Nonviolent Communication and DBT Interpersonal Effectiveness skills..    Client has reviewed and agreed to the above plan.      Vikas Perez, LMFT  August 19, 2019

## 2019-08-20 ASSESSMENT — ANXIETY QUESTIONNAIRES: GAD7 TOTAL SCORE: 1

## 2019-08-20 ASSESSMENT — PATIENT HEALTH QUESTIONNAIRE - PHQ9: SUM OF ALL RESPONSES TO PHQ QUESTIONS 1-9: 1

## 2019-09-24 DIAGNOSIS — E11.9 TYPE 2 DIABETES MELLITUS WITHOUT COMPLICATION, WITHOUT LONG-TERM CURRENT USE OF INSULIN (H): ICD-10-CM

## 2019-09-26 RX ORDER — METFORMIN HCL 500 MG
TABLET, EXTENDED RELEASE 24 HR ORAL
Qty: 30 TABLET | Refills: 3 | Status: SHIPPED | OUTPATIENT
Start: 2019-09-26 | End: 2020-01-22

## 2019-09-30 DIAGNOSIS — F41.0 PANIC ATTACK: ICD-10-CM

## 2019-10-01 ENCOUNTER — MYC REFILL (OUTPATIENT)
Dept: INTERNAL MEDICINE | Facility: CLINIC | Age: 49
End: 2019-10-01

## 2019-10-01 DIAGNOSIS — F41.0 PANIC ATTACK: ICD-10-CM

## 2019-10-02 RX ORDER — HYDROXYZINE HYDROCHLORIDE 25 MG/1
25 TABLET, FILM COATED ORAL EVERY 8 HOURS PRN
Qty: 30 TABLET | Refills: 1 | Status: SHIPPED | OUTPATIENT
Start: 2019-10-02 | End: 2019-11-10

## 2019-10-02 RX ORDER — HYDROXYZINE HYDROCHLORIDE 25 MG/1
TABLET, FILM COATED ORAL
Qty: 30 TABLET | Refills: 1 | OUTPATIENT
Start: 2019-10-02

## 2019-10-02 NOTE — TELEPHONE ENCOUNTER
Prescription approved per [facility] Refill Protocol.  Irma Vargas RN

## 2019-10-16 NOTE — PROGRESS NOTES
Progress Note    Client Name: Flor Fernandez  Date: 4/17/18         Service Type: Individual      Session Start Time: 2:01  Session End Time: 2:53      Session Length: 38-52     Session #: 4     Attendees: Client attended alone    Treatment Plan Last Reviewed: N/A, next due 6/27/18.  PHQ-9 / RODRIGUE-7 : completed.     DATA      Progress Since Last Session (Related to Symptoms / Goals / Homework):   Symptoms: Improved    Homework: Achieved / completed to satisfaction      Episode of Care Goals: Satisfactory progress - ACTION (Actively working towards change); Intervened by reinforcing change plan / affirming steps taken     Current / Ongoing Stressors and Concerns:   Client reported being anxious about some aspects of her work situation.  Client reported some continuing ongoing anxiety regarding her son, which she is working to let go of.  Client identified wanting to install EMDR Container resource in today's session.     Treatment Objective(s) Addressed in This Session:   use cognitive strategies identified in therapy to challenge anxious thoughts     Intervention:   DBT: taught/reviewed with client decision-making paradigm regarding her response options for her work situation and mindfulness skills/strategies to keep focus on present.  EMDR: installed Container resource.        ASSESSMENT: Current Emotional / Mental Status (status of significant symptoms):   Risk status (Self / Other harm or suicidal ideation)   Client denies current fears or concerns for personal safety.   Client denies current or recent suicidal ideation or behaviors.   Client denies current or recent homicidal ideation or behaviors.   Client denies current or recent self injurious behavior or ideation.   Client denies other safety concerns.   A safety and risk management plan has not been developed at this time, however client was given the after-hours number / 911 should there be a change in any of  Patient with a history of depression and suicidal thoughts. Has been seen at multiple different hospitals for this. Was seen earlier today at [facility] for this. Evaluated by psychiatrist who felt patient was not a danger to herself and patient was discharged. She walked across the street from the hospital and called 911 to come here. The psychiatry note makes mention of patient being homeless with no place to stay currently. Patient states that no one listens to her when she states she is suicidal.    The history is provided by the patient. No  was used. Mental Health Problem    This is a recurrent problem. The current episode started more than 1 week ago. The problem has not changed since onset. Pertinent negatives include no confusion, no unresponsiveness, no weakness, no self-injury and no numbness. Mental status baseline is normal.  Her past medical history is significant for depression and psychotropic medication treatment. No past medical history on file. No past surgical history on file. No family history on file.     Social History     Socioeconomic History    Marital status:      Spouse name: Not on file    Number of children: Not on file    Years of education: Not on file    Highest education level: Not on file   Occupational History    Not on file   Social Needs    Financial resource strain: Not on file    Food insecurity:     Worry: Not on file     Inability: Not on file    Transportation needs:     Medical: Not on file     Non-medical: Not on file   Tobacco Use    Smoking status: Not on file   Substance and Sexual Activity    Alcohol use: Not on file    Drug use: Not on file    Sexual activity: Not on file   Lifestyle    Physical activity:     Days per week: Not on file     Minutes per session: Not on file    Stress: Not on file   Relationships    Social connections:     Talks on phone: Not on file     Gets together: Not on file     Attends Islam service: Not on file     Active member of club or organization: Not on file     Attends meetings of clubs or organizations: Not on file     Relationship status: Not on file    Intimate partner violence:     Fear of current or ex partner: Not on file     Emotionally abused: Not on file     Physically abused: Not on file     Forced sexual activity: Not on file   Other Topics Concern    Not on file   Social History Narrative    Not on file         ALLERGIES: Latex; Bee sting [sting, bee]; Clindamycin; Hazelnut; Adhesive; Cobalt; Codeine; Erythromycin; Influenza virus vaccines; Mercury (elemental); Naproxen; Nickel; and Thimerosal    Review of Systems   Constitutional: Negative for chills and fever. HENT: Negative for rhinorrhea and sore throat. Eyes: Negative for pain and redness. Respiratory: Negative for chest tightness, shortness of breath and wheezing. Cardiovascular: Negative for chest pain and leg swelling. Gastrointestinal: Negative for abdominal pain, diarrhea, nausea and vomiting. Genitourinary: Negative for dysuria and hematuria. Musculoskeletal: Negative for back pain, gait problem, neck pain and neck stiffness. Skin: Negative for color change and rash. Neurological: Negative for weakness, numbness and headaches. Psychiatric/Behavioral: Positive for suicidal ideas. Negative for confusion and self-injury. Vitals:    10/15/19 2206   BP: (!) 193/149   Pulse: (!) 108   Resp: 18   Temp: 98.7 °F (37.1 °C)   SpO2: 97%   Weight: 107.5 kg (237 lb)   Height: 5' 3.5\" (1.613 m)            Physical Exam   Constitutional: She is oriented to person, place, and time. She appears well-developed and well-nourished. HENT:   Head: Normocephalic and atraumatic. Neck: Normal range of motion. Neck supple. Cardiovascular: Normal rate and regular rhythm. Pulmonary/Chest: Effort normal and breath sounds normal.   Abdominal: Soft. Bowel sounds are normal. There is no tenderness. these risk factors.     Appearance:   Appropriate    Eye Contact:   Good    Psychomotor Behavior: Normal    Attitude:   Cooperative    Orientation:   All   Speech    Rate / Production: Normal     Volume:  Normal    Mood:    Anxious  Normal   Affect:    Appropriate    Thought Content:  Clear    Thought Form:  Coherent  Logical    Insight:    Good      Medication Review:   No current psychiatric medications prescribed     Medication Compliance:   NA     Changes in Health Issues:   None reported     Chemical Use Review:   Substance Use: Chemical use reviewed, no active concerns identified      Tobacco Use: No current tobacco use.       Collateral Reports Completed:   Not Applicable    PLAN: (Client Tasks / Therapist Tasks / Other)  Client agreed to continue to work on letting go of her son's outcomes, work on acceptance of and plan for her work situation and practicing mindfulness to keep present focus, and practicing EMDR Container resourcing at least once daily and additionally as needed for anxiety/distress between now and follow-up session next week.        Vikas Perez, LMFT                                                         ________________________________________________________________________    Treatment Plan    Client's Name: Flor Fernandez  YOB: 1970    Date: 3/27/18    DSM-V Diagnoses: 300.00 (F41.9) Unspecified Anxiety Disorder  Psychosocial / Contextual Factors: problems with primary support group: conflicts with client's children  WHODAS: 15    Referral / Collaboration:  Referral to another professional/service is not indicated at this time..    Anticipated number of session or this episode of care: 11-20      MeasurableTreatment Goal(s) related to diagnosis / functional impairment(s)  Goal 1: Client will successfully process through past trauma defined as reporting 0 Subjective Units of Distress related to trauma on 0-10 scale and 7 on Validity of (positive) Cognition  Musculoskeletal: Normal range of motion. She exhibits no edema. Neurological: She is alert and oriented to person, place, and time. Skin: Skin is warm and dry. Psychiatric:   Suicidal thoughts with nonspecific plan. MDM  Number of Diagnoses or Management Options  Diagnosis management comments: Discussed with tele-psych and will discharge patient. They feel she is malingering and manipulative. This is in agreement with her evaluation at [address] Will discharge. Amount and/or Complexity of Data Reviewed  Clinical lab tests: reviewed    Patient Progress  Patient progress: stable         Procedures    [facility] lab results from today. UA and UDS negative. ETOH negative. CBC and CMP negative. scale about self on 1-7 scale   I will know I've met my goal when I feel less stuck.       Objective #A (Client Action)    Status: New - Date: 3/27/18      Client will identify past traumatic events/memories which are causing current distress.     Intervention(s)  Therapist will take client's history and facilitate client's identification of targets for EMDR.     Objective #B  Client will complete needed assessment(s) and [facility] EMDR resourcing to confirm readiness for EMDR.    Status: New - Date: 3/27/18      Intervention(s)  Therapist will administer Dissociative Experiences Scale, Multidimensional Inventory of Dissociation as needed and complete [facility] EMDR resourcing with client.     Objective #C  Client will engage in installing at least 2 EMDR resources.  Status: New - Date: 3/27/18      Intervention(s)  Therapist will complete EMDR resourcing (Container, Remote Control, grounding/progressive muscle relaxation, Light Stream, Inner Advisor) with client.     Objective #D (Client Action)    Status: New - Date: 3/27/18      Client will engage in reprocessing all past traumatic event/memory targets.     Intervention(s)   Therapist will complete EMDR reprocessing with client.    Goal 2: Client will increase overall baseline calm mindset by 2 points on a 1-10 Likert scale per self-report (10 = optimal calm mindset).    I will know I've met my goal when I feel less anxious.      Objective #A (Client Action)    Status: New - Date: 3/27/18     Client will use cognitive strategies identified in therapy to challenge anxious thoughts.    Intervention(s)  Therapist will teach emotional regulation skills. CBT/REBT ABCD model.    Objective #B  Client will use at least 2 new coping skills for anxiety management in the next 12 weeks.    Status: New - Date: 3/27/18     Intervention(s)  Therapist will teach emotional regulation skills. DBT Core Mindfulness, Distress Tolerance, Emotion Regulation, and Interpersonal  Effectiveness skills.    Goal 3: Client will improve her interpersonal functioning/relationships by 2 points on a 1-10 Likert scale per self-report (10 = optimal interpersonal functioning/relationships).    I will know I've met my goal when my relationships with my children have improved.      Objective #A (Client Action)    Status: New - Date: 3/27/18     Client will learn & utilize at least 2 new assertive communication skills weekly.    Intervention(s)  Therapist will teach Nonviolent Communication and DBT Interpersonal Effectiveness skills..    Client has reviewed and agreed to the above plan.      Vikas Perez, LMFT  April 17, 2018

## 2019-10-21 ENCOUNTER — OFFICE VISIT (OUTPATIENT)
Dept: PSYCHOLOGY | Facility: CLINIC | Age: 49
End: 2019-10-21
Payer: COMMERCIAL

## 2019-10-21 DIAGNOSIS — F41.9 ANXIETY DISORDER, UNSPECIFIED TYPE: Primary | ICD-10-CM

## 2019-10-21 PROCEDURE — 90834 PSYTX W PT 45 MINUTES: CPT | Performed by: MARRIAGE & FAMILY THERAPIST

## 2019-10-21 ASSESSMENT — ANXIETY QUESTIONNAIRES
3. WORRYING TOO MUCH ABOUT DIFFERENT THINGS: NOT AT ALL
7. FEELING AFRAID AS IF SOMETHING AWFUL MIGHT HAPPEN: NOT AT ALL
GAD7 TOTAL SCORE: 0
5. BEING SO RESTLESS THAT IT IS HARD TO SIT STILL: NOT AT ALL
GAD7 TOTAL SCORE: 0
4. TROUBLE RELAXING: NOT AT ALL
6. BECOMING EASILY ANNOYED OR IRRITABLE: NOT AT ALL
1. FEELING NERVOUS, ANXIOUS, OR ON EDGE: NOT AT ALL
7. FEELING AFRAID AS IF SOMETHING AWFUL MIGHT HAPPEN: NOT AT ALL
2. NOT BEING ABLE TO STOP OR CONTROL WORRYING: NOT AT ALL
GAD7 TOTAL SCORE: 0

## 2019-10-21 ASSESSMENT — PATIENT HEALTH QUESTIONNAIRE - PHQ9
SUM OF ALL RESPONSES TO PHQ QUESTIONS 1-9: 0
10. IF YOU CHECKED OFF ANY PROBLEMS, HOW DIFFICULT HAVE THESE PROBLEMS MADE IT FOR YOU TO DO YOUR WORK, TAKE CARE OF THINGS AT HOME, OR GET ALONG WITH OTHER PEOPLE: NOT DIFFICULT AT ALL
SUM OF ALL RESPONSES TO PHQ QUESTIONS 1-9: 0

## 2019-10-21 NOTE — PROGRESS NOTES
"                                             Progress Note    Client Name: Flor Fernandez  Date: 10/21/19         Service Type: Individual      Session Start Time: 4:00  Session End Time: 4:48      Session Length: 38-52     Session #: 29     Attendees: Client attended alone    Treatment Plan Last Reviewed: 7/22/19, next due 10/22/19.  PHQ-9 / RODRIGUE-7 : completed.     DATA      Progress Since Last Session (Related to Symptoms / Goals / Homework):   Symptoms: Improving client reported decreased RODRIGUE-7 score.    Homework: Partially completed       Episode of Care Goals: Minimal progress - ACTION (Actively working towards change); Intervened by reinforcing change plan / affirming steps taken     Current / Ongoing Stressors and Concerns:   Client reported regarding her daughter telling her that she was \"cutting ties\" with the family, which led to client feeling depressed and anxious.  Client reported that her son went through a break-up and starting using drugs.  Client reported work stress and related relationship stress with one of her former co-workers/friends.     Treatment Objective(s) Addressed in This Session:   practice setting boundaries some times in the next nine weeks      Intervention:   Interpersonal Therapy: re-reviewed with client boundary-setting with her family members and friends.        ASSESSMENT: Current Emotional / Mental Status (status of significant symptoms):   Risk status (Self / Other harm or suicidal ideation)   Client denies current fears or concerns for personal safety.   Client denies current or recent suicidal ideation or behaviors.   Client denies current or recent homicidal ideation or behaviors.   Client denies current or recent self injurious behavior or ideation.   Client denies other safety concerns.   A safety and risk management plan has not been developed at this time, however client was given the after-hours number / 911 should there be a change in any of these risk " factors.     Appearance:   Appropriate    Eye Contact:   Good    Psychomotor Behavior: Normal    Attitude:   Cooperative    Orientation:   All   Speech    Rate / Production: Normal     Volume:  Normal    Mood:    Anxious  Normal   Affect:    Appropriate    Thought Content:  Clear    Thought Form:  Coherent  Logical    Insight:    Good      Medication Review:   No current psychiatric medications prescribed     Medication Compliance:   NA     Changes in Health Issues:   None reported     Chemical Use Review:   Substance Use: Chemical use reviewed, no active concerns identified      Tobacco Use: No current tobacco use.       Collateral Reports Completed:   Not Applicable    PLAN: (Client Tasks / Therapist Tasks / Other)  Client agreed to work on setting effective boundaries with her family member and friend relationships between now and follow-up therapy session in nine weeks.        Vikas Byron Perez, LMFT                                                         ________________________________________________________________________    Treatment Plan    Client's Name: Flor Fernandez  YOB: 1970    Date: 7/22/19    DSM-V Diagnoses: 300.00 (F41.9) Unspecified Anxiety Disorder  Psychosocial / Contextual Factors: problems with primary support group: conflicts with client's children  WHODAS: 15    Referral / Collaboration:  Referral to another professional/service is not indicated at this time..    Anticipated number of session or this episode of care: 31+      MeasurableTreatment Goal(s) related to diagnosis / functional impairment(s)  Goal 1: Client will successfully process through past trauma defined as reporting 0 Subjective Units of Distress related to trauma on 0-10 scale and 7 on Validity of (positive) Cognition scale about self on 1-7 scale   I will know I've met my goal when I feel less stuck.       Objective #A (Client Action)    Status: Continued - Date: 7/22/19      Client will identify  past traumatic events/memories which are causing current distress.     Intervention(s)  Therapist will take client's history and facilitate client's identification of targets for EMDR.     Objective #B  Client will complete needed assessment(s) and [facility] EMDR resourcing to confirm readiness for EMDR.    Status: Continued - Date: 7/22/19      Intervention(s)  Therapist will administer Dissociative Experiences Scale, Multidimensional Inventory of Dissociation as needed and complete [facility] EMDR resourcing with client.     Objective #C  Client will engage in installing at least 2 EMDR resources.  Status: Continued - Date: 7/22/19      Intervention(s)  Therapist will complete EMDR resourcing (Container, Remote Control, grounding/progressive muscle relaxation, Light Stream, Inner Advisor) with client.     Objective #D (Client Action)    Status: Continued- Date: 7/22/19      Client will engage in reprocessing all past traumatic event/memory targets.     Intervention(s)   Therapist will complete EMDR reprocessing with client.    Goal 2: Client will increase overall baseline calm mindset by 2 points on a 1-10 Likert scale per self-report (10 = optimal calm mindset).    I will know I've met my goal when I feel less anxious.      Objective #A (Client Action)    Status: Continued - Date: 7/22/19     Client will use cognitive strategies identified in therapy to challenge anxious thoughts.    Intervention(s)  Therapist will teach emotional regulation skills. CBT/REBT ABCD model.    Objective #B  Client will use at least 2 new coping skills for anxiety management in the next 12 weeks.    Status: Continued - Date: 7/22/19     Intervention(s)  Therapist will teach emotional regulation skills. DBT Core Mindfulness, Distress Tolerance, Emotion Regulation, and Interpersonal Effectiveness skills.    Goal 3: Client will improve her interpersonal functioning/relationships by 2 points on a 1-10 Likert scale per self-report (10 =  optimal interpersonal functioning/relationships).    I will know I've met my goal when my relationships with my children have improved.      Objective #A (Client Action)    Status: Continued - Date(s): 7/22/19     Client will learn & utilize at least 2 new assertive communication skills weekly.    Intervention(s)  Therapist will teach Nonviolent Communication and DBT Interpersonal Effectiveness skills..    Client has reviewed and agreed to the above plan.      Vikas Perez, LMFT  October 21, 2019

## 2019-10-22 ASSESSMENT — ANXIETY QUESTIONNAIRES: GAD7 TOTAL SCORE: 0

## 2019-10-22 ASSESSMENT — PATIENT HEALTH QUESTIONNAIRE - PHQ9: SUM OF ALL RESPONSES TO PHQ QUESTIONS 1-9: 0

## 2019-10-27 DIAGNOSIS — E11.9 TYPE 2 DIABETES MELLITUS (H): ICD-10-CM

## 2019-10-28 NOTE — TELEPHONE ENCOUNTER
JanTouch Delica Lancets 33 gauge      Last Written Prescription Date:  7/26/2019  Last Fill Quantity: ,   # refills:   Last Office Visit: 8-  Future Office visit:    Next 5 appointments (look out 90 days)    Dec 30, 2019  4:00 PM CST  [facility] [address]  [place] [address]  [phone]   Jan 13, 2020  4:00 PM CST  [facility] [address] [place] [address]  [facility] [address]  [phone]           Routing refill request to provider for review/approval because:  Drug not active on patient's medication list

## 2019-10-29 RX ORDER — LANCETS 33 GAUGE
EACH MISCELLANEOUS
Qty: 100 EACH | Refills: 1 | Status: SHIPPED | OUTPATIENT
Start: 2019-10-29

## 2019-10-30 DIAGNOSIS — E11.9 TYPE 2 DIABETES MELLITUS (H): ICD-10-CM

## 2019-10-30 NOTE — TELEPHONE ENCOUNTER
Duplicate.    blood glucose (ONETOUCH VERIO IQ) test strip 100 each 1 10/29/2019  No   Sig: USE TO TEST BLOOD SUGAR ONCE TIME DAILY OR AS DIRECTED   Sent to pharmacy as: blood glucose (ONETOUCH VERIO IQ) test strip   Class: E-Prescribe   Order: [id number]   E-Prescribing Status: Receipt confirmed by pharmacy (10/29/2019 10:11 AM CDT)     Nikky FLYNN CMA ([facility])

## 2019-11-10 ENCOUNTER — MYC REFILL (OUTPATIENT)
Dept: INTERNAL MEDICINE | Facility: CLINIC | Age: 49
End: 2019-11-10

## 2019-11-10 DIAGNOSIS — F41.0 PANIC ATTACK: ICD-10-CM

## 2019-11-11 RX ORDER — HYDROXYZINE HYDROCHLORIDE 25 MG/1
25 TABLET, FILM COATED ORAL EVERY 8 HOURS PRN
Qty: 30 TABLET | Refills: 2 | Status: SHIPPED | OUTPATIENT
Start: 2019-11-11 | End: 2020-01-27

## 2019-12-03 ENCOUNTER — MYC REFILL (OUTPATIENT)
Dept: INTERNAL MEDICINE | Facility: CLINIC | Age: 49
End: 2019-12-03

## 2019-12-03 DIAGNOSIS — F41.0 PANIC ATTACK: ICD-10-CM

## 2019-12-03 RX ORDER — HYDROXYZINE HYDROCHLORIDE 25 MG/1
25 TABLET, FILM COATED ORAL EVERY 8 HOURS PRN
Qty: 30 TABLET | Refills: 2 | Status: CANCELLED | OUTPATIENT
Start: 2019-12-03

## 2019-12-04 NOTE — TELEPHONE ENCOUNTER
Duplicate request.   Closing encounter.     hydrOXYzine (ATARAX) 25 MG tablet 30 tablet 2 11/11/2019  No   Sig - Route: Take 1 tablet (25 mg) by mouth every 8 hours as needed for anxiety - Oral   Sent to pharmacy as: hydrOXYzine (ATARAX) 25 MG tablet   Class: E-Prescribe   Order: [id number]   E-Prescribing Status: Receipt confirmed by pharmacy (11/11/2019 11:59 AM CST)     Dasiy Ball RN

## 2019-12-22 DIAGNOSIS — E78.5 HYPERLIPIDEMIA LDL GOAL <100: ICD-10-CM

## 2019-12-22 NOTE — LETTER
December 23, 2019          Flor Fernandez,  [address]  [place] [address]          Dear Flor Fernandez      Your provider has sent a 30 day billie refill of atorvastatin (LIPITOR) 20 MG tablet. You are due for an laboratory appointment for further refills. Appointment options could include: an in person office visit, telephone visit or Evisit through [employer]. Please contact the clinic to schedule an appointment for further refills.         If you have received your health care elsewhere, please call the clinic so the information can be documented in your chart.    Please call [phone] or message us through your [employer] account to schedule an appointment or provide information for your chart.     Feel free to contact us if you have any questions or concerns!    I look forward to seeing you and working with you on your health care needs.     Sincerely,       [facility] [place] [facility]

## 2019-12-23 RX ORDER — ATORVASTATIN CALCIUM 20 MG/1
TABLET, FILM COATED ORAL
Qty: 90 TABLET | Refills: 0 | Status: SHIPPED | OUTPATIENT
Start: 2019-12-23 | End: 2020-02-17

## 2019-12-30 ENCOUNTER — OFFICE VISIT (OUTPATIENT)
Dept: PSYCHOLOGY | Facility: CLINIC | Age: 49
End: 2019-12-30
Payer: COMMERCIAL

## 2019-12-30 DIAGNOSIS — F41.9 ANXIETY DISORDER, UNSPECIFIED TYPE: Primary | ICD-10-CM

## 2019-12-30 PROCEDURE — 90834 PSYTX W PT 45 MINUTES: CPT | Performed by: MARRIAGE & FAMILY THERAPIST

## 2019-12-30 ASSESSMENT — ANXIETY QUESTIONNAIRES
6. BECOMING EASILY ANNOYED OR IRRITABLE: NOT AT ALL
7. FEELING AFRAID AS IF SOMETHING AWFUL MIGHT HAPPEN: NOT AT ALL
GAD7 TOTAL SCORE: 0
7. FEELING AFRAID AS IF SOMETHING AWFUL MIGHT HAPPEN: NOT AT ALL
5. BEING SO RESTLESS THAT IT IS HARD TO SIT STILL: NOT AT ALL
4. TROUBLE RELAXING: NOT AT ALL
2. NOT BEING ABLE TO STOP OR CONTROL WORRYING: NOT AT ALL
3. WORRYING TOO MUCH ABOUT DIFFERENT THINGS: NOT AT ALL
1. FEELING NERVOUS, ANXIOUS, OR ON EDGE: NOT AT ALL

## 2019-12-30 NOTE — PROGRESS NOTES
Progress Note    Client Name: Flor Fernandez  Date: 12/30/19         Service Type: Individual      Session Start Time: 3:50  Session End Time: 4:42      Session Length: 38-52     Session #: 30     Attendees: Client attended alone    Treatment Plan Last Reviewed: 12/30/19, next due 3/30/20.  PHQ-9 / RODRIGUE-7 : completed.     DATA      Progress Since Last Session (Related to Symptoms / Goals / Homework):   Symptoms: Improving client reported minimal anxiety.    Homework: Partially completed       Episode of Care Goals: Minimal progress - ACTION (Actively working towards change); Intervened by reinforcing change plan / affirming steps taken     Current / Ongoing Stressors and Concerns:   Client reported regarding ongoing family relationship concerns with her daughter, son and mother.  Client reported that she falls into old patterns at times of being overly emotionally invested in things outside of her control, but is generally able to redirect herself.  Client reported some ongoing work stress but overall improvement.     Treatment Objective(s) Addressed in This Session:   practice setting boundaries some times in the next several weeks   Client identified her desired continued therapy goals.     Intervention:   Interpersonal Therapy: re-reviewed with client boundary-setting with her family members regarding her involvement and investment in their issues  Motivational Interviewing: used circular/Socratic questioning to elicit client's identification of her desired continued therapy goals.        ASSESSMENT: Current Emotional / Mental Status (status of significant symptoms):   Risk status (Self / Other harm or suicidal ideation)   Client denies current fears or concerns for personal safety.   Client denies current or recent suicidal ideation or behaviors.   Client denies current or recent homicidal ideation or behaviors.   Client denies current or recent self injurious  behavior or ideation.   Client denies other safety concerns.   A safety and risk management plan has not been developed at this time, however client was given the after-hours number / 911 should there be a change in any of these risk factors.     Appearance:   Appropriate    Eye Contact:   Good    Psychomotor Behavior: Normal    Attitude:   Cooperative    Orientation:   All   Speech    Rate / Production: Normal     Volume:  Normal    Mood:    Anxious  Normal   Affect:    Appropriate    Thought Content:  Clear    Thought Form:  Coherent  Logical    Insight:    Good      Medication Review:   No current psychiatric medications prescribed     Medication Compliance:   NA     Changes in Health Issues:   None reported     Chemical Use Review:   Substance Use: Chemical use reviewed, no active concerns identified      Tobacco Use: No current tobacco use.       Collateral Reports Completed:   Not Applicable    PLAN: (Client Tasks / Therapist Tasks / Other)  Client agreed to continue to work on setting effective boundaries with her family members regarding her level of involvement and emotional investment in their issues/outcomes between now and follow-up therapy session in two weeks.        Vikas Perez, [facility]                                                         ________________________________________________________________________    Treatment Plan    Client's Name: Flor Fernandez  YOB: 1970    Date: 12/30/19    DSM-V Diagnoses: 300.00 (F41.9) Unspecified Anxiety Disorder  Psychosocial / Contextual Factors: problems with primary support group: conflicts with client's children  WHODAS: 15    Referral / Collaboration:  Referral to another professional/service is not indicated at this time..    Anticipated number of session or this episode of care: 31+      MeasurableTreatment Goal(s) related to diagnosis / functional impairment(s)  Goal 1: Client will successfully process through past trauma  defined as reporting 0 Subjective Units of Distress related to trauma on 0-10 scale and 7 on Validity of (positive) Cognition scale about self on 1-7 scale   I will know I've met my goal when I feel less stuck.       Objective #A (Client Action)    Status: Continued - Date: 12/30/19      Client will identify past traumatic events/memories which are causing current distress.     Intervention(s)  Therapist will take client's history and facilitate client's identification of targets for EMDR.     Objective #B  Client will complete needed assessment(s) and [facility] EMDR resourcing to confirm readiness for EMDR.    Status: Continued - Date: 12/30/19      Intervention(s)  Therapist will administer Dissociative Experiences Scale, Multidimensional Inventory of Dissociation as needed and complete [facility] EMDR resourcing with client.     Objective #C  Client will engage in installing at least 2 EMDR resources.  Status: Continued - Date: 12/30/19      Intervention(s)  Therapist will complete EMDR resourcing (Container, Remote Control, grounding/progressive muscle relaxation, Light Stream, Inner Advisor) with client.     Objective #D (Client Action)    Status: Continued- Date: 12/30/19      Client will engage in reprocessing all past traumatic event/memory targets.     Intervention(s)   Therapist will complete EMDR reprocessing with client.    Goal 2: Client will increase overall baseline calm mindset by 2 points on a 1-10 Likert scale per self-report (10 = optimal calm mindset).    I will know I've met my goal when I feel less anxious.      Objective #A (Client Action)    Status: Continued - Date: 12/30/19     Client will use cognitive strategies identified in therapy to challenge anxious thoughts.    Intervention(s)  Therapist will teach emotional regulation skills. CBT/REBT ABCD model.    Objective #B  Client will use at least 2 new coping skills for anxiety management in the next 12 weeks.    Status: Continued - Date:  12/30/19     Intervention(s)  Therapist will teach emotional regulation skills. DBT Core Mindfulness, Distress Tolerance, Emotion Regulation, and Interpersonal Effectiveness skills.    Goal 3: Client will improve her interpersonal functioning/relationships by 2 points on a 1-10 Likert scale per self-report (10 = optimal interpersonal functioning/relationships).    I will know I've met my goal when my relationships with my children have improved.      Objective #A (Client Action)    Status: Continued - Date(s): 12/30/19     Client will learn & utilize at least 2 new assertive communication skills weekly.    Intervention(s)  Therapist will teach Nonviolent Communication and DBT Interpersonal Effectiveness skills..    Client has reviewed and agreed to the above plan.      Vikas Perez, LMFT  December 30, 2019

## 2019-12-31 ASSESSMENT — ANXIETY QUESTIONNAIRES: GAD7 TOTAL SCORE: 0

## 2019-12-31 ASSESSMENT — PATIENT HEALTH QUESTIONNAIRE - PHQ9: SUM OF ALL RESPONSES TO PHQ QUESTIONS 1-9: 0

## 2020-01-07 ENCOUNTER — TELEPHONE (OUTPATIENT)
Dept: INTERNAL MEDICINE | Facility: CLINIC | Age: 50
End: 2020-01-07

## 2020-01-09 ENCOUNTER — OFFICE VISIT (OUTPATIENT)
Dept: FAMILY MEDICINE | Facility: CLINIC | Age: 50
End: 2020-01-09
Payer: COMMERCIAL

## 2020-01-09 VITALS
RESPIRATION RATE: 18 BRPM | SYSTOLIC BLOOD PRESSURE: 110 MMHG | BODY MASS INDEX: 46.45 KG/M2 | WEIGHT: 289 LBS | TEMPERATURE: 96.8 F | HEART RATE: 92 BPM | HEIGHT: 66 IN | DIASTOLIC BLOOD PRESSURE: 60 MMHG | OXYGEN SATURATION: 96 %

## 2020-01-09 DIAGNOSIS — J06.9 VIRAL UPPER RESPIRATORY TRACT INFECTION WITH COUGH: Primary | ICD-10-CM

## 2020-01-09 DIAGNOSIS — Z23 NEED FOR VACCINATION: ICD-10-CM

## 2020-01-09 PROCEDURE — 99213 OFFICE O/P EST LOW 20 MIN: CPT | Mod: 25 | Performed by: PHYSICIAN ASSISTANT

## 2020-01-09 PROCEDURE — 90732 PPSV23 VACC 2 YRS+ SUBQ/IM: CPT | Performed by: PHYSICIAN ASSISTANT

## 2020-01-09 PROCEDURE — 90471 IMMUNIZATION ADMIN: CPT | Performed by: PHYSICIAN ASSISTANT

## 2020-01-09 RX ORDER — CODEINE PHOSPHATE AND GUAIFENESIN 10; 100 MG/5ML; MG/5ML
1-2 SOLUTION ORAL EVERY 6 HOURS PRN
Qty: 118 ML | Refills: 0 | Status: SHIPPED | OUTPATIENT
Start: 2020-01-09 | End: 2020-02-19

## 2020-01-09 RX ORDER — AZITHROMYCIN 500 MG/1
500 TABLET, FILM COATED ORAL DAILY
Qty: 3 TABLET | Refills: 0 | Status: SHIPPED | OUTPATIENT
Start: 2020-01-09 | End: 2020-02-19

## 2020-01-09 ASSESSMENT — MIFFLIN-ST. JEOR: SCORE: 1954.71

## 2020-01-09 NOTE — PROGRESS NOTES
"Subjective     Flor Fernandez is a 49 year old female who presents to clinic today for the following health issues:    HPI   Patient presents with:  URI: x 2 weeks, cough and had not being feeling good, headache, heart racing   Chest Pain: x 1 week        Review of Systems   ROS COMP: Constitutional, HEENT, cardiovascular, pulmonary, gi and gu systems are negative, except as otherwise noted.      Objective    /60   Pulse 92   Temp 96.8  F (36  C) (Oral)   Resp 18   Ht 1.664 m (5' 5.5\")   Wt 131.1 kg (289 lb)   SpO2 96%   BMI 47.36 kg/m    Body mass index is 47.36 kg/m .  Physical Exam   GENERAL: healthy, alert and no distress  HENT: ear canals and TM's normal, nose edematous and mouth without ulcers or lesions. Pharyngeal cobblestoning and telangiectasia noted.   NECK: no adenopathy, no asymmetry, masses, or scars and thyroid normal to palpation  RESP: lungs clear to auscultation - no rales, rhonchi or wheezes    Diagnostic Test Results:  none         Assessment & Plan     1. Viral upper respiratory tract infection with cough  - azithromycin (ZITHROMAX) 500 MG tablet; Take 1 tablet (500 mg) by mouth daily for 3 days  Dispense: 3 tablet; Refill: 0  - guaiFENesin-codeine (ROBITUSSIN AC) 100-10 MG/5ML solution; Take 5-10 mLs by mouth every 6 hours as needed for cough  Dispense: 118 mL; Refill: 0    2. Need for vaccination  - Pneumococcal vaccine 23 valent PPSV23  (Pneumovax) [16486]     Use medication as directed.  Continue supportive OTC measures.  Follow up if symptoms should persist, change or worsen.  Patient amenable to this follow up plan.       Return if symptoms worsen or fail to improve.    Seth Vazquez PA-C  [facility]      "

## 2020-01-10 ENCOUNTER — DOCUMENTATION ONLY (OUTPATIENT)
Dept: LAB | Facility: CLINIC | Age: 50
End: 2020-01-10

## 2020-01-10 DIAGNOSIS — E11.9 TYPE 2 DIABETES MELLITUS WITHOUT COMPLICATION, WITHOUT LONG-TERM CURRENT USE OF INSULIN (H): Primary | ICD-10-CM

## 2020-01-10 DIAGNOSIS — E78.5 HYPERLIPIDEMIA LDL GOAL <100: ICD-10-CM

## 2020-01-10 DIAGNOSIS — E87.6 HYPOKALEMIA: ICD-10-CM

## 2020-01-13 ENCOUNTER — MYC MEDICAL ADVICE (OUTPATIENT)
Dept: FAMILY MEDICINE | Facility: CLINIC | Age: 50
End: 2020-01-13

## 2020-01-14 DIAGNOSIS — E87.6 HYPOKALEMIA: ICD-10-CM

## 2020-01-14 DIAGNOSIS — E11.9 TYPE 2 DIABETES MELLITUS WITHOUT COMPLICATION, WITHOUT LONG-TERM CURRENT USE OF INSULIN (H): ICD-10-CM

## 2020-01-14 DIAGNOSIS — E78.5 HYPERLIPIDEMIA LDL GOAL <100: ICD-10-CM

## 2020-01-14 LAB
ALT SERPL W P-5'-P-CCNC: 48 U/L (ref 0–50)
ANION GAP SERPL CALCULATED.3IONS-SCNC: 7 MMOL/L (ref 3–14)
BUN SERPL-MCNC: 11 MG/DL (ref 7–30)
CALCIUM SERPL-MCNC: 9.4 MG/DL (ref 8.5–10.1)
CHLORIDE SERPL-SCNC: 99 MMOL/L (ref 94–109)
CHOLEST SERPL-MCNC: 115 MG/DL
CO2 SERPL-SCNC: 29 MMOL/L (ref 20–32)
CREAT SERPL-MCNC: 0.51 MG/DL (ref 0.52–1.04)
GFR SERPL CREATININE-BSD FRML MDRD: >90 ML/MIN/{1.73_M2}
GLUCOSE SERPL-MCNC: 178 MG/DL (ref 70–99)
HBA1C MFR BLD: 6.8 % (ref 0–5.6)
HDLC SERPL-MCNC: 38 MG/DL
LDLC SERPL CALC-MCNC: 48 MG/DL
NONHDLC SERPL-MCNC: 77 MG/DL
POTASSIUM SERPL-SCNC: 4 MMOL/L (ref 3.4–5.3)
SODIUM SERPL-SCNC: 135 MMOL/L (ref 133–144)
TRIGL SERPL-MCNC: 147 MG/DL

## 2020-01-14 PROCEDURE — 84460 ALANINE AMINO (ALT) (SGPT): CPT | Performed by: INTERNAL MEDICINE

## 2020-01-14 PROCEDURE — 83036 HEMOGLOBIN GLYCOSYLATED A1C: CPT | Performed by: INTERNAL MEDICINE

## 2020-01-14 PROCEDURE — 80048 BASIC METABOLIC PNL TOTAL CA: CPT | Performed by: INTERNAL MEDICINE

## 2020-01-14 PROCEDURE — 80061 LIPID PANEL: CPT | Performed by: INTERNAL MEDICINE

## 2020-01-14 PROCEDURE — 36415 COLL VENOUS BLD VENIPUNCTURE: CPT | Performed by: INTERNAL MEDICINE

## 2020-01-15 ENCOUNTER — ANCILLARY PROCEDURE (OUTPATIENT)
Dept: MAMMOGRAPHY | Facility: CLINIC | Age: 50
End: 2020-01-15
Payer: COMMERCIAL

## 2020-01-15 DIAGNOSIS — Z12.31 VISIT FOR SCREENING MAMMOGRAM: ICD-10-CM

## 2020-01-15 PROCEDURE — 77067 SCR MAMMO BI INCL CAD: CPT | Mod: TC

## 2020-01-15 PROCEDURE — 77063 BREAST TOMOSYNTHESIS BI: CPT | Mod: TC

## 2020-01-18 ENCOUNTER — MYC REFILL (OUTPATIENT)
Dept: INTERNAL MEDICINE | Facility: CLINIC | Age: 50
End: 2020-01-18

## 2020-01-18 DIAGNOSIS — J30.2 CHRONIC SEASONAL ALLERGIC RHINITIS: ICD-10-CM

## 2020-01-20 RX ORDER — ALBUTEROL SULFATE 0.83 MG/ML
2.5 SOLUTION RESPIRATORY (INHALATION) EVERY 6 HOURS PRN
Qty: 25 VIAL | Refills: 1 | Status: SHIPPED | OUTPATIENT
Start: 2020-01-20 | End: 2024-06-05

## 2020-01-20 NOTE — TELEPHONE ENCOUNTER
Patient read [employer] message and scheduled a physical and pap on 2/12/2020 with chris OB  Thank you   LS

## 2020-01-22 DIAGNOSIS — E11.9 TYPE 2 DIABETES MELLITUS WITHOUT COMPLICATION, WITHOUT LONG-TERM CURRENT USE OF INSULIN (H): ICD-10-CM

## 2020-01-22 RX ORDER — METFORMIN HCL 500 MG
TABLET, EXTENDED RELEASE 24 HR ORAL
Qty: 30 TABLET | Refills: 3 | Status: SHIPPED | OUTPATIENT
Start: 2020-01-22 | End: 2020-05-13

## 2020-01-23 DIAGNOSIS — F41.0 PANIC ATTACK: ICD-10-CM

## 2020-01-27 RX ORDER — HYDROXYZINE HYDROCHLORIDE 25 MG/1
TABLET, FILM COATED ORAL
Qty: 30 TABLET | Refills: 2 | Status: SHIPPED | OUTPATIENT
Start: 2020-01-27 | End: 2020-04-01

## 2020-01-27 NOTE — TELEPHONE ENCOUNTER
Signed Prescriptions:                        Disp   Refills    hydrOXYzine (ATARAX) 25 MG tablet          30 tab*2        Sig: TAKE ONE TABLET BY MOUTH EVERY 8 HOURS AS NEEDED FOR           ANXIETY  Authorizing Provider: GEMINI BARFIELD  Ordering User: BRIANNA PIÑA RN refilled medication per [facility] Refill Protocol.     Brianna Piña RN, RN

## 2020-02-17 ENCOUNTER — OFFICE VISIT (OUTPATIENT)
Dept: INTERNAL MEDICINE | Facility: CLINIC | Age: 50
End: 2020-02-17
Payer: COMMERCIAL

## 2020-02-17 VITALS
SYSTOLIC BLOOD PRESSURE: 132 MMHG | DIASTOLIC BLOOD PRESSURE: 74 MMHG | HEIGHT: 66 IN | RESPIRATION RATE: 16 BRPM | OXYGEN SATURATION: 96 % | WEIGHT: 293 LBS | BODY MASS INDEX: 47.09 KG/M2 | TEMPERATURE: 99 F | HEART RATE: 117 BPM

## 2020-02-17 DIAGNOSIS — R00.0 TACHYCARDIA: ICD-10-CM

## 2020-02-17 DIAGNOSIS — M25.50 ARTHRALGIA, UNSPECIFIED JOINT: ICD-10-CM

## 2020-02-17 DIAGNOSIS — E78.5 HYPERLIPIDEMIA LDL GOAL <100: ICD-10-CM

## 2020-02-17 DIAGNOSIS — I10 BENIGN ESSENTIAL HYPERTENSION: ICD-10-CM

## 2020-02-17 DIAGNOSIS — E11.9 TYPE 2 DIABETES MELLITUS WITHOUT COMPLICATION, WITHOUT LONG-TERM CURRENT USE OF INSULIN (H): Primary | ICD-10-CM

## 2020-02-17 DIAGNOSIS — L91.8 SKIN TAG: ICD-10-CM

## 2020-02-17 DIAGNOSIS — F41.0 PANIC ATTACK: ICD-10-CM

## 2020-02-17 PROBLEM — D75.839 THROMBOCYTOSIS: Status: ACTIVE | Noted: 2017-05-03

## 2020-02-17 PROCEDURE — 99214 OFFICE O/P EST MOD 30 MIN: CPT | Mod: 25 | Performed by: INTERNAL MEDICINE

## 2020-02-17 PROCEDURE — 11200 RMVL SKIN TAGS UP TO&INC 15: CPT | Performed by: INTERNAL MEDICINE

## 2020-02-17 RX ORDER — ATORVASTATIN CALCIUM 20 MG/1
TABLET, FILM COATED ORAL
Qty: 90 TABLET | Refills: 4 | Status: SHIPPED | OUTPATIENT
Start: 2020-02-17 | End: 2021-03-30

## 2020-02-17 RX ORDER — CHLORTHALIDONE 25 MG/1
25 TABLET ORAL DAILY
Qty: 90 TABLET | Refills: 3 | Status: SHIPPED | OUTPATIENT
Start: 2020-02-17 | End: 2021-04-13

## 2020-02-17 RX ORDER — CARVEDILOL 6.25 MG/1
6.25 TABLET ORAL 2 TIMES DAILY WITH MEALS
Qty: 180 TABLET | Refills: 11 | Status: SHIPPED | OUTPATIENT
Start: 2020-02-17 | End: 2021-04-13

## 2020-02-17 RX ORDER — LISINOPRIL 10 MG/1
10 TABLET ORAL DAILY
Qty: 90 TABLET | Refills: 3 | Status: SHIPPED | OUTPATIENT
Start: 2020-02-17 | End: 2021-03-16

## 2020-02-17 ASSESSMENT — MIFFLIN-ST. JEOR: SCORE: 1973.29

## 2020-02-17 NOTE — PATIENT INSTRUCTIONS
- Increase protein in all your meals.  - Skin tag removal: monitor for pain and infection.  Shower normally.    - Restart metformin.  - Xray and labs if pain is more consistent or worsening.

## 2020-02-17 NOTE — PROGRESS NOTES
Subjective     Flor Fernandez is a 50 year old female who presents to clinic today for the following health issues:      HPI   Diabetic recheck - She hasn't been checking her blood sugars at home. She hasn't been taking metformin for a couple months. Notes some weight gain since she stopped. Went over lab results with patient today. Patient is unsure if she is getting enough protein. For breakfast, she has egg white omelette sometimes, other times she has cereal. She has been having pain in the joints of her fingers, toes and wrist. She gets the pain once or twice a month. Denies morning stiffness. Her fingers aren't stiff all the time, only on occasions. She takes tylenol and that has been helpful. Denies chest pain, shortness of breath or abdominal pain. She has some skin tags on her right arm and would like to have some removed. She relates that her menstrual cycle comes and goes- 3 months interval. She has night sweats and hot flashes.    Lab Results   Component Value Date    A1C 6.8 01/14/2020    A1C 6.1 08/12/2019    A1C 6.5 04/22/2019    A1C 6.1 12/07/2018    A1C 5.3 04/25/2016     Wt Readings from Last 5 Encounters:   02/17/20 133.4 kg (294 lb 3.2 oz)   01/09/20 131.1 kg (289 lb)   08/12/19 125.6 kg (277 lb)   07/29/19 127 kg (280 lb)   06/07/19 127 kg (280 lb)         Patient Active Problem List   Diagnosis     Vertigo     Hyperlipidemia LDL goal <100     Tachycardia     Benign essential hypertension     History of stroke     Iron deficiency     Thrombocytosis (H)     Anxiety disorder, unspecified type     Chronic low back pain without sciatica, unspecified back pain laterality     BMI 45.0-49.9, adult (H)     Diabetes mellitus, type 2 (H)     Past Surgical History:   Procedure Laterality Date     APPENDECTOMY       [facility] SURGICAL PATHOLOGY  12/29/2016     MAMMOPLASTY REDUCTION BILATERAL         Social History     Tobacco Use     Smoking status: Former Smoker     Packs/day: 0.50     Years: 27.00     Pack  years: 13.50     Types: Cigarettes     Last attempt to quit: 2014     Years since quittin.8     Smokeless tobacco: Never Used   Substance Use Topics     Alcohol use: No     Family History   Problem Relation Age of Onset     Other Cancer Mother      Asthma Mother      Diabetes Father      Prostate Cancer Paternal Grandfather      Prostate Cancer Other      Glaucoma No family hx of      Macular Degeneration No family hx of          Current Outpatient Medications   Medication Sig Dispense Refill     albuterol (PROVENTIL) (2.5 MG/3ML) 0.083% neb solution Take 1 vial (2.5 mg) by nebulization every 6 hours as needed for shortness of breath / dyspnea or wheezing 25 vial 1     atorvastatin (LIPITOR) 20 MG tablet TAKE ONE TABLET BY MOUTH ONCE DAILY, 90 tablet 4     blood glucose (ONETOUCH VERIO IQ) test strip USE TO TEST BLOOD SUGAR ONCE TIME DAILY OR AS DIRECTED 100 each 1     blood glucose calibration (NO BRAND SPECIFIED) solution Use to calibrate blood glucose monitor as needed as directed. 2 each 1     blood glucose monitoring (NO BRAND SPECIFIED) meter device kit Use to test blood sugar 1 time daily or as directed. 1 kit 0     carvedilol (COREG) 6.25 MG tablet Take 1 tablet (6.25 mg) by mouth 2 times daily (with meals) 180 tablet 11     chlorthalidone (HYGROTON) 25 MG tablet Take 1 tablet (25 mg) by mouth daily 90 tablet 3     [facility] ASPIRIN 325 MG tablet TAKE 1 TABLET BY MOUTH ONCE DAILY WITH A MEAL.  0     cyanocobalamin (VITAMIN  B-12) 1000 MCG tablet Take 1,000 mcg by mouth daily       ferrous fumarate 65 mg, [place]. FE,-Vitamin C 125 mg (VITRON C)  MG TABS tablet Take 1 tablet by mouth every other day 45 tablet 3     guaiFENesin-codeine (ROBITUSSIN AC) 100-10 MG/5ML solution Take 5-10 mLs by mouth every 6 hours as needed for cough 118 mL 0     hydrOXYzine (ATARAX) 25 MG tablet TAKE ONE TABLET BY MOUTH EVERY 8 HOURS AS NEEDED FOR ANXIETY 30 tablet 2     lisinopril (ZESTRIL) 10 MG tablet Take 1 tablet (10  "mg) by mouth daily 90 tablet 3     metFORMIN (GLUCOPHAGE-XR) 500 MG 24 hr tablet TAKE ONE TABLET BY MOUTH ONCE DAILY WITH DINNER 30 tablet 3     Multiple Vitamins-Minerals (MULTI-VITAMIN GUMMIES) CHEW        ONETOUCH DELICA LANCETS 33G MISC USE TO TEST BLOOD SUGARS ONCE DAILY OR AS DIRECTED 100 each 1     sertraline (ZOLOFT) 50 MG tablet Take 1 tablet (50 mg) by mouth daily 90 tablet 1     LORazepam (ATIVAN) 0.5 MG tablet Take 1 tablet (0.5 mg) by mouth every 8 hours as needed for anxiety (Patient not taking: Reported on 2/17/2020) 30 tablet 0     No Known Allergies    Reviewed and updated as needed this visit by Provider  Tobacco  Allergies  Meds  Problems  Med Hx  Surg Hx  Fam Hx         Review of Systems   ROS COMP: Constitutional, HEENT, cardiovascular, pulmonary, GI, , musculoskeletal, neuro, skin, endocrine and psych systems are negative, except as otherwise noted.    This document serves as a record of the services and decisions personally performed and made by Nayeli Barnard MD. It was created on her behalf by Idalia Lazo, a trained medical scribe. The creation of this document is based on the provider's statements to the medical scribe.  Idalia Lazo 4:18 PM February 17, 2020      Objective    /74 (BP Location: Right arm, Cuff Size: Adult Large)   Pulse 117   Temp 99  F (37.2  C) (Oral)   Resp 16   Ht 1.664 m (5' 5.5\")   Wt 133.4 kg (294 lb 3.2 oz)   SpO2 96%   BMI 48.21 kg/m    Body mass index is 48.21 kg/m .  Physical Exam   GENERAL: healthy, alert and no distress  NECK: no adenopathy, no asymmetry, masses, or scars and thyroid normal to palpation  RESP: lungs clear to auscultation - no rales, rhonchi or wheezes  CV: mild tachycardia, regular rhythm, normal S1 S2, no S3 or S4, no murmur, click or rub, no peripheral edema and peripheral pulses strong  ABDOMEN: soft, nontender, no hepatosplenomegaly, no masses and bowel sounds normal  MS: no gross musculoskeletal defects noted, no " edema  SKIN: no suspicious lesions or rashes, skin tags on right axilla  PSYCH: mentation appears normal, affect normal/bright      Diagnostic Test Results:  Labs reviewed in Epic  No results found for this or any previous visit (from the past 24 hour(s)).        Assessment & Plan     1. Type 2 diabetes mellitus without complication, without long-term current use of insulin (H)  Patient relates that she hasn't been taking metformin for a few months. Advised patient to restart.  She hasn't been taking her blood sugars at home. Will continue to monitor.      4. Benign essential hypertension  Doing well with current measures without complications.  Refills placed today. Will continue to monitor.  - chlorthalidone (HYGROTON) 25 MG tablet; Take 1 tablet (25 mg) by mouth daily  Dispense: 90 tablet; Refill: 3  - lisinopril (ZESTRIL) 10 MG tablet; Take 1 tablet (10 mg) by mouth daily  Dispense: 90 tablet; Refill: 3    5. Tachycardia  Managed with current measure. Exam shows mild tachycardia.  Refills placed today. Will continue to monitor.  - carvedilol (COREG) 6.25 MG tablet; Take 1 tablet (6.25 mg) by mouth 2 times daily (with meals)  Dispense: 180 tablet; Refill: 11    6. Hyperlipidemia LDL goal <100  Managed with current measure.  Refills placed today. Will continue to monitor.  - atorvastatin (LIPITOR) 20 MG tablet; TAKE ONE TABLET BY MOUTH ONCE DAILY,  Dispense: 90 tablet; Refill: 4    7. Arthralgia, unspecified joint  Patient notes pain in toes and fingers that comes and goes. Comes on once or twice a month.  Labs and xray ordered today. Complete if pain is more persistent  Will continue to monitor.  - XR Hand Bilateral G/E 3 Views; Future  - CRP inflammation; Future  - Erythrocyte sedimentation rate auto; Future  - Cyclic Citrullinated Peptide Antibody IgG; Future  - Anti Nuclear Cami IgG by IFA with Reflex; Future  - Rheumatoid factor; Future    8. Skin tag  Patient has 2 skin tags on right axilla. I cleaned the  "site with alcohol, numbed with ethyl chloride and removed them with forceps.   I applied bacitracin and dressed it with bandaid. Advised patient to monitor for pain and infection.  - REMOVAL OF SKIN TAGS, FIRST 15    9. Panic attack  Well managed with current measures without complications.  Refills placed today. Will continue to monitor.  - sertraline (ZOLOFT) 50 MG tablet; Take 1 tablet (50 mg) by mouth daily  Dispense: 90 tablet; Refill: 4        Patient Instructions   - Increase protein in all your meals.  - Skin tag removal: monitor for pain and infection.  Shower normally.    - Restart metformin.  - Xray and labs if pain is more consistent or worsening.      BMI:   Estimated body mass index is 47.36 kg/m  as calculated from the following:    Height as of 1/9/20: 1.664 m (5' 5.5\").    Weight as of 1/9/20: 131.1 kg (289 lb).   Weight management plan: Discussed healthy diet and exercise guidelines      The information in this document, created by the medical scribe for me, accurately reflects the services I personally performed and the decisions made by me. I have reviewed and approved this document for accuracy prior to leaving the patient care area.  February 17, 2020 4:18 PM    I spent 15 minutes of time with the patient and >50% of it was in education and counseling regarding health maintenance and medication recheck.  In: 4:17 PM  Out: 4:32 PM    Nayeli Barnard MD  [facility]    "

## 2020-02-18 NOTE — PROGRESS NOTES
SUBJECTIVE:   CC: Flor Fernandez is an 50 year old woman who presents for preventive health visit.     Healthy Habits:     Getting at least 3 servings of Calcium per day:  Yes    Bi-annual eye exam:  Yes    Dental care twice a year:  NO    Sleep apnea or symptoms of sleep apnea:  None    Diet:  Low salt and Low fat/cholesterol    Frequency of exercise:  2-3 days/week    Duration of exercise:  45-60 minutes    Taking medications regularly:  Yes    Medication side effects:  None    PHQ-2 Total Score: 0    Additional concerns today:  Yes    Would like to discuss treatment options for vasomotor symptoms. Finding symptoms are increasing in frequency and severity. Wakes several times at night all sweaty, taking longer then to fall back to sleep. Becoming worse during the day, some level of sweating consistently.   Cycles are occurring every few months. Longest between has been 3 months, flow is lightening.    Today's PHQ-2 Score:   PHQ-2 (  [employer]) 2020   Q1: Little interest or pleasure in doing things 0   Q2: Feeling down, depressed or hopeless 0   PHQ-2 Score 0   Q1: Little interest or pleasure in doing things Not at all   Q2: Feeling down, depressed or hopeless Not at all   PHQ-2 Score 0       Abuse: Current or Past(Physical, Sexual or Emotional)- No  Do you feel safe in your environment? Yes        Social History     Tobacco Use     Smoking status: Former Smoker     Last attempt to quit: 2014     Years since quittin.8     Smokeless tobacco: Never Used   Substance Use Topics     Alcohol use: No         Alcohol Use 2020   Prescreen: >3 drinks/day or >7 drinks/week? No   Prescreen: >3 drinks/day or >7 drinks/week? -   No flowsheet data found.    Reviewed orders with patient.  Reviewed health maintenance and updated orders accordingly - Yes  Patient Active Problem List   Diagnosis     Vertigo     Hyperlipidemia LDL goal <100     Tachycardia     Benign essential hypertension     History of stroke      Iron deficiency     Thrombocytosis (H)     Anxiety disorder, unspecified type     Chronic low back pain without sciatica, unspecified back pain laterality     BMI 45.0-49.9, adult (H)     Diabetes mellitus, type 2 (H)     Panic attack     Past Surgical History:   Procedure Laterality Date     APPENDECTOMY       [facility] SURGICAL PATHOLOGY  2016     MAMMOPLASTY REDUCTION BILATERAL         Social History     Tobacco Use     Smoking status: Former Smoker     Last attempt to quit: 2014     Years since quittin.8     Smokeless tobacco: Never Used   Substance Use Topics     Alcohol use: No     Family History   Problem Relation Age of Onset     Other Cancer Mother      Asthma Mother      Diabetes Father      Prostate Cancer Paternal Grandfather      Prostate Cancer Other      Glaucoma No family hx of      Macular Degeneration No family hx of            Mammogram Screening: Patient under age 50, mutual decision reflected in health maintenance.      Pertinent mammograms are reviewed under the imaging tab.  History of abnormal Pap smear: NO - age 30-65 PAP every 5 years with negative HPV co-testing recommended     Reviewed and updated as needed this visit by clinical staff  Tobacco  Allergies  Meds  Med Hx  Surg Hx  Fam Hx  Soc Hx        Reviewed and updated as needed this visit by Provider        Past Medical History:   Diagnosis Date     Anxiety      Cerebral infarction (H)      Diabetes (H)      Hypertension      Morbid obesity (H)      Tachycardia      Vertigo       Past Surgical History:   Procedure Laterality Date     APPENDECTOMY       [facility] SURGICAL PATHOLOGY  2016     MAMMOPLASTY REDUCTION BILATERAL         Review of Systems  CONSTITUTIONAL: NEGATIVE for fever, chills, change in weight  INTEGUMENTARU/SKIN: NEGATIVE for worrisome rashes, moles or lesions  EYES: NEGATIVE for vision changes or irritation  ENT: NEGATIVE for ear, mouth and throat problems  RESP: NEGATIVE for significant cough  "or SOB  BREAST: NEGATIVE for masses, tenderness or discharge  CV: NEGATIVE for chest pain, palpitations or peripheral edema  GI: NEGATIVE for nausea, abdominal pain, heartburn, or change in bowel habits  : NEGATIVE for unusual urinary or vaginal symptoms. Periods are every 2-3 months.  MUSCULOSKELETAL: NEGATIVE for significant arthralgias or myalgia  NEURO: NEGATIVE for weakness, dizziness or paresthesias  PSYCHIATRIC: NEGATIVE for changes in mood or affect     OBJECTIVE:   /84 (BP Location: Right arm, Patient Position: Sitting, Cuff Size: Adult Large)   Pulse 92   Temp 97.9  F (36.6  C) (Oral)   Ht 1.676 m (5' 6\")   Wt 132 kg (291 lb)   LMP 02/09/2020 (Exact Date)   SpO2 93%   BMI 46.97 kg/m    Physical Exam  GENERAL: healthy, alert and no distress  EYES: Eyes grossly normal to inspection, PERRL and conjunctivae and sclerae normal  HENT: ear canals and TM's normal, nose and mouth without ulcers or lesions  NECK: no adenopathy, no asymmetry, masses, or scars and thyroid normal to palpation  RESP: lungs clear to auscultation - no rales, rhonchi or wheezes  BREAST: normal without masses, tenderness or nipple discharge and no palpable axillary masses or adenopathy  CV: regular rate and rhythm, normal S1 S2, no S3 or S4, no murmur, click or rub, no peripheral edema and peripheral pulses strong  ABDOMEN: soft, nontender, no hepatosplenomegaly, no masses and bowel sounds normal   (female): normal female external genitalia, normal urethral meatus, vaginal mucosa pink, moist, well rugated, and normal cervix/adnexa/uterus without masses or discharge  MS: no gross musculoskeletal defects noted, no edema  SKIN: no suspicious lesions or rashes  NEURO: Normal strength and tone, mentation intact and speech normal  PSYCH: mentation appears normal, affect normal/bright    ASSESSMENT/PLAN:   1. Encounter for gynecological examination without abnormal finding  Mammogram up to date  - Pap imaged thin layer screen with " "HPV - recommended age 30 - 65 years (select HPV order below)  - HPV High Risk Types DNA Cervical    2. Perimenopausal vasomotor symptoms  We discussed her symptoms and management options. Given her PMH and chronic medical conditions, she is not a candidate for HRT/estrogen containing medications. We did discuss non hormonal options. Discussed Brisdelle is FDA approved for management of vasomotor symptoms. Discussed poor insurance coverage and use of Paxil 10 mg daily. Patient is already on a low dose of Zoloft. She is interested in trial of Paxil. Recommend she touch base with her PCP to discuss if this dose would be ok and if any adjustments in the Zoloft are recommended. Can then contact me to send in prescription for Paxil. Discussed possible use of Effexor, but may worsen blood pressure.   We discussed use of medication, expectation that it will not resolve all vasomotor symptoms, but rather help them to be less often/severe, reviewed side effects. Patient is given an opportunity to ask questions and have them answered.    COUNSELING:  Reviewed preventive health counseling, as reflected in patient instructions  Special attention given to:        Regular exercise       Healthy diet/nutrition       Colon cancer screening    Estimated body mass index is 46.97 kg/m  as calculated from the following:    Height as of this encounter: 1.676 m (5' 6\").    Weight as of this encounter: 132 kg (291 lb).    Weight management plan: Patient was referred to their PCP to discuss a diet and exercise plan.     reports that she quit smoking about 5 years ago. She has never used smokeless tobacco.      Counseling Resources:  ATP IV Guidelines  Pooled Cohorts Equation Calculator  Breast Cancer Risk Calculator  FRAX Risk Assessment  ICSI Preventive Guidelines  Dietary Guidelines for Americans, 2010  USDA's MyPlate  ASA Prophylaxis  Lung CA Screening    [facility]AGJIT [facility]  "

## 2020-02-19 ENCOUNTER — MYC MEDICAL ADVICE (OUTPATIENT)
Dept: INTERNAL MEDICINE | Facility: CLINIC | Age: 50
End: 2020-02-19

## 2020-02-19 ENCOUNTER — OFFICE VISIT (OUTPATIENT)
Dept: OBGYN | Facility: CLINIC | Age: 50
End: 2020-02-19
Payer: COMMERCIAL

## 2020-02-19 ENCOUNTER — MYC MEDICAL ADVICE (OUTPATIENT)
Dept: OBGYN | Facility: CLINIC | Age: 50
End: 2020-02-19

## 2020-02-19 VITALS
SYSTOLIC BLOOD PRESSURE: 134 MMHG | OXYGEN SATURATION: 93 % | DIASTOLIC BLOOD PRESSURE: 84 MMHG | BODY MASS INDEX: 46.77 KG/M2 | HEIGHT: 66 IN | HEART RATE: 92 BPM | TEMPERATURE: 97.9 F | WEIGHT: 291 LBS

## 2020-02-19 DIAGNOSIS — N95.1 PERIMENOPAUSAL VASOMOTOR SYMPTOMS: ICD-10-CM

## 2020-02-19 DIAGNOSIS — Z01.419 ENCOUNTER FOR GYNECOLOGICAL EXAMINATION WITHOUT ABNORMAL FINDING: Primary | ICD-10-CM

## 2020-02-19 PROCEDURE — 99213 OFFICE O/P EST LOW 20 MIN: CPT | Mod: 25 | Performed by: NURSE PRACTITIONER

## 2020-02-19 PROCEDURE — G0145 SCR C/V CYTO,THINLAYER,RESCR: HCPCS | Performed by: NURSE PRACTITIONER

## 2020-02-19 PROCEDURE — 87624 HPV HI-RISK TYP POOLED RSLT: CPT | Performed by: NURSE PRACTITIONER

## 2020-02-19 PROCEDURE — 99396 PREV VISIT EST AGE 40-64: CPT | Performed by: NURSE PRACTITIONER

## 2020-02-19 ASSESSMENT — MIFFLIN-ST. JEOR: SCORE: 1966.72

## 2020-02-19 ASSESSMENT — PAIN SCALES - GENERAL: PAINLEVEL: NO PAIN (0)

## 2020-02-19 NOTE — TELEPHONE ENCOUNTER
"Per OV note 2/19/2020: \"2. Perimenopausal vasomotor symptoms  We discussed her symptoms and management options. Given her PMH and chronic medical conditions, she is not a candidate for HRT/estrogen containing medications. We did discuss non hormonal options. Discussed Brisdelle is FDA approved for management of vasomotor symptoms. Discussed poor insurance coverage and use of Paxil 10 mg daily. Patient is already on a low dose of Zoloft. She is interested in trial of Paxil. Recommend she touch base with her PCP to discuss if this dose would be ok and if any adjustments in the Zoloft are recommended. Can then contact me to send in prescription for Paxil. Discussed possible use of Effexor, but may worsen blood pressure.   We discussed use of medication, expectation that it will not resolve all vasomotor symptoms, but rather help them to be less often/severe, reviewed side effects. Patient is given an opportunity to ask questions and have them answered.\"    Sana Garcia RN on 2/19/2020 at 3:19 PM    "

## 2020-02-19 NOTE — TELEPHONE ENCOUNTER
"Per 2/19/2020 OB/GYN OV notes-     Perimenopausal vasomotor symptoms  \"Patient is already on a low dose of Zoloft. She is interested in trial of Paxil. Recommend she touch base with her PCP to discuss if this dose would be ok and if any adjustments in the Zoloft are recommended. Can then contact me to send in prescription for Paxil\"    Michael Schwartz RN    "

## 2020-02-19 NOTE — TELEPHONE ENCOUNTER
[place] with Dr. Barnard: Paxil can replace Zoloft. However, it can cause weight gain. If patient decides to switch, please let Dr. Barnard know    Mychart message sent    Michael Schwartz RN

## 2020-02-21 LAB
COPATH REPORT: NORMAL
PAP: NORMAL

## 2020-02-25 LAB
FINAL DIAGNOSIS: NORMAL
HPV HR 12 DNA CVX QL NAA+PROBE: NEGATIVE
HPV16 DNA SPEC QL NAA+PROBE: NEGATIVE
HPV18 DNA SPEC QL NAA+PROBE: NEGATIVE
SPECIMEN DESCRIPTION: NORMAL
SPECIMEN SOURCE CVX/VAG CYTO: NORMAL

## 2020-03-02 ENCOUNTER — OFFICE VISIT (OUTPATIENT)
Dept: PSYCHOLOGY | Facility: CLINIC | Age: 50
End: 2020-03-02
Payer: COMMERCIAL

## 2020-03-02 DIAGNOSIS — F41.9 ANXIETY DISORDER, UNSPECIFIED TYPE: Primary | ICD-10-CM

## 2020-03-02 PROCEDURE — 90834 PSYTX W PT 45 MINUTES: CPT | Performed by: MARRIAGE & FAMILY THERAPIST

## 2020-03-02 ASSESSMENT — ANXIETY QUESTIONNAIRES
1. FEELING NERVOUS, ANXIOUS, OR ON EDGE: NOT AT ALL
3. WORRYING TOO MUCH ABOUT DIFFERENT THINGS: NOT AT ALL
GAD7 TOTAL SCORE: 0
2. NOT BEING ABLE TO STOP OR CONTROL WORRYING: NOT AT ALL
6. BECOMING EASILY ANNOYED OR IRRITABLE: NOT AT ALL
GAD7 TOTAL SCORE: 0
GAD7 TOTAL SCORE: 0
5. BEING SO RESTLESS THAT IT IS HARD TO SIT STILL: NOT AT ALL
7. FEELING AFRAID AS IF SOMETHING AWFUL MIGHT HAPPEN: NOT AT ALL
7. FEELING AFRAID AS IF SOMETHING AWFUL MIGHT HAPPEN: NOT AT ALL
4. TROUBLE RELAXING: NOT AT ALL

## 2020-03-02 NOTE — PROGRESS NOTES
Progress Note    Client Name: Flor Fernandez  Date: 3/2/20         Service Type: Individual      Session Start Time: 4:50  Session End Time: 5:42      Session Length: 38-52     Session #: 31     Attendees: Client attended alone    Treatment Plan Last Reviewed: 12/30/19, next due 3/30/20.  PHQ-9 / RODRIGUE-7 : completed.     DATA      Progress Since Last Session (Related to Symptoms / Goals / Homework):   Symptoms: Improving client reported minimal anxiety.    Homework: Partially completed       Episode of Care Goals: Satisfactory progress - ACTION (Actively working towards change); Intervened by reinforcing change plan / affirming steps taken     Current / Ongoing Stressors and Concerns:   Client reported regarding feeling down due to her current disconnection from her daughter.  Client reported that her son has moved out with his girlfriend, whom client doesn't approve of, but she is effectively letting go.  Client reported experiencing stress regarding the current political climate.     Treatment Objective(s) Addressed in This Session:   use cognitive strategies identified in therapy to challenge anxious thoughts      Intervention:   CBT: reviewed with client letting go of things outside of her control (relationship with her daughter, politics) and focusing on doing what she can.        ASSESSMENT: Current Emotional / Mental Status (status of significant symptoms):   Risk status (Self / Other harm or suicidal ideation)   Client denies current fears or concerns for personal safety.   Client denies current or recent suicidal ideation or behaviors.   Client denies current or recent homicidal ideation or behaviors.   Client denies current or recent self injurious behavior or ideation.   Client denies other safety concerns.   A safety and risk management plan has not been developed at this time, however client was given the after-hours number / 911 should there be a change in  any of these risk factors.     Appearance:   Appropriate    Eye Contact:   Good    Psychomotor Behavior: Normal    Attitude:   Cooperative    Orientation:   All   Speech    Rate / Production: Normal     Volume:  Normal    Mood:    Anxious  Normal   Affect:    Appropriate    Thought Content:  Clear    Thought Form:  Coherent  Logical    Insight:    Good      Medication Review:   No current psychiatric medications prescribed     Medication Compliance:   NA     Changes in Health Issues:   None reported     Chemical Use Review:   Substance Use: Chemical use reviewed, no active concerns identified      Tobacco Use: No current tobacco use.       Collateral Reports Completed:   Not Applicable    PLAN: (Client Tasks / Therapist Tasks / Other)  Client agreed to continue to work on letting go of things outside of her control (e.g. relationship outcomes with her daughter, politics) and focusing on doing what she can to be effective.        Vikas Perez, [facility]                                                         ________________________________________________________________________    Treatment Plan    Client's Name: Flor Fernandez  YOB: 1970    Date: 12/30/19    DSM-V Diagnoses: 300.00 (F41.9) Unspecified Anxiety Disorder  Psychosocial / Contextual Factors: problems with primary support group: conflicts with client's children  WHODAS: 15    Referral / Collaboration:  Referral to another professional/service is not indicated at this time..    Anticipated number of session or this episode of care: 31+      MeasurableTreatment Goal(s) related to diagnosis / functional impairment(s)  Goal 1: Client will successfully process through past trauma defined as reporting 0 Subjective Units of Distress related to trauma on 0-10 scale and 7 on Validity of (positive) Cognition scale about self on 1-7 scale   I will know I've met my goal when I feel less stuck.       Objective #A (Client Action)    Status:  Continued - Date: 12/30/19      Client will identify past traumatic events/memories which are causing current distress.     Intervention(s)  Therapist will take client's history and facilitate client's identification of targets for EMDR.     Objective #B  Client will complete needed assessment(s) and [facility] EMDR resourcing to confirm readiness for EMDR.    Status: Continued - Date: 12/30/19      Intervention(s)  Therapist will administer Dissociative Experiences Scale, Multidimensional Inventory of Dissociation as needed and complete [facility] EMDR resourcing with client.     Objective #C  Client will engage in installing at least 2 EMDR resources.  Status: Continued - Date: 12/30/19      Intervention(s)  Therapist will complete EMDR resourcing (Container, Remote Control, grounding/progressive muscle relaxation, Light Stream, Inner Advisor) with client.     Objective #D (Client Action)    Status: Continued- Date: 12/30/19      Client will engage in reprocessing all past traumatic event/memory targets.     Intervention(s)   Therapist will complete EMDR reprocessing with client.    Goal 2: Client will increase overall baseline calm mindset by 2 points on a 1-10 Likert scale per self-report (10 = optimal calm mindset).    I will know I've met my goal when I feel less anxious.      Objective #A (Client Action)    Status: Continued - Date: 12/30/19     Client will use cognitive strategies identified in therapy to challenge anxious thoughts.    Intervention(s)  Therapist will teach emotional regulation skills. CBT/REBT ABCD model.    Objective #B  Client will use at least 2 new coping skills for anxiety management in the next 12 weeks.    Status: Continued - Date: 12/30/19     Intervention(s)  Therapist will teach emotional regulation skills. DBT Core Mindfulness, Distress Tolerance, Emotion Regulation, and Interpersonal Effectiveness skills.    Goal 3: Client will improve her interpersonal functioning/relationships  by 2 points on a 1-10 Likert scale per self-report (10 = optimal interpersonal functioning/relationships).    I will know I've met my goal when my relationships with my children have improved.      Objective #A (Client Action)    Status: Continued - Date(s): 12/30/19     Client will learn & utilize at least 2 new assertive communication skills weekly.    Intervention(s)  Therapist will teach Nonviolent Communication and DBT Interpersonal Effectiveness skills..    Client has reviewed and agreed to the above plan.      JENNIFER Horowitz  March 2, 2020

## 2020-03-03 ASSESSMENT — ANXIETY QUESTIONNAIRES: GAD7 TOTAL SCORE: 0

## 2020-03-03 ASSESSMENT — PATIENT HEALTH QUESTIONNAIRE - PHQ9: SUM OF ALL RESPONSES TO PHQ QUESTIONS 1-9: 0

## 2020-03-30 DIAGNOSIS — F41.0 PANIC ATTACK: ICD-10-CM

## 2020-04-01 RX ORDER — HYDROXYZINE HYDROCHLORIDE 25 MG/1
TABLET, FILM COATED ORAL
Qty: 30 TABLET | Refills: 4 | Status: SHIPPED | OUTPATIENT
Start: 2020-04-01 | End: 2020-05-11

## 2020-04-19 DIAGNOSIS — E11.9 TYPE 2 DIABETES MELLITUS (H): ICD-10-CM

## 2020-04-19 RX ORDER — BLOOD SUGAR DIAGNOSTIC
STRIP MISCELLANEOUS
Qty: 100 EACH | Refills: 1 | Status: SHIPPED | OUTPATIENT
Start: 2020-04-19

## 2020-04-19 NOTE — TELEPHONE ENCOUNTER
[place] [facility] [place]  Irma Vargas RN

## 2020-05-11 ENCOUNTER — MYC MEDICAL ADVICE (OUTPATIENT)
Dept: INTERNAL MEDICINE | Facility: CLINIC | Age: 50
End: 2020-05-11

## 2020-05-11 DIAGNOSIS — F41.0 PANIC ATTACK: ICD-10-CM

## 2020-05-11 RX ORDER — HYDROXYZINE HYDROCHLORIDE 25 MG/1
TABLET, FILM COATED ORAL
Qty: 270 TABLET | Refills: 2 | Status: SHIPPED | OUTPATIENT
Start: 2020-05-11 | End: 2021-01-04

## 2020-05-13 DIAGNOSIS — E11.9 TYPE 2 DIABETES MELLITUS WITHOUT COMPLICATION, WITHOUT LONG-TERM CURRENT USE OF INSULIN (H): ICD-10-CM

## 2020-05-13 RX ORDER — METFORMIN HCL 500 MG
TABLET, EXTENDED RELEASE 24 HR ORAL
Qty: 90 TABLET | Refills: 0 | Status: SHIPPED | OUTPATIENT
Start: 2020-05-13 | End: 2020-08-18

## 2020-05-13 NOTE — TELEPHONE ENCOUNTER
[place] [facility] [place]  Irma Vargas RN

## 2020-06-09 ENCOUNTER — MYC MEDICAL ADVICE (OUTPATIENT)
Dept: INTERNAL MEDICINE | Facility: CLINIC | Age: 50
End: 2020-06-09

## 2020-06-09 NOTE — TELEPHONE ENCOUNTER
Please call-Dr Barnard is on vacation for 3 months  Please schedule a virtual visit with Other Provider

## 2020-07-18 DIAGNOSIS — E61.1 IRON DEFICIENCY: ICD-10-CM

## 2020-07-21 RX ORDER — BACILLUS COAGULANS 1B CELL
CAPSULE ORAL
Qty: 45 TABLET | Refills: 3 | Status: SHIPPED | OUTPATIENT
Start: 2020-07-21 | End: 2021-03-18

## 2020-08-18 ENCOUNTER — OFFICE VISIT (OUTPATIENT)
Dept: FAMILY MEDICINE | Facility: CLINIC | Age: 50
End: 2020-08-18
Payer: COMMERCIAL

## 2020-08-18 ENCOUNTER — TELEPHONE (OUTPATIENT)
Dept: FAMILY MEDICINE | Facility: CLINIC | Age: 50
End: 2020-08-18

## 2020-08-18 VITALS
BODY MASS INDEX: 47.09 KG/M2 | DIASTOLIC BLOOD PRESSURE: 78 MMHG | WEIGHT: 293 LBS | TEMPERATURE: 98.5 F | HEIGHT: 66 IN | HEART RATE: 100 BPM | RESPIRATION RATE: 14 BRPM | SYSTOLIC BLOOD PRESSURE: 122 MMHG | OXYGEN SATURATION: 95 %

## 2020-08-18 DIAGNOSIS — E11.65 TYPE 2 DIABETES MELLITUS WITH HYPERGLYCEMIA, WITHOUT LONG-TERM CURRENT USE OF INSULIN (H): Primary | ICD-10-CM

## 2020-08-18 DIAGNOSIS — Z23 NEED FOR HEPATITIS B VACCINATION: ICD-10-CM

## 2020-08-18 PROBLEM — D75.839 THROMBOCYTOSIS: Status: RESOLVED | Noted: 2017-05-03 | Resolved: 2020-08-18

## 2020-08-18 LAB — HBA1C MFR BLD: 8.9 % (ref 0–5.6)

## 2020-08-18 PROCEDURE — 90471 IMMUNIZATION ADMIN: CPT | Performed by: NURSE PRACTITIONER

## 2020-08-18 PROCEDURE — 36415 COLL VENOUS BLD VENIPUNCTURE: CPT | Performed by: NURSE PRACTITIONER

## 2020-08-18 PROCEDURE — 83036 HEMOGLOBIN GLYCOSYLATED A1C: CPT | Performed by: NURSE PRACTITIONER

## 2020-08-18 PROCEDURE — 90746 HEPB VACCINE 3 DOSE ADULT IM: CPT | Performed by: NURSE PRACTITIONER

## 2020-08-18 PROCEDURE — 99214 OFFICE O/P EST MOD 30 MIN: CPT | Mod: 25 | Performed by: NURSE PRACTITIONER

## 2020-08-18 ASSESSMENT — MIFFLIN-ST. JEOR: SCORE: 1986.9

## 2020-08-18 NOTE — TELEPHONE ENCOUNTER
Farxiga or Jaridance is preferred.  Can pursue a prior authorization if needed.     Thank you,  Yanira Burnett, PharmD  [facility]  [phone]

## 2020-08-18 NOTE — NURSING NOTE
Prior to immunization administration, verified patients identity using patient s name and date of birth. Please see Immunization Activity for additional information.     Screening Questionnaire for Adult Immunization    Are you sick today?   No   Do you have allergies to medications, food, a vaccine component or latex?   No   Have you ever had a serious reaction after receiving a vaccination?   No   Do you have a long-term health problem with heart, lung, kidney, or metabolic disease (e.g., diabetes), asthma, a blood disorder, no spleen, complement component deficiency, a cochlear implant, or a spinal fluid leak?  Are you on long-term aspirin therapy?   No   Do you have cancer, leukemia, HIV/AIDS, or any other immune system problem?   No   Do you have a parent, brother, or sister with an immune system problem?   No   In the past 3 months, have you taken medications that affect  your immune system, such as prednisone, other steroids, or anticancer drugs; drugs for the treatment of rheumatoid arthritis, Crohn s disease, or psoriasis; or have you had radiation treatments?   No   Have you had a seizure, or a brain or other nervous system problem?   No   During the past year, have you received a transfusion of blood or blood    products, or been given immune (gamma) globulin or antiviral drug?   No   For women: Are you pregnant or is there a chance you could become       pregnant during the next month?   No   Have you received any vaccinations in the past 4 weeks?   No     Immunization questionnaire answers were all negative.        Per orders of Jayna Hanna CNP, injection of Hep B given by Gisela Collins CMA. Patient instructed to remain in clinic for 15 minutes afterwards, and to report any adverse reaction to me immediately.       Screening performed by Gisela Collins CMA on 8/18/2020 at 4:35 PM.

## 2020-08-18 NOTE — PROGRESS NOTES
Subjective     Flor Fernandez is a 50 year old female who presents to clinic today for the following health issues:    HPI       Diabetes Follow-up    How often are you checking your blood sugar? A few times a month  What time of day are you checking your blood sugars (select all that apply)?  Before meals  Have you had any blood sugars above 200?  No  Have you had any blood sugars below 70?  No    What symptoms do you notice when your blood sugar is low?  Shaky, Dizzy, Weak and Blurred vision    What concerns do you have today about your diabetes? None     Do you have any of these symptoms? (Select all that apply)  Numbness in feet and Weight gain    Have you had a diabetic eye exam in the last 12 months? No                Hyperlipidemia Follow-Up      Are you regularly taking any medication or supplement to lower your cholesterol?   Yes- atorvastatin    Are you having muscle aches or other side effects that you think could be caused by your cholesterol lowering medication?  Yes- sometimes will have them in legs    Hypertension Follow-up      Do you check your blood pressure regularly outside of the clinic? No     Are you following a low salt diet? Yes    Are your blood pressures ever more than 140 on the top number (systolic) OR more   than 90 on the bottom number (diastolic), for example 140/90? No    BP Readings from Last 2 Encounters:   08/18/20 122/78   02/19/20 134/84     Hemoglobin A1C (%)   Date Value   08/18/2020 8.9 (H)   01/14/2020 6.8 (H)     LDL Cholesterol Calculated (mg/dL)   Date Value   01/14/2020 48   12/05/2018 47         How many servings of fruits and vegetables do you eat daily?  2-3    On average, how many sweetened beverages do you drink each day (Examples: soda, juice, sweet tea, etc.  Do NOT count diet or artificially sweetened beverages)?   0    How many days per week do you exercise enough to make your heart beat faster? 3 or less    How many minutes a day do you exercise enough to  make your heart beat faster? 9 or less    How many days per week do you miss taking your medication? 0    Patient stopped taking metformin due to not feeling well on medication.  She has not been checking her blood sugars.        Patient Active Problem List   Diagnosis     Vertigo     Hyperlipidemia LDL goal <100     Tachycardia     Benign essential hypertension     History of stroke     Iron deficiency     Anxiety disorder, unspecified type     Chronic low back pain without sciatica, unspecified back pain laterality     BMI 45.0-49.9, adult (H)     Diabetes mellitus, type 2 (H)     Panic attack     Past Surgical History:   Procedure Laterality Date     APPENDECTOMY       [facility]FF SURGICAL PATHOLOGY  2016     MAMMOPLASTY REDUCTION BILATERAL         Social History     Tobacco Use     Smoking status: Former Smoker     Last attempt to quit: 2014     Years since quittin.3     Smokeless tobacco: Never Used   Substance Use Topics     Alcohol use: No     Family History   Problem Relation Age of Onset     Other Cancer Mother      Asthma Mother      Diabetes Father      Prostate Cancer Paternal Grandfather      Prostate Cancer Other      Glaucoma No family hx of      Macular Degeneration No family hx of          Current Outpatient Medications   Medication Sig Dispense Refill     albuterol (PROVENTIL) (2.5 MG/3ML) 0.083% neb solution Take 1 vial (2.5 mg) by nebulization every 6 hours as needed for shortness of breath / dyspnea or wheezing 25 vial 1     atorvastatin (LIPITOR) 20 MG tablet TAKE ONE TABLET BY MOUTH ONCE DAILY, 90 tablet 4     blood glucose (ONETOUCH VERIO IQ) test strip USE TO TEST BLOOD SUGAR ONCE DAILY OR USE AS DIRECTED 100 each 1     blood glucose calibration (NO BRAND SPECIFIED) solution Use to calibrate blood glucose monitor as needed as directed. 2 each 1     blood glucose monitoring (NO BRAND SPECIFIED) meter device kit Use to test blood sugar 1 time daily or as directed. 1 kit 0      "carvedilol (COREG) 6.25 MG tablet Take 1 tablet (6.25 mg) by mouth 2 times daily (with meals) 180 tablet 11     chlorthalidone (HYGROTON) 25 MG tablet Take 1 tablet (25 mg) by mouth daily 90 tablet 3     [facility] ASPIRIN 325 MG tablet TAKE 1 TABLET BY MOUTH ONCE DAILY WITH A MEAL.  0     cyanocobalamin (VITAMIN  B-12) 1000 MCG tablet Take 1,000 mcg by mouth daily       hydrOXYzine (ATARAX) 25 MG tablet TAKE ONE TABLET BY MOUTH EVERY 8 HOURS AS NEEDED FOR ANXIETY 270 tablet 2     lisinopril (ZESTRIL) 10 MG tablet Take 1 tablet (10 mg) by mouth daily 90 tablet 3     Multiple Vitamins-Minerals (MULTI-VITAMIN GUMMIES) CHEW        ONETOUCH DELICA LANCETS 33G MISC USE TO TEST BLOOD SUGARS ONCE DAILY OR AS DIRECTED 100 each 1     sertraline (ZOLOFT) 50 MG tablet Take 1 tablet (50 mg) by mouth daily 90 tablet 4     VITRON-C  MG TABS tablet TAKE ONE TABLET BY MOUTH EVERY OTHER DAY 45 tablet 3     empagliflozin (JARDIANCE) 10 MG TABS tablet Take 1 tablet (10 mg) by mouth daily 90 tablet 0     No Known Allergies  BP Readings from Last 3 Encounters:   08/18/20 122/78   02/19/20 134/84   02/17/20 132/74    Wt Readings from Last 3 Encounters:   08/18/20 134.8 kg (297 lb 3.2 oz)   02/19/20 132 kg (291 lb)   02/17/20 133.4 kg (294 lb 3.2 oz)                    Reviewed and updated as needed this visit by Provider  Tobacco  Allergies  Meds  Problems  Med Hx  Surg Hx  Fam Hx         Review of Systems   Constitutional, HEENT, cardiovascular, pulmonary, gi and gu systems are negative, except as otherwise noted.      Objective    /78   Pulse 100   Temp 98.5  F (36.9  C) (Oral)   Resp 14   Ht 1.664 m (5' 5.5\")   Wt 134.8 kg (297 lb 3.2 oz)   SpO2 95%   BMI 48.70 kg/m    Body mass index is 48.7 kg/m .  Physical Exam   GENERAL: healthy, alert and no distress  RESP: lungs clear to auscultation - no rales, rhonchi or wheezes  CV: regular rate and rhythm, normal S1 S2, no S3 or S4, no murmur, click or rub, no peripheral " "edema and peripheral pulses strong  MS: no gross musculoskeletal defects noted, no edema  Diabetic foot exam: normal DP and PT pulses, no trophic changes or ulcerative lesions and normal sensory exam    Diagnostic Test Results:  none         Assessment & Plan     1. Type 2 diabetes mellitus with hyperglycemia, without long-term current use of insulin (H)  Patient to start invokana.  If too expensive, will consider januvia, ozempic, victoza, rybelsus.  Patient to update blood sugars by MyChart in 2 weeks.  - HEMOGLOBIN A1C  - Albumin Random Urine Quantitative with Creat Ratio; Future    2. BMI 45.0-49.9, adult (H)  Patient to work on diet.  Will try to avoid sulfonylureas in diabetes treatment to prevent weight gain.    3. Need for hepatitis B vaccination    - HEPATITIS B VACCINE, ADULT, IM     BMI:   Estimated body mass index is 48.7 kg/m  as calculated from the following:    Height as of this encounter: 1.664 m (5' 5.5\").    Weight as of this encounter: 134.8 kg (297 lb 3.2 oz).   Weight management plan: Discussed healthy diet and exercise guidelines            Return in about 3 months (around 11/18/2020) for Diabetic Check.    JAGJIT Vergara [facility]  "

## 2020-08-19 DIAGNOSIS — E11.65 TYPE 2 DIABETES MELLITUS WITH HYPERGLYCEMIA, WITHOUT LONG-TERM CURRENT USE OF INSULIN (H): ICD-10-CM

## 2020-08-19 LAB
CREAT UR-MCNC: 76 MG/DL
MICROALBUMIN UR-MCNC: 6 MG/L
MICROALBUMIN/CREAT UR: 8.45 MG/G CR (ref 0–25)

## 2020-08-19 PROCEDURE — 82043 UR ALBUMIN QUANTITATIVE: CPT | Performed by: NURSE PRACTITIONER

## 2020-08-19 NOTE — RESULT ENCOUNTER NOTE
Dear Flor,    Your recent test results are attached.       No excess protein in the urine.      If you have any questions please feel free to contact [phone] or myself via [employer].    Sincerely,  Jayna Hi, CNP

## 2020-08-26 ENCOUNTER — NURSE TRIAGE (OUTPATIENT)
Dept: INTERNAL MEDICINE | Facility: CLINIC | Age: 50
End: 2020-08-26

## 2020-08-26 NOTE — TELEPHONE ENCOUNTER
Spoke with patient. Patient seen on 8/18/2020 by Jayna Hi CNP and was switched from Metformin to Jardiance 10 mg daily due to side effects from Metformin. She started Jardiance on 8/20 and felt OK until Monday. Since Monday she has been feeling dizzy, weak, tired and nauseas. Reports she normally does have some vertigo though.   She has been checking her blood sugars and they have been more elevated recently than when she was on the metformin. States they were usually within the normal range but now in Jardiance, they are higher.  Monday: 260  Tuesday: 197, 180  Today: before breakfast 247, after 222  States she has been drinking lots of water.  Denies vomiting, SOB, fever.  Patient wondering if this could be side effects of the new medication or due to elevated blood sugar as they are normally not this high?     Please advise.    Additional Information    Negative: Unconscious or difficult to awaken    Negative: Acting confused (e.g., disoriented, slurred speech)    Negative: Very weak (can't stand)    Negative: Sounds like a life-threatening emergency to the triager    Negative: Vomiting and signs of dehydration (e.g., very dry mouth, lightheaded, dark urine)    Negative: Blood glucose > 240 mg/dL (13.3 mmol/L) and rapid breathing    Negative: Blood glucose > 500 mg/dL (27.8 mmol/L)    Negative: Blood glucose > 240 mg/dL (13.3 mmol/L) AND urine ketones moderate-large (or more than 1+)    Negative: Blood glucose > 240 mg/dL (13.3 mmol/L) and blood ketones > 1.4 mmol/L    Negative: Blood glucose > 240 mg/dL (13.3 mmol/L) AND vomiting AND unable to check for ketones (in blood or urine)    Negative: Vomiting lasting > 4 hours    Negative: Patient sounds very sick or weak to the triager    Negative: Fever > 100.5 F (38.1 C)    Negative: Caller has URGENT medication or insulin pump question and triager unable to answer question    Negative: Blood glucose > 400 mg/dL (22.2 mmol/L)    Negative: Blood glucose >  300 mg/dL (16.7 mmol/L) AND two or more times in a row    Negative: Urine ketones moderate - large (or blood ketones > 1.4 mmol/L)    Blood glucose 240 - 300 mg/dL (13.3 - 16.7 mmol/L)    Negative: New-onset diabetes suspected (e.g., frequent urination, weak, weight loss)    Negative: Symptoms of high blood sugar (e.g., frequent urination, weak, weight loss) and not able to test blood glucose    Negative: Patient wants to be seen    Blood glucose  mg/dL (3.9 -13.3 mmol/L)    Protocols used: DIABETES - HIGH BLOOD SUGAR-A-OH    Daisy Ball RN

## 2020-08-27 ENCOUNTER — OFFICE VISIT (OUTPATIENT)
Dept: FAMILY MEDICINE | Facility: CLINIC | Age: 50
End: 2020-08-27
Payer: COMMERCIAL

## 2020-08-27 VITALS
TEMPERATURE: 97.7 F | HEART RATE: 94 BPM | RESPIRATION RATE: 16 BRPM | BODY MASS INDEX: 46.73 KG/M2 | SYSTOLIC BLOOD PRESSURE: 116 MMHG | HEIGHT: 66 IN | OXYGEN SATURATION: 97 % | DIASTOLIC BLOOD PRESSURE: 68 MMHG | WEIGHT: 290.8 LBS

## 2020-08-27 DIAGNOSIS — R73.9 HYPERGLYCEMIA: ICD-10-CM

## 2020-08-27 DIAGNOSIS — E11.65 TYPE 2 DIABETES MELLITUS WITH HYPERGLYCEMIA, WITHOUT LONG-TERM CURRENT USE OF INSULIN (H): Primary | ICD-10-CM

## 2020-08-27 PROCEDURE — 99214 OFFICE O/P EST MOD 30 MIN: CPT | Performed by: FAMILY MEDICINE

## 2020-08-27 ASSESSMENT — MIFFLIN-ST. JEOR: SCORE: 1957.87

## 2020-08-27 NOTE — PROGRESS NOTES
"Subjective     Flor Fernandez is a 50 year old female who presents to clinic today for the following health issues:    HPI       Chief Complaint   Patient presents with     Dizziness     X3 days   Patient was switched from Metformin to Jardiance due to side effects (See triage note 8/26/2020)  Feeling of dizziness, weakness, fatigued and nausea  Blood sugars have been reading higher since Monday ; blood sugar reading around 222 fasting  Patient wondering if new medication possibly caused dehydration as it's making her urinate several times per night and is giving her continuous dry mouth  Previously on metformin, which caused side effects, patient took this sporadically  Elevated A1C from previous      Review of Systems   Constitutional, HEENT, cardiovascular, pulmonary, gi and gu systems are negative, except as otherwise noted.      Objective    /68   Pulse 94   Temp 97.7  F (36.5  C) (Oral)   Resp 16   Ht 1.664 m (5' 5.5\")   Wt 131.9 kg (290 lb 12.8 oz)   SpO2 97%   BMI 47.66 kg/m    Body mass index is 47.66 kg/m .  Physical Exam   GENERAL: healthy, alert and no distress  EYES: Eyes grossly normal to inspection, PERRL and conjunctivae and sclerae normal  NECK: no adenopathy, no asymmetry, masses, or scars and thyroid normal to palpation  SKIN: no suspicious lesions or rashes  PSYCH: mentation appears normal, affect normal/bright          Assessment & Plan       ICD-10-CM    1. Type 2 diabetes mellitus with hyperglycemia, without long-term current use of insulin (H)  E11.65 sitagliptin (JANUVIA) 25 MG tablet   2. Hyperglycemia  R73.9 sitagliptin (JANUVIA) 25 MG tablet     Will start januvia  Continue to follow glucose  Potential side effects discussed  Follow-up in 1 month      Return in about 3 months (around 11/27/2020) for Diabetes/Prediabetes.    Jamshid Hernandez MD  [facility]    "

## 2020-09-23 ENCOUNTER — OFFICE VISIT (OUTPATIENT)
Dept: OPTOMETRY | Facility: CLINIC | Age: 50
End: 2020-09-23
Payer: COMMERCIAL

## 2020-09-23 DIAGNOSIS — H52.222 REGULAR ASTIGMATISM OF LEFT EYE: ICD-10-CM

## 2020-09-23 DIAGNOSIS — Z01.01 ENCOUNTER FOR EXAMINATION OF EYES AND VISION WITH ABNORMAL FINDINGS: Primary | ICD-10-CM

## 2020-09-23 DIAGNOSIS — H52.4 PRESBYOPIA: ICD-10-CM

## 2020-09-23 DIAGNOSIS — H52.13 MYOPIA OF BOTH EYES: ICD-10-CM

## 2020-09-23 DIAGNOSIS — E11.9 TYPE 2 DIABETES MELLITUS WITHOUT RETINOPATHY (H): ICD-10-CM

## 2020-09-23 PROCEDURE — 92015 DETERMINE REFRACTIVE STATE: CPT | Performed by: OPTOMETRIST

## 2020-09-23 PROCEDURE — 92014 COMPRE OPH EXAM EST PT 1/>: CPT | Performed by: OPTOMETRIST

## 2020-09-23 ASSESSMENT — REFRACTION_MANIFEST
OD_CYLINDER: SPHERE
OD_SPHERE: -0.50
OS_ADD: +2.00
OD_ADD: +2.00
OS_AXIS: 177
OS_SPHERE: -1.50
OS_CYLINDER: +0.50

## 2020-09-23 ASSESSMENT — REFRACTION_WEARINGRX
OD_ADD: +2.00
OS_ADD: +2.00
OD_AXIS: 175
OS_AXIS: 175
OD_CYLINDER: +0.75
OD_SPHERE: -0.75
OS_SPHERE: -1.25
OS_CYLINDER: +0.50

## 2020-09-23 ASSESSMENT — VISUAL ACUITY
OS_SC: 20/50
OD_SC: 20/50
METHOD: SNELLEN - LINEAR
OS_SC: 20/25
OD_SC: 20/30

## 2020-09-23 ASSESSMENT — SLIT LAMP EXAM - LIDS
COMMENTS: NORMAL,
COMMENTS: NORMAL

## 2020-09-23 ASSESSMENT — TONOMETRY
OD_IOP_MMHG: 18
OS_IOP_MMHG: 16
IOP_METHOD: APPLANATION

## 2020-09-23 ASSESSMENT — CONF VISUAL FIELD
OS_NORMAL: 1
METHOD: COUNTING FINGERS
OD_NORMAL: 1

## 2020-09-23 ASSESSMENT — CUP TO DISC RATIO
OD_RATIO: 0.3
OS_RATIO: 0.3

## 2020-09-23 ASSESSMENT — EXTERNAL EXAM - RIGHT EYE: OD_EXAM: NORMAL

## 2020-09-23 ASSESSMENT — EXTERNAL EXAM - LEFT EYE: OS_EXAM: NORMAL

## 2020-09-23 NOTE — PATIENT INSTRUCTIONS
Patient educated on importance of good blood sugar control.  Letter sent to primary care provider with diabetic eye exam report.     Flor was advised of today's exam findings.  Fill glasses prescription  Allow 2 weeks to adapt to change in glasses  Copy of glasses Rx provided today.    Return in 1 year for eye exam, or sooner if needed.    The effects of the dilating drops last for 4- 6 hours.  You will be more sensitive to light and vision will be blurry up close.  Mydriatic sunglasses were given if needed.      Abner Sr O.D.  [address] [place]  [address]    [phone]

## 2020-09-23 NOTE — LETTER
9/23/2020         RE: Flor Fernandez  [address]  [place] [address]        Dear Colleague,    Thank you for referring your patient, Flor Fernandez, to the [facility]. Please see a copy of my visit note below.    Chief Complaint   Patient presents with     Diabetic Eye Exam        Hemoglobin A1C   Date Value Ref Range Status   08/18/2020 8.9 (H) 0 - 5.6 % Final     Comment:     Normal <5.7% Prediabetes 5.7-6.4%  Diabetes 6.5% or higher - adopted from ADA   consensus guidelines.  Results confirmed by repeat test     01/14/2020 6.8 (H) 0 - 5.6 % Final     Comment:     Normal <5.7% Prediabetes 5.7-6.4%  Diabetes 6.5% or higher - adopted from ADA   consensus guidelines.     08/12/2019 6.1 (H) 0 - 5.6 % Final     Comment:     Normal <5.7% Prediabetes 5.7-6.4%  Diabetes 6.5% or higher - adopted from ADA   consensus guidelines.         Last Eye Exam: 7/2019  Dilated Previously: Yes    What are you currently using to see?  Readers +1.25  Distance Vision Acuity: Noticed gradual change in both eyes    Near Vision Acuity: Satisfied with vision while reading  with readers    Eye Comfort: good  Do you use eye drops? : No  Occupation or Hobbies:  [employer]  [employer]       Medical, surgical and family histories reviewed and updated 9/23/2020.       OBJECTIVE: See Ophthalmology exam    ASSESSMENT:    ICD-10-CM    1. Encounter for examination of eyes and vision with abnormal findings  Z01.01    2. Type 2 diabetes mellitus without retinopathy (H)  E11.9    3. Myopia of both eyes  H52.13    4. Regular astigmatism of left eye  H52.222    5. Presbyopia  H52.4       PLAN:    Flor Fernandez aware  eye exam results will be sent to Nayeli Barnard.  Patient Instructions   Patient educated on importance of good blood sugar control.  Letter sent to primary care provider with diabetic eye exam report.     Flor was advised of today's exam findings.  Fill glasses  prescription  Allow 2 weeks to adapt to change in glasses  Copy of glasses Rx provided today.    Return in 1 year for eye exam, or sooner if needed.    The effects of the dilating drops last for 4- 6 hours.  You will be more sensitive to light and vision will be blurry up close.  Mydriatic sunglasses were given if needed.      Abner Sr O.D.  [address]  [place]  [address]    [phone]             Again, thank you for allowing me to participate in the care of your patient.        Sincerely,        Abner Sr, OD

## 2020-09-23 NOTE — PROGRESS NOTES
Chief Complaint   Patient presents with     Diabetic Eye Exam        Hemoglobin A1C   Date Value Ref Range Status   08/18/2020 8.9 (H) 0 - 5.6 % Final     Comment:     Normal <5.7% Prediabetes 5.7-6.4%  Diabetes 6.5% or higher - adopted from ADA   consensus guidelines.  Results confirmed by repeat test     01/14/2020 6.8 (H) 0 - 5.6 % Final     Comment:     Normal <5.7% Prediabetes 5.7-6.4%  Diabetes 6.5% or higher - adopted from ADA   consensus guidelines.     08/12/2019 6.1 (H) 0 - 5.6 % Final     Comment:     Normal <5.7% Prediabetes 5.7-6.4%  Diabetes 6.5% or higher - adopted from ADA   consensus guidelines.         Last Eye Exam: 7/2019  Dilated Previously: Yes    What are you currently using to see?  Readers +1.25  Distance Vision Acuity: Noticed gradual change in both eyes    Near Vision Acuity: Satisfied with vision while reading  with readers    Eye Comfort: good  Do you use eye drops? : No  Occupation or Hobbies:  [employer]  [employer]       Medical, surgical and family histories reviewed and updated 9/23/2020.       OBJECTIVE: See Ophthalmology exam    ASSESSMENT:    ICD-10-CM    1. Encounter for examination of eyes and vision with abnormal findings  Z01.01    2. Type 2 diabetes mellitus without retinopathy (H)  E11.9    3. Myopia of both eyes  H52.13    4. Regular astigmatism of left eye  H52.222    5. Presbyopia  H52.4       PLAN:    Flor Fernandez aware  eye exam results will be sent to Nayeli Barnard.  Patient Instructions   Patient educated on importance of good blood sugar control.  Letter sent to primary care provider with diabetic eye exam report.     Flor was advised of today's exam findings.  Fill glasses prescription  Allow 2 weeks to adapt to change in glasses  Copy of glasses Rx provided today.    Return in 1 year for eye exam, or sooner if needed.    The effects of the dilating drops last for 4- 6 hours.  You will be more sensitive to light and  vision will be blurry up close.  Mydriatic sunglasses were given if needed.      Abner Sr O.D.  [address]  [place]  [id number] [phone]

## 2020-09-29 ENCOUNTER — TELEPHONE (OUTPATIENT)
Dept: FAMILY MEDICINE | Facility: CLINIC | Age: 50
End: 2020-09-29

## 2020-10-12 ENCOUNTER — VIRTUAL VISIT (OUTPATIENT)
Dept: PHARMACY | Facility: CLINIC | Age: 50
End: 2020-10-12
Payer: COMMERCIAL

## 2020-10-12 DIAGNOSIS — F41.9 ANXIETY DISORDER, UNSPECIFIED TYPE: ICD-10-CM

## 2020-10-12 DIAGNOSIS — Z86.73 HISTORY OF STROKE: ICD-10-CM

## 2020-10-12 DIAGNOSIS — E11.65 TYPE 2 DIABETES MELLITUS WITH HYPERGLYCEMIA, WITHOUT LONG-TERM CURRENT USE OF INSULIN (H): Primary | ICD-10-CM

## 2020-10-12 DIAGNOSIS — E78.5 HYPERLIPIDEMIA LDL GOAL <100: ICD-10-CM

## 2020-10-12 DIAGNOSIS — I10 BENIGN ESSENTIAL HYPERTENSION: ICD-10-CM

## 2020-10-12 PROCEDURE — 99607 MTMS BY PHARM ADDL 15 MIN: CPT | Mod: TEL | Performed by: PHARMACIST

## 2020-10-12 PROCEDURE — 99605 MTMS BY PHARM NP 15 MIN: CPT | Performed by: PHARMACIST

## 2020-10-12 NOTE — Clinical Note
JELLY RAY note, thanks!    Araceli Santiago, PharmD  Medication Therapy Management Pharmacist  [phone]

## 2020-10-12 NOTE — Clinical Note
JELLY RAY note, thanks!    Araceli Santiago, PharmD  Medication Therapy Management Pharmacist  [phone]

## 2020-10-12 NOTE — PROGRESS NOTES
"MTM ENCOUNTER  SUBJECTIVE/OBJECTIVE:                           Flor Fernandez is a 50 year old female called for an initial visit. She was referred to me from Nayeli Barnard.      Patient consented to a telehealth visit: yes  Telemedicine Visit Details  Type of service:  Telephone visit  Start Time: 3:02 PM  End Time: 3:25 PM  Originating Location (pt. Location): Home  Distant Location (provider location):  [facility] MTM  Mode of Communication:  Telephone    Chief Complaint: Switched to Januvia but still has blood sugar readings above goal    Allergies/ADRs: Reviewed in chart  Tobacco: Reports quitting about 10 years ago after stroke.  Alcohol: Less than 1 beverage / month  Caffeine: 1 soda/week, stopped drinking coffee  Activity: \"Trying\"    Medication Adherence/Access: Patient takes medications 1 time(s) per day, most medications are taken at night. Takes carvedilol twice daily.    Type 2 Diabetes:    Januvia 25 mg daily (pm)     Pt is not experiencing side effects. Patient \"feels normal\". Reports feeling better on Januvia but still has high blood sugars. Metformin caused her to feel sick and not eat while taking 500 mg dose. Victoza caused constipation. Jardiance caused frequent urination overnight.    Blood sugar monitorin time(s) daily using One Touch Verio. Ranges (patient reported): >200    Date FBG/ 2hours post Lunch/2hours post Dinner /2hours post    10/12 245 243     /30         /246, 331     9/  /258          Eye exam: up to date  Diet/Exercise: Trying to exercise more.  Aspirin: Taking 325mg daily and denies side effects   Statin: Yes: atorvastatin   ACEi/ARB: Yes: lisinopril.   Urine Albumin:   Lab Results   Component Value Date    UMALCR 8.45 2020      Lab Results   Component Value Date    A1C 8.9 2020    A1C 6.8 2020    A1C 6.1 2019    A1C 6.5 2019    A1C 6.1 2018     Hyperlipidemia:  atorvastatin 20 mg once daily.     Pt reports no " side effects  Recent Labs   Lab Test 01/14/20  0708 12/05/18  0732   CHOL 115 121   HDL 38* 45*   LDL 48 47   TRIG 147 144       Hx of stroke/Hypertension:   Chlorthalidone 25 mg daily (pm)  Carvedilol 6.25 mg twice daily with meals  Lisinopril 10 mg daily (pm)  Aspirin 325 mg daily    History of 2 strokes. Reports no significant issues with bleeding or bruising.  Does not always take aspirin with food.  Patient does not self-monitor blood pressure. Patient reports dry cough every so often. Patient reports vertigo/ dizziness only when sick. Reports no other side effect.  BP Readings from Last 3 Encounters:   08/27/20 116/68   08/18/20 122/78   02/19/20 134/84     Anxiety:   Hydroxyzine 25 mg every 8 hours PRN (pm)  Sertraline 50 mg daily (pm)    Reports these are effective, has been using hydroxyzine more frequently at night.  RODRIGUE-7 SCORE 10/21/2019 12/30/2019 3/2/2020   Total Score 0 (minimal anxiety) 0 (minimal anxiety) 0 (minimal anxiety)   Total Score 0 0 0       Supplements:   MVI gummies daily  Vitron-C  mg  Vitamin B-12 1000 mcg daily    Reports no adverse effects.    ASSESSMENT:                              Medication Adherence: No issues identified    Diabetes: A1c above goal of <7% and blood sugars are above FBG goal of  and post prandial goal of <180. Consider using control solution or another meter to compare readings. Consider increasing dose of Januvia.    Hyperlipidemia: Stable. May benefit from following up with PCP for FLP within the next 3 months.    Hx of stroke/Hypertension: Stable. BP is at goal of <140/90. May consider taking aspirin daily with food and prevent ulcers.    Anxiety: Stable.      PLAN:                            1. Increase to Januvia 50 mg daily for 1 week, then increase up to 100 mg daily as tolerated. - New prescription was sent to the pharmacy    2. Take aspirin with food to prevent upset stomach    I spent 23 minutes with this patient today. All changes were made  via collaborative practice agreement with Nayeli Barnard. A copy of the visit note was provided to the patient's primary care provider.    Will follow up in 2 weeks.    The patient was sent via [employer] a summary of these recommendations.     Araceli Santiago, PharmD  Medication Therapy Management Pharmacist  [phone]

## 2020-10-12 NOTE — PATIENT INSTRUCTIONS
Recommendations from today's MTM visit:                                                    MTM (medication therapy management) is a service provided by a clinical pharmacist designed to help you get the most of out of your medicines.     1. Increase to Januvia 50 mg daily for 1 week, then increase up to 100 mg daily as tolerated. - A new prescription was sent to the pharmacy    2. Take aspirin with food to prevent upset stomach    It was great to speak with you today.  I value your experience and would be very thankful for your time with providing feedback on our clinic survey. You may receive a survey via email or text message in the next few days.     Next MTM visit: 2 weeks (10/26/2020)    To schedule another MTM appointment, please call the clinic directly or you may call the MTM scheduling line at [phone] or toll-free at [phone].     My Clinical Pharmacist's contact information:                                                      It was a pleasure talking with you today!  Please feel free to contact me with any questions or concerns you have.        Araceli Santiago, PharmD  Medication Therapy Management Pharmacist  [phone]

## 2020-10-12 NOTE — Clinical Note
JELLY RAY note, thanks!    Araceli Santiago, PharmD  Medication Therapy Management Pharmacist  [phone]

## 2020-10-23 ENCOUNTER — RESULTS ONLY (OUTPATIENT)
Dept: LAB | Age: 50
End: 2020-10-23

## 2020-10-23 ENCOUNTER — APPOINTMENT (OUTPATIENT)
Dept: FAMILY MEDICINE | Facility: CLINIC | Age: 50
End: 2020-10-23
Payer: COMMERCIAL

## 2020-10-24 LAB
LABORATORY COMMENT REPORT: NORMAL
SARS-COV-2 RNA SPEC QL NAA+PROBE: NEGATIVE
SARS-COV-2 RNA SPEC QL NAA+PROBE: NORMAL
SPECIMEN SOURCE: NORMAL
SPECIMEN SOURCE: NORMAL

## 2020-10-26 ENCOUNTER — VIRTUAL VISIT (OUTPATIENT)
Dept: PHARMACY | Facility: CLINIC | Age: 50
End: 2020-10-26
Payer: COMMERCIAL

## 2020-10-26 DIAGNOSIS — F41.9 ANXIETY DISORDER, UNSPECIFIED TYPE: ICD-10-CM

## 2020-10-26 DIAGNOSIS — Z86.73 HISTORY OF STROKE: ICD-10-CM

## 2020-10-26 DIAGNOSIS — E11.65 TYPE 2 DIABETES MELLITUS WITH HYPERGLYCEMIA, WITHOUT LONG-TERM CURRENT USE OF INSULIN (H): Primary | ICD-10-CM

## 2020-10-26 DIAGNOSIS — E78.5 HYPERLIPIDEMIA LDL GOAL <100: ICD-10-CM

## 2020-10-26 DIAGNOSIS — I10 BENIGN ESSENTIAL HYPERTENSION: ICD-10-CM

## 2020-10-26 PROCEDURE — 99606 MTMS BY PHARM EST 15 MIN: CPT | Mod: TEL | Performed by: PHARMACIST

## 2020-10-26 PROCEDURE — 99607 MTMS BY PHARM ADDL 15 MIN: CPT | Mod: TEL | Performed by: PHARMACIST

## 2020-10-26 NOTE — PATIENT INSTRUCTIONS
Recommendations from today's MTM visit:                                                        1. Goal blood sugars we are looking for to get your A1C less than 7% are between  mg/dL right away in the morning or before meals, and 2-hours after a meal less than 180 mg/dL.     2. Restart Jardiance 10 mg once daily.    3. Recheck electrolytes (BMP) in 2-3 weeks after restarting Jardiance. - lab appt scheduled.      It was great to speak with you today.  I value your experience and would be very thankful for your time with providing feedback on our clinic survey. You may receive a survey via email or text message in the next few days.     Next MTM visit: 3 weeks    To schedule another MTM appointment, please call the clinic directly or you may call the MTM scheduling line at [phone] or toll-free at [phone].     My Clinical Pharmacist's contact information:                                                      It was a pleasure talking with you today!  Please feel free to contact me with any questions or concerns you have.      Araceli Santiago, PharmD  Medication Therapy Management Pharmacist  [phone]

## 2020-10-26 NOTE — PROGRESS NOTES
"MTM ENCOUNTER  SUBJECTIVE/OBJECTIVE:                           Flor Fernandez is a 50 year old female called for a follow-up visit. She was referred to me from Nayeli Barnard.  Today's visit is a follow-up MTM visit from 10/12/20.     Chief Complaint: diabetes follow-up .    Allergies/ADRs: Reviewed in chart  Tobacco: She reports that she quit smoking about 6 years ago. She has never used smokeless tobacco.  Alcohol: Less than 1 beverage / month  Caffeine: 1 soda/week, stopped drinking coffee  Activity: \"Trying\"    Medication Adherence/Access: Patient takes medications 1 time(s) per day, most medications are taken at night. Takes carvedilol twice daily.    Type 2 Diabetes:    Januvia 100 mg daily (pm)    Pt is not experiencing side effects.  Metformin caused her to feel sick and not eat while taking 500 mg dose. Victoza caused constipation. Jardiance caused frequent urination overnight, felt lethargic, weird, but she's open to re-trying, she did take it at night when she trialed in in 2020, took ~2 weeks. Still has some.  Blood sugar monitorin time(s) daily using One Touch Verio. Ranges (patient reported):   AM fasting: ~200  2 hours after meal: ~220  Eye exam: up to date  Diet/Exercise: Trying to exercise more.  Aspirin: Taking 325mg daily and denies side effects   Statin: Yes: atorvastatin   ACEi/ARB: Yes: lisinopril.   Urine Albumin:   Lab Results   Component Value Date    UMALCR 8.45 2020   ]  Lab Results   Component Value Date    A1C 8.9 2020    A1C 6.8 2020    A1C 6.1 2019    A1C 6.5 2019    A1C 6.1 2018     Hyperlipidemia:  atorvastatin 20 mg once daily.     Pt reports no side effects  Recent Labs   Lab Test 20  0708 18  0732   CHOL 115 121   HDL 38* 45*   LDL 48 47   TRIG 147 144     Hx of stroke/Hypertension:   Chlorthalidone 25 mg daily (pm)  Carvedilol 6.25 mg twice daily with meals  Lisinopril 10 mg daily (pm)  Aspirin 325 mg daily    History of 2 " strokes. Reports no significant issues with bleeding or bruising.  Does not always take aspirin with food.  Patient does not self-monitor blood pressure. Patient reports dry cough every so often. Patient reports vertigo/ dizziness only when sick. Reports no other side effect.  BP Readings from Last 3 Encounters:   08/27/20 116/68   08/18/20 122/78   02/19/20 134/84     Anxiety:   Hydroxyzine 25 mg every 8 hours PRN (pm)  Sertraline 50 mg daily (pm)    Reports these are effective, has been using hydroxyzine more frequently at night.  RODRIGUE-7 SCORE 10/21/2019 12/30/2019 3/2/2020   Total Score 0 (minimal anxiety) 0 (minimal anxiety) 0 (minimal anxiety)   Total Score 0 0 0     Supplements:   MVI gummies daily  Vitron-C  mg  Vitamin B-12 1000 mcg daily    Today's Vitals: There were no vitals taken for this visit.      ASSESSMENT:                              Medication Adherence: No issues identified    Diabetes: A1c above goal of <7% and blood sugars are above FBG goal of  and post prandial goal of <180. May benefit from re-trial of Jardiance, taking in morning instead of at night. Education provided.    Hyperlipidemia: Stable.  Patient is on moderate intensity statin which is indicated based on 2019 ACC/AHA guidelines for lipid management.      Hx of stroke/Hypertension: Stable. BP is at goal of <140/90.     Anxiety: Stable.    PLAN:                               1. Goal blood sugars we are looking for to get your A1C less than 7% are between  mg/dL right away in the morning or before meals, and 2-hours after a meal less than 180 mg/dL.     2. Restart Jardiance 10 mg once daily.    3. Recheck electrolytes (BMP) in 2-3 weeks after restarting Jardiance. - lab appt scheduled.    I spent 12 minutes with this patient today. All changes were made via collaborative practice agreement with Nayeli Barnard. A copy of the visit note was provided to the patient's primary care provider.    Will follow up in 3  weeks.    The patient was sent via MessageMe a summary of these recommendations.     Araceli Santiago, PharmD  Medication Therapy Management Pharmacist  [phone]    Patient consented to a telehealth visit: yes  Telemedicine Visit Details  Type of service:  Telephone visit  Start Time: 4:32 PM  End Time: 4:44 PM  Originating Location (pt. Location): Home  Distant Location (provider location):  [facility]  Mode of Communication:  Telephone

## 2020-10-26 NOTE — Clinical Note
JELLY RAY note, thanks!    Araceli Santiago, PharmD  Medication Therapy Management Pharmacist  [phone]

## 2020-11-04 ENCOUNTER — TELEPHONE (OUTPATIENT)
Dept: FAMILY MEDICINE | Facility: CLINIC | Age: 50
End: 2020-11-04

## 2020-11-04 NOTE — TELEPHONE ENCOUNTER
Per patient, she started feeling ill with generalized abd cramping and nausea after work on Monday 11/2/2020  Vomiting bile   She did have a [employer] sandwich that day with chicken breast, yeung, and raw veggies.   Symptoms have been intermittent.  Denies any symptoms at present  Did take some Angelique [place] which helped  Last BM was this morning with no concerns. Normal color, consistency, and amount  Felt better yesterday and had some pizza but symptoms returned after eating pizza   Denies diarrhea or fever     Advised her on the following-  For nausea/vomiting, try to not consume anything about 1 hour afterwards. Drink sips of clear fluids for the first 12 hours of nausea/vomiting, increasing fluid intake as tolerated. After 12 hours of tolerating this, advance to eating small amounts of bland foods, such as rice, potatoes, soda crackers, dry toast, and applesauce. After tolerating this, advance to eating a normal diet as tolerated. Avoid milk, citrus foods/drinks, spicy/fatty foods, alcohol, coffee or caffeinated drinks. Try OTC anti-nausea meds following the label instructions if needed. Keep in mind to continue good hand washing/hygiene.   Call if symptoms fail to improve or worsens  Patient verbalized understanding.    Michael Anderson RN

## 2020-11-04 NOTE — TELEPHONE ENCOUNTER
Reason for call:  Patient reporting a symptom    Symptom or request: stomach pain, nausea     Duration (how long have symptoms been present): 2 days    Have you been treated for this before? No    Additional comments: Patient thinks she has food poison.     Phone Number patient can be reached at:  Home number on file [phone] (home)    Best Time:  any    Can we leave a detailed message on this number:  YES    Call taken on 11/4/2020 at 8:39 AM by Ricardo Grigsby

## 2020-11-09 DIAGNOSIS — M25.50 ARTHRALGIA: ICD-10-CM

## 2020-11-09 DIAGNOSIS — E11.9 DIABETES MELLITUS, TYPE 2 (H): ICD-10-CM

## 2020-11-09 DIAGNOSIS — Z79.899 HIGH RISK MEDICATION USE: ICD-10-CM

## 2020-11-09 DIAGNOSIS — M25.50 ARTHRALGIA, UNSPECIFIED JOINT: ICD-10-CM

## 2020-11-09 DIAGNOSIS — I10 BENIGN ESSENTIAL HYPERTENSION: ICD-10-CM

## 2020-11-09 DIAGNOSIS — E11.65 TYPE 2 DIABETES MELLITUS WITH HYPERGLYCEMIA, WITHOUT LONG-TERM CURRENT USE OF INSULIN (H): ICD-10-CM

## 2020-11-09 LAB
ALBUMIN SERPL-MCNC: 3.9 G/DL (ref 3.4–5)
ALP SERPL-CCNC: 165 U/L (ref 40–150)
ALT SERPL W P-5'-P-CCNC: 69 U/L (ref 0–50)
AST SERPL W P-5'-P-CCNC: 44 U/L (ref 0–45)
BASOPHILS # BLD AUTO: 0 10E9/L (ref 0–0.2)
BASOPHILS NFR BLD AUTO: 0.3 %
BILIRUB DIRECT SERPL-MCNC: 0.3 MG/DL (ref 0–0.2)
BILIRUB SERPL-MCNC: 0.7 MG/DL (ref 0.2–1.3)
DIFFERENTIAL METHOD BLD: ABNORMAL
EOSINOPHIL # BLD AUTO: 0.2 10E9/L (ref 0–0.7)
EOSINOPHIL NFR BLD AUTO: 1.4 %
ERYTHROCYTE [DISTWIDTH] IN BLOOD BY AUTOMATED COUNT: 13.2 % (ref 10–15)
ERYTHROCYTE [SEDIMENTATION RATE] IN BLOOD BY WESTERGREN METHOD: 9 MM/H (ref 0–20)
HBA1C MFR BLD: 9.1 % (ref 0–5.6)
HCT VFR BLD AUTO: 48.8 % (ref 35–47)
HGB BLD-MCNC: 16.2 G/DL (ref 11.7–15.7)
LYMPHOCYTES # BLD AUTO: 2.5 10E9/L (ref 0.8–5.3)
LYMPHOCYTES NFR BLD AUTO: 22.7 %
MCH RBC QN AUTO: 29.3 PG (ref 26.5–33)
MCHC RBC AUTO-ENTMCNC: 33.2 G/DL (ref 31.5–36.5)
MCV RBC AUTO: 88 FL (ref 78–100)
MONOCYTES # BLD AUTO: 0.7 10E9/L (ref 0–1.3)
MONOCYTES NFR BLD AUTO: 5.9 %
NEUTROPHILS # BLD AUTO: 7.7 10E9/L (ref 1.6–8.3)
NEUTROPHILS NFR BLD AUTO: 69.7 %
PLATELET # BLD AUTO: 408 10E9/L (ref 150–450)
PROT SERPL-MCNC: 7.8 G/DL (ref 6.8–8.8)
RBC # BLD AUTO: 5.53 10E12/L (ref 3.8–5.2)
WBC # BLD AUTO: 11 10E9/L (ref 4–11)

## 2020-11-09 PROCEDURE — 86038 ANTINUCLEAR ANTIBODIES: CPT | Performed by: INTERNAL MEDICINE

## 2020-11-09 PROCEDURE — 80076 HEPATIC FUNCTION PANEL: CPT | Performed by: INTERNAL MEDICINE

## 2020-11-09 PROCEDURE — 85652 RBC SED RATE AUTOMATED: CPT | Performed by: INTERNAL MEDICINE

## 2020-11-09 PROCEDURE — 80048 BASIC METABOLIC PNL TOTAL CA: CPT | Performed by: INTERNAL MEDICINE

## 2020-11-09 PROCEDURE — 36415 COLL VENOUS BLD VENIPUNCTURE: CPT | Performed by: INTERNAL MEDICINE

## 2020-11-09 PROCEDURE — 86200 CCP ANTIBODY: CPT | Performed by: INTERNAL MEDICINE

## 2020-11-09 PROCEDURE — 86431 RHEUMATOID FACTOR QUANT: CPT | Performed by: INTERNAL MEDICINE

## 2020-11-09 PROCEDURE — 85025 COMPLETE CBC W/AUTO DIFF WBC: CPT | Performed by: INTERNAL MEDICINE

## 2020-11-09 PROCEDURE — 86140 C-REACTIVE PROTEIN: CPT | Performed by: INTERNAL MEDICINE

## 2020-11-09 PROCEDURE — 83036 HEMOGLOBIN GLYCOSYLATED A1C: CPT | Performed by: INTERNAL MEDICINE

## 2020-11-09 NOTE — RESULT ENCOUNTER NOTE
Flor Salazar.  Your liver function tests are off, as you suspected.  We should talk about this more in a virtual or in person appointment.    Dr. Barnard

## 2020-11-10 LAB
ANA SER QL IF: NEGATIVE
ANION GAP SERPL CALCULATED.3IONS-SCNC: 8 MMOL/L (ref 3–14)
BUN SERPL-MCNC: 12 MG/DL (ref 7–30)
CALCIUM SERPL-MCNC: 9.6 MG/DL (ref 8.5–10.1)
CHLORIDE SERPL-SCNC: 98 MMOL/L (ref 94–109)
CO2 SERPL-SCNC: 28 MMOL/L (ref 20–32)
CREAT SERPL-MCNC: 0.55 MG/DL (ref 0.52–1.04)
GFR SERPL CREATININE-BSD FRML MDRD: >90 ML/MIN/{1.73_M2}
GLUCOSE SERPL-MCNC: 301 MG/DL (ref 70–99)
POTASSIUM SERPL-SCNC: 3.8 MMOL/L (ref 3.4–5.3)
RHEUMATOID FACT SER NEPH-ACNC: <7 IU/ML (ref 0–20)
SODIUM SERPL-SCNC: 134 MMOL/L (ref 133–144)

## 2020-11-11 LAB — CRP SERPL-MCNC: 9.5 MG/L (ref 0–8)

## 2020-11-12 LAB — CCP AB SER IA-ACNC: 1 U/ML

## 2020-11-12 NOTE — PROGRESS NOTES
"Flor Fernandez is a 50 year old female who is being evaluated via a billable telephone visit.    The patient has been notified of following:   \"This telephone visit will be conducted via a call between you and your physician/provider. We have found that certain health care needs can be provided without the need for a physical exam.  This service lets us provide the care you need with a short phone conversation.  If a prescription is necessary we can send it directly to your pharmacy.  If lab work is needed we can place an order for that and you can then stop by our lab to have the test done at a later time.  Telephone visits are billed at different rates depending on your insurance coverage. During this emergency period, for some insurers they may be billed the same as an in-person visit.  Please reach out to your insurance provider with any questions.  If during the course of the call the physician/provider feels a telephone visit is not appropriate, you will not be charged for this service.\"    Patient has given verbal consent for Telephone visit?  Yes    What phone number would you like to be contacted at? [phone]    How would you like to obtain your AVS? MyChart    Subjective   Flor Fernandez is a 50 year old female who presents via phone visit today for the following health issues:  HPI      Chief Complaint   Patient presents with     lab results     Health Maintenance     hep b, flu shot, hep c screening     Start 3:14 PM     She is feeling good now.  When she was sick she had a really bad stomach ache and was nauseated.  She would vomit bile when she did throw up.  This started on a Monday and then took the week to go away.  The pain was mostly in the right side but also all over.  One meal she ate was heavy and this made it worse.    Her blood sugars are not good.  She is on Jaridance and Januvia.  Blood sugar are around   The lowest is 238.    No fever/chills.  She is very thirsty since starting " the jardiance 10 mg daily. .  She is urinating more as well.               Review of Systems    ROS: 4 point ROS neg other than the symptoms noted above in the HPI.         Objective          Vitals:  No vitals were obtained today due to virtual visit.    healthy, alert and no distress  PSYCH: Alert and oriented times 3; coherent speech, normal   rate and volume, able to articulate logical thoughts, able   to abstract reason, no tangential thoughts, no hallucinations   or delusions  Her affect is normal  RESP: No cough, no audible wheezing, able to talk in full sentences  Remainder of exam unable to be completed due to telephone visits    No results found for this or any previous visit (from the past 24 hour(s)).        Assessment/Plan:    Assessment & Plan     Nonspecific elevation of levels of transaminase or lactic acid dehydrogenase (LDH)  Given symptoms of epigastric pain, nausea/vomiting, and elevated transaminase, suspect biliary disease.  If negative ultrasound then will need more serologic workup.   - US Abdomen Complete; Future    Type 2 diabetes mellitus with hyperglycemia, with long-term current use of insulin (H)  Poor control with polyuria and polydypsia.  Will start insulin today with 10 units of Lantus to be administered this afternoon with RN instruction.  Then she can take it every evening thereafter.  Patient doesn't want to be on insulin long term so might be able to wean off in the future once her orals can be titrated up.  For now I just need her blood sugars down so she can feel better.    - insulin glargine (LANTUS PEN) 100 UNIT/ML pen; Inject 10 Units Subcutaneous At Bedtime            No follow-ups on file.    Nayeli Barnard MD  [facility]    Phone call duration:  8 minutes    Stop 3:22 PM

## 2020-11-13 ENCOUNTER — ALLIED HEALTH/NURSE VISIT (OUTPATIENT)
Dept: NURSING | Facility: CLINIC | Age: 50
End: 2020-11-13
Payer: COMMERCIAL

## 2020-11-13 ENCOUNTER — VIRTUAL VISIT (OUTPATIENT)
Dept: INTERNAL MEDICINE | Facility: CLINIC | Age: 50
End: 2020-11-13
Payer: COMMERCIAL

## 2020-11-13 DIAGNOSIS — R74.01 NONSPECIFIC ELEVATION OF LEVELS OF TRANSAMINASE OR LACTIC ACID DEHYDROGENASE (LDH): Primary | ICD-10-CM

## 2020-11-13 DIAGNOSIS — Z79.4 TYPE 2 DIABETES MELLITUS WITH HYPERGLYCEMIA, WITH LONG-TERM CURRENT USE OF INSULIN (H): ICD-10-CM

## 2020-11-13 DIAGNOSIS — R74.02 NONSPECIFIC ELEVATION OF LEVELS OF TRANSAMINASE OR LACTIC ACID DEHYDROGENASE (LDH): Primary | ICD-10-CM

## 2020-11-13 DIAGNOSIS — E11.65 TYPE 2 DIABETES MELLITUS WITH HYPERGLYCEMIA, WITH LONG-TERM CURRENT USE OF INSULIN (H): ICD-10-CM

## 2020-11-13 DIAGNOSIS — E11.9 DIABETES MELLITUS, TYPE 2 (H): Primary | ICD-10-CM

## 2020-11-13 PROCEDURE — 99214 OFFICE O/P EST MOD 30 MIN: CPT | Mod: TEL | Performed by: INTERNAL MEDICINE

## 2020-11-13 NOTE — PATIENT INSTRUCTIONS
RN teaching for Lantus start.  You will do 10 units this afternoon and then every evening thereafter.

## 2020-11-13 NOTE — PROGRESS NOTES
Lantus pen injection teaching completed.   Patient was able to return demonstration on injection practice kit without any issues.  Written instructions also given.    Michael Anderson RN

## 2020-11-16 ENCOUNTER — VIRTUAL VISIT (OUTPATIENT)
Dept: PHARMACY | Facility: CLINIC | Age: 50
End: 2020-11-16
Payer: COMMERCIAL

## 2020-11-16 DIAGNOSIS — F41.9 ANXIETY DISORDER, UNSPECIFIED TYPE: ICD-10-CM

## 2020-11-16 DIAGNOSIS — Z86.73 HISTORY OF STROKE: ICD-10-CM

## 2020-11-16 DIAGNOSIS — E78.5 HYPERLIPIDEMIA LDL GOAL <100: ICD-10-CM

## 2020-11-16 DIAGNOSIS — I10 BENIGN ESSENTIAL HYPERTENSION: ICD-10-CM

## 2020-11-16 DIAGNOSIS — E11.65 TYPE 2 DIABETES MELLITUS WITH HYPERGLYCEMIA, WITHOUT LONG-TERM CURRENT USE OF INSULIN (H): Primary | ICD-10-CM

## 2020-11-16 PROCEDURE — 99606 MTMS BY PHARM EST 15 MIN: CPT | Mod: TEL | Performed by: PHARMACIST

## 2020-11-16 PROCEDURE — 99607 MTMS BY PHARM ADDL 15 MIN: CPT | Performed by: PHARMACIST

## 2020-11-16 NOTE — Clinical Note
JELLY RAY note, thanks!    Araceli Santiago, PharmD  Medication Therapy Management Pharmacist  [phone]

## 2020-11-16 NOTE — PROGRESS NOTES
MTM ENCOUNTER  SUBJECTIVE/OBJECTIVE:                           Flor Fernandez is a 50 year old female called for a follow-up visit. She was referred to me from Nayeli Barnard.  Today's visit is a follow-up MTM visit from 10/26/20.     Reason for visit: diabetes follow-up.    Allergies/ADRs: Reviewed in chart  Tobacco: She reports that she quit smoking about 6 years ago. She has never used smokeless tobacco.  Alcohol: Less than 1 beverage / month  Caffeine: 1 soda/week, stopped drinking coffee    Medication Adherence/Access: Patient takes medications 1 time(s) per day, most medications are taken at night. Takes carvedilol twice daily.    Type 2 Diabetes:    Januvia 100 mg daily   Jardiance 10 mg once daily  Lantus 10 units at bedtime    Was very ill with nausea and upper abdominal pain, was evaluated by Dr. Barnard, LFTs were elevated.  Has ultrasound of liver and gall bladder tomorrow, plan is to retest LFTs after that. She was also started on Lantus, as above. Feels the best she has in a long time now that she's on Lantus, however hopes to be on it only short term while the blood sugar comes down.  Still has some increased urination, but minimal, denies any further nausea.  Medication history:   Metformin caused her to feel sick and not eat while taking 500 mg dose.   Victoza caused constipation.   Blood sugar monitorin time(s) daily using One Touch Verio. Ranges (patient reported):   AM fastins  2 hours after meal: 150s  Eye exam: up to date  Diet/Exercise: Trying to exercise more.  Aspirin: Taking 325mg daily and denies side effects   Statin: Yes: atorvastatin   ACEi/ARB: Yes: lisinopril.   Urine Albumin:   Lab Results   Component Value Date    UMALCR 8.45 2020   ]  Lab Results   Component Value Date    A1C 9.1 2020    A1C 8.9 2020    A1C 6.8 2020    A1C 6.1 2019    A1C 6.5 2019     Hyperlipidemia:  atorvastatin 20 mg once daily.     Pt reports no side effects  Recent  Labs   Lab Test 01/14/20  0708 12/05/18  0732   CHOL 115 121   HDL 38* 45*   LDL 48 47   TRIG 147 144     Hx of stroke/Hypertension:   Chlorthalidone 25 mg daily (pm)  Carvedilol 6.25 mg twice daily with meals  Lisinopril 10 mg daily (pm)  Aspirin 325 mg daily    History of 2 strokes.   Patient does not self-monitor blood pressure. Patient reports dry cough every so often. Patient reports vertigo/ dizziness only when sick. Reports no other side effect.  BP Readings from Last 3 Encounters:   08/27/20 116/68   08/18/20 122/78   02/19/20 134/84     Anxiety:   Hydroxyzine 25 mg every 8 hours PRN (pm)  Sertraline 50 mg daily (pm)    Reports these are effective, has been using hydroxyzine more frequently at night.  RODRIGUE-7 SCORE 10/21/2019 12/30/2019 3/2/2020   Total Score 0 (minimal anxiety) 0 (minimal anxiety) 0 (minimal anxiety)   Total Score 0 0 0     Supplements:   MVI gummies daily  Vitron-C  mg  Vitamin B-12 1000 mcg daily    Today's Vitals: There were no vitals taken for this visit.      ASSESSMENT:                              Medication Adherence: No issues identified    Diabetes: A1c above goal of <7% and blood sugars are above FBG goal of  and at goal post prandial goal of <180. Improved on Lantus. It's possible Januvia could have contributed to elevated LFTs. Will await workup from Dr. Barnard before any further adjustments.     Hyperlipidemia: Stable.  Patient is on moderate intensity statin which is indicated based on 2019 ACC/AHA guidelines for lipid management.      Hx of stroke/Hypertension: Stable. BP is at goal of <140/90.     Anxiety: Stable.    PLAN:                            1. Continue current medications.    I spent 13 minutes with this patient today. All changes were made via collaborative practice agreement with Nayeli Barnard. A copy of the visit note was provided to the patient's primary care provider.    Will follow up in 2 weeks.    The patient declined a summary of these  recommendations.     Araceli Santiago, PharmD  Medication Therapy Management Pharmacist  [phone]    Patient consented to a telehealth visit: yes  Telemedicine Visit Details  Type of service:  Telephone visit  Start Time: 4:05 PM  End Time: 4:18 PM  Originating Location (patient location): Home  Distant Location (provider location):  [facility]  Mode of Communication:  Telephone

## 2020-11-17 ENCOUNTER — ANCILLARY PROCEDURE (OUTPATIENT)
Dept: ULTRASOUND IMAGING | Facility: CLINIC | Age: 50
End: 2020-11-17
Attending: INTERNAL MEDICINE
Payer: COMMERCIAL

## 2020-11-17 DIAGNOSIS — R74.02 NONSPECIFIC ELEVATION OF LEVELS OF TRANSAMINASE OR LACTIC ACID DEHYDROGENASE (LDH): ICD-10-CM

## 2020-11-17 DIAGNOSIS — R74.01 NONSPECIFIC ELEVATION OF LEVELS OF TRANSAMINASE OR LACTIC ACID DEHYDROGENASE (LDH): ICD-10-CM

## 2020-11-17 PROCEDURE — 76700 US EXAM ABDOM COMPLETE: CPT | Performed by: RADIOLOGY

## 2020-11-17 NOTE — PATIENT INSTRUCTIONS
Recommendations from today's MTM visit:                                                      1. Continue current medications.    It was great to speak with you today.  I value your experience and would be very thankful for your time with providing feedback on our clinic survey. You may receive a survey via email or text message in the next few days.     Next MTM visit: 2 weeks    To schedule another MTM appointment, please call the clinic directly or you may call the MTM scheduling line at [phone] or toll-free at [phone].     My Clinical Pharmacist's contact information:                                                      It was a pleasure talking with you today!  Please feel free to contact me with any questions or concerns you have.      Araceli Santiago, PharmD  Medication Therapy Management Pharmacist  [phone]

## 2020-11-18 DIAGNOSIS — R74.01 ELEVATED LIVER TRANSAMINASE LEVEL: Primary | ICD-10-CM

## 2020-11-18 NOTE — RESULT ENCOUNTER NOTE
The gallbladder looks fine.  The liver is enlarged due to fatty liver.   The only treatment for this is weight loss.  It can cause some mild right sided abdomen pain but not the type of pain you had.      Let's stop the Januvia and Lipitor for right now and repeat your liver labs in 1 week.  No alcohol use.    Dr. Barnard    Hepatic panel - indication elevated transaminases

## 2020-11-25 DIAGNOSIS — R74.01 ELEVATED LIVER TRANSAMINASE LEVEL: ICD-10-CM

## 2020-11-25 LAB
ALBUMIN SERPL-MCNC: 3.6 G/DL (ref 3.4–5)
ALP SERPL-CCNC: 143 U/L (ref 40–150)
ALT SERPL W P-5'-P-CCNC: 74 U/L (ref 0–50)
AST SERPL W P-5'-P-CCNC: 48 U/L (ref 0–45)
BILIRUB DIRECT SERPL-MCNC: 0.2 MG/DL (ref 0–0.2)
BILIRUB SERPL-MCNC: 0.6 MG/DL (ref 0.2–1.3)
PROT SERPL-MCNC: 7.8 G/DL (ref 6.8–8.8)

## 2020-11-25 PROCEDURE — 36415 COLL VENOUS BLD VENIPUNCTURE: CPT | Performed by: INTERNAL MEDICINE

## 2020-11-25 PROCEDURE — 80076 HEPATIC FUNCTION PANEL: CPT | Performed by: INTERNAL MEDICINE

## 2020-11-25 NOTE — RESULT ENCOUNTER NOTE
Flor,    Your liver tests are mildly abnormal due to fatty liver disease.     Jacqueline Nicholson MD

## 2020-11-30 ENCOUNTER — VIRTUAL VISIT (OUTPATIENT)
Dept: PHARMACY | Facility: CLINIC | Age: 50
End: 2020-11-30
Payer: COMMERCIAL

## 2020-11-30 DIAGNOSIS — Z86.73 HISTORY OF STROKE: ICD-10-CM

## 2020-11-30 DIAGNOSIS — E78.5 HYPERLIPIDEMIA LDL GOAL <100: ICD-10-CM

## 2020-11-30 DIAGNOSIS — R74.01 NONSPECIFIC ELEVATION OF LEVELS OF TRANSAMINASE OR LACTIC ACID DEHYDROGENASE (LDH): ICD-10-CM

## 2020-11-30 DIAGNOSIS — R74.02 NONSPECIFIC ELEVATION OF LEVELS OF TRANSAMINASE OR LACTIC ACID DEHYDROGENASE (LDH): ICD-10-CM

## 2020-11-30 DIAGNOSIS — F41.9 ANXIETY DISORDER, UNSPECIFIED TYPE: ICD-10-CM

## 2020-11-30 DIAGNOSIS — I10 BENIGN ESSENTIAL HYPERTENSION: ICD-10-CM

## 2020-11-30 DIAGNOSIS — E11.65 TYPE 2 DIABETES MELLITUS WITH HYPERGLYCEMIA, WITHOUT LONG-TERM CURRENT USE OF INSULIN (H): Primary | ICD-10-CM

## 2020-11-30 PROCEDURE — 99607 MTMS BY PHARM ADDL 15 MIN: CPT | Mod: TEL | Performed by: PHARMACIST

## 2020-11-30 PROCEDURE — 99606 MTMS BY PHARM EST 15 MIN: CPT | Mod: TEL | Performed by: PHARMACIST

## 2020-11-30 NOTE — PATIENT INSTRUCTIONS
Recommendations from today's MTM visit:                                                      1. Check blood sugar every day in the morning (goal is  mg/dL).    2. Occasionally check blood sugar two hours after a meal (goal is less than 180 mg/dL).    3. I will check with Dr. Barnard to see if you should restart atorvastatin and/or Januvia.    It was great to speak with you today.  I value your experience and would be very thankful for your time with providing feedback on our clinic survey. You may receive a survey via email or text message in the next few days.     Next MTM visit: 2 weeks    To schedule another MTM appointment, please call the clinic directly or you may call the MTM scheduling line at [phone] or toll-free at [phone].     My Clinical Pharmacist's contact information:                                                      It was a pleasure talking with you today!  Please feel free to contact me with any questions or concerns you have.      Araceli Santiago, PharmD  Medication Therapy Management Pharmacist  [phone]

## 2020-11-30 NOTE — Clinical Note
Hi Dr. Barnard - checking in to see if Flor should restart atorvastatin and/or Januvia?    Thanks!  Araceli Santiago, PharmD  Medication Therapy Management Pharmacist  [phone]

## 2020-11-30 NOTE — PROGRESS NOTES
MTM ENCOUNTER  SUBJECTIVE/OBJECTIVE:                           Flor Fernandez is a 50 year old female called for a follow-up visit. She was referred to me from Nayeli Barnard.  Today's visit is a follow-up MTM visit from 20.     Reason for visit: diabetes follow-up.    Allergies/ADRs: Reviewed in chart  Tobacco: She reports that she quit smoking about 6 years ago. She has never used smokeless tobacco.  Alcohol: Less than 1 beverage / month  Caffeine: 1 soda/week, stopped drinking coffee    Medication Adherence/Access: Patient takes medications 1 time(s) per day, most medications are taken at night. Takes carvedilol twice daily.    Type 2 Diabetes:    Jardiance 10 mg once daily  Lantus 10 units at bedtime    Holding Januvia 100 mg daily due to elevated LFTs, Flor has not heard if she is to remain off this.Her symptoms of nausea and upper abdominal pain resolved after she started Lantus, had still been taking Januvia for a period of time after that, before she was instructed to stop it. She hopes to be on Lantus only short term while the blood sugar comes down, doesn't want to gain any more weight.   Still has some increased urination, but minimal, denies any further nausea.  Medication history:   Metformin caused her to feel sick and not eat while taking 500 mg dose.   Victoza caused constipation.   Blood sugar monitorin time(s) daily using One Touch Verio. Ranges (patient reported):   AM fastin   2 hours after meal: 179, 205 (only checked 3x)  Eye exam: up to date  Diet/Exercise: Trying to exercise more.  Aspirin: Taking 325mg daily and denies side effects   Statin: Yes: atorvastatin   ACEi/ARB: Yes: lisinopril.   Urine Albumin:   Lab Results   Component Value Date    UMALCR 8.45 2020   ]  Lab Results   Component Value Date    A1C 9.1 2020    A1C 8.9 2020    A1C 6.8 2020    A1C 6.1 2019    A1C 6.5 2019     Hyperlipidemia:    Holding atorvastatin 20 mg once  daily  Pt reports no side effects  Recent Labs   Lab Test 01/14/20  0708 12/05/18  0732   CHOL 115 121   HDL 38* 45*   LDL 48 47   TRIG 147 144     Liver Function Studies -   Recent Labs   Lab Test 11/25/20  0951   PROTTOTAL 7.8   ALBUMIN 3.6   BILITOTAL 0.6   ALKPHOS 143   AST 48*   ALT 74*     Hx of stroke/Hypertension:   Chlorthalidone 25 mg daily (pm)  Carvedilol 6.25 mg twice daily with meals  Lisinopril 10 mg daily (pm)  Aspirin 325 mg daily    History of 2 strokes.   Patient does not self-monitor blood pressure. Patient reports dry cough every so often. Patient reports vertigo/ dizziness only when sick. Reports no other side effect.  BP Readings from Last 3 Encounters:   08/27/20 116/68   08/18/20 122/78   02/19/20 134/84     Anxiety:   Hydroxyzine 25 mg every 8 hours PRN (pm)  Sertraline 50 mg daily (pm)    Reports these are effective, has been using hydroxyzine more frequently at night.  RODRIGUE-7 SCORE 10/21/2019 12/30/2019 3/2/2020   Total Score 0 (minimal anxiety) 0 (minimal anxiety) 0 (minimal anxiety)   Total Score 0 0 0     Supplements:   MVI gummies daily  Vitron-C  mg  Vitamin B-12 1000 mcg daily    Today's Vitals: There were no vitals taken for this visit.      ASSESSMENT:                              Medication Adherence: No issues identified    Diabetes: A1c above goal of <7% and blood sugars are above FBG goal of  and at goal post prandial goal of <180. Improved on Lantus, however need more blood sugar readings. It's possible Januvia could have contributed to elevated LFTs.     Hyperlipidemia: Will inquire if Flor should restart atorvastatin and or Januvia.    Hx of stroke/Hypertension: Stable. BP is at goal of <140/90.     Anxiety: Stable.    PLAN:                              1. Check blood sugar every day in the morning (goal is  mg/dL).    2. Occasionally check blood sugar two hours after a meal (goal is less than 180 mg/dL).    3. I will check with Dr. Barnard to see if you  should restart atorvastatin and/or Januvia.    I spent 10 minutes with this patient today. All changes were made via collaborative practice agreement with Nayeli Barnard. A copy of the visit note was provided to the patient's primary care provider.    Will follow up in 2 weeks.    The patient was sent via [employer] a summary of these recommendations.     Araceli Santiago, PharmD  Medication Therapy Management Pharmacist  [phone]      Patient consented to a telehealth visit: yes  Telemedicine Visit Details  Type of service:  Telephone visit  Start Time: 4:01 PM  End Time: 4:11 PM  Originating Location (patient location): Home  Distant Location (provider location):  [facility]  Mode of Communication:  Telephone

## 2020-12-01 ENCOUNTER — ALLIED HEALTH/NURSE VISIT (OUTPATIENT)
Dept: FAMILY MEDICINE | Facility: CLINIC | Age: 50
End: 2020-12-01

## 2020-12-01 ENCOUNTER — TELEPHONE (OUTPATIENT)
Dept: PHARMACY | Facility: CLINIC | Age: 50
End: 2020-12-01

## 2020-12-01 ENCOUNTER — ALLIED HEALTH/NURSE VISIT (OUTPATIENT)
Dept: FAMILY MEDICINE | Facility: CLINIC | Age: 50
End: 2020-12-01
Payer: COMMERCIAL

## 2020-12-01 DIAGNOSIS — Z53.9 ERRONEOUS ENCOUNTER--DISREGARD: Primary | ICD-10-CM

## 2020-12-01 DIAGNOSIS — E11.65 TYPE 2 DIABETES MELLITUS WITH HYPERGLYCEMIA, WITHOUT LONG-TERM CURRENT USE OF INSULIN (H): Primary | ICD-10-CM

## 2020-12-01 RX ORDER — GLIMEPIRIDE 1 MG/1
1 TABLET ORAL
Qty: 30 TABLET | Refills: 0 | Status: SHIPPED | OUTPATIENT
Start: 2020-12-01 | End: 2020-12-20

## 2020-12-01 NOTE — TELEPHONE ENCOUNTER
Received a message from  to restart Atorvastatin 20mg daily and start Glimepiride 1mg daily with first meal of day (not restarting Januvia at this time).  I called and informed patient, and counseled on increased risk of hypoglycemia with addition of sulfonylurea to current Lantus and Jardiance therapy, what to watch for.  Also informed her of hepatic panel, serology orders in one month that  is ordering; informed that elevated LFTs  likely a combo of fatty liver and meds, but MD wants to rule out other causes per 's request. Patient states understanding.  Will restart Atorvastatin 20mg daily and will begin Glimepiride 1mg daily with breakfast/first main meal of the day.  Will send order for Glimepiride 1mg daily to pharmacy.      Roxanna Mcfadden, Pharm.D.,[facility]  Board Certified Geriatric Pharmacist  Medication Therapy Management Pharmacist  [phone]

## 2020-12-02 NOTE — TELEPHONE ENCOUNTER
Care plan noted. Thank you!    Araceli Santiago, PharmD  Medication Therapy Management Pharmacist  [phone]

## 2020-12-14 ENCOUNTER — VIRTUAL VISIT (OUTPATIENT)
Dept: PHARMACY | Facility: CLINIC | Age: 50
End: 2020-12-14
Payer: COMMERCIAL

## 2020-12-14 DIAGNOSIS — I10 BENIGN ESSENTIAL HYPERTENSION: ICD-10-CM

## 2020-12-14 DIAGNOSIS — Z78.9 TAKES DIETARY SUPPLEMENTS: ICD-10-CM

## 2020-12-14 DIAGNOSIS — F41.9 ANXIETY DISORDER, UNSPECIFIED TYPE: ICD-10-CM

## 2020-12-14 DIAGNOSIS — E78.5 HYPERLIPIDEMIA LDL GOAL <100: ICD-10-CM

## 2020-12-14 DIAGNOSIS — Z86.73 HISTORY OF STROKE: ICD-10-CM

## 2020-12-14 DIAGNOSIS — E11.65 TYPE 2 DIABETES MELLITUS WITH HYPERGLYCEMIA, WITH LONG-TERM CURRENT USE OF INSULIN (H): Primary | ICD-10-CM

## 2020-12-14 DIAGNOSIS — Z79.4 TYPE 2 DIABETES MELLITUS WITH HYPERGLYCEMIA, WITH LONG-TERM CURRENT USE OF INSULIN (H): Primary | ICD-10-CM

## 2020-12-14 PROCEDURE — 99606 MTMS BY PHARM EST 15 MIN: CPT | Mod: TEL | Performed by: PHARMACIST

## 2020-12-14 PROCEDURE — 99607 MTMS BY PHARM ADDL 15 MIN: CPT | Mod: TEL | Performed by: PHARMACIST

## 2020-12-14 NOTE — Clinical Note
JELLY RAY note, thanks!    Araceli Santiago, PharmD  Medication Therapy Management Pharmacist  [phone]

## 2020-12-14 NOTE — PATIENT INSTRUCTIONS
Recommendations from today's MTM visit:                                                       1. Increase Lantus to 12 units daily, then continue to increase Lantus by 2 units every other day until fasting blood sugar is consistently between 80-130mg/dL.    It was great to speak with you today.  I value your experience and would be very thankful for your time with providing feedback on our clinic survey. You may receive a survey via email or text message in the next few days.     Next MTM visit: 2 weeks    To schedule another MTM appointment, please call the clinic directly or you may call the MTM scheduling line at [phone] or toll-free at [phone].     My Clinical Pharmacist's contact information:                                                      It was a pleasure talking with you today!  Please feel free to contact me with any questions or concerns you have.      Araceli Santiago, PharmD  Medication Therapy Management Pharmacist  [phone]

## 2020-12-14 NOTE — PROGRESS NOTES
MTM ENCOUNTER  SUBJECTIVE/OBJECTIVE:                           Flor Fernandez is a 50 year old female called for a follow-up visit. She was referred to me from Nayeli Barnard.  Today's visit is a follow-up MTM visit from 20.     Reason for visit: diabetes follow-up.    Allergies/ADRs: Reviewed in chart  Tobacco: She reports that she quit smoking about 6 years ago. She has never used smokeless tobacco.  Alcohol: Less than 1 beverage / month  Caffeine: 1 soda/week, stopped drinking coffee    Medication Adherence/Access: Patient takes medications 1 time(s) per day, most medications are taken at night. Takes carvedilol twice daily.    Type 2 Diabetes:    Jardiance 10 mg once daily  Lantus 10 units at bedtime  glimepiride 1 mg daily    Holding Januvia 100 mg daily due to elevated LFTs. Has plans to do additional labs with Dr. Barnard in January.  Her symptoms of nausea and upper abdominal pain resolved after she started Lantus, had still been taking Januvia for a period of time after that, before she was instructed to stop it. She hopes to be on Lantus only short term while the blood sugar comes down, doesn't want to gain any more weight.   Still has some increased urination, but minimal, denies any further nausea.  Medication history:   Metformin caused her to feel sick and not eat while taking 500 mg dose.   Victoza caused constipation.   Blood sugar monitorin time(s) daily using One Touch Verio. Ranges (patient reported):   Date FBG/ 2hours post Lunch/2hours post Dinner /2hours post     182       170       165       193      12/10 203       233  209     191  /161     214  146     166       253  /228    12/3 260      12/ 247       215  182           Eye exam: up to date  Diet/Exercise: Trying to exercise more.  Aspirin: Taking 325mg daily and denies side effects   Statin: Yes: atorvastatin   ACEi/ARB: Yes: lisinopril.   Urine Albumin:   Lab Results   Component Value  Date    UMALCR 8.45 08/19/2020   ]  Lab Results   Component Value Date    A1C 9.1 11/09/2020    A1C 8.9 08/18/2020    A1C 6.8 01/14/2020    A1C 6.1 08/12/2019    A1C 6.5 04/22/2019     Hyperlipidemia:  Atorvastatin 20 mg once daily    No issues since restarting atorvastatin.  Pt reports no side effects  Recent Labs   Lab Test 01/14/20  0708 12/05/18  0732   CHOL 115 121   HDL 38* 45*   LDL 48 47   TRIG 147 144   Liver Function Studies -   Recent Labs   Lab Test 11/25/20  0951   PROTTOTAL 7.8   ALBUMIN 3.6   BILITOTAL 0.6   ALKPHOS 143   AST 48*   ALT 74*     Hx of stroke/Hypertension:   Chlorthalidone 25 mg daily (pm)  Carvedilol 6.25 mg twice daily with meals  Lisinopril 10 mg daily (pm)  Aspirin 325 mg daily    History of 2 strokes.   Patient does not self-monitor blood pressure. Patient reports dry cough every so often. Patient reports vertigo/ dizziness only when sick. Reports no other side effect.  BP Readings from Last 3 Encounters:   08/27/20 116/68   08/18/20 122/78   02/19/20 134/84     Anxiety:   Hydroxyzine 25 mg every 8 hours PRN (pm)  Sertraline 50 mg daily (pm)    Reports these are effective, has been using hydroxyzine more frequently at night.    Supplements:   MVI gummies daily  Vitron-C  mg  Vitamin B-12 1000 mcg daily    Today's Vitals: There were no vitals taken for this visit.      ASSESSMENT:                              Medication Adherence: No issues identified    Diabetes: A1c above goal of <7% and blood sugars are above FBG goal of  and at goal post prandial goal of <180. May benefit from titrating Lantus. It's possible Januvia could have contributed to elevated LFTs vs fatty liver.     Hyperlipidemia: Stable.    Hx of stroke/Hypertension: Stable. BP is at goal of <140/90.     Anxiety: Stable.    Supplements: Stable.      PLAN:                              1. Increase Lantus to 12 units daily, then continue to increase Lantus by 2 units every other day until fasting blood sugar  is consistently between 80-130mg/dL.    I spent 9 minutes with this patient today. All changes were made via collaborative practice agreement with Nayeli Barnard. A copy of the visit note was provided to the patient's primary care provider.    Will follow up in 2 weeks.    The patient was sent via [employer] a summary of these recommendations.     Araceli Santiago, PharmD  Medication Therapy Management Pharmacist  [phone]    Patient consented to a telehealth visit: yes  Telemedicine Visit Details  Type of service:  Telephone visit  Start Time: 4:16 PM  End Time: 4:25 PM  Originating Location (patient location): Home  Distant Location (provider location):  [facility]  Mode of Communication:  Telephone

## 2020-12-20 DIAGNOSIS — E11.65 TYPE 2 DIABETES MELLITUS WITH HYPERGLYCEMIA, WITHOUT LONG-TERM CURRENT USE OF INSULIN (H): ICD-10-CM

## 2020-12-20 RX ORDER — GLIMEPIRIDE 1 MG/1
TABLET ORAL
Qty: 30 TABLET | Refills: 0 | Status: SHIPPED | OUTPATIENT
Start: 2020-12-20 | End: 2021-01-20

## 2020-12-20 NOTE — TELEPHONE ENCOUNTER
Prescription approved per [facility] Refill Protocol.    Live Mejia RN....12/20/2020 3:24 PM

## 2020-12-28 ENCOUNTER — VIRTUAL VISIT (OUTPATIENT)
Dept: PHARMACY | Facility: CLINIC | Age: 50
End: 2020-12-28
Payer: COMMERCIAL

## 2020-12-28 DIAGNOSIS — E78.5 HYPERLIPIDEMIA LDL GOAL <100: ICD-10-CM

## 2020-12-28 DIAGNOSIS — I10 BENIGN ESSENTIAL HYPERTENSION: ICD-10-CM

## 2020-12-28 DIAGNOSIS — F41.9 ANXIETY DISORDER, UNSPECIFIED TYPE: ICD-10-CM

## 2020-12-28 DIAGNOSIS — E11.65 TYPE 2 DIABETES MELLITUS WITH HYPERGLYCEMIA, WITH LONG-TERM CURRENT USE OF INSULIN (H): Primary | ICD-10-CM

## 2020-12-28 DIAGNOSIS — Z79.4 TYPE 2 DIABETES MELLITUS WITH HYPERGLYCEMIA, WITH LONG-TERM CURRENT USE OF INSULIN (H): Primary | ICD-10-CM

## 2020-12-28 DIAGNOSIS — Z78.9 TAKES DIETARY SUPPLEMENTS: ICD-10-CM

## 2020-12-28 DIAGNOSIS — Z86.73 HISTORY OF STROKE: ICD-10-CM

## 2020-12-28 PROCEDURE — 99607 MTMS BY PHARM ADDL 15 MIN: CPT | Mod: TEL | Performed by: PHARMACIST

## 2020-12-28 PROCEDURE — 99606 MTMS BY PHARM EST 15 MIN: CPT | Mod: TEL | Performed by: PHARMACIST

## 2020-12-28 NOTE — PATIENT INSTRUCTIONS
Recommendations from today's MTM visit:                                                       1. Stay at Lantus 18 units 1 more week, if morning blood sugars are still not between  mg/dL, go ahead and restart titrating Lantus up by 2 units every other day.    It was great to speak with you today.  I value your experience and would be very thankful for your time with providing feedback on our clinic survey. You may receive a survey via email or text message in the next few days.     Next MTM visit: 3 weeks    To schedule another MTM appointment, please call the clinic directly or you may call the MTM scheduling line at [phone] or toll-free at [phone].     My Clinical Pharmacist's contact information:                                                      It was a pleasure talking with you today!  Please feel free to contact me with any questions or concerns you have.      Araceli Santiago, PharmD  Medication Therapy Management Pharmacist  [phone]

## 2020-12-28 NOTE — Clinical Note
JELLY RAY note, thanks!    Araceli Santiago, PharmD  Medication Therapy Management Pharmacist  [phone]

## 2020-12-28 NOTE — PROGRESS NOTES
MTM ENCOUNTER  SUBJECTIVE/OBJECTIVE:                           Flor Fernandez is a 50 year old female called for a follow-up visit. She was referred to me from Nayeli Barnard.  Today's visit is a follow-up MTM visit from 20.     Reason for visit: diabetes follow-up. She went up to 18 units on the Lantus, still 140-150s in the morning. Constantly feel hungry, usually in the mornings.     Allergies/ADRs: Reviewed in chart  Tobacco: She reports that she quit smoking about 6 years ago. She has never used smokeless tobacco.  Alcohol: Less than 1 beverage / month  Caffeine: 1 soda/week, stopped drinking coffee    Medication Adherence/Access: Patient takes medications 1 time(s) per day, most medications are taken at night. Takes carvedilol twice daily.    Type 2 Diabetes:    Jardiance 10 mg once daily  Lantus 18 units at bedtime  glimepiride 1 mg daily    She's been titrating Lantus up by 2 units every other day, see chief complaint. The hungry feeling feels like she's about to go low, but doesn't.   Holding Januvia 100 mg daily due to elevated LFTs. Has plans to do additional labs with Dr. Barnard in January.  Her symptoms of nausea and upper abdominal pain resolved after she started Lantus, had still been taking Januvia for a period of time after that, before she was instructed to stop it. She hopes to be on Lantus only short term while the blood sugar comes down, doesn't want to gain any more weight.   Still has some increased urination, but minimal, denies any further nausea.  Medication history:   Metformin caused her to feel sick and not eat while taking 500 mg dose.   Victoza caused constipation.   Blood sugar monitorin time(s) daily using One Touch Verio. Ranges (patient reported): forgot her meter, plans to send me readings through [employer], otherwise states  FB-150s  Post-prandial: 160s-170s.  Eye exam: up to date  Diet/Exercise: Trying to exercise more.  Aspirin: Taking 325mg daily and denies side  effects   Statin: Yes: atorvastatin   ACEi/ARB: Yes: lisinopril.   Urine Albumin:   Lab Results   Component Value Date    UMALCR 8.45 08/19/2020     Lab Results   Component Value Date    A1C 9.1 11/09/2020    A1C 8.9 08/18/2020    A1C 6.8 01/14/2020    A1C 6.1 08/12/2019    A1C 6.5 04/22/2019     Hyperlipidemia:  Atorvastatin 20 mg once daily    No issues since restarting atorvastatin.  Pt reports no side effects  Recent Labs   Lab Test 01/14/20  0708 12/05/18  0732   CHOL 115 121   HDL 38* 45*   LDL 48 47   TRIG 147 144     Liver Function Studies -   Recent Labs   Lab Test 11/25/20  0951   PROTTOTAL 7.8   ALBUMIN 3.6   BILITOTAL 0.6   ALKPHOS 143   AST 48*   ALT 74*     Hx of stroke/Hypertension:   Chlorthalidone 25 mg daily (pm)  Carvedilol 6.25 mg twice daily with meals  Lisinopril 10 mg daily (pm)  Aspirin 325 mg daily    History of 2 strokes.   Patient does not self-monitor blood pressure. Patient reports dry cough every so often. Patient reports vertigo/ dizziness only when sick. Reports no other side effect.  BP Readings from Last 3 Encounters:   08/27/20 116/68   08/18/20 122/78   02/19/20 134/84     Anxiety:   Hydroxyzine 25 mg every 8 hours PRN (pm)  Sertraline 50 mg daily (pm)    Reports these are effective, has been using hydroxyzine more frequently at night.    Supplements:   MVI gummies daily  Vitron-C  mg  Vitamin B-12 1000 mcg daily    Today's Vitals: There were no vitals taken for this visit.      ASSESSMENT:                              Medication Adherence: No issues identified    Diabetes: A1c above goal of <7% and blood sugars are above FBG goal of  and at goal post prandial goal of <180. Feelings of hunger not related to hypoglyemia, however may be due to quicker titration of Lantus - body used to higher blood sugar, and she feels low even though she is not. May benefit from maintaining current Lantus dose and then titrating further if needed.   It's possible Januvia could have  contributed to elevated LFTs vs fatty liver, remaining off for now.     Hyperlipidemia: Stable.    Hx of stroke/Hypertension: Stable. BP is at goal of <140/90.     Anxiety: Stable.    Supplements: Stable.    PLAN:                              1. Stay at Lantus 18 units 1 more week, if morning blood sugars are still not between  mg/dL, go ahead and restart titrating Lantus up by 2 units every other day.    I spent 9 minutes with this patient today. All changes were made via collaborative practice agreement with Nayeli Barnard. A copy of the visit note was provided to the patient's primary care provider.    Will follow up in 3 weeks.    The patient was sent via [employer] a summary of these recommendations.     Araceli Santiago, PharmD  Medication Therapy Management Pharmacist  [phone]    Patient consented to a telehealth visit: yes  Telemedicine Visit Details  Type of service:  Telephone visit  Start Time: 12:01 PM  End Time: 12:10 PM  Originating Location (patient location): Home  Distant Location (provider location):  [facility]  Mode of Communication:  Telephone      Medication Therapy Recommendations  Diabetes mellitus, type 2 (H)    Current Medication: insulin glargine (LANTUS PEN) 100 UNIT/ML pen   Rationale: Does not understand instructions - Adherence - Adherence   Recommendation: Provide Education - discussed why feeling lower, when bg isn't actually low   Status: Accepted - no CPA Needed

## 2021-01-02 DIAGNOSIS — F41.0 PANIC ATTACK: ICD-10-CM

## 2021-01-04 DIAGNOSIS — R74.02 NONSPECIFIC ELEVATION OF LEVELS OF TRANSAMINASE OR LACTIC ACID DEHYDROGENASE (LDH): ICD-10-CM

## 2021-01-04 DIAGNOSIS — R74.01 NONSPECIFIC ELEVATION OF LEVELS OF TRANSAMINASE OR LACTIC ACID DEHYDROGENASE (LDH): ICD-10-CM

## 2021-01-04 LAB
ALBUMIN SERPL-MCNC: 3.7 G/DL (ref 3.4–5)
ALP SERPL-CCNC: 137 U/L (ref 40–150)
ALT SERPL W P-5'-P-CCNC: 70 U/L (ref 0–50)
AST SERPL W P-5'-P-CCNC: 51 U/L (ref 0–45)
BILIRUB DIRECT SERPL-MCNC: 0.2 MG/DL (ref 0–0.2)
BILIRUB SERPL-MCNC: 0.6 MG/DL (ref 0.2–1.3)
FERRITIN SERPL-MCNC: 140 NG/ML (ref 8–252)
HBV CORE AB SERPL QL IA: NONREACTIVE
HBV SURFACE AB SERPL IA-ACNC: 1.3 M[IU]/ML
HBV SURFACE AG SERPL QL IA: NONREACTIVE
HCV AB SERPL QL IA: NONREACTIVE
IRON SATN MFR SERPL: 18 % (ref 15–46)
IRON SERPL-MCNC: 65 UG/DL (ref 35–180)
PROT SERPL-MCNC: 7.6 G/DL (ref 6.8–8.8)
TIBC SERPL-MCNC: 365 UG/DL (ref 240–430)

## 2021-01-04 PROCEDURE — 83540 ASSAY OF IRON: CPT | Performed by: INTERNAL MEDICINE

## 2021-01-04 PROCEDURE — 87340 HEPATITIS B SURFACE AG IA: CPT | Performed by: INTERNAL MEDICINE

## 2021-01-04 PROCEDURE — 80076 HEPATIC FUNCTION PANEL: CPT | Performed by: INTERNAL MEDICINE

## 2021-01-04 PROCEDURE — 86704 HEP B CORE ANTIBODY TOTAL: CPT | Performed by: INTERNAL MEDICINE

## 2021-01-04 PROCEDURE — 86803 HEPATITIS C AB TEST: CPT | Performed by: INTERNAL MEDICINE

## 2021-01-04 PROCEDURE — 83550 IRON BINDING TEST: CPT | Performed by: INTERNAL MEDICINE

## 2021-01-04 PROCEDURE — 83516 IMMUNOASSAY NONANTIBODY: CPT | Performed by: INTERNAL MEDICINE

## 2021-01-04 PROCEDURE — 82103 ALPHA-1-ANTITRYPSIN TOTAL: CPT | Mod: 90 | Performed by: INTERNAL MEDICINE

## 2021-01-04 PROCEDURE — 82728 ASSAY OF FERRITIN: CPT | Performed by: INTERNAL MEDICINE

## 2021-01-04 PROCEDURE — 86706 HEP B SURFACE ANTIBODY: CPT | Performed by: INTERNAL MEDICINE

## 2021-01-04 PROCEDURE — 99000 SPECIMEN HANDLING OFFICE-LAB: CPT | Performed by: INTERNAL MEDICINE

## 2021-01-04 PROCEDURE — 36415 COLL VENOUS BLD VENIPUNCTURE: CPT | Performed by: INTERNAL MEDICINE

## 2021-01-04 RX ORDER — HYDROXYZINE HYDROCHLORIDE 25 MG/1
TABLET, FILM COATED ORAL
Qty: 270 TABLET | Refills: 0 | Status: SHIPPED | OUTPATIENT
Start: 2021-01-04 | End: 2021-03-10

## 2021-01-05 LAB
TTG IGA SER-ACNC: 1 U/ML
TTG IGG SER-ACNC: <1 U/ML

## 2021-01-05 NOTE — RESULT ENCOUNTER NOTE
No celiac disease. No hepatitis B or C.  Normal iron levels.  The rest of your labs are still pending.

## 2021-01-07 LAB — A1AT SERPL-MCNC: 112 MG/DL (ref 90–200)

## 2021-01-18 ENCOUNTER — VIRTUAL VISIT (OUTPATIENT)
Dept: PHARMACY | Facility: CLINIC | Age: 51
End: 2021-01-18
Payer: COMMERCIAL

## 2021-01-18 DIAGNOSIS — E78.5 HYPERLIPIDEMIA LDL GOAL <100: ICD-10-CM

## 2021-01-18 DIAGNOSIS — E11.65 TYPE 2 DIABETES MELLITUS WITH HYPERGLYCEMIA, WITH LONG-TERM CURRENT USE OF INSULIN (H): Primary | ICD-10-CM

## 2021-01-18 DIAGNOSIS — F41.9 ANXIETY DISORDER, UNSPECIFIED TYPE: ICD-10-CM

## 2021-01-18 DIAGNOSIS — I10 BENIGN ESSENTIAL HYPERTENSION: ICD-10-CM

## 2021-01-18 DIAGNOSIS — Z78.9 TAKES DIETARY SUPPLEMENTS: ICD-10-CM

## 2021-01-18 DIAGNOSIS — Z86.73 HISTORY OF STROKE: ICD-10-CM

## 2021-01-18 DIAGNOSIS — Z79.4 TYPE 2 DIABETES MELLITUS WITH HYPERGLYCEMIA, WITH LONG-TERM CURRENT USE OF INSULIN (H): Primary | ICD-10-CM

## 2021-01-18 PROCEDURE — 99607 MTMS BY PHARM ADDL 15 MIN: CPT | Mod: TEL | Performed by: PHARMACIST

## 2021-01-18 PROCEDURE — 99605 MTMS BY PHARM NP 15 MIN: CPT | Mod: TEL | Performed by: PHARMACIST

## 2021-01-18 NOTE — PATIENT INSTRUCTIONS
Recommendations from today's MTM visit:                                                       1. Check your blood sugar two hours after supper at least a few times a week.     2. Continue current medications.     3. Work on cutting out night-time eating.    It was great to speak with you today.  I value your experience and would be very thankful for your time with providing feedback on our clinic survey. You may receive a survey via email or text message in the next few days.     Next MTM visit: 1 month    To schedule another MTM appointment, please call the clinic directly or you may call the MTM scheduling line at [phone] or toll-free at [phone].     My Clinical Pharmacist's contact information:                                                      It was a pleasure talking with you today!  Please feel free to contact me with any questions or concerns you have.      Araceli Santiago, PharmD  Medication Therapy Management Pharmacist  [phone]

## 2021-01-18 NOTE — PROGRESS NOTES
Medication Therapy Management (MTM) Encounter    ASSESSMENT:                            Medication Adherence/Access: No issues identified    Diabetes: A1c above goal of <7% and blood sugars are above FBG goal of  when snacking overnight. Unsure if post prandial blood sugar are at goal of <180. May benefit from maintaining current Lantus dose, checking post-prandial readings, and working on cutting out night-time snacking.  Fatty liver is not a contraindication for GLP-1 acting medications, could consider retrial of similar agent, such as Trulicity in future.    Hyperlipidemia: Stable.    Hx of stroke/Hypertension: Stable. BP is at goal of <140/90.     Anxiety: Stable.    Supplements: Stable.    PLAN:                            1. Check your blood sugar two hours after supper at least a few times a week.     2. Continue current medications.     3. Work on cutting out night-time eating.    Follow-up: 1 month    SUBJECTIVE/OBJECTIVE:                          Flor Fernandez is a 51 year old female called for a follow-up visit. She was referred to me from Nayeli Barnard.  Today's visit is a follow-up MTM visit from 12/28/20. This will serve as an initial visit for 2021.     Reason for visit: diabetes follow-up.    Allergies/ADRs: Reviewed in chart  Tobacco: She reports that she quit smoking about 6 years ago. She has never used smokeless tobacco.  Alcohol: Less than 1 beverage / month  Caffeine: 1 soda/week, stopped drinking coffee    Medication Adherence/Access: Patient takes medications 1 time(s) per day, most medications are taken at night. Takes carvedilol twice daily.    Type 2 Diabetes:    Jardiance 10 mg once daily  Lantus 26 units at bedtime  glimepiride 1 mg daily    She's been titrating Lantus up by 2 units every other day, wondering if snacking during night is affecting morning readings. She usually wakes up ~1:30-2:30 am to go to the bathroom and also tends to snack at this time, out of habit, not so  much because she's hungry. Then is also checking blood sugar sometimes ~4:30-5:30 am.   She also did labs with Dr. Barnard, which show fatty liver.   Still has some increased urination, but minimal, denies any further nausea.  Medication history:   Metformin caused her to feel sick and not eat while taking 500 mg dose.   Victoza caused constipation.   Januvia possibly contributed to nausea/upper abdominal pain, also has fatty liver.   Blood sugar monitorin time(s) daily using One Touch Verio. Ranges (patient reported):   FB, 159, 169, 167, 103, 129, 166, 188, 109  Eye exam: up to date  Diet/Exercise: Trying to exercise more.  Aspirin: Taking 325mg daily and denies side effects   Statin: Yes: atorvastatin   ACEi/ARB: Yes: lisinopril.   Urine Albumin:   Lab Results   Component Value Date    UMALCR 8.45 2020     Lab Results   Component Value Date    A1C 9.1 2020    A1C 8.9 2020    A1C 6.8 2020    A1C 6.1 2019    A1C 6.5 2019     Hyperlipidemia:  Atorvastatin 20 mg once daily    No issues since restarting atorvastatin.  Pt reports no side effects  Recent Labs   Lab Test 20  0708 18  0732   CHOL 115 121   HDL 38* 45*   LDL 48 47   TRIG 147 144     Liver Function Studies -   Recent Labs   Lab Test 21  0721   PROTTOTAL 7.6   ALBUMIN 3.7   BILITOTAL 0.6   ALKPHOS 137   AST 51*   ALT 70*     Hx of stroke/Hypertension:   Chlorthalidone 25 mg daily (am)  Carvedilol 6.25 mg twice daily with meals  Lisinopril 10 mg daily   Aspirin 325 mg daily    History of 2 strokes.   Patient does not self-monitor blood pressure. Patient reports dry cough every so often. Patient reports vertigo/ dizziness only when sick. Reports no other side effect.  BP Readings from Last 3 Encounters:   20 116/68   20 122/78   20 134/84     Anxiety:   Hydroxyzine 25 mg every 8 hours PRN (pm)  Sertraline 50 mg daily (pm)    Reports these are effective, has been using hydroxyzine  more frequently at night.    Supplements:   MVI gummies daily  Vitron-C  mg  Vitamin B-12 1000 mcg daily    States this/these are effective. Denies side effects.     Today's Vitals: There were no vitals taken for this visit.  ----------------        I spent 19 minutes with this patient today. All changes were made via collaborative practice agreement with Nayeli Barnard. A copy of the visit note was provided to the patient's primary care provider.    The patient was sent via [employer] a summary of these recommendations.     Araceli Santiago, PharmD  Medication Therapy Management Pharmacist  [phone]    Telemedicine Visit Details  Type of service:  Telephone visit  Start Time: 4:33 PM  End Time: 4:52 PM  Originating Location (patient location): Home  Distant Location (provider location):  [facility]      Medication Therapy Recommendations  Diabetes mellitus, type 2 (H)    Current Medication: glimepiride (AMARYL) 1 MG tablet   Rationale: Medication requires monitoring - Needs additional monitoring   Recommendation: Self-Monitoring - post-prandial readings   Status: Accepted - no CPA Needed

## 2021-01-18 NOTE — Clinical Note
JELLY RAY note, thanks!    Araceli Santiago, PharmD  Medication Therapy Management Pharmacist  [phone]

## 2021-01-20 DIAGNOSIS — E11.65 TYPE 2 DIABETES MELLITUS WITH HYPERGLYCEMIA, WITHOUT LONG-TERM CURRENT USE OF INSULIN (H): ICD-10-CM

## 2021-01-20 RX ORDER — GLIMEPIRIDE 1 MG/1
TABLET ORAL
Qty: 30 TABLET | Refills: 0 | Status: SHIPPED | OUTPATIENT
Start: 2021-01-20 | End: 2021-02-22

## 2021-01-27 ENCOUNTER — TELEPHONE (OUTPATIENT)
Dept: FAMILY MEDICINE | Facility: CLINIC | Age: 51
End: 2021-01-27

## 2021-01-27 NOTE — TELEPHONE ENCOUNTER
Reason for Call:  Form, our goal is to have forms completed with 72 hours, however, some forms may require a visit or additional information.    Type of letter, form or note:  [place] Dept of Public Health    Who is the form from?: Patient    Where did the form come from: Patient or family brought in       What clinic location was the form placed at?: [facility]    Where the form was placed: Dr. Barnard's Box/Folder    What number is listed as a contact on the form?: Fax to: [phone]       Additional comments: N/A    Call taken on 1/27/2021 at 9:16 AM by Meri German

## 2021-01-27 NOTE — TELEPHONE ENCOUNTER
Insulin-Treated Diabetes Mellitus Report form received and given to Dr. Barnard to complete and sign.  Jud Staley,

## 2021-01-28 NOTE — TELEPHONE ENCOUNTER
Form completed and signed.  Form faxed to the DMV to fax number [phone].  Patient informed.  Jud Staley,

## 2021-02-01 ENCOUNTER — MYC MEDICAL ADVICE (OUTPATIENT)
Dept: PHARMACY | Facility: CLINIC | Age: 51
End: 2021-02-01

## 2021-02-01 DIAGNOSIS — E11.65 TYPE 2 DIABETES MELLITUS WITH HYPERGLYCEMIA, WITHOUT LONG-TERM CURRENT USE OF INSULIN (H): Primary | ICD-10-CM

## 2021-02-01 NOTE — TELEPHONE ENCOUNTER
Refilled Jardiance 10 mg daily.    Araceli Santiago, PharmD  Medication Therapy Management Pharmacist  [phone]

## 2021-02-08 ENCOUNTER — OFFICE VISIT (OUTPATIENT)
Dept: INTERNAL MEDICINE | Facility: CLINIC | Age: 51
End: 2021-02-08
Payer: COMMERCIAL

## 2021-02-08 VITALS
BODY MASS INDEX: 45.24 KG/M2 | WEIGHT: 281.5 LBS | RESPIRATION RATE: 16 BRPM | SYSTOLIC BLOOD PRESSURE: 128 MMHG | HEIGHT: 66 IN | DIASTOLIC BLOOD PRESSURE: 72 MMHG | OXYGEN SATURATION: 95 % | TEMPERATURE: 97.7 F | HEART RATE: 97 BPM

## 2021-02-08 DIAGNOSIS — Z79.4 TYPE 2 DIABETES MELLITUS WITH HYPERGLYCEMIA, WITH LONG-TERM CURRENT USE OF INSULIN (H): ICD-10-CM

## 2021-02-08 DIAGNOSIS — E11.65 TYPE 2 DIABETES MELLITUS WITH HYPERGLYCEMIA, WITH LONG-TERM CURRENT USE OF INSULIN (H): ICD-10-CM

## 2021-02-08 DIAGNOSIS — M25.50 ARTHRALGIA, UNSPECIFIED JOINT: Primary | ICD-10-CM

## 2021-02-08 DIAGNOSIS — E11.65 TYPE 2 DIABETES MELLITUS WITH HYPERGLYCEMIA, WITHOUT LONG-TERM CURRENT USE OF INSULIN (H): ICD-10-CM

## 2021-02-08 DIAGNOSIS — R05.9 COUGH: ICD-10-CM

## 2021-02-08 DIAGNOSIS — M79.10 MYALGIA: ICD-10-CM

## 2021-02-08 PROBLEM — E11.9 DIABETES MELLITUS, TYPE 2 (H): Status: ACTIVE | Noted: 2019-06-07

## 2021-02-08 LAB
ERYTHROCYTE [SEDIMENTATION RATE] IN BLOOD BY WESTERGREN METHOD: 48 MM/H (ref 0–20)
HBA1C MFR BLD: 7.7 % (ref 0–5.6)

## 2021-02-08 PROCEDURE — U0005 INFEC AGEN DETEC AMPLI PROBE: HCPCS | Performed by: INTERNAL MEDICINE

## 2021-02-08 PROCEDURE — 86431 RHEUMATOID FACTOR QUANT: CPT | Performed by: INTERNAL MEDICINE

## 2021-02-08 PROCEDURE — 83036 HEMOGLOBIN GLYCOSYLATED A1C: CPT | Performed by: INTERNAL MEDICINE

## 2021-02-08 PROCEDURE — 82550 ASSAY OF CK (CPK): CPT | Performed by: INTERNAL MEDICINE

## 2021-02-08 PROCEDURE — 99214 OFFICE O/P EST MOD 30 MIN: CPT | Performed by: INTERNAL MEDICINE

## 2021-02-08 PROCEDURE — 86200 CCP ANTIBODY: CPT | Performed by: INTERNAL MEDICINE

## 2021-02-08 PROCEDURE — U0003 INFECTIOUS AGENT DETECTION BY NUCLEIC ACID (DNA OR RNA); SEVERE ACUTE RESPIRATORY SYNDROME CORONAVIRUS 2 (SARS-COV-2) (CORONAVIRUS DISEASE [COVID-19]), AMPLIFIED PROBE TECHNIQUE, MAKING USE OF HIGH THROUGHPUT TECHNOLOGIES AS DESCRIBED BY CMS-2020-01-R: HCPCS | Performed by: INTERNAL MEDICINE

## 2021-02-08 PROCEDURE — 85652 RBC SED RATE AUTOMATED: CPT | Performed by: INTERNAL MEDICINE

## 2021-02-08 PROCEDURE — 86140 C-REACTIVE PROTEIN: CPT | Performed by: INTERNAL MEDICINE

## 2021-02-08 PROCEDURE — 36415 COLL VENOUS BLD VENIPUNCTURE: CPT | Performed by: INTERNAL MEDICINE

## 2021-02-08 ASSESSMENT — MIFFLIN-ST. JEOR: SCORE: 1911.5

## 2021-02-08 NOTE — PROGRESS NOTES
"  Assessment & Plan     Type 2 diabetes mellitus with hyperglycemia, with long-term current use of insulin (H)  Improving control.  Per patient instructions.   - Hemoglobin A1c    Type 2 diabetes mellitus with hyperglycemia, without long-term current use of insulin (H)    - empagliflozin (JARDIANCE) 25 MG TABS tablet; Take 1 tablet (25 mg) by mouth daily    Arthralgia, unspecified joint  Unclear etiology.  Rule out covid given mild cough.    - Erythrocyte sedimentation rate auto  - CRP inflammation  - Symptomatic COVID-19 Virus (Coronavirus) by PCR; Future  - Symptomatic COVID-19 Virus (Coronavirus) by PCR  - Rheumatoid factor    Myalgia    - CK total  - Symptomatic COVID-19 Virus (Coronavirus) by PCR; Future  - Symptomatic COVID-19 Virus (Coronavirus) by PCR    Cough    - Symptomatic COVID-19 Virus (Coronavirus) by PCR; Future  - Symptomatic COVID-19 Virus (Coronavirus) by PCR              27 minutes spent on the date of the encounter doing chart review, history and exam, documentation and further activities as noted above         BMI:   Estimated body mass index is 46.06 kg/m  as calculated from the following:    Height as of this encounter: 1.665 m (5' 5.55\").    Weight as of this encounter: 127.7 kg (281 lb 8 oz).         Patient Instructions   Increase Jardiance to 25 mg daily.      No follow-ups on file.    Nayeli Barnard MD  [facility] HospitalSAILAJA Ray is a 51 year old who presents to clinic today for the following health issues   HPI   On Jardiance 10 mg daily.     For the last 2 weeks she has body aches.  It feels like she worked out really hard but she didn't  Sometimes it is muscle and sometimes it is joints.  No fever/chills.  She has a cough off and on.  She has sinus headache as well.   The cough is dry.  She is feeling run down and tired.  No new medications.  Doesn't see a swollen joint.  Diabetes Follow-up  How often are you checking your blood sugar? One time daily  She " is losing weight without trying.  Without jardiance her blood sugar started to spike.  She was without for a 1-2 weeks.  This morning she was 206 with a low of 134.  What time of day are you checking your blood sugars (select all that apply)?  Before meals  Have you had any blood sugars above 200?  Yes   Have you had any blood sugars below 70?  No    What symptoms do you notice when your blood sugar is low?  Shaky and Dizzy    What concerns do you have today about your diabetes? None     Do you have any of these symptoms? (Select all that apply)  Numbness in feet and Burning in feet  BP Readings from Last 2 Encounters:   21 128/72   20 116/68     Hemoglobin A1C (%)   Date Value   2021 7.7 (H)   2020 9.1 (H)     LDL Cholesterol Calculated (mg/dL)   Date Value   2020 48   2018 47       Hyperlipidemia Follow-Up    Are you regularly taking any medication or supplement to lower your cholesterol?   Yes- Atorvastatin    Are you having muscle aches or other side effects that you think could be caused by your cholesterol lowering medication?  Yes- major muscle pain in the last two weeks     Hypertesion Follow-up    Do you check your blood pressure regularly outside of the clinic? No     Are you following a low salt diet? Yes    Are your blood pressures ever more than 140 on the top number (systolic) OR more   than 90 on the bottom number (diastolic), for example 140/90? No    Anxiety Follow-Up    How are you doing with your anxiety since your last visit? Improved     Are you having other symptoms that might be associated with anxiety? No    Have you had a significant life event? No Job changes    Are you feeling depressed? No    Do you have any concerns with your use of alcohol or other drugs? No  Social History     Tobacco Use     Smoking status: Former Smoker     Quit date: 2014     Years since quittin.8     Smokeless tobacco: Never Used   Substance Use Topics     Alcohol use: No      Drug use: No     RODRIGUE-7 SCORE 10/21/2019 12/30/2019 3/2/2020   Total Score 0 (minimal anxiety) 0 (minimal anxiety) 0 (minimal anxiety)   Total Score 0 0 0     PHQ 10/21/2019 12/30/2019 3/2/2020   PHQ-9 Total Score 0 0 0   Q9: Thoughts of better off dead/self-harm past 2 weeks Not at all Not at all Not at all     Last PHQ-9 3/2/2020   1.  Little interest or pleasure in doing things 0   2.  Feeling down, depressed, or hopeless 0   3.  Trouble falling or staying asleep, or sleeping too much 0   4.  Feeling tired or having little energy 0   5.  Poor appetite or overeating 0   6.  Feeling bad about yourself 0   7.  Trouble concentrating 0   8.  Moving slowly or restless 0   Q9: Thoughts of better off dead/self-harm past 2 weeks 0   PHQ-9 Total Score 0   Difficulty at work, home, or with people -     RODRIGUE-7  3/2/2020   1. Feeling nervous, anxious, or on edge 0   2. Not being able to stop or control worrying 0   3. Worrying too much about different things 0   4. Trouble relaxing 0   5. Being so restless that it is hard to sit still 0   6. Becoming easily annoyed or irritable 0   7. Feeling afraid, as if something awful might happen 0   RODRIGUE-7 Total Score 0   If you checked any problems, how difficult have they made it for you to do your work, take care of things at home, or get along with other people? -         How many servings of fruits and vegetables do you eat daily?  2-3    On average, how many sweetened beverages do you drink each day (Examples: soda, juice, sweet tea, etc.  Do NOT count diet or artificially sweetened beverages)?   0 - lemonade     How many days per week do you exercise enough to make your heart beat faster? 3 or less    How many minutes a day do you exercise enough to make your heart beat faster? 10 - 19    How many days per week do you miss taking your medication? 0        Review of Systems    ROS: 10 point ROS neg other than the symptoms noted above in the HPI.       Objective    /72 (BP  "Location: Left arm)   Pulse 97   Temp 97.7  F (36.5  C) (Oral)   Resp 16   Ht 1.665 m (5' 5.55\")   Wt 127.7 kg (281 lb 8 oz)   SpO2 95%   BMI 46.06 kg/m    Body mass index is 46.06 kg/m .  Physical Exam   GENERAL APPEARANCE: healthy, alert and no distress  NECK: no adenopathy, no asymmetry, masses, or scars and thyroid normal to palpation  RESP: lungs clear to auscultation - no rales, rhonchi or wheezes  CV: regular rates and rhythm and normal S1 S2, no S3 or S4  ABDOMEN:  soft, nontender, no HSM or masses and bowel sounds normal  SKIN: no suspicious lesions or rashes  PSYCH: mentation appears normal. and affect normal/bright  No edema     Orders Only on 01/04/2021   Component Date Value Ref Range Status     Iron 01/04/2021 65  35 - 180 ug/dL Final     Iron Binding Cap 01/04/2021 365  240 - 430 ug/dL Final     Iron Saturation Index 01/04/2021 18  15 - 46 % Final     Tissue Transglutaminase Antibody I* 01/04/2021 1  <7 U/mL Final    Comment: Negative  The tTG-IgA assay has limited utility for patients with decreased levels of   IgA. Screening for celiac disease should include IgA testing to rule out   selective IgA deficiency and to guide selection and interpretation of   serological testing. tTG-IgG testing may be positive in celiac disease   patients with IgA deficiency.       Tissue Transglutaminase Cami IgG 01/04/2021 <1  <7 U/mL Final    Negative     Bilirubin Direct 01/04/2021 0.2  0.0 - 0.2 mg/dL Final     Bilirubin Total 01/04/2021 0.6  0.2 - 1.3 mg/dL Final     Albumin 01/04/2021 3.7  3.4 - 5.0 g/dL Final     Protein Total 01/04/2021 7.6  6.8 - 8.8 g/dL Final     Alkaline Phosphatase 01/04/2021 137  40 - 150 U/L Final     ALT 01/04/2021 70* 0 - 50 U/L Final     AST 01/04/2021 51* 0 - 45 U/L Final     Alpha 1 Antitrypsin 01/04/2021 112  90 - 200 mg/dL Final     Ferritin 01/04/2021 140  8 - 252 ng/mL Final     Hepatitis B Core Cami 01/04/2021 Nonreactive  NR^Nonreactive Final     Hepatitis B Surface " Antibody 01/04/2021 1.30  <8.00 m[IU]/mL Final    Nonreactive, No antibody detected when the value is less than 8.00 m[IU]/mL.     Hep B Surface Agn 01/04/2021 Nonreactive  NR^Nonreactive Final     Hepatitis C Antibody 01/04/2021 Nonreactive  NR^Nonreactive Final    Comment: Assay performance characteristics have not been established for newborns,   infants, and children

## 2021-02-08 NOTE — LETTER
February 8, 2021      Flor Fernandez  [address]  [place] [address]        To Whom It May Concern,     Flor Fernandez attended clinic here on Feb 8, 2021 and cannot return to work unless she has a negative COVID test.    If you have questions or concerns, please call the clinic at the number listed above.    Sincerely,         Nayeli Barnard MD

## 2021-02-09 LAB
CK SERPL-CCNC: 40 U/L (ref 30–225)
CRP SERPL-MCNC: 16.9 MG/L (ref 0–8)
SARS-COV-2 RNA RESP QL NAA+PROBE: NOT DETECTED
SPECIMEN SOURCE: NORMAL

## 2021-02-10 LAB — RHEUMATOID FACT SER NEPH-ACNC: <7 IU/ML (ref 0–20)

## 2021-02-10 NOTE — RESULT ENCOUNTER NOTE
No COVID.  Normal urine.   Inflammation tests are elevated.  Awaiting the rest of your bloodwork.    MA- please call lab and make sure they were able to add on the anti-ccp antibody

## 2021-02-11 LAB — CCP AB SER IA-ACNC: 1 U/ML

## 2021-02-15 ENCOUNTER — VIRTUAL VISIT (OUTPATIENT)
Dept: PHARMACY | Facility: CLINIC | Age: 51
End: 2021-02-15
Payer: COMMERCIAL

## 2021-02-15 DIAGNOSIS — F41.9 ANXIETY DISORDER, UNSPECIFIED TYPE: ICD-10-CM

## 2021-02-15 DIAGNOSIS — E11.65 TYPE 2 DIABETES MELLITUS WITH HYPERGLYCEMIA, WITHOUT LONG-TERM CURRENT USE OF INSULIN (H): Primary | ICD-10-CM

## 2021-02-15 DIAGNOSIS — Z78.9 TAKES DIETARY SUPPLEMENTS: ICD-10-CM

## 2021-02-15 DIAGNOSIS — I10 BENIGN ESSENTIAL HYPERTENSION: ICD-10-CM

## 2021-02-15 DIAGNOSIS — E78.5 HYPERLIPIDEMIA LDL GOAL <100: ICD-10-CM

## 2021-02-15 DIAGNOSIS — Z86.73 HISTORY OF STROKE: ICD-10-CM

## 2021-02-15 PROCEDURE — 99606 MTMS BY PHARM EST 15 MIN: CPT | Mod: TEL | Performed by: PHARMACIST

## 2021-02-15 RX ORDER — ONDANSETRON 4 MG/1
8 TABLET, ORALLY DISINTEGRATING ORAL 2 TIMES DAILY PRN
COMMUNITY
Start: 2021-02-14 | End: 2021-08-18

## 2021-02-15 NOTE — PROGRESS NOTES
Medication Therapy Management (MTM) Encounter    ASSESSMENT:                            Medication Adherence/Access: No issues identified    Nausea/vomiting: Improving.    Diabetes: A1c above goal of <7% and unknown if blood sugars are above FBG goal of  or if post prandial blood sugar are at goal of <180. May benefit from maintaining current regimen due to acute illness. If does have gastroparesis, will avoid GLP-1 agonists, Januvia.     Hyperlipidemia: Stable.    Hx of stroke/Hypertension: Stable. BP is at goal of <140/90.     Anxiety: Stable.    Supplements: Stable.    PLAN:                            1. Continue current medications.    Follow-up: 1 month    SUBJECTIVE/OBJECTIVE:                          Flor Fernandez is a 51 year old female called for a follow-up visit. She was referred to me from Nayeli Barnard.  Today's visit is a follow-up MTM visit from 1/18/21.     Reason for visit: diabetes follow-up, was in ED yesterday for nausea/vomiting.    Allergies/ADRs: Reviewed in chart  Tobacco: She reports that she quit smoking about 6 years ago. She has never used smokeless tobacco.  Alcohol: Less than 1 beverage / month  Caffeine: 1 soda/week, stopped drinking coffee  Past Medical History: fatty liver    Medication Adherence/Access: Patient takes medications 1 time(s) per day, most medications are taken at night. Takes carvedilol twice daily.    Nausea/vomiting:    Ondansetron ODT 8 mg twice daily PRN    She's only needed one dose so far, was helpful. Has felt better since. Has plans to follow-up with PCP. BRAT diet, chicken noodles soup, have been helpful. ED doc also mentioned her symptoms might be related to gastroparesis.     Type 2 Diabetes:    Jardiance 20 mg once daily (will increase to 25 mg when run out of 10s)  Lantus 28 units at bedtime  glimepiride 1 mg daily    Denies hypoglycemia while she was sick, she was checking more frequently. Doesn't have any readings for me today because of this.    She's had been titrating Lantus up by 2 units every other day, stopped at 28 units   Medication history:   Metformin caused her to feel sick and not eat while taking 500 mg dose.   Victoza caused constipation.   Januvia possibly contributed to nausea/upper abdominal pain, also has fatty liver.  Blood sugar monitorin time(s) daily using One Touch Verio. Ranges (patient reported):   FBG: n/a  Eye exam: up to date  Diet/Exercise: Trying to exercise more.  Aspirin: Taking 325mg daily and denies side effects   Statin: Yes: atorvastatin   ACEi/ARB: Yes: lisinopril.   Urine Albumin:   Lab Results   Component Value Date    UMALCR 8.45 2020     Lab Results   Component Value Date    A1C 7.7 2021    A1C 9.1 2020    A1C 8.9 2020    A1C 6.8 2020    A1C 6.1 2019     Hyperlipidemia:  Atorvastatin 20 mg once daily    No issues since restarting atorvastatin.  Pt reports no side effects    Hx of stroke/Hypertension:   Chlorthalidone 25 mg daily (am)  Carvedilol 6.25 mg twice daily with meals  Lisinopril 10 mg daily   Aspirin 325 mg daily    History of 2 strokes.   Patient does not self-monitor blood pressure. Patient reports dry cough every so often. Patient reports vertigo/ dizziness only when sick. Reports no other side effect.  BP Readings from Last 3 Encounters:   21 128/72   20 116/68   20 122/78     Anxiety:   Hydroxyzine 25 mg every 8 hours PRN (pm)  Sertraline 50 mg daily (pm)    Reports these are effective, has been using hydroxyzine more frequently at night.    Supplements:   MVI gummies daily  Vitron-C  mg  Vitamin B-12 1000 mcg daily    States this/these are effective. Denies side effects.     Today's Vitals: There were no vitals taken for this visit.  ----------------      I spent 9 minutes with this patient today. All changes were made via collaborative practice agreement with Gloria Mitchell A copy of the visit note was provided to the patient's  primary care provider.    The patient declined a summary of these recommendations.     Araceli Santiago, PharmD  Medication Therapy Management Pharmacist  [phone]    Telemedicine Visit Details  Type of service:  Telephone visit  Start Time: 4:34 PM  End Time: 4:43 PM  Originating Location (patient location): [place]  Distant Location (provider location):  [facility] [place]      Medication Therapy Recommendations  No medication therapy recommendations to display

## 2021-02-15 NOTE — RESULT ENCOUNTER NOTE
Dear Flor,    Your recent test results are attached.      Negative rheumatoid arthritis test.    If you have any questions please feel free to contact [phone] or myself via [employer].    Sincerely,  Jayna Hi, CNP

## 2021-02-18 ENCOUNTER — VIRTUAL VISIT (OUTPATIENT)
Dept: INTERNAL MEDICINE | Facility: CLINIC | Age: 51
End: 2021-02-18
Payer: COMMERCIAL

## 2021-02-18 DIAGNOSIS — G89.29 ABDOMINAL PAIN, CHRONIC, RIGHT UPPER QUADRANT: Primary | ICD-10-CM

## 2021-02-18 DIAGNOSIS — R10.11 ABDOMINAL PAIN, CHRONIC, RIGHT UPPER QUADRANT: Primary | ICD-10-CM

## 2021-02-18 PROCEDURE — 99213 OFFICE O/P EST LOW 20 MIN: CPT | Mod: TEL | Performed by: INTERNAL MEDICINE

## 2021-02-18 NOTE — PROGRESS NOTES
"Flor is a 51 year old who is being evaluated via a billable telephone visit.      What phone number would you like to be contacted at? [phone]  How would you like to obtain your AVS? MyChart  Start 11:54 AM   Assessment & Plan     Abdominal pain, chronic, right upper quadrant  She has pain that sounds biliary.  It is right upper quadrant and postprandial after a fatty meal.  Labs normal.  Ultrasound in fall normal.    - NM Hepatobiliary Scan w GB EF; Future           BMI:   Estimated body mass index is 46.06 kg/m  as calculated from the following:    Height as of 2/8/21: 1.665 m (5' 5.55\").    Weight as of 2/8/21: 127.7 kg (281 lb 8 oz).       There are no Patient Instructions on file for this visit.     No follow-ups on file.    Nayeli Barnard MD  [facility]    Kailey Ray is a 51 year old who presents for the following health issues     HPI       ED/UC Followup:    Facility:  [facility]  Date of visit: 2/14/2021  Reason for visit: Vomiting  Current Status: Has been feeling better since ER visit      She had another episode of severe abdomen pain with nausea/vomiting.  She had this in the fall as well.  She had a sandwich with cheese preceding this and then had a fast food hamburger during the episode.  The pain was in the abdomen over on the right side.  The entire episode lasted 4 days off and on.   She is fine now           Review of Systems    ROS: 4 point ROS neg other than the symptoms noted above in the HPI.        Objective           Vitals:  No vitals were obtained today due to virtual visit.    Physical Exam   healthy, alert and no distress  PSYCH: Alert and oriented times 3; coherent speech, normal   rate and volume, able to articulate logical thoughts, able   to abstract reason, no tangential thoughts, no hallucinations   or delusions  Her affect is normal  RESP: No cough, no audible wheezing, able to talk in full sentences  Remainder of exam unable to be completed due " to telephone visits    Office Visit on 02/08/2021   Component Date Value Ref Range Status     Hemoglobin A1C 02/08/2021 7.7* 0 - 5.6 % Final    Comment: Normal <5.7% Prediabetes 5.7-6.4%  Diabetes 6.5% or higher - adopted from ADA   consensus guidelines.       Sed Rate 02/08/2021 48* 0 - 20 mm/h Final     CRP Inflammation 02/08/2021 16.9* 0.0 - 8.0 mg/L Final     CK Total 02/08/2021 40  30 - 225 U/L Final     COVID-19 Virus PCR to [facility]* 02/08/2021 Nasopharyngeal   Final     COVID-19 Virus PCR to [facility] - Re* 02/08/2021 Not Detected   Final    Comment: Collection of multiple specimens from the same patient may be necessary to   detect the virus. The possibility of a false negative should be considered if   the patient's recent exposure or clinical presentation suggests 2019 nCOV   infection and diagnostic tests for other causes of illness are negative.   Repeat testing may be considered in this setting.  Patient sample was heat inactivated and amplified using the HDPCR SARS-CoV-2   assay ([employer] The HDPCRTM SARS-CoV-2 assay is a reverse   transcription real-time polymerase chain reaction (qRT-PCR) test intended for   the qualitative detection of nucleic acid  from SARS-CoV-2 in human nasopharyngeal swabs, oropharyngeal swabs, anterior   nasal swabs, mid-turbinate nasal swabs as well as nasal aspirate, nasal wash,   and bronchoalveolar lavage (BAL) specimens from individuals who are suspected   of COVID-19 by their healthcare provider.  A negative result does not rule out the presence of real-time PCR inhibitors   in the sp                           ecimen or COVID-19 RNA in concentrations below the limit of detection   of the assay. The possibility of a false negative should be considered if the   patients recent exposure or clinical presentation suggests COVID-19.   Additional testing or repeat testing requires consultation with the   laboratory.  Nasopharyngeal specimen is the preferred choice  for swab-based SARS CoV2   testing. When collection of a nasopharyngeal swab is not possible the   following are acceptable alternatives:  an oropharyngeal (OP) specimen collected by a healthcare professional, or a   nasal mid-turbinate (NMT) swab collected by a healthcare professional or by   onsite self-collection (using a flocked tapered swab), or an anterior nares   specimen collected by a healthcare professional or by onsite self-collection   (using a round foam swab). (Centers for Disease Control)  Testing performed by [facility] ([facility]) [address] [place] [facility] [address]  The test performance characteristics were determined by [facility]. It has not been   cleared or approved by the FDA.  The laboratory is regulated under the Clinical Laboratory Improvement   Amendments of 1988 (CLIA-88) as qualified to perform high-complexity testing.   This test is used for clinical purposes. It should not be regarded as   investigational or for research.       Rheumatoid Factor 02/08/2021 <7  <12 IU/mL Final     Cyclic Citrullinated Peptide Antib* 02/08/2021 1  <7 U/mL Final    Negative               Phone call duration: 5 minutes    Stop 11:59 AM

## 2021-02-21 DIAGNOSIS — E11.65 TYPE 2 DIABETES MELLITUS WITH HYPERGLYCEMIA, WITHOUT LONG-TERM CURRENT USE OF INSULIN (H): ICD-10-CM

## 2021-02-22 RX ORDER — GLIMEPIRIDE 1 MG/1
TABLET ORAL
Qty: 30 TABLET | Refills: 0 | Status: SHIPPED | OUTPATIENT
Start: 2021-02-22 | End: 2021-03-22

## 2021-03-09 ENCOUNTER — ANCILLARY PROCEDURE (OUTPATIENT)
Dept: GENERAL RADIOLOGY | Facility: CLINIC | Age: 51
End: 2021-03-09
Attending: INTERNAL MEDICINE
Payer: COMMERCIAL

## 2021-03-09 DIAGNOSIS — M25.50 ARTHRALGIA, UNSPECIFIED JOINT: ICD-10-CM

## 2021-03-09 PROCEDURE — 73130 X-RAY EXAM OF HAND: CPT | Mod: RT | Performed by: RADIOLOGY

## 2021-03-09 NOTE — RESULT ENCOUNTER NOTE
Dear Flor,    Your recent test results are attached.      Normal hand x-ray.    If you have any questions please feel free to contact [phone] or myself via [employer].    Sincerely,  Jayna Hi, CNP

## 2021-03-15 ENCOUNTER — ANCILLARY PROCEDURE (OUTPATIENT)
Dept: NUCLEAR MEDICINE | Facility: CLINIC | Age: 51
End: 2021-03-15
Attending: INTERNAL MEDICINE
Payer: COMMERCIAL

## 2021-03-15 DIAGNOSIS — R10.11 ABDOMINAL PAIN, CHRONIC, RIGHT UPPER QUADRANT: ICD-10-CM

## 2021-03-15 DIAGNOSIS — G89.29 ABDOMINAL PAIN, CHRONIC, RIGHT UPPER QUADRANT: ICD-10-CM

## 2021-03-15 PROCEDURE — 78227 HEPATOBIL SYST IMAGE W/DRUG: CPT

## 2021-03-15 PROCEDURE — A9537 TC99M MEBROFENIN: HCPCS

## 2021-03-15 RX ORDER — KIT FOR THE PREPARATION OF TECHNETIUM TC 99M MEBROFENIN 45 MG/10ML
6 INJECTION, POWDER, LYOPHILIZED, FOR SOLUTION INTRAVENOUS ONCE
Status: COMPLETED | OUTPATIENT
Start: 2021-03-15 | End: 2021-03-15

## 2021-03-15 RX ADMIN — KIT FOR THE PREPARATION OF TECHNETIUM TC 99M MEBROFENIN 6 MILLICURIE: 45 INJECTION, POWDER, LYOPHILIZED, FOR SOLUTION INTRAVENOUS at 08:27

## 2021-03-16 ENCOUNTER — OFFICE VISIT (OUTPATIENT)
Dept: OBGYN | Facility: CLINIC | Age: 51
End: 2021-03-16
Payer: COMMERCIAL

## 2021-03-16 ENCOUNTER — ANCILLARY PROCEDURE (OUTPATIENT)
Dept: MAMMOGRAPHY | Facility: CLINIC | Age: 51
End: 2021-03-16
Payer: COMMERCIAL

## 2021-03-16 VITALS
HEART RATE: 96 BPM | DIASTOLIC BLOOD PRESSURE: 83 MMHG | WEIGHT: 278 LBS | OXYGEN SATURATION: 97 % | BODY MASS INDEX: 46.32 KG/M2 | SYSTOLIC BLOOD PRESSURE: 114 MMHG | HEIGHT: 65 IN

## 2021-03-16 DIAGNOSIS — Z12.31 VISIT FOR SCREENING MAMMOGRAM: ICD-10-CM

## 2021-03-16 DIAGNOSIS — I10 BENIGN ESSENTIAL HYPERTENSION: ICD-10-CM

## 2021-03-16 PROCEDURE — 77067 SCR MAMMO BI INCL CAD: CPT | Mod: TC | Performed by: RADIOLOGY

## 2021-03-16 PROCEDURE — 99396 PREV VISIT EST AGE 40-64: CPT | Performed by: OBSTETRICS & GYNECOLOGY

## 2021-03-16 PROCEDURE — 77063 BREAST TOMOSYNTHESIS BI: CPT | Mod: TC | Performed by: RADIOLOGY

## 2021-03-16 RX ORDER — LISINOPRIL 10 MG/1
TABLET ORAL
Qty: 90 TABLET | Refills: 3 | Status: SHIPPED | OUTPATIENT
Start: 2021-03-16 | End: 2022-01-02

## 2021-03-16 ASSESSMENT — MIFFLIN-ST. JEOR: SCORE: 1883.7

## 2021-03-16 NOTE — PROGRESS NOTES
Flor Fernandez is a 51 year old female , who presents for annual exam.   No unusual bleeding, no incontinence, or unusual discharge.   She has been having some night sweats and hot flashes.   They have been getting better. She has not had bleeding since this past  July.    Last cholesterol:   Recent Labs   Lab Test 20  0708 18  0732   CHOL 115 121   HDL 38* 45*   LDL 48 47   TRIG 147 144     Past Medical History:   Diagnosis Date     Anxiety      Cerebral infarction (H)      Diabetes (H)      Hypertension      Morbid obesity (H)      Tachycardia      Vertigo        Past Surgical History:   Procedure Laterality Date     APPENDECTOMY       CL AFF SURGICAL PATHOLOGY  2016     MAMMOPLASTY REDUCTION BILATERAL         OB History    Para Term  AB Living   2 2 2 0 0 2   SAB TAB Ectopic Multiple Live Births   0 0 0 0 2      # Outcome Date GA Lbr [place]/2nd Weight Sex Delivery Anes PTL Lv   2 Term  40w0d       LAXMI   1 Term  40w0d       LAXMI       Gyn History:  Gynecological History         Patient's last menstrual period was 2020 (approximate).     no STD /no PID/no IUD/abstinence for contraception       history of abnormal pap smear:  no  Last pap:   Lab Results   Component Value Date    PAP NIL 2020           Current Outpatient Medications   Medication Sig Dispense Refill     albuterol (PROVENTIL) (2.5 MG/3ML) 0.083% neb solution Take 1 vial (2.5 mg) by nebulization every 6 hours as needed for shortness of breath / dyspnea or wheezing 25 vial 1     aspirin (ASA) 325 MG EC tablet Take 325 mg by mouth daily       atorvastatin (LIPITOR) 20 MG tablet TAKE ONE TABLET BY MOUTH ONCE DAILY, 90 tablet 4     blood glucose (ONETOUCH VERIO IQ) test strip USE TO TEST BLOOD SUGAR ONCE DAILY OR USE AS DIRECTED 100 each 1     blood glucose calibration (NO BRAND SPECIFIED) solution Use to calibrate blood glucose monitor as needed as directed. 2 each 1     blood glucose  monitoring (NO BRAND SPECIFIED) meter device kit Use to test blood sugar 1 time daily or as directed. 1 kit 0     carvedilol (COREG) 6.25 MG tablet Take 1 tablet (6.25 mg) by mouth 2 times daily (with meals) 180 tablet 11     chlorthalidone (HYGROTON) 25 MG tablet Take 1 tablet (25 mg) by mouth daily 90 tablet 3     cyanocobalamin (VITAMIN  B-12) 1000 MCG tablet Take 1,000 mcg by mouth daily       empagliflozin (JARDIANCE) 25 MG TABS tablet Take 1 tablet (25 mg) by mouth daily 90 tablet 1     glimepiride (AMARYL) 1 MG tablet TAKE 1 TABLET (1 MG) BY MOUTH EVERY MORNING BEFORE BREAKFAST 30 tablet 0     hydrOXYzine (ATARAX) 25 MG tablet TAKE ONE TABLET BY MOUTH EVERY 8 HOURS AS NEEDED FOR ANXIETY 270 tablet 0     insulin glargine (LANTUS SOLOSTAR) 100 UNIT/ML pen Inject 26 Units Subcutaneous daily Ok to substitute Basaglar at same dose and frequency if insurance prefers. (Patient taking differently: Inject 28 Units Subcutaneous daily Ok to substitute Basaglar at same dose and frequency if insurance prefers.) 30 mL 0     insulin pen needle (31G X 5 MM) 31G X 5 MM miscellaneous Use 1 pen needles daily or as directed. 100 each 1     lisinopril (ZESTRIL) 10 MG tablet TAKE ONE TABLET BY MOUTH ONCE DAILY 90 tablet 3     Multiple Vitamins-Minerals (MULTI-VITAMIN GUMMIES) CHEW        ondansetron (ZOFRAN-ODT) 4 MG ODT tab Place 8 mg under the tongue 2 times daily as needed       ONETOUCH DELICA LANCETS 33G MISC USE TO TEST BLOOD SUGARS ONCE DAILY OR AS DIRECTED 100 each 1     sertraline (ZOLOFT) 50 MG tablet Take 1 tablet (50 mg) by mouth daily 90 tablet 4     VITRON-C  MG TABS tablet TAKE ONE TABLET BY MOUTH EVERY OTHER DAY 45 tablet 3       Allergies   Allergen Reactions     Victoza      Constipation, feeling ill       Social History     Socioeconomic History     Marital status: Single     Spouse name: Not on file     Number of children: Not on file     Years of education: Not on file     Highest education level: Not on  "file   Occupational History     Employer: [employer]   Social Needs     Financial resource strain: Not on file     Food insecurity     Worry: Not on file     Inability: Not on file     Transportation needs     Medical: Not on file     Non-medical: Not on file   Tobacco Use     Smoking status: Former Smoker     Quit date: 2014     Years since quittin.9     Smokeless tobacco: Never Used   Substance and Sexual Activity     Alcohol use: No     Drug use: No     Sexual activity: Not Currently     Birth control/protection: Abstinence   Lifestyle     Physical activity     Days per week: Not on file     Minutes per session: Not on file     Stress: Not on file   Relationships     Social connections     Talks on phone: Not on file     Gets together: Not on file     Attends Sikhism service: Not on file     Active member of club or organization: Not on file     Attends meetings of clubs or organizations: Not on file     Relationship status: Not on file     Intimate partner violence     Fear of current or ex partner: Not on file     Emotionally abused: Not on file     Physically abused: Not on file     Forced sexual activity: Not on file   Other Topics Concern     Parent/sibling w/ CABG, MI or angioplasty before 65F 55M? Not Asked   Social History Narrative     Not on file       Family History   Problem Relation Age of Onset     Other Cancer Mother      Asthma Mother      Diabetes Father      Prostate Cancer Paternal Grandfather      Prostate Cancer Other      Glaucoma No family hx of      Macular Degeneration No family hx of          ROS:  All negative except as above.      EXAM:  /83 (BP Location: Right arm, Cuff Size: Adult Large)   Pulse 96   Ht 1.646 m (5' 4.8\")   Wt 126.1 kg (278 lb)   LMP 2020 (Approximate)   SpO2 97%   Breastfeeding No   BMI 46.55 kg/m    General:  WNWD female, NAD  Alert  Oriented x 3  Gait:  Normal  Skin:  Normal skin turgor  Neurologic:  CN grossly intact, good sensation.  "   HEENT:  NC/AT, EOMI  Neck:  No masses palpated, symmetrical, carotids +2/4, no bruits heard  Heart:  RRR  Lungs:  Clear   Breasts:  Symmetrical, no dimpling noted, no masses palpated, no discharge expressed  Abdomen:  Non-tender, non-distended.  Vulva: No external lesions, normal hair distribution, no adenopathy  BUS:  Normal, no masses noted  Urethra:  No hypermobility noted  Urethral meatus:  No masses noted  Vagina: Moist, pink, no abnormal discharge, well rugated, no lesions  Cervix: Smooth, pink, no visible lesions  Uterus: No masses palpated, but difficult to determine size of the uterus due to abdominal girth.   Ovaries: No masses palpated, but difficult due to abdominal girth.   Perianal:  No masses noted.    Extremities:  No clubbing, cyanosis, or edema      ASSESSMENT/PLAN   Annual examination   She had her mammogram today  Schedule the pelvic ultrasound due to difficulty palpating pelvic structures with the bimanual exam.   We discussed HT and the associated risks and benefits and goals and limitations.   With her history of strokes, the HT is contraindicated.  We discussed the alternatives, but at this time she desires not to change medications (Effexor, Clonidine, Paxil).   Low fat diet, weight bearing exercises and self breast exams on a monthly basis have been recommended.  I have discussed with patient the risks, benefits, medications, treatment options and modalities.   I have instructed the patient to call or schedule a follow-up appointment if any problems.

## 2021-03-18 ENCOUNTER — OFFICE VISIT (OUTPATIENT)
Dept: INTERNAL MEDICINE | Facility: CLINIC | Age: 51
End: 2021-03-18
Payer: COMMERCIAL

## 2021-03-18 VITALS
DIASTOLIC BLOOD PRESSURE: 81 MMHG | SYSTOLIC BLOOD PRESSURE: 126 MMHG | WEIGHT: 279 LBS | OXYGEN SATURATION: 98 % | TEMPERATURE: 98.5 F | HEART RATE: 87 BPM | BODY MASS INDEX: 46.72 KG/M2

## 2021-03-18 DIAGNOSIS — E11.65 TYPE 2 DIABETES MELLITUS WITH HYPERGLYCEMIA, WITH LONG-TERM CURRENT USE OF INSULIN (H): Primary | ICD-10-CM

## 2021-03-18 DIAGNOSIS — Z79.4 TYPE 2 DIABETES MELLITUS WITH HYPERGLYCEMIA, WITH LONG-TERM CURRENT USE OF INSULIN (H): Primary | ICD-10-CM

## 2021-03-18 DIAGNOSIS — Z86.73 HISTORY OF STROKE: ICD-10-CM

## 2021-03-18 DIAGNOSIS — J44.9 CHRONIC OBSTRUCTIVE PULMONARY DISEASE, UNSPECIFIED COPD TYPE (H): ICD-10-CM

## 2021-03-18 DIAGNOSIS — Z23 NEED FOR VACCINATION: ICD-10-CM

## 2021-03-18 DIAGNOSIS — R11.2 NAUSEA AND VOMITING, INTRACTABILITY OF VOMITING NOT SPECIFIED, UNSPECIFIED VOMITING TYPE: ICD-10-CM

## 2021-03-18 DIAGNOSIS — I10 HYPERTENSION GOAL BP (BLOOD PRESSURE) < 140/90: ICD-10-CM

## 2021-03-18 DIAGNOSIS — E78.5 HYPERLIPIDEMIA LDL GOAL <100: ICD-10-CM

## 2021-03-18 DIAGNOSIS — Z86.39 HX OF IRON DEFICIENCY: ICD-10-CM

## 2021-03-18 PROCEDURE — 90746 HEPB VACCINE 3 DOSE ADULT IM: CPT | Performed by: INTERNAL MEDICINE

## 2021-03-18 PROCEDURE — 90471 IMMUNIZATION ADMIN: CPT | Performed by: INTERNAL MEDICINE

## 2021-03-18 PROCEDURE — 99214 OFFICE O/P EST MOD 30 MIN: CPT | Mod: 25 | Performed by: INTERNAL MEDICINE

## 2021-03-18 NOTE — PATIENT INSTRUCTIONS
Check A1C in May...       Stop iron supplement    Return to clinic  Fall 2021.     Finish Hep B series in 6 months.

## 2021-03-18 NOTE — PROGRESS NOTES
"    Assessment & Plan     Type 2 diabetes mellitus with hyperglycemia, with long-term current use of insulin (H)  Well controlled since addition of jardiance.   Dose increase upcoming this month.   Tolerating well  - Hemoglobin A1c; Future    Hyperlipidemia LDL goal <100   well controlled.  Recheck FLP in May  - Lipid panel reflex to direct LDL Fasting; Future    Hypertension goal BP (blood pressure) < 140/90  Well controlled     BMI 45.0-49.9, adult (H)  Lost 12# since jardiance  Working on meal prep and exercise to enhance this weight loss effect    Chronic obstructive pulmonary disease, unspecified COPD type (H)  Stopped smoking 10Y ago  She has inhaler for bouts of bronchitis.  Likely had mild COPD, unspecified.      Hx of iron deficiency  Not currently apparent   Will d/c iron .  Observe over time.     Need for vaccination   #2.   Will get #3 in about 6 months  - HEPATITIS B VACCINE,  ADULT  [[id number]]    History of stroke   risk factors controlled.     Nausea and vomiting, intractability of vomiting not specified, unspecified vomiting type  This happened abruptly on a couple occasions  Currently asymptomatic  Watch for sugar free / artificially sweetened products  GB function and imaging normal  If recurs, consider EGD, GI consult and AbdCT imaging.         39 minutes spent on the date of the encounter doing chart review, history and exam, documentation and further activities as noted above       BMI:   Estimated body mass index is 46.72 kg/m  as calculated from the following:    Height as of 3/16/21: 1.646 m (5' 4.8\").    Weight as of this encounter: 126.6 kg (279 lb).   Weight management plan: see A/P    See Patient Instructions    Return in about 6 months (around 9/18/2021) for diabetes follow up.    Gloria Mitchell MD  [facility] AUBRIE Ray is a 51 year old who presents for the following health issues     50 y/o F here to establish care.  H/o obesity, HTN, panic " attacks, iron deficiency, breast reduction, hyperlipidemia, T2DM (insulin, jardiance, glimepiride).  Jardiance added to regimen this past year, Has lost about 12# since.   A1C had improved from 9.x to 7.x    Her jardiance dose will increase sometime this month.    She plans to increase her cardio exercise.   She has a treadmill at home, plans to walk her dog more.   She started a meal delivery plan, too.   Feels she is getting heathier with this as well.     Occasional use of hydroxyzine for sleep, MH is stable.     Two episodes of vomiting and nausea heralded by diarrhea in past 3 months.  Has had (-) GB work up.   No CT imaging nor EGD     Currently asymptomatic  Consider hernia?    HPI     Establish care       Review of Systems   Constitutional, HEENT, cardiovascular, pulmonary, gi and gu systems are negative, except as otherwise noted.      Objective    /81 (BP Location: Right arm, Patient Position: Sitting, Cuff Size: Adult Large)   Pulse 87   Temp 98.5  F (36.9  C) (Oral)   Wt 126.6 kg (279 lb)   SpO2 98%   BMI 46.72 kg/m    Body mass index is 46.72 kg/m .     Physical Exam     GENERAL: healthy, alert, no distress and over weight  EYES: Eyes grossly normal to inspection, PERRL and conjunctivae and sclerae normal  RESP: lungs clear to auscultation - no rales, rhonchi or wheezes  CV: regular rate and rhythm, normal S1 S2, no S3 or S4, no murmur, click or rub, no peripheral edema and peripheral pulses strong  MS: no gross musculoskeletal defects noted, no edema    2/2021 labs all reviewed  Recent GB studies reviewed.

## 2021-03-21 DIAGNOSIS — E11.65 TYPE 2 DIABETES MELLITUS WITH HYPERGLYCEMIA, WITHOUT LONG-TERM CURRENT USE OF INSULIN (H): ICD-10-CM

## 2021-03-22 RX ORDER — GLIMEPIRIDE 1 MG/1
TABLET ORAL
Qty: 990 TABLET | Refills: 1 | Status: SHIPPED | OUTPATIENT
Start: 2021-03-22 | End: 2021-09-22

## 2021-03-29 DIAGNOSIS — E78.5 HYPERLIPIDEMIA LDL GOAL <100: ICD-10-CM

## 2021-03-30 RX ORDER — ATORVASTATIN CALCIUM 20 MG/1
TABLET, FILM COATED ORAL
Qty: 90 TABLET | Refills: 2 | Status: SHIPPED | OUTPATIENT
Start: 2021-03-30 | End: 2021-12-20

## 2021-04-02 ENCOUNTER — IMMUNIZATION (OUTPATIENT)
Dept: NURSING | Facility: CLINIC | Age: 51
End: 2021-04-02
Payer: COMMERCIAL

## 2021-04-02 PROCEDURE — 0001A PR COVID VAC PFIZER DIL RECON 30 MCG/0.3 ML IM: CPT

## 2021-04-02 PROCEDURE — 91300 PR COVID VAC PFIZER DIL RECON 30 MCG/0.3 ML IM: CPT

## 2021-04-11 DIAGNOSIS — I10 BENIGN ESSENTIAL HYPERTENSION: ICD-10-CM

## 2021-04-11 DIAGNOSIS — R00.0 TACHYCARDIA: ICD-10-CM

## 2021-04-13 RX ORDER — CARVEDILOL 6.25 MG/1
TABLET ORAL
Qty: 180 TABLET | Refills: 1 | Status: SHIPPED | OUTPATIENT
Start: 2021-04-13 | End: 2021-10-06

## 2021-04-13 RX ORDER — CHLORTHALIDONE 25 MG/1
TABLET ORAL
Qty: 90 TABLET | Refills: 1 | Status: SHIPPED | OUTPATIENT
Start: 2021-04-13 | End: 2021-10-06

## 2021-04-23 ENCOUNTER — TELEPHONE (OUTPATIENT)
Dept: PHARMACY | Facility: CLINIC | Age: 51
End: 2021-04-23

## 2021-04-23 ENCOUNTER — IMMUNIZATION (OUTPATIENT)
Dept: NURSING | Facility: CLINIC | Age: 51
End: 2021-04-23
Attending: FAMILY MEDICINE
Payer: COMMERCIAL

## 2021-04-23 PROCEDURE — 0002A PR COVID VAC PFIZER DIL RECON 30 MCG/0.3 ML IM: CPT

## 2021-04-23 PROCEDURE — 91300 PR COVID VAC PFIZER DIL RECON 30 MCG/0.3 ML IM: CPT

## 2021-04-23 NOTE — TELEPHONE ENCOUNTER
Called to schedule MTM appt, attempted work number, no answer, attempted mobile number and spoke with Flor's mother who took message for her to schedule MTM appt.    Araceli Santiago, PharmD  Medication Therapy Management Pharmacist  [phone]  [facility]: [phone]

## 2021-05-03 DIAGNOSIS — E11.65 TYPE 2 DIABETES MELLITUS WITH HYPERGLYCEMIA, WITH LONG-TERM CURRENT USE OF INSULIN (H): ICD-10-CM

## 2021-05-03 DIAGNOSIS — Z79.4 TYPE 2 DIABETES MELLITUS WITH HYPERGLYCEMIA, WITH LONG-TERM CURRENT USE OF INSULIN (H): ICD-10-CM

## 2021-05-03 RX ORDER — INSULIN GLARGINE 100 [IU]/ML
INJECTION, SOLUTION SUBCUTANEOUS
Qty: 30 ML | Refills: 0 | Status: SHIPPED | OUTPATIENT
Start: 2021-05-03 | End: 2021-05-19

## 2021-05-11 DIAGNOSIS — Z79.4 TYPE 2 DIABETES MELLITUS WITH HYPERGLYCEMIA, WITH LONG-TERM CURRENT USE OF INSULIN (H): ICD-10-CM

## 2021-05-11 DIAGNOSIS — E11.65 TYPE 2 DIABETES MELLITUS WITH HYPERGLYCEMIA, WITH LONG-TERM CURRENT USE OF INSULIN (H): ICD-10-CM

## 2021-05-11 DIAGNOSIS — E78.5 HYPERLIPIDEMIA LDL GOAL <100: ICD-10-CM

## 2021-05-11 LAB
CHOLEST SERPL-MCNC: 141 MG/DL
HBA1C MFR BLD: 7 % (ref 0–5.6)
HDLC SERPL-MCNC: 51 MG/DL
LDLC SERPL CALC-MCNC: 55 MG/DL
NONHDLC SERPL-MCNC: 90 MG/DL
TRIGL SERPL-MCNC: 175 MG/DL

## 2021-05-11 PROCEDURE — 80061 LIPID PANEL: CPT | Performed by: INTERNAL MEDICINE

## 2021-05-11 PROCEDURE — 83036 HEMOGLOBIN GLYCOSYLATED A1C: CPT | Performed by: INTERNAL MEDICINE

## 2021-05-11 PROCEDURE — 36415 COLL VENOUS BLD VENIPUNCTURE: CPT | Performed by: INTERNAL MEDICINE

## 2021-05-11 NOTE — RESULT ENCOUNTER NOTE
Flor Fernandez    Hgb A1C improved, and the cholesterol looks great.  See you in September.     Sincerely,       EVERT ARNDT M.D.

## 2021-05-13 ENCOUNTER — ANCILLARY PROCEDURE (OUTPATIENT)
Dept: GENERAL RADIOLOGY | Facility: CLINIC | Age: 51
End: 2021-05-13
Attending: PODIATRIST
Payer: COMMERCIAL

## 2021-05-13 ENCOUNTER — OFFICE VISIT (OUTPATIENT)
Dept: PODIATRY | Facility: CLINIC | Age: 51
End: 2021-05-13
Payer: COMMERCIAL

## 2021-05-13 VITALS — SYSTOLIC BLOOD PRESSURE: 113 MMHG | HEART RATE: 90 BPM | DIASTOLIC BLOOD PRESSURE: 70 MMHG

## 2021-05-13 DIAGNOSIS — M79.672 LEFT FOOT PAIN: ICD-10-CM

## 2021-05-13 DIAGNOSIS — E11.65 TYPE 2 DIABETES MELLITUS WITH HYPERGLYCEMIA, WITH LONG-TERM CURRENT USE OF INSULIN (H): ICD-10-CM

## 2021-05-13 DIAGNOSIS — S90.122A TRAUMATIC ECCHYMOSIS OF TOE OF LEFT FOOT, INITIAL ENCOUNTER: Primary | ICD-10-CM

## 2021-05-13 DIAGNOSIS — Z79.4 TYPE 2 DIABETES MELLITUS WITH HYPERGLYCEMIA, WITH LONG-TERM CURRENT USE OF INSULIN (H): ICD-10-CM

## 2021-05-13 PROCEDURE — 73630 X-RAY EXAM OF FOOT: CPT | Mod: LT | Performed by: RADIOLOGY

## 2021-05-13 PROCEDURE — 99203 OFFICE O/P NEW LOW 30 MIN: CPT | Performed by: PODIATRIST

## 2021-05-13 NOTE — PROGRESS NOTES
S:  Patient complains of pain and ecchymosis in the left first toe.  This started last Saturday.  She thinks she may have had some trauma but not sure.  She has diabetes with numbness in her toes.  The pain is minimal now but she is mostly concerned about the ecchymosis.  She denies any weakness.  She denies any increased toe deformity.  She denies erythema.  She does not smoke.  She works at a sitdown job.    ROS:   see above         Allergies   Allergen Reactions     Victoza      Constipation, feeling ill       Current Outpatient Medications   Medication Sig Dispense Refill     albuterol (PROVENTIL) (2.5 MG/3ML) 0.083% neb solution Take 1 vial (2.5 mg) by nebulization every 6 hours as needed for shortness of breath / dyspnea or wheezing 25 vial 1     aspirin (ASA) 325 MG EC tablet Take 325 mg by mouth daily       atorvastatin (LIPITOR) 20 MG tablet TAKE ONE TABLET BY MOUTH ONCE DAILY 90 tablet 2     blood glucose (ONETOUCH VERIO IQ) test strip USE TO TEST BLOOD SUGAR ONCE DAILY OR USE AS DIRECTED 100 each 1     blood glucose calibration (NO BRAND SPECIFIED) solution Use to calibrate blood glucose monitor as needed as directed. 2 each 1     blood glucose monitoring (NO BRAND SPECIFIED) meter device kit Use to test blood sugar 1 time daily or as directed. 1 kit 0     carvedilol (COREG) 6.25 MG tablet TAKE ONE TABLET BY MOUTH TWICE A DAY WITH MEALS 180 tablet 1     chlorthalidone (HYGROTON) 25 MG tablet TAKE ONE TABLET BY MOUTH ONCE DAILY 90 tablet 1     cyanocobalamin (VITAMIN  B-12) 1000 MCG tablet Take 1,000 mcg by mouth daily       empagliflozin (JARDIANCE) 25 MG TABS tablet Take 1 tablet (25 mg) by mouth daily 90 tablet 1     glimepiride (AMARYL) 1 MG tablet TAKE 1 TABLET (1 MG) BY MOUTH EVERY MORNING BEFORE BREAKFAST 990 tablet 1     hydrOXYzine (ATARAX) 25 MG tablet TAKE ONE TABLET BY MOUTH EVERY 8 HOURS AS NEEDED FOR ANXIETY 270 tablet 0     insulin pen needle (31G X 5 MM) 31G X 5 MM miscellaneous Use 1 pen  needles daily or as directed. 100 each 1     LANTUS SOLOSTAR 100 UNIT/ML soln INJECT 26 UNITS SUBCUTANEOUS DAILY 30 mL 0     lisinopril (ZESTRIL) 10 MG tablet TAKE ONE TABLET BY MOUTH ONCE DAILY 90 tablet 3     Multiple Vitamins-Minerals (MULTI-VITAMIN GUMMIES) CHEW        ondansetron (ZOFRAN-ODT) 4 MG ODT tab Place 8 mg under the tongue 2 times daily as needed       ONETOUCH DELICA LANCETS 33G MISC USE TO TEST BLOOD SUGARS ONCE DAILY OR AS DIRECTED 100 each 1     sertraline (ZOLOFT) 50 MG tablet Take 1 tablet (50 mg) by mouth daily 90 tablet 4       Patient Active Problem List   Diagnosis     Vertigo     Hyperlipidemia LDL goal <100     Tachycardia     Hypertension goal BP (blood pressure) < 140/90     History of stroke     Iron deficiency     Anxiety disorder, unspecified type     Chronic low back pain without sciatica, unspecified back pain laterality     BMI 45.0-49.9, adult (H)     Type 2 diabetes mellitus, with long-term current use of insulin (H)     Panic attack     COPD (chronic obstructive pulmonary disease) (H)     Tobacco use disorder       Past Medical History:   Diagnosis Date     Anxiety      Cerebral infarction (H)      Diabetes (H)      Hypertension      Morbid obesity (H)      Tachycardia      Vertigo        Past Surgical History:   Procedure Laterality Date     APPENDECTOMY       CL AFF SURGICAL PATHOLOGY  2016     MAMMOPLASTY REDUCTION BILATERAL         Family History   Problem Relation Age of Onset     Other Cancer Mother      Asthma Mother      Diabetes Father      Prostate Cancer Paternal Grandfather      Prostate Cancer Other      Glaucoma No family hx of      Macular Degeneration No family hx of        Social History     Tobacco Use     Smoking status: Former Smoker     Quit date: 2014     Years since quittin.1     Smokeless tobacco: Never Used   Substance Use Topics     Alcohol use: No         Exam:    Vitals: /70   Pulse 90   BMI: There is no height or weight on file  to calculate BMI.  Height: Data Unavailable    Constitutional/ general:  Pt is in no apparent distress, appears well-nourished.  Cooperative with history and physical exam.     Psych:  The patient answered questions appropriately.  Normal affect.  Seems to have reasonable expectations, in terms of treatment.     Eyes:  Visual scanning/ tracking without deficit.     Ears:  Response to auditory stimuli is normal.   Auricles in proper alignment.     Lymphatic:  Popliteal lymph nodes not enlarged.     Lungs:  Non labored breathing, non labored speech. No cough.  No audible wheezing. Even, quiet breathing.       Vascular:  positive pedal pulses bilaterally for both the DP and PT arteries.  CFT < 3 sec.  positive ankle edema.  positive pedal hair growth.    Neuro:  Alert and oriented x 3. Coordinated gait.  Light touch sensation is intact to the L4, L5, S1 distributions. No obvious deficits.  No evidence of neurological-based weakness, spasticity, or contracture in the lower extremities.  Monofilament intact    Derm: Normal texture and turgor.  No erythema, ecchymosis, or cyanosis.      Musculoskeletal:     Patient is ambulatory without an assistive device or brace.     Normal arch with weightbearing.  No forefoot or rear foot deformities noted.  MS 5/5 all compartments.  Normal ROM all fore foot and rearfoot joints.  No equinus.  left first toe is edematous and painful on palpation.  There is no erythema.  There is no underlying fluctuance.  The extensor and flexor tendons are intact.    Radiographic Exam:  X-Ray Findings: Unremarkable    A:   Left hallux blunt trauma with ecchymosis  Diabetes with peripheral neuropathy    P: Xrays 3-views of affected foot.  Discussed with patient that s no signs of fracture or tendon pathology.  Discussed not infected.  Explained that some type of blunt trauma caused ecchymosis here.  She watches and give this time to heal.  Recommended good house shoes at all times to protect toes in  the future.  She will watch her feet daily.  Return to clinic prn.    Aneesh Bo DPM, FACFAS

## 2021-05-13 NOTE — PATIENT INSTRUCTIONS
We wish you continued good healing. If you have any questions or concerns, please do not hesitate to contact us at [phone]    [facility] (secure e-mail communication and access to your chart) to send a message or to make an appointment.    Please remember to call and schedule a follow up appointment if one was recommended at your earliest convenience.     +++OF MARCH 2020+++ LOCATION AND HOURS HAVE CHANGED    PLEASE CALL CLINICS TO VERIFY DAYS AND TIMES  PODIATRY CLINIC HOURS  TELEPHONE NUMBER    Dr. Aneesh BRITTONPSHERRY [facility]        Clinics:  [place]   Tuesday 1PM-6PM  [place]  Wednesday 745AM-330PM  [place]  Thursday/Friday 745AM-230PM  [place] [facility] APPOINTMENTS  [phone]    [place] APPOINTMENTS  [phone]          If you need a medication refill, please contact us you may need lab work and/or a follow up visit prior to your refill (i.e. Antifungal medications).    If MRI needed please call Imaging at [phone] or [phone]    HOW DO I GET MY KNEE SCOOTER? Knee scooters can be picked up at ANY Medical Supply stores with your knee scooter Prescription.  OR    Bring your signed prescription to an [facility] showroom.

## 2021-05-13 NOTE — LETTER
5/13/2021         RE: Flor Fernandez  [address]  [place] [address]        Dear Colleague,    Thank you for referring your patient, Flor Fernandez, to the [facility]. Please see a copy of my visit note below.    S:  Patient complains of pain and ecchymosis in the left first toe.  This started last Saturday.  She thinks she may have had some trauma but not sure.  She has diabetes with numbness in her toes.  The pain is minimal now but she is mostly concerned about the ecchymosis.  She denies any weakness.  She denies any increased toe deformity.  She denies erythema.  She does not smoke.  She works at a sitdown job.    ROS:   see above         Allergies   Allergen Reactions     Victoza      Constipation, feeling ill       Current Outpatient Medications   Medication Sig Dispense Refill     albuterol (PROVENTIL) (2.5 MG/3ML) 0.083% neb solution Take 1 vial (2.5 mg) by nebulization every 6 hours as needed for shortness of breath / dyspnea or wheezing 25 vial 1     aspirin (ASA) 325 MG EC tablet Take 325 mg by mouth daily       atorvastatin (LIPITOR) 20 MG tablet TAKE ONE TABLET BY MOUTH ONCE DAILY 90 tablet 2     blood glucose (ONETOUCH VERIO IQ) test strip USE TO TEST BLOOD SUGAR ONCE DAILY OR USE AS DIRECTED 100 each 1     blood glucose calibration (NO BRAND SPECIFIED) solution Use to calibrate blood glucose monitor as needed as directed. 2 each 1     blood glucose monitoring (NO BRAND SPECIFIED) meter device kit Use to test blood sugar 1 time daily or as directed. 1 kit 0     carvedilol (COREG) 6.25 MG tablet TAKE ONE TABLET BY MOUTH TWICE A DAY WITH MEALS 180 tablet 1     chlorthalidone (HYGROTON) 25 MG tablet TAKE ONE TABLET BY MOUTH ONCE DAILY 90 tablet 1     cyanocobalamin (VITAMIN  B-12) 1000 MCG tablet Take 1,000 mcg by mouth daily       empagliflozin (JARDIANCE) 25 MG TABS tablet Take 1 tablet (25 mg) by mouth daily 90 tablet 1     glimepiride (AMARYL) 1 MG tablet TAKE 1  TABLET (1 MG) BY MOUTH EVERY MORNING BEFORE BREAKFAST 990 tablet 1     hydrOXYzine (ATARAX) 25 MG tablet TAKE ONE TABLET BY MOUTH EVERY 8 HOURS AS NEEDED FOR ANXIETY 270 tablet 0     insulin pen needle (31G X 5 MM) 31G X 5 MM miscellaneous Use 1 pen needles daily or as directed. 100 each 1     LANTUS SOLOSTAR 100 UNIT/ML soln INJECT 26 UNITS SUBCUTANEOUS DAILY 30 mL 0     lisinopril (ZESTRIL) 10 MG tablet TAKE ONE TABLET BY MOUTH ONCE DAILY 90 tablet 3     Multiple Vitamins-Minerals (MULTI-VITAMIN GUMMIES) CHEW        ondansetron (ZOFRAN-ODT) 4 MG ODT tab Place 8 mg under the tongue 2 times daily as needed       ONETOUCH DELICA LANCETS 33G MISC USE TO TEST BLOOD SUGARS ONCE DAILY OR AS DIRECTED 100 each 1     sertraline (ZOLOFT) 50 MG tablet Take 1 tablet (50 mg) by mouth daily 90 tablet 4       Patient Active Problem List   Diagnosis     Vertigo     Hyperlipidemia LDL goal <100     Tachycardia     Hypertension goal BP (blood pressure) < 140/90     History of stroke     Iron deficiency     Anxiety disorder, unspecified type     Chronic low back pain without sciatica, unspecified back pain laterality     BMI 45.0-49.9, adult (H)     Type 2 diabetes mellitus, with long-term current use of insulin (H)     Panic attack     COPD (chronic obstructive pulmonary disease) (H)     Tobacco use disorder       Past Medical History:   Diagnosis Date     Anxiety      Cerebral infarction (H)      Diabetes (H)      Hypertension      Morbid obesity (H)      Tachycardia      Vertigo        Past Surgical History:   Procedure Laterality Date     APPENDECTOMY       [facility] SURGICAL PATHOLOGY  12/29/2016     MAMMOPLASTY REDUCTION BILATERAL         Family History   Problem Relation Age of Onset     Other Cancer Mother      Asthma Mother      Diabetes Father      Prostate Cancer Paternal Grandfather      Prostate Cancer Other      Glaucoma No family hx of      Macular Degeneration No family hx of        Social History     Tobacco Use      Smoking status: Former Smoker     Quit date: 2014     Years since quittin.1     Smokeless tobacco: Never Used   Substance Use Topics     Alcohol use: No         Exam:    Vitals: /70   Pulse 90   BMI: There is no height or weight on file to calculate BMI.  Height: Data Unavailable    Constitutional/ general:  Pt is in no apparent distress, appears well-nourished.  Cooperative with history and physical exam.     Psych:  The patient answered questions appropriately.  Normal affect.  Seems to have reasonable expectations, in terms of treatment.     Eyes:  Visual scanning/ tracking without deficit.     Ears:  Response to auditory stimuli is normal.   Auricles in proper alignment.     Lymphatic:  Popliteal lymph nodes not enlarged.     Lungs:  Non labored breathing, non labored speech. No cough.  No audible wheezing. Even, quiet breathing.       Vascular:  positive pedal pulses bilaterally for both the DP and PT arteries.  CFT < 3 sec.  positive ankle edema.  positive pedal hair growth.    Neuro:  Alert and oriented x 3. Coordinated gait.  Light touch sensation is intact to the L4, L5, S1 distributions. No obvious deficits.  No evidence of neurological-based weakness, spasticity, or contracture in the lower extremities.  Monofilament intact    Derm: Normal texture and turgor.  No erythema, ecchymosis, or cyanosis.      Musculoskeletal:     Patient is ambulatory without an assistive device or brace.     Normal arch with weightbearing.  No forefoot or rear foot deformities noted.  MS 5/5 all compartments.  Normal ROM all fore foot and rearfoot joints.  No equinus.  left first toe is edematous and painful on palpation.  There is no erythema.  There is no underlying fluctuance.  The extensor and flexor tendons are intact.    Radiographic Exam:  X-Ray Findings: Unremarkable    A:   Left hallux blunt trauma with ecchymosis  Diabetes with peripheral neuropathy    P: Xrays 3-views of affected foot.  Discussed with  patient that s no signs of fracture or tendon pathology.  Discussed not infected.  Explained that some type of blunt trauma caused ecchymosis here.  She watches and give this time to heal.  Recommended good house shoes at all times to protect toes in the future.  She will watch her feet daily.  Return to clinic prn.    Aneesh Bo DPM, FACFAS             Again, thank you for allowing me to participate in the care of your patient.        Sincerely,        Aneesh Bo DPM

## 2021-05-19 ENCOUNTER — VIRTUAL VISIT (OUTPATIENT)
Dept: PHARMACY | Facility: CLINIC | Age: 51
End: 2021-05-19
Payer: COMMERCIAL

## 2021-05-19 DIAGNOSIS — Z79.4 TYPE 2 DIABETES MELLITUS WITH HYPERGLYCEMIA, WITH LONG-TERM CURRENT USE OF INSULIN (H): ICD-10-CM

## 2021-05-19 DIAGNOSIS — I10 BENIGN ESSENTIAL HYPERTENSION: ICD-10-CM

## 2021-05-19 DIAGNOSIS — Z86.73 HISTORY OF STROKE: ICD-10-CM

## 2021-05-19 DIAGNOSIS — E11.65 TYPE 2 DIABETES MELLITUS WITH HYPERGLYCEMIA, WITH LONG-TERM CURRENT USE OF INSULIN (H): ICD-10-CM

## 2021-05-19 DIAGNOSIS — E78.5 HYPERLIPIDEMIA LDL GOAL <100: Primary | ICD-10-CM

## 2021-05-19 DIAGNOSIS — Z78.9 TAKES DIETARY SUPPLEMENTS: ICD-10-CM

## 2021-05-19 DIAGNOSIS — F41.9 ANXIETY DISORDER, UNSPECIFIED TYPE: ICD-10-CM

## 2021-05-19 PROCEDURE — 99607 MTMS BY PHARM ADDL 15 MIN: CPT | Performed by: PHARMACIST

## 2021-05-19 PROCEDURE — 99606 MTMS BY PHARM EST 15 MIN: CPT | Performed by: PHARMACIST

## 2021-05-19 NOTE — PROGRESS NOTES
Medication Therapy Management (MTM) Encounter    ASSESSMENT:                            Medication Adherence/Access: No issues identified    Nausea/vomiting: Improving.    Diabetes: A1c at goal of <7%. May benefit from maintaining current regimen with recheck of A1C in 3 months.   Due to possible gastroparesis, will avoid GLP-1 agonists, DPP4 inhibitors.   Plans to see Dr. Mitchell in September.     Hyperlipidemia: Stable.    Hx of stroke/Hypertension: Stable. BP is at goal of <140/90.     Anxiety: Stable.    Supplements: Stable.    PLAN:                            1. Continue current medications.    2. Recheck A1C in 3 months.     Follow-up: 3 months    SUBJECTIVE/OBJECTIVE:                          Flor Fernandez is a 51 year old female called for a follow-up visit. She was referred to me from Nayeli Barnard, her PCP is Gloria Mitchell.  Today's visit is a follow-up MTM visit from 2/15/21.     Reason for visit: med review.    Allergies/ADRs: Reviewed in chart  Tobacco: She reports that she quit smoking about 7 years ago. She has never used smokeless tobacco.  Alcohol: not currently using  Caffeine: 1 soda/week, stopped drinking coffee  Past Medical History: fatty liver    Medication Adherence/Access: Patient takes medications 1 time(s) per day, most medications are taken at night. Takes carvedilol twice daily.    Nausea/vomiting:    Not using Ondansetron ODT 8 mg twice daily PRN, however prefers to have on hand    Had been in the ED in the past for nausea, ED doc also mentioned her symptoms might be related to gastroparesis.     Type 2 Diabetes:    Jardiance 25 daily   Lantus 28 units at bedtime  glimepiride 1 mg daily    Denies side effects.   Medication history:   Metformin caused her to feel sick and not eat while taking 500 mg dose.   Victoza caused constipation.   Januvia possibly contributed to nausea/upper abdominal pain, also has fatty liver.  Blood sugar monitorin-1 time(s) daily using One Touch  Dg. Ranges (patient reported):   FB-140  Eye exam: up to date  Diet/Exercise: Eats small meals, more than 3x/day. Breakfast, snack, lunch, dinner. Depends on the day, sometimes snacks more throughout the day, trying to due veggies and fruit  Aspirin: Taking 325mg daily (stroke hx) and denies side effects   Statin: Yes: atorvastatin   ACEi/ARB: Yes: lisinopril.   Urine Albumin:   Lab Results   Component Value Date    UMALCR 8.45 2020     Lab Results   Component Value Date    A1C 7.0 2021    A1C 7.7 2021    A1C 9.1 2020    A1C 8.9 2020    A1C 6.8 2020     Hyperlipidemia:  Atorvastatin 20 mg once daily    No issues since restarting atorvastatin.  Pt reports no side effects  Recent Labs   Lab Test 21  0706 20  0708   CHOL 141 115   HDL 51 38*   LDL 55 48   TRIG 175* 147     Hx of stroke/Hypertension:   Chlorthalidone 25 mg daily (am)  Carvedilol 6.25 mg twice daily with meals  Lisinopril 10 mg daily   Aspirin 325 mg daily    History of 2 strokes.   Patient does not self-monitor blood pressure, but did recently buy a blood pressure cuff, it's been really good wherever she goes. Patient reports dry cough every so often, hasn't been around for a while now. Reports no other side effect.  BP Readings from Last 3 Encounters:   21 113/70   21 126/81   21 114/83     Anxiety:   Hydroxyzine 25 mg every 8 hours PRN (pm)  Sertraline 50 mg daily (pm)    Reports these are effective, usually needs hydroxyzine more frequently in evenings and at night.    Supplements:   MVI gummies daily  Vitamin B-12 1000 mcg daily    Dr. Mitchell had taken her off iron. States this/these are effective. Denies side effects.   Hemoglobin   Date Value Ref Range Status   2020 16.2 (H) 11.7 - 15.7 g/dL Final   2019 13.6 11.7 - 15.7 g/dL Final     Ferritin   Date Value Ref Range Status   2021 140 8 - 252 ng/mL Final     Iron   Date Value Ref Range Status    01/04/2021 65 35 - 180 ug/dL Final     Iron Binding Cap   Date Value Ref Range Status   01/04/2021 365 240 - 430 ug/dL Final       Today's Vitals: There were no vitals taken for this visit.  ----------------      I spent 16 minutes with this patient today. All changes were made via collaborative practice agreement with Gloria Mitchell A copy of the visit note was provided to the patient's primary care provider.    The patient was sent via [employer] a summary of these recommendations.     Araceli Santiago, PharmD  Medication Therapy Management Pharmacist  [phone]    Telemedicine Visit Details  Type of service:  Telephone visit  Start Time: 1:02 PM  End Time: 1:18 PM  Originating Location (patient location): Home  Distant Location (provider location):  [facility]      Medication Therapy Recommendations  Type 2 diabetes mellitus, with long-term current use of insulin (H)    Current Medication: insulin glargine (LANTUS SOLOSTAR) 100 UNIT/ML pen   Rationale: Medication requires monitoring - Needs additional monitoring   Recommendation: Order Lab - a1c   Status: Accepted per CPA

## 2021-05-19 NOTE — Clinical Note
JELLY RAY note, thanks!    Araceli Santiago, PharmD  Medication Therapy Management Pharmacist  [phone]

## 2021-05-19 NOTE — PATIENT INSTRUCTIONS
Recommendations from today's MTM visit:                                                       1. Continue current medications.    2. Recheck A1C in 3 months.     Follow-up: Return in about 3 months (around 8/19/2021) for Medication Therapy Management.    It was great to speak with you today.  I value your experience and would be very thankful for your time with providing feedback on our clinic survey. You may receive a survey via email or text message in the next few days.     To schedule another MTM appointment, please call the clinic directly or you may call the MTM scheduling line at [phone] or toll-free at [phone].     My Clinical Pharmacist's contact information:                                                      Please feel free to contact me with any questions or concerns you have.      Araceli Santiago, PharmD  Medication Therapy Management Pharmacist  [phone]

## 2021-05-20 DIAGNOSIS — F41.0 PANIC ATTACK: ICD-10-CM

## 2021-05-31 DIAGNOSIS — F41.0 PANIC ATTACK: ICD-10-CM

## 2021-06-01 RX ORDER — HYDROXYZINE HYDROCHLORIDE 25 MG/1
TABLET, FILM COATED ORAL
Qty: 270 TABLET | Refills: 0 | Status: SHIPPED | OUTPATIENT
Start: 2021-06-01 | End: 2021-08-11

## 2021-08-10 DIAGNOSIS — F41.0 PANIC ATTACK: ICD-10-CM

## 2021-08-11 RX ORDER — HYDROXYZINE HYDROCHLORIDE 25 MG/1
TABLET, FILM COATED ORAL
Qty: 270 TABLET | Refills: 0 | Status: SHIPPED | OUTPATIENT
Start: 2021-08-11 | End: 2021-08-26

## 2021-08-18 ENCOUNTER — VIRTUAL VISIT (OUTPATIENT)
Dept: PHARMACY | Facility: CLINIC | Age: 51
End: 2021-08-18
Payer: COMMERCIAL

## 2021-08-18 ENCOUNTER — LAB (OUTPATIENT)
Dept: LAB | Facility: CLINIC | Age: 51
End: 2021-08-18
Payer: COMMERCIAL

## 2021-08-18 DIAGNOSIS — Z79.4 TYPE 2 DIABETES MELLITUS WITH HYPERGLYCEMIA, WITH LONG-TERM CURRENT USE OF INSULIN (H): ICD-10-CM

## 2021-08-18 DIAGNOSIS — E11.65 TYPE 2 DIABETES MELLITUS WITH HYPERGLYCEMIA, WITH LONG-TERM CURRENT USE OF INSULIN (H): ICD-10-CM

## 2021-08-18 DIAGNOSIS — E11.65 TYPE 2 DIABETES MELLITUS WITH HYPERGLYCEMIA, WITH LONG-TERM CURRENT USE OF INSULIN (H): Primary | ICD-10-CM

## 2021-08-18 DIAGNOSIS — Z79.4 TYPE 2 DIABETES MELLITUS WITH HYPERGLYCEMIA, WITH LONG-TERM CURRENT USE OF INSULIN (H): Primary | ICD-10-CM

## 2021-08-18 DIAGNOSIS — I10 BENIGN ESSENTIAL HYPERTENSION: ICD-10-CM

## 2021-08-18 DIAGNOSIS — E78.5 HYPERLIPIDEMIA LDL GOAL <100: ICD-10-CM

## 2021-08-18 DIAGNOSIS — F41.9 ANXIETY DISORDER, UNSPECIFIED TYPE: ICD-10-CM

## 2021-08-18 DIAGNOSIS — Z86.73 HISTORY OF STROKE: ICD-10-CM

## 2021-08-18 DIAGNOSIS — Z78.9 TAKES DIETARY SUPPLEMENTS: ICD-10-CM

## 2021-08-18 LAB — HBA1C MFR BLD: 6.8 % (ref 0–5.6)

## 2021-08-18 PROCEDURE — 36415 COLL VENOUS BLD VENIPUNCTURE: CPT

## 2021-08-18 PROCEDURE — 83036 HEMOGLOBIN GLYCOSYLATED A1C: CPT

## 2021-08-18 PROCEDURE — 99606 MTMS BY PHARM EST 15 MIN: CPT | Performed by: PHARMACIST

## 2021-08-18 NOTE — PROGRESS NOTES
Medication Therapy Management (MTM) Encounter    ASSESSMENT:                            Medication Adherence/Access: No issues identified    Diabetes: A1c at goal of <7%.   Due to possible gastroparesis, will avoid GLP-1 agonists, DPP4 inhibitors.   Plans to see Dr. Mitchell in September.     Hyperlipidemia: Stable.    Hx of stroke/Hypertension: Stable. BP is at goal of <140/90.     Anxiety: Stable.    Supplements: Stable.    PLAN:                            1. Continue current medications.    Follow-up: Return in about 6 months (around 2022) for Medication Therapy Management.      SUBJECTIVE/OBJECTIVE:                          Flor Fernandez is a 51 year old female called for a follow-up visit. She was referred to me from Nayeli Barnard, her PCP is Gloria Mitchell.  Today's visit is a follow-up MTM visit from 21.     Reason for visit: med review.    Allergies/ADRs: Reviewed in chart  Past Medical History: Reviewed in chart  Tobacco: She reports that she quit smoking about 7 years ago. She has never used smokeless tobacco.  Alcohol: not currently using  Caffeine: 1 soda/week, stopped drinking coffee  Past Medical History: fatty liver    Medication Adherence/Access: Patient takes medications 1 time(s) per day, most medications are taken at night. Takes carvedilol twice daily.    Type 2 Diabetes:    Jardiance 25 daily   Lantus 28 units at bedtime  glimepiride 1 mg daily    Denies side effects. Still gets occassional nausea here and there, thinks it might be due to dehydration. In the past an ED physician made mention of possible gastroparesis.   Medication history:   Metformin caused her to feel sick and not eat while taking 500 mg dose.   Victoza caused constipation.   Januvia possibly contributed to nausea/upper abdominal pain, patient also has fatty liver.  Blood sugar monitorin-1 time(s) daily using One Touch Verio. Ranges (patient reported):   FB-140  Diet/Exercise: Eats small meals, more  than 3x/day. Breakfast, snack, lunch, dinner. Depends on the day, sometimes snacks more throughout the day, trying to due veggies and fruit  Aspirin: Taking 325mg daily (stroke hx) and denies side effects   Statin: Yes: atorvastatin   ACEi/ARB: Yes: lisinopril.   Urine Albumin:   Lab Results   Component Value Date    UMALCR 8.45 08/19/2020     Lab Results   Component Value Date    A1C 6.8 08/18/2021    A1C 7.0 05/11/2021    A1C 7.7 02/08/2021    A1C 9.1 11/09/2020    A1C 8.9 08/18/2020    A1C 6.8 01/14/2020     Hyperlipidemia:  Atorvastatin 20 mg once daily    Pt reports no side effects  Recent Labs   Lab Test 05/11/21  0706 01/14/20  0708   CHOL 141 115   HDL 51 38*   LDL 55 48   TRIG 175* 147     Hx of stroke/Hypertension:   Chlorthalidone 25 mg daily (am)  Carvedilol 6.25 mg twice daily with meals  Lisinopril 10 mg daily   Aspirin 325 mg daily    History of 2 strokes.   Patient does not self-monitor blood pressure, but hs a cuff at home. Patient reports dry cough every so often, hasn't been around for a while now. Reports no other side effect.  BP Readings from Last 3 Encounters:   05/13/21 113/70   03/18/21 126/81   03/16/21 114/83     Anxiety:   Hydroxyzine 25 mg every 8 hours PRN (pm)  Sertraline 50 mg daily (pm)    Reports these are effective, usually needs hydroxyzine more frequently in evenings and at night.    Supplements:   MVI gummies daily  Vitamin B-12 1000 mcg daily    Dr. Mitchell had taken her off iron. States this/these are effective. Denies side effects.   Hemoglobin   Date Value Ref Range Status   11/09/2020 16.2 (H) 11.7 - 15.7 g/dL Final   04/22/2019 13.6 11.7 - 15.7 g/dL Final     Ferritin   Date Value Ref Range Status   01/04/2021 140 8 - 252 ng/mL Final     Iron   Date Value Ref Range Status   01/04/2021 65 35 - 180 ug/dL Final     Iron Binding Cap   Date Value Ref Range Status   01/04/2021 365 240 - 430 ug/dL Final     Today's Vitals: There were no vitals taken for this  visit.  ----------------      I spent 6 minutes with this patient today. All changes were made via collaborative practice agreement with Gloria Mitchell MD. A copy of the visit note was provided to the patient's primary care provider.    The patient was sent via [email or web address] a summary of these recommendations.     Araceli Santiago, PharmD  Medication Therapy Management Pharmacist  [phone]    Telemedicine Visit Details  Type of service:  Telephone visit  Start Time: 1:02 PM  End Time: 1:08 PM  Originating Location (patient location): [place]  Distant Location (provider location):  [facility]     Medication Therapy Recommendations  No medication therapy recommendations to display

## 2021-08-18 NOTE — PATIENT INSTRUCTIONS
Recommendations from today's MTM visit:                                                       1. Continue current medications.    Follow-up: Return in about 6 months (around 2/18/2022) for Medication Therapy Management.    It was great to speak with you today.  I value your experience and would be very thankful for your time with providing feedback on our clinic survey. You may receive a survey via email or text message in the next few days.     To schedule another MTM appointment, please call the clinic directly or you may call the MTM scheduling line at [phone] or toll-free at [phone].     My Clinical Pharmacist's contact information:                                                      Please feel free to contact me with any questions or concerns you have.      Araceli Santiago, PharmD  Medication Therapy Management Pharmacist  [phone]

## 2021-08-18 NOTE — Clinical Note
JELLY RAY note, thanks!    Araceli Santiago, PharmD  Medication Therapy Management Pharmacist  [phone]

## 2021-08-18 NOTE — RESULT ENCOUNTER NOTE
Flor Fernandez    HgbA1C is at goal:  Excellent control of blood sugars, great job!    Sincerely,       EVERT ARNDT M.D.

## 2021-08-20 DIAGNOSIS — E61.1 IRON DEFICIENCY: ICD-10-CM

## 2021-08-20 NOTE — TELEPHONE ENCOUNTER
Please clarify if patient is requesting to re-start this medication or if this is a pharmacy error (automated request).    VITRON-C  MG TABS tablet was discontinued on 03/18/2021 by Dr. Mitchell. Reason: trial off iron, therapy completed.

## 2021-08-22 RX ORDER — BACILLUS COAGULANS 1B CELL
CAPSULE ORAL
Qty: 45 TABLET | Refills: 3 | OUTPATIENT
Start: 2021-08-22

## 2021-08-25 DIAGNOSIS — F41.0 PANIC ATTACK: ICD-10-CM

## 2021-08-26 RX ORDER — HYDROXYZINE HYDROCHLORIDE 25 MG/1
TABLET, FILM COATED ORAL
Qty: 270 TABLET | Refills: 1 | Status: SHIPPED | OUTPATIENT
Start: 2021-08-26 | End: 2022-03-22

## 2021-09-22 DIAGNOSIS — E11.65 TYPE 2 DIABETES MELLITUS WITH HYPERGLYCEMIA, WITHOUT LONG-TERM CURRENT USE OF INSULIN (H): ICD-10-CM

## 2021-09-22 RX ORDER — GLIMEPIRIDE 1 MG/1
TABLET ORAL
Qty: 90 TABLET | Refills: 3 | Status: SHIPPED | OUTPATIENT
Start: 2021-09-22 | End: 2022-09-09

## 2021-09-26 ENCOUNTER — HEALTH MAINTENANCE LETTER (OUTPATIENT)
Age: 51
End: 2021-09-26

## 2021-10-11 DIAGNOSIS — R00.0 TACHYCARDIA: ICD-10-CM

## 2021-10-11 DIAGNOSIS — I10 BENIGN ESSENTIAL HYPERTENSION: ICD-10-CM

## 2021-10-12 RX ORDER — CARVEDILOL 6.25 MG/1
TABLET ORAL
Qty: 180 TABLET | Refills: 1 | Status: SHIPPED | OUTPATIENT
Start: 2021-10-12 | End: 2022-04-05

## 2021-10-12 RX ORDER — CHLORTHALIDONE 25 MG/1
TABLET ORAL
Qty: 90 TABLET | Refills: 1 | Status: SHIPPED | OUTPATIENT
Start: 2021-10-12 | End: 2022-04-05

## 2021-10-17 DIAGNOSIS — E11.65 TYPE 2 DIABETES MELLITUS WITH HYPERGLYCEMIA, WITH LONG-TERM CURRENT USE OF INSULIN (H): ICD-10-CM

## 2021-10-17 DIAGNOSIS — Z79.4 TYPE 2 DIABETES MELLITUS WITH HYPERGLYCEMIA, WITH LONG-TERM CURRENT USE OF INSULIN (H): ICD-10-CM

## 2021-10-18 RX ORDER — INSULIN GLARGINE 100 [IU]/ML
INJECTION, SOLUTION SUBCUTANEOUS
Qty: 30 ML | Refills: 4 | Status: SHIPPED | OUTPATIENT
Start: 2021-10-18 | End: 2022-08-29

## 2021-11-21 ENCOUNTER — HEALTH MAINTENANCE LETTER (OUTPATIENT)
Age: 51
End: 2021-11-21

## 2021-11-29 ENCOUNTER — OFFICE VISIT (OUTPATIENT)
Dept: INTERNAL MEDICINE | Facility: CLINIC | Age: 51
End: 2021-11-29
Payer: COMMERCIAL

## 2021-11-29 ENCOUNTER — TELEPHONE (OUTPATIENT)
Dept: LAB | Facility: CLINIC | Age: 51
End: 2021-11-29

## 2021-11-29 VITALS
BODY MASS INDEX: 47.72 KG/M2 | HEART RATE: 93 BPM | DIASTOLIC BLOOD PRESSURE: 79 MMHG | TEMPERATURE: 98 F | OXYGEN SATURATION: 95 % | WEIGHT: 285 LBS | SYSTOLIC BLOOD PRESSURE: 123 MMHG

## 2021-11-29 DIAGNOSIS — I10 HYPERTENSION GOAL BP (BLOOD PRESSURE) < 140/90: ICD-10-CM

## 2021-11-29 DIAGNOSIS — Z23 HIGH PRIORITY FOR 2019-NCOV VACCINE: ICD-10-CM

## 2021-11-29 DIAGNOSIS — E11.65 TYPE 2 DIABETES MELLITUS WITH HYPERGLYCEMIA, WITH LONG-TERM CURRENT USE OF INSULIN (H): Primary | ICD-10-CM

## 2021-11-29 DIAGNOSIS — Z79.4 TYPE 2 DIABETES MELLITUS WITH HYPERGLYCEMIA, WITH LONG-TERM CURRENT USE OF INSULIN (H): Primary | ICD-10-CM

## 2021-11-29 DIAGNOSIS — Z86.39 HX OF IRON DEFICIENCY: ICD-10-CM

## 2021-11-29 DIAGNOSIS — Z12.31 ENCOUNTER FOR SCREENING MAMMOGRAM FOR BREAST CANCER: ICD-10-CM

## 2021-11-29 DIAGNOSIS — J44.9 CHRONIC OBSTRUCTIVE PULMONARY DISEASE, UNSPECIFIED COPD TYPE (H): ICD-10-CM

## 2021-11-29 LAB
ANION GAP SERPL CALCULATED.3IONS-SCNC: 5 MMOL/L (ref 3–14)
BUN SERPL-MCNC: 14 MG/DL (ref 7–30)
CALCIUM SERPL-MCNC: 9.9 MG/DL (ref 8.5–10.1)
CHLORIDE BLD-SCNC: 103 MMOL/L (ref 94–109)
CO2 SERPL-SCNC: 28 MMOL/L (ref 20–32)
CREAT SERPL-MCNC: 0.57 MG/DL (ref 0.52–1.04)
CREAT UR-MCNC: 70 MG/DL
ERYTHROCYTE [DISTWIDTH] IN BLOOD BY AUTOMATED COUNT: 14 % (ref 10–15)
GFR SERPL CREATININE-BSD FRML MDRD: >90 ML/MIN/1.73M2
GLUCOSE BLD-MCNC: 125 MG/DL (ref 70–99)
HBA1C MFR BLD: 6.9 % (ref 0–5.6)
HCT VFR BLD AUTO: 48.1 % (ref 35–47)
HGB BLD-MCNC: 15.5 G/DL (ref 11.7–15.7)
MCH RBC QN AUTO: 28.8 PG (ref 26.5–33)
MCHC RBC AUTO-ENTMCNC: 32.2 G/DL (ref 31.5–36.5)
MCV RBC AUTO: 89 FL (ref 78–100)
MICROALBUMIN UR-MCNC: 9 MG/L
MICROALBUMIN/CREAT UR: 12.86 MG/G CR (ref 0–25)
PLATELET # BLD AUTO: 402 10E3/UL (ref 150–450)
POTASSIUM BLD-SCNC: 3.5 MMOL/L (ref 3.4–5.3)
RBC # BLD AUTO: 5.38 10E6/UL (ref 3.8–5.2)
SODIUM SERPL-SCNC: 136 MMOL/L (ref 133–144)
WBC # BLD AUTO: 11.6 10E3/UL (ref 4–11)

## 2021-11-29 PROCEDURE — 91300 COVID-19,PF,PFIZER (12+ YRS): CPT | Performed by: INTERNAL MEDICINE

## 2021-11-29 PROCEDURE — 36415 COLL VENOUS BLD VENIPUNCTURE: CPT | Performed by: INTERNAL MEDICINE

## 2021-11-29 PROCEDURE — 84305 ASSAY OF SOMATOMEDIN: CPT | Performed by: INTERNAL MEDICINE

## 2021-11-29 PROCEDURE — 0004A COVID-19,PF,PFIZER (12+ YRS): CPT | Performed by: INTERNAL MEDICINE

## 2021-11-29 PROCEDURE — 82043 UR ALBUMIN QUANTITATIVE: CPT | Performed by: INTERNAL MEDICINE

## 2021-11-29 PROCEDURE — 85027 COMPLETE CBC AUTOMATED: CPT | Performed by: INTERNAL MEDICINE

## 2021-11-29 PROCEDURE — 80048 BASIC METABOLIC PNL TOTAL CA: CPT | Performed by: INTERNAL MEDICINE

## 2021-11-29 PROCEDURE — 83036 HEMOGLOBIN GLYCOSYLATED A1C: CPT | Performed by: INTERNAL MEDICINE

## 2021-11-29 PROCEDURE — 99214 OFFICE O/P EST MOD 30 MIN: CPT | Performed by: INTERNAL MEDICINE

## 2021-11-29 RX ORDER — PROCHLORPERAZINE 25 MG/1
SUPPOSITORY RECTAL
Qty: 9 EACH | Refills: 4 | Status: SHIPPED | OUTPATIENT
Start: 2021-11-29 | End: 2021-11-29

## 2021-11-29 RX ORDER — FLASH GLUCOSE SENSOR
KIT MISCELLANEOUS
Qty: 6 EACH | Refills: 11 | Status: SHIPPED | OUTPATIENT
Start: 2021-11-29 | End: 2023-01-27

## 2021-11-29 RX ORDER — LACTOBACILLUS RHAMNOSUS GG 10B CELL
1 CAPSULE ORAL 2 TIMES DAILY
COMMUNITY

## 2021-11-29 RX ORDER — PROCHLORPERAZINE 25 MG/1
SUPPOSITORY RECTAL
Qty: 1 EACH | Refills: 0 | Status: SHIPPED | OUTPATIENT
Start: 2021-11-29 | End: 2021-11-29

## 2021-11-29 RX ORDER — FLASH GLUCOSE SCANNING READER
EACH MISCELLANEOUS
Qty: 1 EACH | Refills: 0 | Status: SHIPPED | OUTPATIENT
Start: 2021-11-29 | End: 2021-12-26

## 2021-11-29 RX ORDER — PROCHLORPERAZINE 25 MG/1
SUPPOSITORY RECTAL
Qty: 1 EACH | Refills: 3 | Status: SHIPPED | OUTPATIENT
Start: 2021-11-29 | End: 2021-11-29

## 2021-11-29 NOTE — PROGRESS NOTES
"  Assessment & Plan     Type 2 diabetes mellitus with hyperglycemia, with long-term current use of insulin (H)  Generally well controlled. Is on 3 meds.   Would like CGM to help with behaviours and monitoring.     - Albumin Random Urine Quantitative with Creat Ratio; Future  - Hemoglobin A1c; Future  - Basic metabolic panel; Future  -   FREESTYLE RUBÉN - AMB Adult Diabetes Educator Referral; Future  - Basic metabolic panel  - Hemoglobin A1c  - Albumin Random Urine Quantitative with Creat Ratio    Hypertension goal BP (blood pressure) < 140/90  Controlled.  Continue current management   - Basic metabolic panel; Future  - Basic metabolic panel    BMI 45.0-49.9, adult (H)   diets and meds have failed.   Will rule out rare endocrine anomolies (see orders below)  Referral to WtMgmt/Bariatric.   - Insulin Growth Factor 1 by Immunoassay; Future  - Comprehensive Weight Management; Future  - Cortisol Cortisone Free 24 Hour Urine; Future  - Cortisol Cortisone Free 24 Hour Urine  - Insulin Growth Factor 1 by Immunoassay    Chronic obstructive pulmonary disease, unspecified COPD type (H)      Hx of iron deficiency   recheck blood count   - CBC with platelets; Future  - CBC with platelets    Encounter for screening mammogram for breast cancer   due soon:   - *MA Screening Digital Bilateral; Future    High priority for 2019-nCoV vaccine   done today (booster)   - COVID-19,PF,PFIZER (12+ Yrs PURPLE LABEL)    I spent a total of 30 minutes on the day of the visit.   Time spent doing chart review, history and exam, documentation and further activities per the note        BMI:   Estimated body mass index is 47.72 kg/m  as calculated from the following:    Height as of 3/16/21: 1.646 m (5' 4.8\").    Weight as of this encounter: 129.3 kg (285 lb).   Weight management plan: Patient referred to endocrine and/or weight management specialty         Return in about 6 months (around 5/29/2022) for Physical Exam, diabetes follow up.    Gloria" ZEKE Mitchell MD  [facility]    ==================================================  ==================================================  ============================================    Subjective   Flor is a 51 year old who presents for the following health issues     50 yo F here for DM f/u.  H/o obesity, HTN, panic attacks, iron deficiency (Ferritin normal 1/2021), breast reduction, hyperlipidemia, T2DM (insulin, jardiance, glimepiride).   Since Jardiance added last year, weight is actually increased a little.         HPI     Diabetes Follow-up    How often are you checking your blood sugar? One time daily  What time of day are you checking your blood sugars (select all that apply)?  Before meals  Have you had any blood sugars above 200?  No  Have you had any blood sugars below 70?  No    What symptoms do you notice when your blood sugar is low?  Shaky and Dizzy    What concerns do you have today about your diabetes? None     Do you have any of these symptoms? (Select all that apply)  Numbness in feet    Have you had a diabetic eye exam in the last 12 months? No        BP Readings from Last 2 Encounters:   11/29/21 123/79   05/13/21 113/70     Hemoglobin A1C (%)   Date Value   08/18/2021 6.8 (H)   05/11/2021 7.0 (H)   02/08/2021 7.7 (H)     LDL Cholesterol Calculated (mg/dL)   Date Value   05/11/2021 55   01/14/2020 48            How many servings of fruits and vegetables do you eat daily?  2-3    On average, how many sweetened beverages do you drink each day (Examples: soda, juice, sweet tea, etc.  Do NOT count diet or artificially sweetened beverages)?   0    How many days per week do you exercise enough to make your heart beat faster? 3 or less    How many minutes a day do you exercise enough to make your heart beat faster? 30 - 60  How many days per week do you miss taking your medication? 1    What makes it hard for you to take your medications?  remembering to take        Review of  Systems   Constitutional, HEENT, cardiovascular, pulmonary, gi and gu systems are negative, except as otherwise noted.      Objective    /79 (BP Location: Right arm, Patient Position: Sitting, Cuff Size: Adult Large)   Pulse 93   Temp 98  F (36.7  C) (Oral)   Wt 129.3 kg (285 lb)   SpO2 95%   BMI 47.72 kg/m    Body mass index is 47.72 kg/m .  Physical Exam   GENERAL: healthy, alert, no distress and obese  EYES: Eyes grossly normal to inspection, PERRL and conjunctivae and sclerae normal  MS: no gross musculoskeletal defects noted, no edema  SKIN: no suspicious lesions or rashes  Diabetic foot exam: normal DP and PT pulses, no trophic changes or ulcerative lesions and normal sensory exam    Labs are pending.

## 2021-11-29 NOTE — TELEPHONE ENCOUNTER
Dexcom is too expensive, wondering if we can get a prescription for Freestyle to see if cheaper for the patient.  Both reader and sensor.    Thank you,  Yanira Burnett, PharmD  [facility]  [phone]

## 2021-11-29 NOTE — PATIENT INSTRUCTIONS
Call to schedule imaging   -- mammogram due by 3/2022      EYE exam due.        [facility]   [address]   [address]   [facility] [address]   Phone: [phone]       Diabetic Education will call you     24 hour urine.

## 2021-11-29 NOTE — LETTER
My COPD Action Plan     Name: Flor Fernandez    YOB: 1970   Date: 11/29/2021    My doctor: Gloria Mitchell MD   My clinic: [address] [address]  [phone]  My Controller Medicine:     Dose:       My Rescue Medicine: Albuterol nebulizer solution    Dose:       My Flare Up Medicine: call clinic   Dose:       My COPD Severity: unk      Use of Oxygen: Oxygen Not Prescribed      Make sure you've had your pneumonia   vaccines.          GREEN ZONE       Doing well today      Usual level of activity and exercise    Usual amount of cough and mucus    No shortness of breath    Usual level of health (thinking clearly, sleeping well, feel like eating) Actions:      Take daily medicines    Use oxygen as prescribed    Follow regular exercise and diet plan    Avoid cigarette smoke and other irritants that harm the lungs           YELLOW ZONE          Having a bad day or flare up      Short of breath more than usual    A lot more sputum (mucus) than usual    Sputum looks yellow, green, tan, brown or bloody    More coughing or wheezing    Fever or chills    Less energy; trouble completing activities    Trouble thinking or focusing    Using quick relief inhaler or nebulizer more often    Poor sleep; symptoms wake me up    Do not feel like eating Actions:      Get plenty of rest    Take daily medicines    Use quick relief inhaler every 6 hours    If you use oxygen, call you doctor to see if you should adjust your oxygen    Do breathing exercises or other things to help you relax    Let a loved one, friend or neighbor know you are feeling worse    Call your care team if you have 2 or more symptoms.  Start taking steroids or antibiotics if directed by your care team           RED ZONE       Need medical care now      Severe shortness of breath (feel you can't breathe)    Fever, chills    Not enough breath to do any activity    Trouble coughing up  mucus, walking or talking    Blood in mucus    Frequent coughing   Rescue medicines are not working    Not able to sleep because of breathing    Feel confused or drowsy    Chest pain    Actions:      Call your health care team.  If you cannot reach your care team, call 911 or go to the emergency room.        Annual Reminders:  Meet with Care Team, Flu Shot every Fall  Pharmacy:    [facility] [place], [place] - [address]  [id number] - [place], [place] - [address]

## 2021-11-30 ENCOUNTER — TELEPHONE (OUTPATIENT)
Dept: FAMILY MEDICINE | Facility: CLINIC | Age: 51
End: 2021-11-30
Payer: COMMERCIAL

## 2021-11-30 LAB — IGF-I BLD-MCNC: 83 NG/ML (ref 59–238)

## 2021-11-30 NOTE — TELEPHONE ENCOUNTER
Diabetes Education Scheduling Outreach #1:    Call to patient to schedule. Left message with phone number to call to schedule.    Plan for 2nd outreach attempt within 1 week.    Iliana So  [facility]  Diabetes and Nutrition Scheduling

## 2021-12-02 NOTE — RESULT ENCOUNTER NOTE
Flor Fernandez     HgbA1c is at goal.  Electrolytes and kidney function look great.     Blood count shows mild anemia, improved from last year....     You will be due for your first colonoscopy screening in mid January.  Would you mind if I enter an order, so you can get this on your calendar soon after your birthday?     Let me know, okay?   When I see anemia, it is most likely due to menstruation in women... however it can be a sign of colon polyps, so I just want to get on this screening as soon as you are eligible per insurance (age 50)    Sincerely,       EVERT ARNDT M.D.

## 2021-12-12 DIAGNOSIS — Z79.4 TYPE 2 DIABETES MELLITUS WITH HYPERGLYCEMIA, WITH LONG-TERM CURRENT USE OF INSULIN (H): ICD-10-CM

## 2021-12-12 DIAGNOSIS — E11.65 TYPE 2 DIABETES MELLITUS WITH HYPERGLYCEMIA, WITH LONG-TERM CURRENT USE OF INSULIN (H): ICD-10-CM

## 2021-12-13 RX ORDER — PEN NEEDLE, DIABETIC 31 GX3/16"
NEEDLE, DISPOSABLE MISCELLANEOUS DAILY
Qty: 100 EACH | Refills: 3 | Status: SHIPPED | OUTPATIENT
Start: 2021-12-13 | End: 2023-01-27

## 2021-12-20 DIAGNOSIS — E11.65 TYPE 2 DIABETES MELLITUS WITH HYPERGLYCEMIA, WITHOUT LONG-TERM CURRENT USE OF INSULIN (H): ICD-10-CM

## 2021-12-20 DIAGNOSIS — E78.5 HYPERLIPIDEMIA LDL GOAL <100: ICD-10-CM

## 2021-12-20 RX ORDER — ATORVASTATIN CALCIUM 20 MG/1
TABLET, FILM COATED ORAL
Qty: 90 TABLET | Refills: 2 | Status: SHIPPED | OUTPATIENT
Start: 2021-12-20 | End: 2022-05-23

## 2021-12-20 RX ORDER — EMPAGLIFLOZIN 25 MG/1
TABLET, FILM COATED ORAL
Qty: 90 TABLET | Refills: 3 | Status: SHIPPED | OUTPATIENT
Start: 2021-12-20 | End: 2022-12-12

## 2021-12-21 ENCOUNTER — ALLIED HEALTH/NURSE VISIT (OUTPATIENT)
Dept: EDUCATION SERVICES | Facility: CLINIC | Age: 51
End: 2021-12-21
Attending: INTERNAL MEDICINE
Payer: COMMERCIAL

## 2021-12-21 DIAGNOSIS — Z79.4 TYPE 2 DIABETES MELLITUS WITH HYPERGLYCEMIA, WITH LONG-TERM CURRENT USE OF INSULIN (H): ICD-10-CM

## 2021-12-21 DIAGNOSIS — E11.65 TYPE 2 DIABETES MELLITUS WITH HYPERGLYCEMIA, WITH LONG-TERM CURRENT USE OF INSULIN (H): ICD-10-CM

## 2021-12-21 PROCEDURE — G0108 DIAB MANAGE TRN  PER INDIV: HCPCS | Performed by: DIETITIAN, REGISTERED

## 2021-12-21 NOTE — LETTER
"    12/21/2021         RE: Flor Fernandez  [address]  [place] [address]        Dear Colleague,    Thank you for referring your patient, Flor Fernandez, to the [facility]. Please see a copy of my visit note below.    Diabetes Self-Management Education & Support    Presents for: Personal CGM Start    SUBJECTIVE/OBJECTIVE:  Presents for: Personal CGM Start  Accompanied by: Self  Diabetes education in the past 24mo: No  Focus of Visit: CGM  Type of CGM visit: Personal CGM Start  Diabetes type: Type 2  Disease course: Stable  How confident are you filling out medical forms by yourself:: Extremely  Diabetes management related comments/concerns: would like to learn how to use niki  Transportation concerns: No  Difficulty affording diabetes medication?: Sometimes  Difficulty affording diabetes testing supplies?: Sometimes  Other concerns:: None  Cultural Influences/Ethnic Background:  Other    Diabetes Symptoms & Complications:  Fatigue: Sometimes  Neuropathy: Sometimes  Polydipsia: Sometimes  Polyphagia: Sometimes  Polyuria: No  Visual change: No  Slow healing wounds: Sometimes  Weight trend: Decreasing  Complications assessed today?: No  Autonomic neuropathy: No  CVA: No  Heart disease: No  Nephropathy: No  Peripheral neuropathy: Other  Peripheral Vascular Disease: No  Retinopathy: No  Sexual dysfunction: No    Patient Problem List and Family Medical History reviewed for relevant medical history, current medical status, and diabetes risk factors.    Vitals:  There were no vitals taken for this visit.  Estimated body mass index is 47.72 kg/m  as calculated from the following:    Height as of 3/16/21: 1.646 m (5' 4.8\").    Weight as of 11/29/21: 129.3 kg (285 lb).   Last 3 BP:   BP Readings from Last 3 Encounters:   11/29/21 123/79   05/13/21 113/70   03/18/21 126/81       History   Smoking Status     Former Smoker     Quit date: 4/8/2014   Smokeless Tobacco     Never Used "       Labs:  Lab Results   Component Value Date    A1C 6.9 11/29/2021    A1C 7.0 05/11/2021     Lab Results   Component Value Date     11/29/2021     11/09/2020     Lab Results   Component Value Date    LDL 55 05/11/2021     HDL Cholesterol   Date Value Ref Range Status   05/11/2021 51 >49 mg/dL Final   ]  GFR Estimate   Date Value Ref Range Status   11/29/2021 >90 >60 mL/min/1.73m2 Final     Comment:     As of July 11, 2021, eGFR is calculated by the CKD-EPI creatinine equation, without race adjustment. eGFR can be influenced by muscle mass, exercise, and diet. The reported eGFR is an estimation only and is only applicable if the renal function is stable.   11/09/2020 >90 >60 mL/min/[1.73_m2] Final     Comment:     Non  GFR Calc  Starting 12/18/2018, serum creatinine based estimated GFR (eGFR) will be   calculated using the Chronic Kidney Disease Epidemiology Collaboration   (CKD-EPI) equation.       GFR Estimate If Black   Date Value Ref Range Status   11/09/2020 >90 >60 mL/min/[1.73_m2] Final     Comment:      GFR Calc  Starting 12/18/2018, serum creatinine based estimated GFR (eGFR) will be   calculated using the Chronic Kidney Disease Epidemiology Collaboration   (CKD-EPI) equation.       Lab Results   Component Value Date    CR 0.57 11/29/2021    CR 0.55 11/09/2020     No results found for: MICROALBUMIN    Healthy Eating:  Healthy Eating Assessed Today: Yes  Cultural/Islam diet restrictions?: No  Meal planning/habits: Low salt,Smaller portions,Frequent snacking  How many times a week on average do you eat food made away from home (restaurant/take-out)?: 1  Meals include: Breakfast,Lunch,Dinner,Morning Snack,Afternoon Snack  Breakfast: smoothie (fruit, almond or oat milk, yogurt), eggs  Beverages: Water,Coffee,Milk,Other (has cut out soda, working on decreasing gatorade)    Being Active:  Being Active Assessed Today: No  Barrier to exercise:  None    Monitoring:  Monitoring Assessed Today: Yes  Did patient bring glucose meter to appointment? : No  Blood Glucose Meter: FreeStyle  Times checking blood sugar at home (number): Other  Times checking blood sugar at home (per): Day  Blood glucose trend: Fluctuating    Downloaded niki link to phone    Taking Medications:  Diabetes Medication(s)     Insulin       LANTUS SOLOSTAR 100 UNIT/ML soln    INJECT 28 UNITS UNDER THE SKIN DAILY    Sodium-Glucose Co-Transporter 2 (SGLT2) Inhibitors       JARDIANCE 25 MG TABS tablet    TAKE 1 TABLET (25 MG) BY MOUTH DAILY    Sulfonylureas       glimepiride (AMARYL) 1 MG tablet    TAKE 1 TABLET (1 MG) BY MOUTH EVERY MORNING BEFORE BREAKFAST          Current Treatments: Insulin Injections,Oral Medication (taken by mouth)  Dose schedule: At bedtime  Given by: Patient  Injection/Infusion sites: Abdomen  Problems taking diabetes medications regularly?: No  Diabetes medication side effects?: No    Problem Solving:  Is the patient at risk for hypoglycemia?: Yes  Hypoglycemia Frequency: Never    Reducing Risks:  CAD Risks: Diabetes Mellitus,Dyslipidemia,Family history,Hypertension,Obesity,Post-menopausal,Sedentary lifestyle,Stress  Has dilated eye exam at least once a year?: No  Sees dentist every 6 months?: Yes  Feet checked by healthcare provider in the last year?: Yes    Healthy Coping:  Healthy Coping Assessed Today: Yes  Emotional response to diabetes: Ready to learn  Informal Support system:: Friends,Parent  Stage of change: ACTION (Actively working towards change)  Support resources: Websites  Patient Activation Measure Survey Score:  No flowsheet data found.    Diabetes knowledge and skills assessment:   Patient is knowledgeable in diabetes management concepts related to: Healthy Eating    Patient needs further education on the following diabetes management concepts: Healthy Eating, Monitoring and Taking Medication    Based on learning assessment above, most appropriate  setting for further diabetes education would be: Individual setting.      INTERVENTIONS:    Patient completed homework (online training and/or Getting started with CGM guide)? : Yes  Sensor started:: Started today in clinic  CGM Initial Settings: Freestyle Simon  Freestyle Simon Target Glucose Range (mg/dL):     Sensor was inserted with no resistance or bleeding at insertion site.    Education provided today on:  AADE Self-Care Behaviors:  Healthy Eating: Discussed smoothie contents.  Encouraged adding spinach/kale/carrots, and selecting almond milk over oat milk.  Discussed alternative gatorade beverages.     Monitoring: log and interpret results, individual blood glucose targets, frequency of monitoring and how to use assessment function in loretta.  Discussed how to look at average blood sugars at certain times of day to target specific changes.  Linked patient to practice.     Taking Medication: action of prescribed medication and discussed how to self decrease insulin if blood sugars decrease.  Discussed that 12-13 units would be the minimum dose we would consider helpful and a point to consider stopping.  Additionally discussed GLP-1s to check cost on after 1/2022    CGM-specific education:   Freestyle Simon sensor: insertion technique, sensor site location and rotation, insulin administration in relation to sensor placement, sensor wear, reasons to remove sensor (MRI, CT, diathermy), Vitamin C & Aspirin effects on sensor, Simon [place]: frequency of scanning sensor, length of time data is visible, use of built in glucose meter, Precision X-tra test strips, Use of trends and graphs for pattern management and problem solving, Dosing insulin based on sensor glucose results and LibrKashlessiew software       Education Materials Provided:  No new materials provided today    ASSESSMENT:  Patient was able to apply and start sensor in clinic.  She is motivated to limit sugared beverages and lose weight.  She would benefit  from a glp-1 if cost is doable OR if possible slowly decrease insulin if able to lower blood sugars through CGM and lifestyle change.    Pt verbalized understanding of concepts discussed and recommendations provided today.    PLAN  See Patient Instructions for co-developed, patient-stated behavior change goals.  AVS printed and provided to patient today. See Follow-Up section for recommended follow-up.    Scan sensor every 8 hours.    Use settings feature to review data.    Aim for Time in range of 70% or more.    Mercy Duong MS, RD, LD, CDE  Time Spent: 30 minutes  Encounter Type: Individual    Any diabetes medication dose changes were made via the CDE Protocol and Collaborative Practice Agreement with the patient's primary care provider. A copy of this encounter was shared with the provider.

## 2021-12-21 NOTE — PATIENT INSTRUCTIONS
70% time in range is a1c of 7 or less     Menu > connected apps> [employer]ially > Connect To A Practice > Practice ID # [id number] > Connect     Ask about trulicity or ozempic    12-13 units of insulin we would consider stopping if blood sugars are at target OR stay at target with no insulin    1 cup fruit for a smoothie  [place] milk over oat milk  - add spinach or kale if you can OR carrots  -nutmeg, cinnamon, eugenie  -plain greek yogurt

## 2021-12-21 NOTE — PROGRESS NOTES
"Diabetes Self-Management Education & Support    Presents for: Personal CGM Start    SUBJECTIVE/OBJECTIVE:  Presents for: Personal CGM Start  Accompanied by: Self  Diabetes education in the past 24mo: No  Focus of Visit: CGM  Type of CGM visit: Personal CGM Start  Diabetes type: Type 2  Disease course: Stable  How confident are you filling out medical forms by yourself:: Extremely  Diabetes management related comments/concerns: would like to learn how to use niki  Transportation concerns: No  Difficulty affording diabetes medication?: Sometimes  Difficulty affording diabetes testing supplies?: Sometimes  Other concerns:: None  Cultural Influences/Ethnic Background:  Other    Diabetes Symptoms & Complications:  Fatigue: Sometimes  Neuropathy: Sometimes  Polydipsia: Sometimes  Polyphagia: Sometimes  Polyuria: No  Visual change: No  Slow healing wounds: Sometimes  Weight trend: Decreasing  Complications assessed today?: No  Autonomic neuropathy: No  CVA: No  Heart disease: No  Nephropathy: No  Peripheral neuropathy: Other  Peripheral Vascular Disease: No  Retinopathy: No  Sexual dysfunction: No    Patient Problem List and Family Medical History reviewed for relevant medical history, current medical status, and diabetes risk factors.    Vitals:  There were no vitals taken for this visit.  Estimated body mass index is 47.72 kg/m  as calculated from the following:    Height as of 3/16/21: 1.646 m (5' 4.8\").    Weight as of 11/29/21: 129.3 kg (285 lb).   Last 3 BP:   BP Readings from Last 3 Encounters:   11/29/21 123/79   05/13/21 113/70   03/18/21 126/81       History   Smoking Status     Former Smoker     Quit date: 4/8/2014   Smokeless Tobacco     Never Used       Labs:  Lab Results   Component Value Date    A1C 6.9 11/29/2021    A1C 7.0 05/11/2021     Lab Results   Component Value Date     11/29/2021     11/09/2020     Lab Results   Component Value Date    LDL 55 05/11/2021     HDL Cholesterol   Date " Value Ref Range Status   05/11/2021 51 >49 mg/dL Final   ]  GFR Estimate   Date Value Ref Range Status   11/29/2021 >90 >60 mL/min/1.73m2 Final     Comment:     As of July 11, 2021, eGFR is calculated by the CKD-EPI creatinine equation, without race adjustment. eGFR can be influenced by muscle mass, exercise, and diet. The reported eGFR is an estimation only and is only applicable if the renal function is stable.   11/09/2020 >90 >60 mL/min/[1.73_m2] Final     Comment:     Non  GFR Calc  Starting 12/18/2018, serum creatinine based estimated GFR (eGFR) will be   calculated using the Chronic Kidney Disease Epidemiology Collaboration   (CKD-EPI) equation.       GFR Estimate If Black   Date Value Ref Range Status   11/09/2020 >90 >60 mL/min/[1.73_m2] Final     Comment:      GFR Calc  Starting 12/18/2018, serum creatinine based estimated GFR (eGFR) will be   calculated using the Chronic Kidney Disease Epidemiology Collaboration   (CKD-EPI) equation.       Lab Results   Component Value Date    CR 0.57 11/29/2021    CR 0.55 11/09/2020     No results found for: MICROALBUMIN    Healthy Eating:  Healthy Eating Assessed Today: Yes  Cultural/Yarsanism diet restrictions?: No  Meal planning/habits: Low salt,Smaller portions,Frequent snacking  How many times a week on average do you eat food made away from home (restaurant/take-out)?: 1  Meals include: Breakfast,Lunch,Dinner,Morning Snack,Afternoon Snack  Breakfast: smoothie (fruit, almond or oat milk, yogurt), eggs  Beverages: Water,Coffee,Milk,Other (has cut out soda, working on decreasing gatorade)    Being Active:  Being Active Assessed Today: No  Barrier to exercise: None    Monitoring:  Monitoring Assessed Today: Yes  Did patient bring glucose meter to appointment? : No  Blood Glucose Meter: FreeStyle  Times checking blood sugar at home (number): Other  Times checking blood sugar at home (per): Day  Blood glucose trend:  Fluctuating    Downloaded niki link to phone    Taking Medications:  Diabetes Medication(s)     Insulin       LANTUS SOLOSTAR 100 UNIT/ML soln    INJECT 28 UNITS UNDER THE SKIN DAILY    Sodium-Glucose Co-Transporter 2 (SGLT2) Inhibitors       JARDIANCE 25 MG TABS tablet    TAKE 1 TABLET (25 MG) BY MOUTH DAILY    Sulfonylureas       glimepiride (AMARYL) 1 MG tablet    TAKE 1 TABLET (1 MG) BY MOUTH EVERY MORNING BEFORE BREAKFAST          Current Treatments: Insulin Injections,Oral Medication (taken by mouth)  Dose schedule: At bedtime  Given by: Patient  Injection/Infusion sites: Abdomen  Problems taking diabetes medications regularly?: No  Diabetes medication side effects?: No    Problem Solving:  Is the patient at risk for hypoglycemia?: Yes  Hypoglycemia Frequency: Never    Reducing Risks:  CAD Risks: Diabetes Mellitus,Dyslipidemia,Family history,Hypertension,Obesity,Post-menopausal,Sedentary lifestyle,Stress  Has dilated eye exam at least once a year?: No  Sees dentist every 6 months?: Yes  Feet checked by healthcare provider in the last year?: Yes    Healthy Coping:  Healthy Coping Assessed Today: Yes  Emotional response to diabetes: Ready to learn  Informal Support system:: Friends,Parent  Stage of change: ACTION (Actively working towards change)  Support resources: Websites  Patient Activation Measure Survey Score:  No flowsheet data found.    Diabetes knowledge and skills assessment:   Patient is knowledgeable in diabetes management concepts related to: Healthy Eating    Patient needs further education on the following diabetes management concepts: Healthy Eating, Monitoring and Taking Medication    Based on learning assessment above, most appropriate setting for further diabetes education would be: Individual setting.      INTERVENTIONS:    Patient completed homework (online training and/or Getting started with CGM guide)? : Yes  Sensor started:: Started today in clinic  CGM Initial Settings: Freestyle  Simon  Freestyle Simon Target Glucose Range (mg/dL):     Sensor was inserted with no resistance or bleeding at insertion site.    Education provided today on:  AADE Self-Care Behaviors:  Healthy Eating: Discussed smoothie contents.  Encouraged adding spinach/kale/carrots, and selecting almond milk over oat milk.  Discussed alternative gatorade beverages.     Monitoring: log and interpret results, individual blood glucose targets, frequency of monitoring and how to use assessment function in loretta.  Discussed how to look at average blood sugars at certain times of day to target specific changes.  Linked patient to practice.     Taking Medication: action of prescribed medication and discussed how to self decrease insulin if blood sugars decrease.  Discussed that 12-13 units would be the minimum dose we would consider helpful and a point to consider stopping.  Additionally discussed GLP-1s to check cost on after 1/2022    CGM-specific education:   Freestyle Simon sensor: insertion technique, sensor site location and rotation, insulin administration in relation to sensor placement, sensor wear, reasons to remove sensor (MRI, CT, diathermy), Vitamin C & Aspirin effects on sensor, Simon [place]: frequency of scanning sensor, length of time data is visible, use of built in glucose meter, Precision X-tra test strips, Use of trends and graphs for pattern management and problem solving, Dosing insulin based on sensor glucose results and LibrEasel Learniew software       Education Materials Provided:  No new materials provided today    ASSESSMENT:  Patient was able to apply and start sensor in clinic.  She is motivated to limit sugared beverages and lose weight.  She would benefit from a glp-1 if cost is doable OR if possible slowly decrease insulin if able to lower blood sugars through CGM and lifestyle change.    Pt verbalized understanding of concepts discussed and recommendations provided today.    PLAN  See Patient Instructions  for co-developed, patient-stated behavior change goals.  AVS printed and provided to patient today. See Follow-Up section for recommended follow-up.    Scan sensor every 8 hours.    Use settings feature to review data.    Aim for Time in range of 70% or more.    Mercy Duong MS, RD, LD, CDE  Time Spent: 30 minutes  Encounter Type: Individual    Any diabetes medication dose changes were made via the CDE Protocol and Collaborative Practice Agreement with the patient's primary care provider. A copy of this encounter was shared with the provider.

## 2021-12-25 DIAGNOSIS — E11.65 TYPE 2 DIABETES MELLITUS WITH HYPERGLYCEMIA, WITH LONG-TERM CURRENT USE OF INSULIN (H): ICD-10-CM

## 2021-12-25 DIAGNOSIS — Z79.4 TYPE 2 DIABETES MELLITUS WITH HYPERGLYCEMIA, WITH LONG-TERM CURRENT USE OF INSULIN (H): ICD-10-CM

## 2021-12-26 RX ORDER — FLASH GLUCOSE SCANNING READER
EACH MISCELLANEOUS
Qty: 1 EACH | Refills: 1 | Status: SHIPPED | OUTPATIENT
Start: 2021-12-26

## 2021-12-31 DIAGNOSIS — I10 BENIGN ESSENTIAL HYPERTENSION: ICD-10-CM

## 2022-01-02 RX ORDER — LISINOPRIL 10 MG/1
TABLET ORAL
Qty: 90 TABLET | Refills: 0 | Status: SHIPPED | OUTPATIENT
Start: 2022-01-02 | End: 2022-07-06

## 2022-01-02 NOTE — TELEPHONE ENCOUNTER
Prescription approved per [facility] Refill Protocol.  Irma Vargas RN

## 2022-01-27 ENCOUNTER — OFFICE VISIT (OUTPATIENT)
Dept: OPTOMETRY | Facility: CLINIC | Age: 52
End: 2022-01-27
Payer: COMMERCIAL

## 2022-01-27 DIAGNOSIS — E11.9 TYPE 2 DIABETES MELLITUS WITHOUT RETINOPATHY (H): ICD-10-CM

## 2022-01-27 DIAGNOSIS — H52.4 PRESBYOPIA: ICD-10-CM

## 2022-01-27 DIAGNOSIS — H52.221 REGULAR ASTIGMATISM OF RIGHT EYE: ICD-10-CM

## 2022-01-27 DIAGNOSIS — Z01.01 ENCOUNTER FOR EXAMINATION OF EYES AND VISION WITH ABNORMAL FINDINGS: Primary | ICD-10-CM

## 2022-01-27 DIAGNOSIS — H52.13 MYOPIA OF BOTH EYES: ICD-10-CM

## 2022-01-27 PROCEDURE — 92015 DETERMINE REFRACTIVE STATE: CPT | Performed by: OPTOMETRIST

## 2022-01-27 PROCEDURE — 92014 COMPRE OPH EXAM EST PT 1/>: CPT | Performed by: OPTOMETRIST

## 2022-01-27 ASSESSMENT — SLIT LAMP EXAM - LIDS
COMMENTS: NORMAL,
COMMENTS: NORMAL

## 2022-01-27 ASSESSMENT — CONF VISUAL FIELD
OS_NORMAL: 1
OD_NORMAL: 1
METHOD: COUNTING FINGERS

## 2022-01-27 ASSESSMENT — TONOMETRY
OD_IOP_MMHG: 17
OS_IOP_MMHG: 17
IOP_METHOD: APPLANATION

## 2022-01-27 ASSESSMENT — CUP TO DISC RATIO
OS_RATIO: 0.25
OD_RATIO: 0.3

## 2022-01-27 ASSESSMENT — REFRACTION_WEARINGRX
OD_CYLINDER: SPHERE
OS_SPHERE: -1.50
OS_CYLINDER: +0.50
OS_ADD: +2.00
OS_AXIS: 177
OD_SPHERE: -0.50
SPECS_TYPE: PAL
OD_ADD: +2.00

## 2022-01-27 ASSESSMENT — VISUAL ACUITY
OD_CC+: -1
OD_SC: 20/40
OD_CC: 20/30
OD_SC: 20/30-1
METHOD: SNELLEN - LINEAR
OS_SC: 20/20
OD_CC: 20/20-1
OS_SC: 20/40
OS_CC: 20/30
CORRECTION_TYPE: GLASSES
OS_CC: 20/20
OD_SC+: -1

## 2022-01-27 ASSESSMENT — REFRACTION_MANIFEST
OS_CYLINDER: SPHERE
OD_SPHERE: -1.00
OD_CYLINDER: +0.50
OD_AXIS: 180
OS_ADD: +2.25
OS_SPHERE: -1.50
OD_ADD: +2.25

## 2022-01-27 ASSESSMENT — EXTERNAL EXAM - LEFT EYE: OS_EXAM: NORMAL

## 2022-01-27 ASSESSMENT — EXTERNAL EXAM - RIGHT EYE: OD_EXAM: NORMAL

## 2022-01-27 NOTE — PATIENT INSTRUCTIONS
Patient educated on importance of good blood sugar control.  Letter sent to primary care provider with diabetic eye exam report.     Flor was advised of today's exam findings.  Fill glasses prescription  Allow 2 weeks to adapt to change in glasses  Wear glasses full time  Copy of glasses Rx provided today.    Return in 1 year for eye exam, or sooner if needed.    The effects of the dilating drops last for 4- 6 hours.  You will be more sensitive to light and vision will be blurry up close.  Mydriatic sunglasses were given if needed.      Abner Sr O.D.  [address]  [place]  [address]    [phone]

## 2022-01-27 NOTE — LETTER
1/27/2022         RE: Flor Fernandez  [address]  [place] [address]        Dear Colleague,    Thank you for referring your patient, Flor Fernandez, to the [facility]. Please see a copy of my visit note below.    Chief Complaint   Patient presents with     Diabetic Eye Exam        Chief Complaint(s) and History of Present Illness(es)     Diabetic Eye Exam     Diabetes Type: Type 2, on insulin and taking oral medications    Duration: 3 years               Hemoglobin A1C POCT   Date Value Ref Range Status   05/11/2021 7.0 (H) 0 - 5.6 % Final     Comment:     Normal <5.7% Prediabetes 5.7-6.4%  Diabetes 6.5% or higher - adopted from ADA   consensus guidelines.     02/08/2021 7.7 (H) 0 - 5.6 % Final     Comment:     Normal <5.7% Prediabetes 5.7-6.4%  Diabetes 6.5% or higher - adopted from ADA   consensus guidelines.     11/09/2020 9.1 (H) 0 - 5.6 % Final     Comment:     Normal <5.7% Prediabetes 5.7-6.4%  Diabetes 6.5% or higher - adopted from ADA   consensus guidelines.       Hemoglobin A1C   Date Value Ref Range Status   11/29/2021 6.9 (H) 0.0 - 5.6 % Final     Comment:     Normal <5.7%   Prediabetes 5.7-6.4%    Diabetes 6.5% or higher     Note: Adopted from ADA consensus guidelines.   08/18/2021 6.8 (H) 0.0 - 5.6 % Final     Comment:     Normal <5.7%   Prediabetes 5.7-6.4%    Diabetes 6.5% or higher     Note: Adopted from ADA consensus guidelines.       Last Eye Exam: 9/23/2020  Dilated Previously: Yes, side effects of dilation explained today    What are you currently using to see?  Glasses - wears full time    Distance Vision Acuity: Noticed gradual change in both eyes    Near Vision Acuity: Not satisfied     Eye Comfort: good  Do you use eye drops? : No  Occupation or Hobbies:  at [employer]     Medical, surgical and family histories reviewed and updated 1/27/2022.       OBJECTIVE: See Ophthalmology exam    ASSESSMENT:    ICD-10-CM    1.  Encounter for examination of eyes and vision with abnormal findings  Z01.01    2. Type 2 diabetes mellitus without retinopathy (H)  E11.9    3. Myopia of both eyes  H52.13    4. Regular astigmatism of right eye  H52.221    5. Presbyopia  H52.4       PLAN:    Flor Fernandez aware  eye exam results will be sent to Gloria Mitchell  Patient Instructions   Patient educated on importance of good blood sugar control.  Letter sent to primary care provider with diabetic eye exam report.     Flor was advised of today's exam findings.  Fill glasses prescription  Allow 2 weeks to adapt to change in glasses  Wear glasses full time  Copy of glasses Rx provided today.    Return in 1 year for eye exam, or sooner if needed.    The effects of the dilating drops last for 4- 6 hours.  You will be more sensitive to light and vision will be blurry up close.  Mydriatic sunglasses were given if needed.      Abner Sr O.D.  [address]. [place]  [address]    [phone]             Again, thank you for allowing me to participate in the care of your patient.        Sincerely,        Abner Sr, OD

## 2022-01-27 NOTE — PROGRESS NOTES
Chief Complaint   Patient presents with     Diabetic Eye Exam        Chief Complaint(s) and History of Present Illness(es)     Diabetic Eye Exam     Diabetes Type: Type 2, on insulin and taking oral medications    Duration: 3 years               Hemoglobin A1C POCT   Date Value Ref Range Status   05/11/2021 7.0 (H) 0 - 5.6 % Final     Comment:     Normal <5.7% Prediabetes 5.7-6.4%  Diabetes 6.5% or higher - adopted from ADA   consensus guidelines.     02/08/2021 7.7 (H) 0 - 5.6 % Final     Comment:     Normal <5.7% Prediabetes 5.7-6.4%  Diabetes 6.5% or higher - adopted from ADA   consensus guidelines.     11/09/2020 9.1 (H) 0 - 5.6 % Final     Comment:     Normal <5.7% Prediabetes 5.7-6.4%  Diabetes 6.5% or higher - adopted from ADA   consensus guidelines.       Hemoglobin A1C   Date Value Ref Range Status   11/29/2021 6.9 (H) 0.0 - 5.6 % Final     Comment:     Normal <5.7%   Prediabetes 5.7-6.4%    Diabetes 6.5% or higher     Note: Adopted from ADA consensus guidelines.   08/18/2021 6.8 (H) 0.0 - 5.6 % Final     Comment:     Normal <5.7%   Prediabetes 5.7-6.4%    Diabetes 6.5% or higher     Note: Adopted from ADA consensus guidelines.       Last Eye Exam: 9/23/2020  Dilated Previously: Yes, side effects of dilation explained today    What are you currently using to see?  Glasses - wears full time    Distance Vision Acuity: Noticed gradual change in both eyes    Near Vision Acuity: Not satisfied     Eye Comfort: good  Do you use eye drops? : No  Occupation or Hobbies:  at [employer]     Medical, surgical and family histories reviewed and updated 1/27/2022.       OBJECTIVE: See Ophthalmology exam    ASSESSMENT:    ICD-10-CM    1. Encounter for examination of eyes and vision with abnormal findings  Z01.01    2. Type 2 diabetes mellitus without retinopathy (H)  E11.9    3. Myopia of both eyes  H52.13    4. Regular astigmatism of right eye  H52.221    5. Presbyopia  H52.4        PLAN:    Flor Fernandez aware  eye exam results will be sent to Gloria Mitchell  Patient Instructions   Patient educated on importance of good blood sugar control.  Letter sent to primary care provider with diabetic eye exam report.     Flor was advised of today's exam findings.  Fill glasses prescription  Allow 2 weeks to adapt to change in glasses  Wear glasses full time  Copy of glasses Rx provided today.    Return in 1 year for eye exam, or sooner if needed.    The effects of the dilating drops last for 4- 6 hours.  You will be more sensitive to light and vision will be blurry up close.  Mydriatic sunglasses were given if needed.      Abner Sr O.D.  [facility]  [address] [place] [address]. [place]  [id number]    [phone]

## 2022-03-13 ENCOUNTER — HEALTH MAINTENANCE LETTER (OUTPATIENT)
Age: 52
End: 2022-03-13

## 2022-03-21 DIAGNOSIS — F41.0 PANIC ATTACK: ICD-10-CM

## 2022-03-22 RX ORDER — HYDROXYZINE HYDROCHLORIDE 25 MG/1
TABLET, FILM COATED ORAL
Qty: 270 TABLET | Refills: 2 | Status: SHIPPED | OUTPATIENT
Start: 2022-03-22 | End: 2022-10-27

## 2022-03-22 NOTE — TELEPHONE ENCOUNTER
"Prescription approved per [facility] Refill Protocol.      Requested Prescriptions   Pending Prescriptions Disp Refills     hydrOXYzine (ATARAX) 25 MG tablet [Pharmacy Med Name: hydrOXYzine HCL 25MG TAB] 270 tablet 1     Sig: TAKE ONE TABLET BY MOUTH EVERY 8 HOURS AS NEEDED FOR ANXIETY       Antihistamines Protocol Passed - 3/21/2022  5:02 AM        Passed - Recent (12 mo) or future (30 days) visit within the authorizing provider's specialty     Patient has had an office visit with the authorizing provider or a provider within the authorizing providers department within the previous 12 mos or has a future within next 30 days. See \"Patient Info\" tab in inbasket, or \"Choose Columns\" in Meds & Orders section of the refill encounter.              Passed - Patient is age 3 or older     Apply age and/or weight-based dosing for peds patients age 3 and older.    Forward request to provider for patients under the age of 3.          Passed - Medication is active on med list           Iliana MENON RN on 3/22/2022 at 3:51 PM      "

## 2022-04-05 DIAGNOSIS — R00.0 TACHYCARDIA: ICD-10-CM

## 2022-04-05 DIAGNOSIS — I10 BENIGN ESSENTIAL HYPERTENSION: ICD-10-CM

## 2022-04-05 RX ORDER — CHLORTHALIDONE 25 MG/1
TABLET ORAL
Qty: 90 TABLET | Refills: 1 | Status: SHIPPED | OUTPATIENT
Start: 2022-04-05 | End: 2022-10-03

## 2022-04-05 RX ORDER — CARVEDILOL 6.25 MG/1
TABLET ORAL
Qty: 180 TABLET | Refills: 1 | Status: SHIPPED | OUTPATIENT
Start: 2022-04-05 | End: 2022-10-03

## 2022-04-12 ENCOUNTER — OFFICE VISIT (OUTPATIENT)
Dept: OBGYN | Facility: CLINIC | Age: 52
End: 2022-04-12
Payer: COMMERCIAL

## 2022-04-12 ENCOUNTER — ANCILLARY PROCEDURE (OUTPATIENT)
Dept: MAMMOGRAPHY | Facility: CLINIC | Age: 52
End: 2022-04-12
Attending: INTERNAL MEDICINE
Payer: COMMERCIAL

## 2022-04-12 VITALS
BODY MASS INDEX: 47.98 KG/M2 | OXYGEN SATURATION: 93 % | SYSTOLIC BLOOD PRESSURE: 130 MMHG | WEIGHT: 288 LBS | HEART RATE: 91 BPM | HEIGHT: 65 IN | DIASTOLIC BLOOD PRESSURE: 80 MMHG

## 2022-04-12 DIAGNOSIS — Z12.31 ENCOUNTER FOR SCREENING MAMMOGRAM FOR BREAST CANCER: ICD-10-CM

## 2022-04-12 DIAGNOSIS — Z01.419 ENCOUNTER FOR GYNECOLOGICAL EXAMINATION WITHOUT ABNORMAL FINDING: Primary | ICD-10-CM

## 2022-04-12 DIAGNOSIS — Z12.11 SCREENING FOR COLON CANCER: ICD-10-CM

## 2022-04-12 PROCEDURE — 77067 SCR MAMMO BI INCL CAD: CPT | Mod: TC | Performed by: RADIOLOGY

## 2022-04-12 PROCEDURE — 99396 PREV VISIT EST AGE 40-64: CPT | Performed by: NURSE PRACTITIONER

## 2022-04-12 PROCEDURE — 77063 BREAST TOMOSYNTHESIS BI: CPT | Mod: TC | Performed by: RADIOLOGY

## 2022-04-12 ASSESSMENT — PAIN SCALES - GENERAL: PAINLEVEL: NO PAIN (0)

## 2022-04-12 NOTE — PROGRESS NOTES
SUBJECTIVE:   CC: Flor Fernandez is an 52 year old woman who presents for preventive health visit.     {Healthy Habits:     Getting at least 3 servings of Calcium per day:  Yes    Bi-annual eye exam:  Yes    Dental care twice a year:  Yes    Sleep apnea or symptoms of sleep apnea:  None    Diet:  Low salt and Diabetic    Frequency of exercise:  2-3 days/week    Duration of exercise:  15-30 minutes    Taking medications regularly:  Yes    Medication side effects:  None    PHQ-2 Total Score: 0    Additional concerns today:  No    Mammogram scheduled for later today.    Today's PHQ-2 Score:   PHQ-2 (  [employer]) 2022   Q1: Little interest or pleasure in doing things 0   Q2: Feeling down, depressed or hopeless 0   PHQ-2 Score 0   PHQ-2 Total Score (12-17 Years)- Positive if 3 or more points; Administer PHQ-A if positive -   Q1: Little interest or pleasure in doing things Not at all   Q2: Feeling down, depressed or hopeless Not at all   PHQ-2 Score 0       Abuse: Current or Past (Physical, Sexual or Emotional) - No  Do you feel safe in your environment? Yes        Social History     Tobacco Use     Smoking status: Former Smoker     Quit date: 2014     Years since quittin.0     Smokeless tobacco: Never Used   Substance Use Topics     Alcohol use: No         Alcohol Use 2022   Prescreen: >3 drinks/day or >7 drinks/week? No   Prescreen: >3 drinks/day or >7 drinks/week? -   No flowsheet data found.    Reviewed orders with patient.  Reviewed health maintenance and updated orders accordingly - Yes  Patient Active Problem List   Diagnosis     Vertigo     Hyperlipidemia LDL goal <100     Tachycardia     Hypertension goal BP (blood pressure) < 140/90     History of stroke     Iron deficiency     Anxiety disorder, unspecified type     Chronic low back pain without sciatica, unspecified back pain laterality     BMI 45.0-49.9, adult (H)     Type 2 diabetes mellitus, with long-term current use of insulin (H)      Panic attack     COPD (chronic obstructive pulmonary disease) (H)     Tobacco use disorder     Past Surgical History:   Procedure Laterality Date     APPENDECTOMY       CL AFF SURGICAL PATHOLOGY  2016     MAMMOPLASTY REDUCTION BILATERAL         Social History     Tobacco Use     Smoking status: Former Smoker     Quit date: 2014     Years since quittin.0     Smokeless tobacco: Never Used   Substance Use Topics     Alcohol use: No     Family History   Problem Relation Age of Onset     Other Cancer Mother      Asthma Mother      Diabetes Father      Prostate Cancer Paternal Grandfather      Prostate Cancer Other      Glaucoma No family hx of      Macular Degeneration No family hx of            Breast Cancer Screening:  Any new diagnosis of family breast, ovarian, or bowel cancer? No    Mammogram Screening: Recommended annual mammography  Pertinent mammograms are reviewed under the imaging tab.    History of abnormal Pap smear: NO - age 30-65 PAP every 5 years with negative HPV co-testing recommended  PAP / HPV Latest Ref Rng & Units 2020   PAP (Historical) - NIL   HPV16 NEG:Negative Negative   HPV18 NEG:Negative Negative   HRHPV NEG:Negative Negative     Reviewed and updated as needed this visit by clinical staff   Tobacco  Allergies  Meds   Med Hx  Surg Hx  Fam Hx  Soc Hx          Reviewed and updated as needed this visit by Provider                   Past Medical History:   Diagnosis Date     Anxiety      Cerebral infarction (H)      Diabetes (H)      Hypertension      Morbid obesity (H)      Tachycardia      Vertigo       Past Surgical History:   Procedure Laterality Date     APPENDECTOMY       CL AFF SURGICAL PATHOLOGY  2016     MAMMOPLASTY REDUCTION BILATERAL         Review of Systems  CONSTITUTIONAL: NEGATIVE for fever, chills, change in weight  INTEGUMENTARY/SKIN: NEGATIVE for worrisome rashes, moles or lesions  EYES: NEGATIVE for vision changes or irritation  ENT: NEGATIVE for  "ear, mouth and throat problems  RESP: NEGATIVE for significant cough or SOB  BREAST: NEGATIVE for masses, tenderness or discharge  CV: NEGATIVE for chest pain, palpitations or peripheral edema  GI: NEGATIVE for nausea, abdominal pain, heartburn, or change in bowel habits  : NEGATIVE for unusual urinary or vaginal symptoms. No vaginal bleeding.  MUSCULOSKELETAL: NEGATIVE for significant arthralgias or myalgia  NEURO: NEGATIVE for weakness, dizziness or paresthesias  PSYCHIATRIC: NEGATIVE for changes in mood or affect      OBJECTIVE:   /80 (BP Location: Right arm, Patient Position: Sitting, Cuff Size: Adult Large)   Pulse 91   Ht 1.646 m (5' 4.8\")   Wt 130.6 kg (288 lb)   LMP 07/01/2020   SpO2 93%   BMI 48.22 kg/m    Physical Exam   GENERAL APPEARANCE: healthy, alert and no distress  NECK: no adenopathy, no asymmetry, masses, or scars and thyroid normal to palpation  RESP: lungs clear to auscultation - no rales, rhonchi or wheezes  BREAST: normal without masses, tenderness or nipple discharge and no palpable axillary masses or adenopathy  CV: regular rate and rhythm, normal S1 S2, no S3 or S4, no murmur, click or rub, no peripheral edema and peripheral pulses strong  ABDOMEN: soft, nontender, no hepatosplenomegaly, no masses and bowel sounds normal   (female): normal female external genitalia, normal urethral meatus, vaginal mucosal atrophy noted, normal cervix, adnexae, and uterus without masses or abnormal discharge-though bilateral somewhat difficult to palpate due to body habitus  MS: no musculoskeletal defects are noted and gait is age appropriate without ataxia  SKIN: no suspicious lesions or rashes  NEURO: Normal strength and tone, sensory exam grossly normal, mentation intact and speech normal  PSYCH: mentation appears normal and affect normal/bright    ASSESSMENT/PLAN:   (Z01.419) Encounter for gynecological examination without abnormal finding  (primary encounter diagnosis)  Comment: Health " "maintenance reviewed. Had appointment scheduled with her PCP for follow up on her chronic medical conditions. Mammogram later today. Pap smear due in 2025.    (Z12.11) Screening for colon cancer  Plan: Adult Gastro Ref - Procedure Only          COUNSELING:  Reviewed preventive health counseling, as reflected in patient instructions  Special attention given to:        Regular exercise       Healthy diet/nutrition       Osteoporosis prevention/bone health       Colorectal Cancer Screening       (Teresa)menopause management    Estimated body mass index is 48.22 kg/m  as calculated from the following:    Height as of this encounter: 1.646 m (5' 4.8\").    Weight as of this encounter: 130.6 kg (288 lb).    Weight management plan: Patient was referred to their PCP to discuss a diet and exercise plan.    She reports that she quit smoking about 8 years ago. She has never used smokeless tobacco.      Counseling Resources:  ATP IV Guidelines  Pooled Cohorts Equation Calculator  Breast Cancer Risk Calculator  BRCA-Related Cancer Risk Assessment: FHS-7 Tool  FRAX Risk Assessment  ICSI Preventive Guidelines  Dietary Guidelines for Americans, 2010  USDA's MyPlate  ASA Prophylaxis  Lung CA Screening    JAGJIT [facility]  "

## 2022-05-04 ENCOUNTER — TELEPHONE (OUTPATIENT)
Dept: PHARMACY | Facility: CLINIC | Age: 52
End: 2022-05-04
Payer: COMMERCIAL

## 2022-05-04 NOTE — TELEPHONE ENCOUNTER
We have been unable to reach this patient for MTM follow-up after several attempts. We will stop reaching out to the patient at this time. Please let us know if we can assist in this patient's care in the future.     Routing to PCP as MALORIE Santiago, PharmD  Medication Therapy Management Pharmacist  [phone]

## 2022-05-10 ENCOUNTER — VIRTUAL VISIT (OUTPATIENT)
Dept: FAMILY MEDICINE | Facility: CLINIC | Age: 52
End: 2022-05-10
Payer: COMMERCIAL

## 2022-05-10 DIAGNOSIS — M54.16 LUMBAR BACK PAIN WITH RADICULOPATHY AFFECTING RIGHT LOWER EXTREMITY: Primary | ICD-10-CM

## 2022-05-10 PROCEDURE — 99213 OFFICE O/P EST LOW 20 MIN: CPT | Mod: 95 | Performed by: PHYSICIAN ASSISTANT

## 2022-05-10 RX ORDER — CYCLOBENZAPRINE HCL 5 MG
5 TABLET ORAL 2 TIMES DAILY PRN
Qty: 20 TABLET | Refills: 0 | Status: SHIPPED | OUTPATIENT
Start: 2022-05-10 | End: 2022-05-23

## 2022-05-10 RX ORDER — METHYLPREDNISOLONE 4 MG
TABLET, DOSE PACK ORAL
Qty: 21 TABLET | Refills: 0 | Status: SHIPPED | OUTPATIENT
Start: 2022-05-10 | End: 2022-05-23

## 2022-05-10 NOTE — PROGRESS NOTES
Flor is a 52 year old who is being evaluated via a billable telephone visit.      What phone number would you like to be contacted at?   How would you like to obtain your AVS? MyChart    Assessment & Plan     Lumbar back pain with radiculopathy affecting right lower extremity  - methylPREDNISolone (MEDROL DOSEPAK) 4 MG tablet therapy pack; Follow Package Directions  - cyclobenzaprine (FLEXERIL) 5 MG tablet; Take 1 tablet (5 mg) by mouth 2 times daily as needed for muscle spasms        Return in about 2 weeks (around 5/24/2022) for follow up if symptoms persist, change or worsen.    BART [facility] AUBRIE Ray is a 52 year old who presents for the following health issues     HPI     Pain History:  When did you first notice your pain? - Acute Pain   Have you seen anyone else for your pain? No  Where in your body do you have pain? Back Pain  Onset/Duration: 3 days  Description:   Location of pain: low back right  Character of pain: muscle pain   Pain radiation: radiates into the right buttocks and radiates into the right leg  New numbness or weakness in legs, not attributed to pain: no   Intensity: Currently 7/10, At its worst 10/10  Progression of Symptoms: same  History:   Specific cause: lifting, spring cleaning so moving stuff  Pain interferes with job: YES  History of back problems: previous degenerative joint disease of the lumbar spine  Any previous MRI or X-rays: Yes- at [place].  Date 08/24/2018  Sees a specialist for back pain: No  Alleviating factors:   Improved by: acetaminophen (Tylenol)    Precipitating factors:  Worsened by: Bending and Sitting  Therapies tried and outcome: acetaminophen (Tylenol), cold and heat    Patient notes that her symptoms are usually absent but she triggered symptoms by lifting heavy furniture.  She is amenable to follow up in clinic with persistent symptoms.     Review of Systems   Constitutional, HEENT,  cardiovascular, pulmonary, gi and gu systems are negative, except as otherwise noted.      Objective           Vitals:  No vitals were obtained today due to virtual visit.    Physical Exam   healthy, alert and no distress  PSYCH: Alert and oriented times 3; coherent speech, normal   rate and volume, able to articulate logical thoughts, able   to abstract reason, no tangential thoughts, no hallucinations   or delusions  Her affect is normal  RESP: No cough, no audible wheezing, able to talk in full sentences  Remainder of exam unable to be completed due to telephone visits                Phone call duration: 8 minutes

## 2022-05-20 ASSESSMENT — ANXIETY QUESTIONNAIRES
1. FEELING NERVOUS, ANXIOUS, OR ON EDGE: MORE THAN HALF THE DAYS
4. TROUBLE RELAXING: MORE THAN HALF THE DAYS
6. BECOMING EASILY ANNOYED OR IRRITABLE: MORE THAN HALF THE DAYS
GAD7 TOTAL SCORE: 10
GAD7 TOTAL SCORE: 10
8. IF YOU CHECKED OFF ANY PROBLEMS, HOW DIFFICULT HAVE THESE MADE IT FOR YOU TO DO YOUR WORK, TAKE CARE OF THINGS AT HOME, OR GET ALONG WITH OTHER PEOPLE?: NOT DIFFICULT AT ALL
5. BEING SO RESTLESS THAT IT IS HARD TO SIT STILL: NOT AT ALL
7. FEELING AFRAID AS IF SOMETHING AWFUL MIGHT HAPPEN: NOT AT ALL
2. NOT BEING ABLE TO STOP OR CONTROL WORRYING: MORE THAN HALF THE DAYS
7. FEELING AFRAID AS IF SOMETHING AWFUL MIGHT HAPPEN: NOT AT ALL
3. WORRYING TOO MUCH ABOUT DIFFERENT THINGS: MORE THAN HALF THE DAYS
GAD7 TOTAL SCORE: 10

## 2022-05-23 ENCOUNTER — OFFICE VISIT (OUTPATIENT)
Dept: INTERNAL MEDICINE | Facility: CLINIC | Age: 52
End: 2022-05-23
Payer: COMMERCIAL

## 2022-05-23 VITALS
HEART RATE: 104 BPM | TEMPERATURE: 98 F | BODY MASS INDEX: 49.06 KG/M2 | OXYGEN SATURATION: 96 % | DIASTOLIC BLOOD PRESSURE: 78 MMHG | SYSTOLIC BLOOD PRESSURE: 108 MMHG | WEIGHT: 293 LBS

## 2022-05-23 DIAGNOSIS — E11.65 TYPE 2 DIABETES MELLITUS WITH HYPERGLYCEMIA, WITH LONG-TERM CURRENT USE OF INSULIN (H): Primary | ICD-10-CM

## 2022-05-23 DIAGNOSIS — Z23 NEED FOR SHINGLES VACCINE: ICD-10-CM

## 2022-05-23 DIAGNOSIS — M54.16 LUMBAR BACK PAIN WITH RADICULOPATHY AFFECTING RIGHT LOWER EXTREMITY: ICD-10-CM

## 2022-05-23 DIAGNOSIS — Z79.4 TYPE 2 DIABETES MELLITUS WITH HYPERGLYCEMIA, WITH LONG-TERM CURRENT USE OF INSULIN (H): Primary | ICD-10-CM

## 2022-05-23 DIAGNOSIS — Z12.11 SCREEN FOR COLON CANCER: ICD-10-CM

## 2022-05-23 DIAGNOSIS — F41.0 PANIC ATTACK: ICD-10-CM

## 2022-05-23 DIAGNOSIS — I10 HYPERTENSION GOAL BP (BLOOD PRESSURE) < 140/90: ICD-10-CM

## 2022-05-23 DIAGNOSIS — E78.5 HYPERLIPIDEMIA LDL GOAL <100: ICD-10-CM

## 2022-05-23 LAB
CHOLEST SERPL-MCNC: 135 MG/DL
FASTING STATUS PATIENT QL REPORTED: ABNORMAL
HBA1C MFR BLD: 7.7 % (ref 0–5.6)
HDLC SERPL-MCNC: 44 MG/DL
LDLC SERPL CALC-MCNC: 60 MG/DL
NONHDLC SERPL-MCNC: 91 MG/DL
TRIGL SERPL-MCNC: 156 MG/DL

## 2022-05-23 PROCEDURE — 36415 COLL VENOUS BLD VENIPUNCTURE: CPT | Performed by: INTERNAL MEDICINE

## 2022-05-23 PROCEDURE — 83036 HEMOGLOBIN GLYCOSYLATED A1C: CPT | Performed by: INTERNAL MEDICINE

## 2022-05-23 PROCEDURE — 90471 IMMUNIZATION ADMIN: CPT | Performed by: INTERNAL MEDICINE

## 2022-05-23 PROCEDURE — 80061 LIPID PANEL: CPT | Performed by: INTERNAL MEDICINE

## 2022-05-23 PROCEDURE — 90750 HZV VACC RECOMBINANT IM: CPT | Performed by: INTERNAL MEDICINE

## 2022-05-23 PROCEDURE — 99214 OFFICE O/P EST MOD 30 MIN: CPT | Mod: 25 | Performed by: INTERNAL MEDICINE

## 2022-05-23 RX ORDER — ATORVASTATIN CALCIUM 20 MG/1
20 TABLET, FILM COATED ORAL DAILY
Qty: 90 TABLET | Refills: 2 | Status: SHIPPED | OUTPATIENT
Start: 2022-05-23 | End: 2023-05-30

## 2022-05-23 RX ORDER — METHYLPREDNISOLONE 4 MG
TABLET, DOSE PACK ORAL
Qty: 21 TABLET | Refills: 0 | Status: SHIPPED | OUTPATIENT
Start: 2022-05-23 | End: 2022-08-28

## 2022-05-23 RX ORDER — CYCLOBENZAPRINE HCL 5 MG
5 TABLET ORAL 2 TIMES DAILY PRN
Qty: 20 TABLET | Refills: 0 | Status: SHIPPED | OUTPATIENT
Start: 2022-05-23 | End: 2022-08-17

## 2022-05-23 ASSESSMENT — ANXIETY QUESTIONNAIRES: GAD7 TOTAL SCORE: 10

## 2022-05-23 NOTE — PATIENT INSTRUCTIONS
Call colonoscopy to schedule    Weight management  [facility]   [address] [place]   [facility] [address]   Phone: [phone]     Return to clinic 3 months with HgbA1c.  Diabetes and weight recheck.

## 2022-05-23 NOTE — NURSING NOTE
Prior to immunization administration, verified patients identity using patient s name and date of birth. Please see Immunization Activity for additional information.     Screening Questionnaire for Adult Immunization    Are you sick today?   No   Do you have allergies to medications, food, a vaccine component or latex?   No   Have you ever had a serious reaction after receiving a vaccination?   No   Do you have a long-term health problem with heart, lung, kidney, or metabolic disease (e.g., diabetes), asthma, a blood disorder, no spleen, complement component deficiency, a cochlear implant, or a spinal fluid leak?  Are you on long-term aspirin therapy?   Yes   Do you have cancer, leukemia, HIV/AIDS, or any other immune system problem?   No   Do you have a parent, brother, or sister with an immune system problem?   No   In the past 3 months, have you taken medications that affect  your immune system, such as prednisone, other steroids, or anticancer drugs; drugs for the treatment of rheumatoid arthritis, Crohn s disease, or psoriasis; or have you had radiation treatments?   Yes   Have you had a seizure, or a brain or other nervous system problem?   No   During the past year, have you received a transfusion of blood or blood    products, or been given immune (gamma) globulin or antiviral drug?   No   For women: Are you pregnant or is there a chance you could become       pregnant during the next month?   No   Have you received any vaccinations in the past 4 weeks?   No     Immunization questionnaire was positive for at least one answer.  Notified Dr. Mitchell.        Per orders of Dr. Mitchell, injection of Sningrix given by Bessy Hendrix CMA. Patient instructed to remain in clinic for 15 minutes afterwards, and to report any adverse reaction to me immediately.       Screening performed by Bessy Hendrix CMA on 5/23/2022 at 9:15 AM.

## 2022-05-23 NOTE — PROGRESS NOTES
Assessment & Plan     Type 2 diabetes mellitus with hyperglycemia, with long-term current use of insulin (H)  Prefers to work on diet /exercise to improve this..   - Hemoglobin A1c; Future  - Hemoglobin A1c  - Comprehensive Weight Management; Future    Hypertension goal BP (blood pressure) < 140/90  Controlled     Hyperlipidemia LDL goal <100   continue current management   - Lipid panel reflex to direct LDL Fasting; Future  - atorvastatin (LIPITOR) 20 MG tablet; Take 1 tablet (20 mg) by mouth daily  - Lipid panel reflex to direct LDL Fasting    BMI 45.0-49.9, adult (H)   she plans to get some weight off.  She would like referral for weight mgmt    - Comprehensive Weight Management; Future    Screen for colon cancer   has number, will call to schedule     Lumbar back pain with radiculopathy affecting right lower extremity   she would like to have the pred/flexeril available in case of another bout.   She may need additi  - cyclobenzaprine (FLEXERIL) 5 MG tablet; Take 1 tablet (5 mg) by mouth 2 times daily as needed for muscle spasms  - methylPREDNISolone (MEDROL DOSEPAK) 4 MG tablet therapy pack; Follow Package Directions    Panic attack  Continue current management      Stress at work   - sertraline (ZOLOFT) 50 MG tablet; Take 1 tablet (50 mg) by mouth daily    Need for shingles vaccine     - ZOSTER VACCINE RECOMBINANT ADJUVANTED (SHINGRIX)      I spent a total of 30 minutes on the day of the visit.   Time spent doing chart review, history and exam, documentation and further activities per the note             Return in about 3 months (around 8/23/2022) for diabetes follow up, Lab Work   weight recheck.    Gloria Mitchell MD  [facility]      =====================================================  =========================================================    Subjective   Flor is a 52 year old who presents for the following health issues     53 y/o F here for 6 month DM recheck.         "H/o obesity, HTN, panic attacks, iron deficiency (Ferritin normal 1/2021), breast reduction, hyperlipidemia, T2DM (insulin, jardiance, glimepiride, has CGM, Freestyle).  Today's HgbA1C is 7.7   This is increased from 6M ago.   She has gainded about 15# in past year also    Has delayed colonoscopy due to caregiver stress (mom)  Work is stressful.    clinical.  Short staffed.      She has back pain, this is why she is unable to do much exercise  Triggered by moving furniture 3W ago.   Got prednisone Rx and it helped.   Now the back pain is back:   It is lumbosacral.  \"If I move a certain way it can take my breath away\"  She is doing her HEP again which do generally help  She would like to have that prednisone on hand in case of severe recurrence this summer.          History of Present Illness       Mental Health Follow-up:  Patient presents to follow-up on Anxiety.    Patient's anxiety since last visit has been:  Good  The patient is not having other symptoms associated with anxiety.  Any significant life events: financial concerns and housing concerns  Patient is feeling anxious or having panic attacks.  Patient has no concerns about alcohol or drug use.    Diabetes:   She presents for follow up of diabetes.  She is checking home blood glucose a few times a month. She checks blood glucose before and after meals and at bedtime.  Blood glucose is never over 200 and never under 70. She is aware of hypoglycemia symptoms including shakiness, dizziness and other. She has no concerns regarding her diabetes at this time.  She is having numbness in feet, burning in feet and weight gain.         Hypertension: She presents for follow up of hypertension.  She does not check blood pressure  regularly outside of the clinic. Outpatient blood pressures have not been over 140/90. She follows a low salt diet.    Today's RODRIGUE-7 Score: 10             Review of Systems   Constitutional, HEENT, cardiovascular, pulmonary, gi and " gu systems are negative, except as otherwise noted.      Objective    /78 (BP Location: Right arm, Patient Position: Sitting, Cuff Size: Adult Large)   Pulse 104   Temp 98  F (36.7  C) (Oral)   Wt 132.9 kg (293 lb)   SpO2 96%   BMI 49.06 kg/m    There is no height or weight on file to calculate BMI.  Physical Exam   GENERAL: healthy, alert and no distress  EYES: Eyes grossly normal to inspection, PERRL and conjunctivae and sclerae normal  MS: no gross musculoskeletal defects noted, no edema  NEURO: Normal strength and tone, mentation intact and speech normal  PSYCH: mentation appears normal, affect normal/bright    Results for orders placed or performed in visit on 05/23/22 (from the past 24 hour(s))   Hemoglobin A1c   Result Value Ref Range    Hemoglobin A1C 7.7 (H) 0.0 - 5.6 %

## 2022-05-24 NOTE — RESULT ENCOUNTER NOTE
"Flor Fernandez    Cholesterol is similar to years past, and I think generally well controlled.  As we discussed, your plan to improve the HgbA1C is to work on weight loss.  We agreed on enlisting the help of our \"weight management\" team    See you in a few months.     Best,       EVERT ARNDT M.D.  "

## 2022-07-03 DIAGNOSIS — I10 BENIGN ESSENTIAL HYPERTENSION: ICD-10-CM

## 2022-07-06 RX ORDER — LISINOPRIL 10 MG/1
TABLET ORAL
Qty: 90 TABLET | Refills: 0 | Status: SHIPPED | OUTPATIENT
Start: 2022-07-06 | End: 2022-10-03

## 2022-07-06 NOTE — TELEPHONE ENCOUNTER
"Requested Prescriptions   Signed Prescriptions Disp Refills    lisinopril (ZESTRIL) 10 MG tablet 90 tablet 0     Sig: TAKE ONE TABLET BY MOUTH ONCE DAILY       ACE Inhibitors (Including Combos) Protocol Passed - 7/3/2022  5:02 AM        Passed - Blood pressure under 140/90 in past 12 months     BP Readings from Last 3 Encounters:   05/23/22 108/78   04/12/22 130/80   11/29/21 123/79                 Passed - Recent (12 mo) or future (30 days) visit within the authorizing provider's specialty     Patient has had an office visit with the authorizing provider or a provider within the authorizing providers department within the previous 12 mos or has a future within next 30 days. See \"Patient Info\" tab in inbasket, or \"Choose Columns\" in Meds & Orders section of the refill encounter.              Passed - Medication is active on med list        Passed - Patient is age 18 or older        Passed - No active pregnancy on record        Passed - Normal serum creatinine on file in past 12 months     Recent Labs   Lab Test 11/29/21  0949   CR 0.57       Ok to refill medication if creatinine is low          Passed - Normal serum potassium on file in past 12 months     Recent Labs   Lab Test 11/29/21  0949   POTASSIUM 3.5             Passed - No positive pregnancy test within past 12 months           Lili Coburn RN  [facility]     "

## 2022-08-17 ENCOUNTER — MYC MEDICAL ADVICE (OUTPATIENT)
Dept: INTERNAL MEDICINE | Facility: CLINIC | Age: 52
End: 2022-08-17

## 2022-08-17 DIAGNOSIS — M54.16 LUMBAR BACK PAIN WITH RADICULOPATHY AFFECTING RIGHT LOWER EXTREMITY: ICD-10-CM

## 2022-08-17 RX ORDER — CYCLOBENZAPRINE HCL 5 MG
5 TABLET ORAL 2 TIMES DAILY PRN
Qty: 60 TABLET | Refills: 1 | Status: SHIPPED | OUTPATIENT
Start: 2022-08-17 | End: 2022-10-03

## 2022-08-28 ENCOUNTER — HEALTH MAINTENANCE LETTER (OUTPATIENT)
Age: 52
End: 2022-08-28

## 2022-08-29 ENCOUNTER — OFFICE VISIT (OUTPATIENT)
Dept: INTERNAL MEDICINE | Facility: CLINIC | Age: 52
End: 2022-08-29
Payer: COMMERCIAL

## 2022-08-29 VITALS
TEMPERATURE: 98 F | DIASTOLIC BLOOD PRESSURE: 58 MMHG | SYSTOLIC BLOOD PRESSURE: 103 MMHG | WEIGHT: 293 LBS | HEART RATE: 99 BPM | OXYGEN SATURATION: 97 % | BODY MASS INDEX: 49.56 KG/M2

## 2022-08-29 DIAGNOSIS — E78.5 HYPERLIPIDEMIA LDL GOAL <100: ICD-10-CM

## 2022-08-29 DIAGNOSIS — M54.50 LUMBAR BACK PAIN: ICD-10-CM

## 2022-08-29 DIAGNOSIS — Z23 HIGH PRIORITY FOR 2019-NCOV VACCINE: ICD-10-CM

## 2022-08-29 DIAGNOSIS — Z23 NEED FOR SHINGLES VACCINE: ICD-10-CM

## 2022-08-29 DIAGNOSIS — M54.6 BILATERAL THORACIC BACK PAIN, UNSPECIFIED CHRONICITY: ICD-10-CM

## 2022-08-29 DIAGNOSIS — Z79.4 TYPE 2 DIABETES MELLITUS WITH HYPERGLYCEMIA, WITH LONG-TERM CURRENT USE OF INSULIN (H): Primary | ICD-10-CM

## 2022-08-29 DIAGNOSIS — Z12.11 SCREEN FOR COLON CANCER: ICD-10-CM

## 2022-08-29 DIAGNOSIS — I10 HYPERTENSION GOAL BP (BLOOD PRESSURE) < 140/90: ICD-10-CM

## 2022-08-29 DIAGNOSIS — E11.65 TYPE 2 DIABETES MELLITUS WITH HYPERGLYCEMIA, WITH LONG-TERM CURRENT USE OF INSULIN (H): Primary | ICD-10-CM

## 2022-08-29 LAB — HBA1C MFR BLD: 7.6 % (ref 0–5.6)

## 2022-08-29 PROCEDURE — 83036 HEMOGLOBIN GLYCOSYLATED A1C: CPT | Performed by: INTERNAL MEDICINE

## 2022-08-29 PROCEDURE — 90750 HZV VACC RECOMBINANT IM: CPT | Performed by: INTERNAL MEDICINE

## 2022-08-29 PROCEDURE — 91305 COVID-19,PF,PFIZER (12+ YRS): CPT | Performed by: INTERNAL MEDICINE

## 2022-08-29 PROCEDURE — 90471 IMMUNIZATION ADMIN: CPT | Performed by: INTERNAL MEDICINE

## 2022-08-29 PROCEDURE — 36415 COLL VENOUS BLD VENIPUNCTURE: CPT | Performed by: INTERNAL MEDICINE

## 2022-08-29 PROCEDURE — 99214 OFFICE O/P EST MOD 30 MIN: CPT | Mod: 25 | Performed by: INTERNAL MEDICINE

## 2022-08-29 PROCEDURE — 0054A COVID-19,PF,PFIZER (12+ YRS): CPT | Performed by: INTERNAL MEDICINE

## 2022-08-29 RX ORDER — INSULIN GLARGINE 100 [IU]/ML
36 INJECTION, SOLUTION SUBCUTANEOUS AT BEDTIME
Qty: 30 ML | Refills: 4 | Status: SHIPPED | OUTPATIENT
Start: 2022-08-29 | End: 2022-10-21

## 2022-08-29 NOTE — PROGRESS NOTES
Assessment & Plan     Type 2 diabetes mellitus with hyperglycemia, with long-term current use of insulin (H)  Her A1C did not change much, likely due to weight staying stable, not down.   She is now agreeable to med increase, will increase lantus from 28 to 36 U  She is still on sulfonourea, will work on that next visit.   - Hemoglobin A1c; Future  - Hemoglobin A1c  - insulin glargine (LANTUS SOLOSTAR) 100 UNIT/ML pen; Inject 36 Units Subcutaneous At Bedtime    Hypertension goal BP (blood pressure) < 140/90   controlled     BMI 45.0-49.9, adult (H)   she is applying her previous diet starting now.     Hyperlipidemia LDL goal <100       Need for shingles vaccine     - ZOSTER VACCINE RECOMBINANT ADJUVANTED (SHINGRIX)    High priority for 2019-nCoV vaccine     - COVID-19,PF,PFIZER (12+ Yrs GRAY LABEL)    Screen for colon cancer   has Ph#    Lumbar back pain   - XR Thoracic Lumbar Spine 2 Views; Future  Bilateral thoracic back pain, unspecified chronicity   - XR Thoracic Lumbar Spine 2 Views; Future  Her xrays show very mild dejenerative joint arthritis.  I think she is having symptoms of lumbar strain, and her plan of engaging fully in weight loss plan will be the most effective.  She has HEP from previous physical therapy work.        I spent a total of 30 minutes on the day of the visit.   Time spent doing chart review, history and exam, documentation and further activities per the note             Return in about 3 months (around 11/29/2022) for Routine Visit, diabetes follow up, Lab Work.         Gloria Mitchell MD  [facility] [place] Ray is a 52 year old, presenting for the following health issues:  Diabetes  53 y/o F here for DM check.  H/o obesity, HTN, panic attacks, iron deficiency (Ferritin normal 1/2021), breast reduction, hyperlipidemia, T2DM (insulin, jardiance, glimepiride, has CGM, Freestyle).       Just now starting to implement some diet changes.     HgbA1C is  "7.6     She \"tweaked her back\" about a week ago.   Spasms up and down her entire back for about 30\"   Trigger was just getting oob.    Sometime her entire back will spasm while walking and stops her in her tracks.  A few times per day.  Then it lets up.   Back is stiff at end of day.    Has had similar pains for years,  Has HEP from previous Physical Therapy.          History of Present Illness       Diabetes:   She presents for follow up of diabetes.  She is not checking blood glucose. She has no concerns regarding her diabetes at this time.  She is having numbness in feet, burning in feet and weight gain.         She eats 2-3 servings of fruits and vegetables daily.She consumes 3 sweetened beverage(s) daily.She exercises with enough effort to increase her heart rate 10 to 19 minutes per day.  She exercises with enough effort to increase her heart rate 3 or less days per week.   She is taking medications regularly.             Review of Systems   Constitutional, HEENT, cardiovascular, pulmonary, gi and gu systems are negative, except as otherwise noted.      Objective    /58 (BP Location: Right arm, Patient Position: Sitting, Cuff Size: Adult Large)   Pulse 99   Temp 98  F (36.7  C) (Oral)   Wt 134.3 kg (296 lb)   SpO2 97%   BMI 49.56 kg/m    There is no height or weight on file to calculate BMI.  Physical Exam   GENERAL: healthy, alert and no distress  EYES: Eyes grossly normal to inspection, PERRL and conjunctivae and sclerae normal  MS: no gross musculoskeletal defects noted, no edema  NEURO: Normal strength and tone, mentation intact and speech normal  PSYCH: mentation appears normal, affect normal/bright    Results for orders placed or performed in visit on 08/29/22 (from the past 24 hour(s))   Hemoglobin A1c   Result Value Ref Range    Hemoglobin A1C 7.6 (H) 0.0 - 5.6 %                   .  ..  "

## 2022-08-29 NOTE — PATIENT INSTRUCTIONS
Recheck labs (fasting) in late NOvember    Vv or e visit for lab review.  (F2f jay)     HEP for back.

## 2022-10-01 DIAGNOSIS — I10 BENIGN ESSENTIAL HYPERTENSION: ICD-10-CM

## 2022-10-01 DIAGNOSIS — M54.16 LUMBAR BACK PAIN WITH RADICULOPATHY AFFECTING RIGHT LOWER EXTREMITY: ICD-10-CM

## 2022-10-01 DIAGNOSIS — R00.0 TACHYCARDIA: ICD-10-CM

## 2022-10-03 RX ORDER — CARVEDILOL 6.25 MG/1
TABLET ORAL
Qty: 180 TABLET | Refills: 1 | Status: SHIPPED | OUTPATIENT
Start: 2022-10-03 | End: 2023-03-20

## 2022-10-03 RX ORDER — CYCLOBENZAPRINE HCL 5 MG
5 TABLET ORAL 2 TIMES DAILY PRN
Qty: 60 TABLET | Refills: 1 | Status: SHIPPED | OUTPATIENT
Start: 2022-10-03 | End: 2022-12-27

## 2022-10-03 RX ORDER — CHLORTHALIDONE 25 MG/1
TABLET ORAL
Qty: 90 TABLET | Refills: 0 | Status: SHIPPED | OUTPATIENT
Start: 2022-10-03 | End: 2022-12-26

## 2022-10-03 RX ORDER — LISINOPRIL 10 MG/1
TABLET ORAL
Qty: 90 TABLET | Refills: 0 | Status: SHIPPED | OUTPATIENT
Start: 2022-10-03 | End: 2022-12-26

## 2022-10-20 DIAGNOSIS — E11.65 TYPE 2 DIABETES MELLITUS WITH HYPERGLYCEMIA, WITH LONG-TERM CURRENT USE OF INSULIN (H): ICD-10-CM

## 2022-10-20 DIAGNOSIS — Z79.4 TYPE 2 DIABETES MELLITUS WITH HYPERGLYCEMIA, WITH LONG-TERM CURRENT USE OF INSULIN (H): ICD-10-CM

## 2022-10-21 RX ORDER — INSULIN GLARGINE 100 [IU]/ML
36 INJECTION, SOLUTION SUBCUTANEOUS AT BEDTIME
Qty: 30 ML | Refills: 4 | Status: SHIPPED | OUTPATIENT
Start: 2022-10-21 | End: 2023-11-22

## 2022-11-28 ENCOUNTER — MYC MEDICAL ADVICE (OUTPATIENT)
Dept: INTERNAL MEDICINE | Facility: CLINIC | Age: 52
End: 2022-11-28

## 2022-12-04 DIAGNOSIS — E11.65 TYPE 2 DIABETES MELLITUS WITH HYPERGLYCEMIA, WITHOUT LONG-TERM CURRENT USE OF INSULIN (H): ICD-10-CM

## 2022-12-04 RX ORDER — GLIMEPIRIDE 1 MG/1
TABLET ORAL
Qty: 90 TABLET | Refills: 3 | Status: SHIPPED | OUTPATIENT
Start: 2022-12-04 | End: 2022-12-06

## 2022-12-06 ENCOUNTER — LAB (OUTPATIENT)
Dept: LAB | Facility: CLINIC | Age: 52
End: 2022-12-06
Payer: COMMERCIAL

## 2022-12-06 DIAGNOSIS — Z79.4 TYPE 2 DIABETES MELLITUS, WITH LONG-TERM CURRENT USE OF INSULIN (H): ICD-10-CM

## 2022-12-06 DIAGNOSIS — I10 HYPERTENSION GOAL BP (BLOOD PRESSURE) < 140/90: ICD-10-CM

## 2022-12-06 DIAGNOSIS — E11.9 TYPE 2 DIABETES MELLITUS, WITH LONG-TERM CURRENT USE OF INSULIN (H): ICD-10-CM

## 2022-12-06 DIAGNOSIS — E11.65 TYPE 2 DIABETES MELLITUS WITH HYPERGLYCEMIA, WITHOUT LONG-TERM CURRENT USE OF INSULIN (H): ICD-10-CM

## 2022-12-06 LAB
ANION GAP SERPL CALCULATED.3IONS-SCNC: 5 MMOL/L (ref 3–14)
BUN SERPL-MCNC: 11 MG/DL (ref 7–30)
CALCIUM SERPL-MCNC: 9.7 MG/DL (ref 8.5–10.1)
CHLORIDE BLD-SCNC: 103 MMOL/L (ref 94–109)
CO2 SERPL-SCNC: 30 MMOL/L (ref 20–32)
CREAT SERPL-MCNC: 0.56 MG/DL (ref 0.52–1.04)
CREAT UR-MCNC: 42 MG/DL
GFR SERPL CREATININE-BSD FRML MDRD: >90 ML/MIN/1.73M2
GLUCOSE BLD-MCNC: 181 MG/DL (ref 70–99)
HBA1C MFR BLD: 7.9 % (ref 0–5.6)
MICROALBUMIN UR-MCNC: <5 MG/L
MICROALBUMIN/CREAT UR: NORMAL MG/G{CREAT}
POTASSIUM BLD-SCNC: 3.9 MMOL/L (ref 3.4–5.3)
SODIUM SERPL-SCNC: 138 MMOL/L (ref 133–144)

## 2022-12-06 PROCEDURE — 83036 HEMOGLOBIN GLYCOSYLATED A1C: CPT

## 2022-12-06 PROCEDURE — 82043 UR ALBUMIN QUANTITATIVE: CPT

## 2022-12-06 PROCEDURE — 80048 BASIC METABOLIC PNL TOTAL CA: CPT

## 2022-12-06 PROCEDURE — 36415 COLL VENOUS BLD VENIPUNCTURE: CPT

## 2022-12-06 RX ORDER — GLIMEPIRIDE 2 MG/1
2 TABLET ORAL
Qty: 90 TABLET | Refills: 3 | Status: SHIPPED | OUTPATIENT
Start: 2022-12-06 | End: 2023-11-09

## 2022-12-06 NOTE — RESULT ENCOUNTER NOTE
Martin Ray    The HgbA1C was a bit high.   I took the liberty of increasing your glimeperide to 2mg daily    So you can take two of your 1mg tabs daily until supply gone  Then when you refill, the pharmacy will give you the 2 mg tabs, you will then just take one of these each morning.     Sincerely,       EVERT ARNDT M.D.

## 2022-12-09 ENCOUNTER — OFFICE VISIT (OUTPATIENT)
Dept: FAMILY MEDICINE | Facility: CLINIC | Age: 52
End: 2022-12-09
Payer: COMMERCIAL

## 2022-12-09 VITALS
HEART RATE: 110 BPM | SYSTOLIC BLOOD PRESSURE: 125 MMHG | RESPIRATION RATE: 18 BRPM | TEMPERATURE: 98 F | DIASTOLIC BLOOD PRESSURE: 84 MMHG | OXYGEN SATURATION: 95 %

## 2022-12-09 DIAGNOSIS — S61.211A LACERATION OF LEFT INDEX FINGER WITHOUT FOREIGN BODY WITHOUT DAMAGE TO NAIL, INITIAL ENCOUNTER: Primary | ICD-10-CM

## 2022-12-09 PROCEDURE — 12001 RPR S/N/AX/GEN/TRNK 2.5CM/<: CPT | Mod: F1 | Performed by: FAMILY MEDICINE

## 2022-12-09 NOTE — PROGRESS NOTES
SUBJECTIVE:   52 year old female sustained laceration of left index finger, about1 hours ago. Nature of injury: cut with sharp object (pair of scissors). Tetanus vaccination status reviewed: last tetanus booster within 10 years.     OBJECTIVE:   Patient appears well, vitals are normal. Laceration 1 cm noted.  Description: clean wound edges, no foreign bodies. Neurovascular and tendon structures are intact.    ASSESSMENT:   Laceration as described.    PLAN:   Anesthesia with 1% Lidocaine without Epinephrine. Wound cleansed, debrided of visible foreign material and necrotic tissue, and sutured. Antibiotic ointment and dressing applied.  Wound care instructions provided.  Observe for any signs of infection or other problems.  Return for suture removal in 7 days.

## 2022-12-16 ENCOUNTER — ALLIED HEALTH/NURSE VISIT (OUTPATIENT)
Dept: FAMILY MEDICINE | Facility: CLINIC | Age: 52
End: 2022-12-16
Payer: COMMERCIAL

## 2022-12-16 DIAGNOSIS — S61.211A LACERATION OF LEFT INDEX FINGER WITHOUT FOREIGN BODY WITHOUT DAMAGE TO NAIL, INITIAL ENCOUNTER: Primary | ICD-10-CM

## 2022-12-16 PROCEDURE — 99207 PR NO CHARGE NURSE ONLY: CPT

## 2022-12-16 NOTE — PROGRESS NOTES
"Flor Fernandez presents to the clinic for removal of {SUTURE:455247::\"sutures,staples, steri strips\"}. The patient has had {SUTURE:735317::\"sutures\"} in place for {NUMBERS 1-16:10} days. There has been no patient reported signs or symptoms of infection or drainage. {NUMBERS 1-16:10}  {SUTURE:055838::\"sutures,staples, staple, steri strips\"} are seen and located on the ***. Tetanus status is up to date. All {SUTURE:881030::\"sutures,staples, steri strips\"} were easily removed today. Routine wound care discussed by the RN or provider. The patient will follow up as needed.      "

## 2022-12-16 NOTE — PROGRESS NOTES
Flor Fernandez presents to the clinic today for removal of sutures.  The patient has had the sutures in place for 7 days.  There has been no history of infection or drainage.  3 sutures are seen located on the left index finger.  The wound is healing well with no signs of infection.  Tetanus status is up to date.   All sutures were easily removed today.  Routine wound care discussed.  The patient will follow up as needed.    Andreina Jung RN  [facility]

## 2022-12-16 NOTE — NURSING NOTE
Florjovon Fernandez presents to the clinic for removal of sutures and sutures,staples, steri strips. The patient has had sutures in place for 7 days. There has been no patient reported signs or symptoms of infection or drainage. 4  sutures and sutures,staples, staple, steri strips are seen and located on the left finger. Tetanus status is up to date. All sutures and sutures,staples, steri strips were easily removed today. Routine wound care discussed by the RN or provider. The patient will follow up as needed.

## 2022-12-25 DIAGNOSIS — I10 BENIGN ESSENTIAL HYPERTENSION: ICD-10-CM

## 2022-12-26 RX ORDER — CHLORTHALIDONE 25 MG/1
TABLET ORAL
Qty: 90 TABLET | Refills: 0 | Status: SHIPPED | OUTPATIENT
Start: 2022-12-26 | End: 2023-03-20

## 2022-12-26 RX ORDER — LISINOPRIL 10 MG/1
TABLET ORAL
Qty: 90 TABLET | Refills: 0 | Status: SHIPPED | OUTPATIENT
Start: 2022-12-26 | End: 2023-03-20

## 2022-12-26 NOTE — TELEPHONE ENCOUNTER
"Requested Prescriptions   Signed Prescriptions Disp Refills    chlorthalidone (HYGROTON) 25 MG tablet 90 tablet 0     Sig: TAKE ONE TABLET BY MOUTH ONCE DAILY       Diuretics (Including Combos) Protocol Passed - 12/25/2022  5:02 AM        Passed - Blood pressure under 140/90 in past 12 months     BP Readings from Last 3 Encounters:   12/09/22 125/84   08/29/22 103/58   05/23/22 108/78                 Passed - Recent (12 mo) or future (30 days) visit within the authorizing provider's specialty     Patient has had an office visit with the authorizing provider or a provider within the authorizing providers department within the previous 12 mos or has a future within next 30 days. See \"Patient Info\" tab in inbasket, or \"Choose Columns\" in Meds & Orders section of the refill encounter.              Passed - Medication is active on med list        Passed - Patient is age 18 or older        Passed - No active pregancy on record        Passed - Normal serum creatinine on file in past 12 months     Recent Labs   Lab Test 12/06/22  0713   CR 0.56              Passed - Normal serum potassium on file in past 12 months     Recent Labs   Lab Test 12/06/22  0713   POTASSIUM 3.9                    Passed - Normal serum sodium on file in past 12 months     Recent Labs   Lab Test 12/06/22  0713                 Passed - No positive pregnancy test in past 12 months          lisinopril (ZESTRIL) 10 MG tablet 90 tablet 0     Sig: TAKE ONE TABLET BY MOUTH ONCE DAILY       ACE Inhibitors (Including Combos) Protocol Passed - 12/25/2022  5:02 AM        Passed - Blood pressure under 140/90 in past 12 months     BP Readings from Last 3 Encounters:   12/09/22 125/84   08/29/22 103/58   05/23/22 108/78                 Passed - Recent (12 mo) or future (30 days) visit within the authorizing provider's specialty     Patient has had an office visit with the authorizing provider or a provider within the authorizing providers department within " "the previous 12 mos or has a future within next 30 days. See \"Patient Info\" tab in inbasket, or \"Choose Columns\" in Meds & Orders section of the refill encounter.              Passed - Medication is active on med list        Passed - Patient is age 18 or older        Passed - No active pregnancy on record        Passed - Normal serum creatinine on file in past 12 months     Recent Labs   Lab Test 12/06/22  0713   CR 0.56       Ok to refill medication if creatinine is low          Passed - Normal serum potassium on file in past 12 months     Recent Labs   Lab Test 12/06/22  0713   POTASSIUM 3.9             Passed - No positive pregnancy test within past 12 months           Lili Coburn RN  [facility]     "

## 2022-12-27 DIAGNOSIS — M54.16 LUMBAR BACK PAIN WITH RADICULOPATHY AFFECTING RIGHT LOWER EXTREMITY: ICD-10-CM

## 2022-12-27 RX ORDER — CYCLOBENZAPRINE HCL 5 MG
5 TABLET ORAL 2 TIMES DAILY PRN
Qty: 60 TABLET | Refills: 1 | Status: SHIPPED | OUTPATIENT
Start: 2022-12-27 | End: 2023-03-20

## 2023-01-05 ENCOUNTER — VIRTUAL VISIT (OUTPATIENT)
Dept: INTERNAL MEDICINE | Facility: CLINIC | Age: 53
End: 2023-01-05
Payer: COMMERCIAL

## 2023-01-05 DIAGNOSIS — Z79.4 TYPE 2 DIABETES MELLITUS WITH HYPERGLYCEMIA, WITH LONG-TERM CURRENT USE OF INSULIN (H): Primary | ICD-10-CM

## 2023-01-05 DIAGNOSIS — E11.65 TYPE 2 DIABETES MELLITUS WITH HYPERGLYCEMIA, WITH LONG-TERM CURRENT USE OF INSULIN (H): Primary | ICD-10-CM

## 2023-01-05 DIAGNOSIS — E66.813 CLASS 3 SEVERE OBESITY DUE TO EXCESS CALORIES WITH SERIOUS COMORBIDITY AND BODY MASS INDEX (BMI) OF 45.0 TO 49.9 IN ADULT (H): ICD-10-CM

## 2023-01-05 DIAGNOSIS — E66.01 CLASS 3 SEVERE OBESITY DUE TO EXCESS CALORIES WITH SERIOUS COMORBIDITY AND BODY MASS INDEX (BMI) OF 45.0 TO 49.9 IN ADULT (H): ICD-10-CM

## 2023-01-05 PROCEDURE — 99213 OFFICE O/P EST LOW 20 MIN: CPT | Mod: TEL | Performed by: INTERNAL MEDICINE

## 2023-01-05 NOTE — PROGRESS NOTES
"Flor is a 52 year old who is being evaluated via a billable telephone visit.      What phone number would you like to be contacted at? [phone]  How would you like to obtain your AVS? Sebastien    Distant Location (provider location):  On-site    Assessment & Plan     Type 2 diabetes mellitus with hyperglycemia, with long-term current use of insulin (H)  Not checking other than am:  Asked her to check at least BID for more data  We increased glipizide a little bit last month, she \"Feels better\" but not checking BS much so unable to report results on BS  Will work on weight loss (her latest trend is gaining) and f/u for AFE in April.        Class 3 severe obesity due to excess calories with serious comorbidity and body mass index (BMI) of 45.0 to 49.9 in adult (H)   DM diet  Declines DM education, understands what is needed.         I spent a total of 18 minutes on the day of the visit.   Time spent doing chart review, history and exam, documentation and further activities per the note        BMI:   Estimated body mass index is 49.56 kg/m  as calculated from the following:    Height as of 4/12/22: 1.646 m (5' 4.8\").    Weight as of 8/29/22: 134.3 kg (296 lb).   Weight management plan: DM diet         No follow-ups on file.    Gloria Mitchell MD  [facility]    Subjective   Flor is a 52 year old, presenting for the following health issues:  No chief complaint on file.    53 y/o F here for VV, med check.   H/o obesity, HTN, panic attacks, iron deficiency (Ferritin normal 1/2021), breast reduction, hyperlipidemia, T2DM (insulin, jardiance, glimepiride, has CGM, Freestyle)  >glipizide increased to 2 mg daily, see 12/6/22 results note  > Lantus insulin increased 3M ago  > Recent A1C increased, now at 7.9 on 12/6/22   > Weight has been increasing a little.      HPI     Medication Followup of all meds    Taking Medication as prescribed: yes    Side Effects:  None    Medication Helping " Symptoms:  yes        Review of Systems   Constitutional, HEENT, cardiovascular, pulmonary, gi and gu systems are negative, except as otherwise noted.      Objective           Vitals:  No vitals were obtained today due to virtual visit.    Physical Exam   healthy, alert, no distress and cooperative  PSYCH: Alert and oriented times 3; coherent speech, normal   rate and volume, able to articulate logical thoughts, able   to abstract reason, no tangential thoughts, no hallucinations   or delusions  Her affect is normal and pleasant  RESP: No cough, no audible wheezing, able to talk in full sentences  Remainder of exam unable to be completed due to telephone visits    No results found for any visits on 01/05/23.            Phone call duration: 17 minutes

## 2023-01-30 ENCOUNTER — TELEPHONE (OUTPATIENT)
Dept: FAMILY MEDICINE | Facility: CLINIC | Age: 53
End: 2023-01-30
Payer: COMMERCIAL

## 2023-01-30 NOTE — LETTER
Re:   Flor Fernandez  Appeal for Continuous Glucose monitor  :  1970      To Whom It May Concern       Please reconsider your denial of a Freestyle Simon 14 sensor system.     Mercy has insulin dependent Type 2 diabetes  She is on several other medications for her diabetes other than insulin    She requires frequent blood sugar checks to optimize control of her blood sugar due to long hours at a demanding job and then changes in activity on weekdays.     The continuous blood glucose monitoring is an essential part of her optimizing blood sugar control in many different contexts.    It has improved her diabetes control dramatically      Your review and understanding is appreciated    Sincerely,       EVERT ARNDT M.D.   [phone] ()  [phone] (fax)

## 2023-02-01 NOTE — TELEPHONE ENCOUNTER
PRIOR AUTHORIZATION DENIED    Medication: Freestyle Simon 14 Sensor    Denial Date: 2/1/2023    Denial Rational:              Appeal Information:    If you would like to appeal, please supply P/A team with a letter of medical necessity with clinical reason.

## 2023-02-01 NOTE — TELEPHONE ENCOUNTER
Medication Appeal Initiation    We have initiated an appeal for the requested medication:  Medication: Freestyle Simon 14 Sensor  Appeal Start Date:  2/1/2023  Insurance Company: for[MD] - Phone [phone] Fax [phone]  Comments:

## 2023-02-01 NOTE — TELEPHONE ENCOUNTER
Central Prior Authorization Team   Phone: [phone]    PA Initiation    Medication: Freestyle Simon 14 Sensor  Insurance Company: [employer] - Phone [phone] Fax [phone]  Pharmacy Filling the Rx: [place] [facility] - [address]  Filling Pharmacy Phone: [phone]  Filling Pharmacy Fax:    Start Date: 2/1/2023

## 2023-02-02 ENCOUNTER — TELEPHONE (OUTPATIENT)
Dept: FAMILY MEDICINE | Facility: CLINIC | Age: 53
End: 2023-02-02
Payer: COMMERCIAL

## 2023-02-02 NOTE — TELEPHONE ENCOUNTER
"Dr. Mitchell,     [employer] called and stated that there is a formulary exception review available for an appeal on Carmine. \"If provider wants to know the decision, line to call is [phone]\". They will mail letter to PCP and patient as well.     Thanks,  MINDA Pearson  [facility]     "

## 2023-02-07 NOTE — TELEPHONE ENCOUNTER
MEDICATION APPEAL APPROVED    Medication: Freestyle Simon 14 Sensor  Authorization Effective Date:    Authorization Expiration Date:    Approved Dose/Quantity:    Reference #:     Insurance Company: [employer] - Phone [phone] Fax [phone]  Expected CoPay:       CoPay Card Available:      Foundation Assistance Needed:    Which Pharmacy is filling the prescription (Not needed for infusion/clinic administered): [facility] [place] - [address]      Checked with the pharmacy and they received a paid claim.

## 2023-03-19 DIAGNOSIS — R00.0 TACHYCARDIA: ICD-10-CM

## 2023-03-19 DIAGNOSIS — I10 BENIGN ESSENTIAL HYPERTENSION: ICD-10-CM

## 2023-03-20 ENCOUNTER — TELEPHONE (OUTPATIENT)
Dept: OBGYN | Facility: CLINIC | Age: 53
End: 2023-03-20
Payer: COMMERCIAL

## 2023-03-20 ENCOUNTER — TELEPHONE (OUTPATIENT)
Dept: GASTROENTEROLOGY | Facility: CLINIC | Age: 53
End: 2023-03-20
Payer: COMMERCIAL

## 2023-03-20 DIAGNOSIS — Z12.11 SCREENING FOR COLON CANCER: Primary | ICD-10-CM

## 2023-03-20 DIAGNOSIS — M54.16 LUMBAR BACK PAIN WITH RADICULOPATHY AFFECTING RIGHT LOWER EXTREMITY: ICD-10-CM

## 2023-03-20 RX ORDER — CYCLOBENZAPRINE HCL 5 MG
5 TABLET ORAL 2 TIMES DAILY PRN
Qty: 60 TABLET | Refills: 1 | Status: SHIPPED | OUTPATIENT
Start: 2023-03-20 | End: 2023-05-15

## 2023-03-20 RX ORDER — LISINOPRIL 10 MG/1
TABLET ORAL
Qty: 90 TABLET | Refills: 0 | Status: SHIPPED | OUTPATIENT
Start: 2023-03-20 | End: 2023-06-12

## 2023-03-20 RX ORDER — CHLORTHALIDONE 25 MG/1
TABLET ORAL
Qty: 90 TABLET | Refills: 0 | Status: SHIPPED | OUTPATIENT
Start: 2023-03-20 | End: 2023-06-12

## 2023-03-20 RX ORDER — CARVEDILOL 6.25 MG/1
TABLET ORAL
Qty: 180 TABLET | Refills: 1 | Status: SHIPPED | OUTPATIENT
Start: 2023-03-20 | End: 2023-06-26

## 2023-03-20 NOTE — TELEPHONE ENCOUNTER
Screening Questions  BLUE  KIND OF PREP RED  LOCATION [review exclusion criteria] GREEN  SEDATION TYPE        y Are you active on mychart?       Delisa Stallworth, JAGJIT CNP  Ordering/Referring Provider?        [facility] What type of coverage do you have?      N Have you had a positive covid test in the last 14 days?     46.8 1. BMI  [BMI 40+ - review exclusion criteria]    Y  2. Are you able to give consent for your medical care? [IF NO,RN REVIEW]          N  3. Are you taking any prescription pain medications on a routine schedule   (ex narcotics: oxycodone, roxicodone, oxycontin,  and percocet)? [RN Review]          3a. EXTENDED PREP What kind of prescription?     N 4. Do you have any chemical dependencies such as alcohol, street drugs, or methadone?        **If yes 3- 5 , please schedule with MAC sedation.**          IF YES TO ANY 6 - 10 - HOSPITAL SETTING ONLY.     N 6.   Do you need assistance transferring?     N 7.   Have you had a heart or lung transplant?    N 8.   Are you currently on dialysis?   N 9.   Do you use daily home oxygen?   N 10. Do you take nitroglycerin?   10a.  If yes, how often?     11. [FEMALES]  N Are you currently pregnant?    11a.  If yes, how many weeks? [ Greater than 12 weeks, OR NEEDED]    N 12. Do you have Pulmonary Hypertension? *NEED PAC APPT AT [facility] w/ MAC*     N 13. [review exclusion criteria]  Do you have any implantable devices in your body (pacemaker, defib, LVAD)?    N 14. In the past 6 months, have you had any heart related issues including cardiomyopathy or heart attack?     14a.  If yes, did it require cardiac stenting if so when?     N 15. Have you had a stroke or Transient ischemic attack (TIA - aka  mini stroke ) within 6 months?      N 16. Do you have mod to severe Obstructive Sleep Apnea?  [Hospital only]    N 17. Do you have SEVERE AND UNCONTROLLED asthma? *NEED PAC APPT AT [facility] w/MAC*     18. Are you currently taking any blood thinners?     18a. No. Continue  "to 19.   18b. Yes/no Blood Thinner: No [CONTINUE TO #19]    N 19. Do you take the medication Phentermine?    19a. If yes, \"Hold for 7 days before procedure.  Please consult your prescribing provider if you have questions about holding this medication.\"     N  20. Do you have chronic kidney disease?      Y  21. Do you have a diagnosis of diabetes?     N  22. On a regular basis do you go 3-5 days between bowel movements?      23. Preferred LOCAL Pharmacy for Pre Prescription    [ LIST ONLY ONE PHARMACY]        [facility] [place], [place] - [address]          - CLOSING REMINDERS -    Informed patient they will need an adult    Cannot take any type of public or medical transportation alone    Conscious Sedation- Needs  for 6 hours after the procedure       MAC/General-Needs  for 24 hours after procedure    Pre-Procedure Covid test to be completed [[facility] PCR Testing Required]    Confirmed Nurse will call to complete assessment       - SCHEDULING DETAILS -  N Hospital Setting Required? If yes, what is the exclusion?:    ROSENDA  Surgeon    05/22/2023  Date of Procedure  Lower Endoscopy [Colonoscopy]  Type of Procedure Scheduled  [facility] EXTENDED - If you answer yes to questions #1, #3, #22 ([place] and CF pts)Which Colonoscopy Prep was Sent?     CS Sedation Type     N PAC / Pre-op Required               "

## 2023-03-20 NOTE — TELEPHONE ENCOUNTER
----- Message from Dillon Ortega sent at 3/20/2023  1:30 PM CDT -----  Regarding: Endoscopy Referral  Hello,    Would you please place a new order for patient as the on placed on 22 will be  by the time patient comes in for procedure.      Thank you,  Dillon MCKEON  Endoscopy Scheduling Team

## 2023-04-23 ENCOUNTER — HEALTH MAINTENANCE LETTER (OUTPATIENT)
Age: 53
End: 2023-04-23

## 2023-05-05 ENCOUNTER — TELEPHONE (OUTPATIENT)
Dept: GASTROENTEROLOGY | Facility: CLINIC | Age: 53
End: 2023-05-05

## 2023-05-05 DIAGNOSIS — Z12.11 SCREENING FOR COLON CANCER: Primary | ICD-10-CM

## 2023-05-05 RX ORDER — BISACODYL 5 MG/1
TABLET, DELAYED RELEASE ORAL
Qty: 4 TABLET | Refills: 0 | Status: SHIPPED | OUTPATIENT
Start: 2023-05-05 | End: 2023-06-26

## 2023-05-05 NOTE — TELEPHONE ENCOUNTER
Attempted to contact patient regarding upcoming Colonoscopy  procedure on 05.22.2023 for pre assessment questions. No answer.     Left message to return call to [phone] #4    Discuss Covid policy and designated  policy.    Pre op exam? N/A    Arrival time: 1015. Procedure time: 1100    Facility location: [facility]; [address], [place] [address]    Sedation type: Conscious sedation     NSAIDs? No NSAID medications per patient's medication list.  RN will verify with pre-assessment call.    Anticoagulants: No    Electronic implanted devices? No    Diabetic? Yes - Patient to hold oral diabetic medications day of procedure    Indication for procedure: screening colonoscopy    Bowel prep recommendation: Extended prep Golytely  d/t BMI    Get most recent height/weight from patient d/t being close to above 50 BMI from last charted    Prep instructions sent via [facility]. Bowel prep script sent to    [facility] - [address]    Nikky Giordano RN  Endoscopy Procedure Pre Assessment RN

## 2023-05-09 NOTE — TELEPHONE ENCOUNTER
"Pt returning call    Pre assessment questions completed for upcoming Colonoscopy  procedure scheduled on 5/22/23    COVID policy reviewed.     Reviewed procedural arrival time 1015, procedure time 1100 and facility location [facility]; [address], [place] [address]    Designated  policy reviewed. Instructed to have someone stay 6 hours post procedure.     NSAIDs? No    Diabetic? Yes - Patient to hold oral diabetic medications day of procedure - Pt instructed to hold oral meds    Pt reports her Ht: 5' 6\"  - Wt: 298 - BMI 48    Reviewed procedure prep instructions.      Patient verbalized understanding and had no questions or concerns at this time.    Nikky Jolley, RN  Endoscopy Procedure Pre Assessment RN      "

## 2023-05-15 DIAGNOSIS — M54.16 LUMBAR BACK PAIN WITH RADICULOPATHY AFFECTING RIGHT LOWER EXTREMITY: ICD-10-CM

## 2023-05-15 RX ORDER — CYCLOBENZAPRINE HCL 5 MG
5 TABLET ORAL 2 TIMES DAILY PRN
Qty: 60 TABLET | Refills: 1 | Status: SHIPPED | OUTPATIENT
Start: 2023-05-15 | End: 2023-07-07

## 2023-05-21 ENCOUNTER — ANESTHESIA EVENT (OUTPATIENT)
Dept: SURGERY | Facility: AMBULATORY SURGERY CENTER | Age: 53
End: 2023-05-21
Payer: COMMERCIAL

## 2023-05-21 NOTE — ANESTHESIA PREPROCEDURE EVALUATION
Anesthesia Pre-Procedure Evaluation    Patient: Flor Fernandez   MRN: [id number] : 1970        Procedure : Procedure(s):  Colonoscopy with CO2 insufflation          Past Medical History:   Diagnosis Date     Anxiety      Cerebral infarction (H)      Diabetes (H)      Hypertension      Morbid obesity (H)      Tachycardia      Vertigo       Past Surgical History:   Procedure Laterality Date     APPENDECTOMY       [facility] AFF SURGICAL PATHOLOGY  2016     MAMMOPLASTY REDUCTION BILATERAL        Allergies   Allergen Reactions     Metformin GI Disturbance      Social History     Tobacco Use     Smoking status: Former     Types: Cigarettes     Quit date: 2014     Years since quittin.1     Smokeless tobacco: Never   Vaping Use     Vaping status: Not on file   Substance Use Topics     Alcohol use: Yes     Comment: rare      Wt Readings from Last 1 Encounters:   22 134.3 kg (296 lb)           Physical Exam    Airway        Mallampati: III   TM distance: > 3 FB   Neck ROM: full   Mouth opening: > 3 cm    Respiratory Devices and Support         Dental           Cardiovascular             Pulmonary                   OUTSIDE LABS:  CBC:   Lab Results   Component Value Date    WBC 11.6 (H) 2021    WBC 11.0 2020    HGB 15.5 2021    HGB 16.2 (H) 2020    HCT 48.1 (H) 2021    HCT 48.8 (H) 2020     2021     2020     BMP:   Lab Results   Component Value Date     2022     2021    POTASSIUM 3.9 2022    POTASSIUM 3.5 2021    CHLORIDE 103 2022    CHLORIDE 103 2021    CO2 30 2022    CO2 28 2021    BUN 11 2022    BUN 14 2021    CR 0.56 2022    CR 0.57 2021     (H) 2022     (H) 2021     COAGS: No results found for: PTT, INR, FIBR  POC: No results found for: BGM, HCG, HCGS  HEPATIC:   Lab Results   Component Value Date    ALBUMIN 3.7 2021     PROTTOTAL 7.6 01/04/2021    ALT 70 (H) 01/04/2021    AST 51 (H) 01/04/2021    ALKPHOS 137 01/04/2021    BILITOTAL 0.6 01/04/2021     OTHER:   Lab Results   Component Value Date    A1C 7.9 (H) 12/06/2022    MIC 9.7 12/06/2022    TSH 3.82 08/12/2019    CRP 16.9 (H) 02/08/2021    SED 48 (H) 02/08/2021       Anesthesia Plan    ASA Status:  3   NPO Status:  NPO Appropriate    Anesthesia Type: MAC.     - Reason for MAC: straight local not clinically adequate   Induction: Intravenous, Propofol.   Maintenance: TIVA.        Consents    Anesthesia Plan(s) and associated risks, benefits, and realistic alternatives discussed. Questions answered and patient/representative(s) expressed understanding.    - Discussed:     - Discussed with:  Patient, Other (See Comment) (daughter)      - Extended Intubation/Ventilatory Support Discussed: No.      - Patient is DNR/DNI Status: No    Use of blood products discussed: No .     Postoperative Care       PONV prophylaxis: Ondansetron (or other 5HT-3), Background Propofol Infusion     Comments:                Vivek Pitts MD

## 2023-05-22 ENCOUNTER — HOSPITAL ENCOUNTER (OUTPATIENT)
Facility: AMBULATORY SURGERY CENTER | Age: 53
Discharge: HOME | End: 2023-05-22
Attending: INTERNAL MEDICINE
Payer: COMMERCIAL

## 2023-05-22 ENCOUNTER — ANESTHESIA (OUTPATIENT)
Dept: SURGERY | Facility: AMBULATORY SURGERY CENTER | Age: 53
End: 2023-05-22
Payer: COMMERCIAL

## 2023-05-22 VITALS
WEIGHT: 289 LBS | DIASTOLIC BLOOD PRESSURE: 68 MMHG | RESPIRATION RATE: 16 BRPM | BODY MASS INDEX: 48.39 KG/M2 | TEMPERATURE: 97.3 F | SYSTOLIC BLOOD PRESSURE: 115 MMHG | HEART RATE: 101 BPM | OXYGEN SATURATION: 96 %

## 2023-05-22 VITALS — HEART RATE: 105 BPM

## 2023-05-22 DIAGNOSIS — M54.16 LUMBAR BACK PAIN WITH RADICULOPATHY AFFECTING RIGHT LOWER EXTREMITY: ICD-10-CM

## 2023-05-22 LAB
COLONOSCOPY: NORMAL
GLUCOSE BLDC GLUCOMTR-MCNC: 143 MG/DL (ref 70–99)

## 2023-05-22 PROCEDURE — G8918 PT W/O PREOP ORDER IV AB PRO: HCPCS

## 2023-05-22 PROCEDURE — G8907 PT DOC NO EVENTS ON DISCHARG: HCPCS

## 2023-05-22 PROCEDURE — 45385 COLONOSCOPY W/LESION REMOVAL: CPT

## 2023-05-22 PROCEDURE — 88305 TISSUE EXAM BY PATHOLOGIST: CPT | Performed by: PATHOLOGY

## 2023-05-22 RX ORDER — NALOXONE HYDROCHLORIDE 0.4 MG/ML
0.4 INJECTION, SOLUTION INTRAMUSCULAR; INTRAVENOUS; SUBCUTANEOUS
Status: DISCONTINUED | OUTPATIENT
Start: 2023-05-22 | End: 2023-05-23 | Stop reason: HOSPADM

## 2023-05-22 RX ORDER — ONDANSETRON 2 MG/ML
4 INJECTION INTRAMUSCULAR; INTRAVENOUS EVERY 6 HOURS PRN
Status: DISCONTINUED | OUTPATIENT
Start: 2023-05-22 | End: 2023-05-23 | Stop reason: HOSPADM

## 2023-05-22 RX ORDER — ONDANSETRON 4 MG/1
4 TABLET, ORALLY DISINTEGRATING ORAL EVERY 6 HOURS PRN
Status: DISCONTINUED | OUTPATIENT
Start: 2023-05-22 | End: 2023-05-23 | Stop reason: HOSPADM

## 2023-05-22 RX ORDER — LIDOCAINE 40 MG/G
CREAM TOPICAL
Status: DISCONTINUED | OUTPATIENT
Start: 2023-05-22 | End: 2023-05-23 | Stop reason: HOSPADM

## 2023-05-22 RX ORDER — SODIUM CHLORIDE, SODIUM LACTATE, POTASSIUM CHLORIDE, CALCIUM CHLORIDE 600; 310; 30; 20 MG/100ML; MG/100ML; MG/100ML; MG/100ML
INJECTION, SOLUTION INTRAVENOUS CONTINUOUS
Status: DISCONTINUED | OUTPATIENT
Start: 2023-05-22 | End: 2023-05-23 | Stop reason: HOSPADM

## 2023-05-22 RX ORDER — PROPOFOL 10 MG/ML
INJECTION, EMULSION INTRAVENOUS CONTINUOUS PRN
Status: DISCONTINUED | OUTPATIENT
Start: 2023-05-22 | End: 2023-05-22

## 2023-05-22 RX ORDER — NALOXONE HYDROCHLORIDE 0.4 MG/ML
0.2 INJECTION, SOLUTION INTRAMUSCULAR; INTRAVENOUS; SUBCUTANEOUS
Status: DISCONTINUED | OUTPATIENT
Start: 2023-05-22 | End: 2023-05-23 | Stop reason: HOSPADM

## 2023-05-22 RX ORDER — ONDANSETRON 2 MG/ML
4 INJECTION INTRAMUSCULAR; INTRAVENOUS
Status: DISCONTINUED | OUTPATIENT
Start: 2023-05-22 | End: 2023-05-23 | Stop reason: HOSPADM

## 2023-05-22 RX ORDER — LIDOCAINE HYDROCHLORIDE 20 MG/ML
INJECTION, SOLUTION INFILTRATION; PERINEURAL PRN
Status: DISCONTINUED | OUTPATIENT
Start: 2023-05-22 | End: 2023-05-22

## 2023-05-22 RX ORDER — PROCHLORPERAZINE MALEATE 10 MG
10 TABLET ORAL EVERY 6 HOURS PRN
Status: DISCONTINUED | OUTPATIENT
Start: 2023-05-22 | End: 2023-05-23 | Stop reason: HOSPADM

## 2023-05-22 RX ORDER — CYCLOBENZAPRINE HCL 5 MG
5 TABLET ORAL 2 TIMES DAILY PRN
Qty: 60 TABLET | Refills: 1 | OUTPATIENT
Start: 2023-05-22

## 2023-05-22 RX ORDER — FLUMAZENIL 0.1 MG/ML
0.2 INJECTION, SOLUTION INTRAVENOUS
Status: ACTIVE | OUTPATIENT
Start: 2023-05-22 | End: 2023-05-22

## 2023-05-22 RX ADMIN — SODIUM CHLORIDE, SODIUM LACTATE, POTASSIUM CHLORIDE, CALCIUM CHLORIDE: 600; 310; 30; 20 INJECTION, SOLUTION INTRAVENOUS at 10:59

## 2023-05-22 RX ADMIN — Medication 100 MCG: at 11:28

## 2023-05-22 RX ADMIN — LIDOCAINE HYDROCHLORIDE 100 MG: 20 INJECTION, SOLUTION INFILTRATION; PERINEURAL at 11:09

## 2023-05-22 RX ADMIN — PROPOFOL 150 MCG/KG/MIN: 10 INJECTION, EMULSION INTRAVENOUS at 11:09

## 2023-05-22 RX ADMIN — PROPOFOL 50 MG: 10 INJECTION, EMULSION INTRAVENOUS at 11:23

## 2023-05-22 RX ADMIN — PROPOFOL 50 MG: 10 INJECTION, EMULSION INTRAVENOUS at 11:13

## 2023-05-22 NOTE — INTERVAL H&P NOTE
I have reviewed the surgical (or preoperative) H&P that is linked to this encounter, and examined the patient. There are no significant changes    Clinical Conditions Present on Arrival:  Clinically Significant Risk Factors Present on Admission                 # Drug Induced Platelet Defect: home medication list includes an antiplatelet medication  # DMII: A1C = N/A within past 6 months

## 2023-05-22 NOTE — ANESTHESIA CARE TRANSFER NOTE
Patient: Flor Fernandez    Procedure: Procedure(s):  Colonoscopy with CO2 insufflation  COLONOSCOPY, FLEXIBLE, WITH LESION REMOVAL USING SNARE       Diagnosis: Screening for colon cancer [Z12.11]  Diagnosis Additional Information: No value filed.    Anesthesia Type:   MAC     Note:      Level of Consciousness: awake  Oxygen Supplementation: room air    Independent Airway: airway patency satisfactory and stable  Dentition: dentition unchanged  Vital Signs Stable: post-procedure vital signs reviewed and stable  Report to RN Given: handoff report given  Patient transferred to: Phase II    Handoff Report: Identifed the Patient, Identified the Reponsible Provider, Reviewed the pertinent medical history, Discussed the surgical course, Reviewed Intra-OP anesthesia mangement and issues during anesthesia, Set expectations for post-procedure period and Allowed opportunity for questions and acknowledgement of understanding      Vitals:  Vitals Value Taken Time   /76    Temp 98    Pulse 100    Resp 20    SpO2 95        Electronically Signed By: JAGJIT Winter CRNA  May 22, 2023  11:37 AM

## 2023-05-22 NOTE — H&P
ENDOSCOPY PRE-SEDATION H&P FOR OUTPATIENT PROCEDURES    Flor Fernandez  [id number]  1970    Procedure: colonoscopy    Pre-procedure diagnosis: screening    Past medical history:   Past Medical History:   Diagnosis Date     Anxiety      Cerebral infarction (H)      Diabetes (H)      Hypertension      Morbid obesity (H)      Tachycardia      Vertigo        Past surgical history:   Past Surgical History:   Procedure Laterality Date     APPENDECTOMY       CL AFF SURGICAL PATHOLOGY  12/29/2016     MAMMOPLASTY REDUCTION BILATERAL         Current Outpatient Medications   Medication     albuterol (PROVENTIL) (2.5 MG/3ML) 0.083% neb solution     aspirin (ASA) 325 MG EC tablet     atorvastatin (LIPITOR) 20 MG tablet     carvedilol (COREG) 6.25 MG tablet     chlorthalidone (HYGROTON) 25 MG tablet     cyanocobalamin (VITAMIN  B-12) 1000 MCG tablet     cyclobenzaprine (FLEXERIL) 5 MG tablet     glimepiride (AMARYL) 2 MG tablet     hydrOXYzine (ATARAX) 25 MG tablet     insulin glargine (LANTUS SOLOSTAR) 100 UNIT/ML pen     JARDIANCE 25 MG TABS tablet     lactobacillus rhamnosus, GG, (CULTURELL) capsule     lisinopril (ZESTRIL) 10 MG tablet     Multiple Vitamins-Minerals (MULTI-VITAMIN GUMMIES) CHEW     sertraline (ZOLOFT) 50 MG tablet     bisacodyl (DULCOLAX) 5 MG EC tablet     blood glucose (ONETOUCH VERIO IQ) test strip     blood glucose calibration (NO BRAND SPECIFIED) solution     blood glucose monitoring (NO BRAND SPECIFIED) meter device kit     Continuous Blood Gluc  (FREESTYLE RUBÉN 14 DAY READER) EARNEST     Continuous Blood Gluc Sensor (FREESTYLE RUBÉN 14 DAY SENSOR) [facility]     ONEUCH DELICA LANCETS 33G MISC     PENTIPS 31G X 5 MM miscellaneous     polyethylene glycol (GOLYTELY) 236 g suspension     Current Facility-Administered Medications   Medication     lactated ringers infusion     lidocaine (LMX4) kit     lidocaine 1 % 0.1-1 mL     ofloxacin (OCUFLOX) 0.3 % ophthalmic solution 1 drop     ondansetron  (ZOFRAN) injection 4 mg     sodium chloride (PF) 0.9% PF flush 3 mL     sodium chloride (PF) 0.9% PF flush 3 mL     Facility-Administered Medications Ordered in Other Encounters   Medication     lidocaine 2% injection (MDV)     propofol (DIPRIVAN) injection 10 mg/mL vial       Allergies   Allergen Reactions     Metformin GI Disturbance       History of Anesthesia/Sedation Problems: no    PHYSICAL EXAMINATION:  Constitutional: aaox3, cooperative, pleasant  Vitals reviewed: /47   Pulse 104   Temp 98  F (36.7  C) (Temporal)   Resp 20   Wt 131.1 kg (289 lb)   LMP 07/01/2020   SpO2 93%   BMI 48.39 kg/m    Wt:   Wt Readings from Last 2 Encounters:   05/22/23 131.1 kg (289 lb)   08/29/22 134.3 kg (296 lb)      Eyes: Sclera anicteric/injected  Ears/nose/mouth/throat: Normal oropharynx without ulcers or exudate, mucus membranes moist, hearing intact  Neck: supple, thyroid normal size  CV: No edema  Respiratory: Unlabored breathing  Lymph: No submandibular, supraclavicular or inguinal lymphadenopathy  Abd: Nondistended, no masses, nontender  Skin: warm, perfused, no jaundice  Psych: Normal affect  MSK: normal movement on limited exam.    ASA Score: See Provation note    Assessment/Plan:     The patient is an appropriate candidate to receive sedation.    Informed consent was discussed with the patient/family, including the risks, benefits, potential complications and any alternative options associated with sedation.    Patient assessment completed just prior to sedation and while under constant observation by the provider. Condition determined to be adequate for proceeding with sedation.    The specific risks for the procedure were discussed with the patient at the time of informed consent and include but are not limited to perforation which could require surgery, missing significant neoplasm or lesion, hemorrhage and adverse sedative complication.      Timothy Brewster MD

## 2023-05-22 NOTE — ANESTHESIA POSTPROCEDURE EVALUATION
Patient: Flor Fernandez    Procedure: Procedure(s):  Colonoscopy with CO2 insufflation  COLONOSCOPY, FLEXIBLE, WITH LESION REMOVAL USING SNARE       Anesthesia Type:  MAC    Note:  Disposition: Outpatient   Postop Pain Control: Uneventful            Sign Out: Well controlled pain   PONV: No   Neuro/Psych: Uneventful            Sign Out: Acceptable/Baseline neuro status   Airway/Respiratory: Uneventful            Sign Out: Acceptable/Baseline resp. status   CV/Hemodynamics: Uneventful            Sign Out: Acceptable CV status; No obvious hypovolemia; No obvious fluid overload   Other NRE:    DID A NON-ROUTINE EVENT OCCUR?            Last vitals:  Vitals Value Taken Time   /68 05/22/23 1157   Temp 97.3  F (36.3  C) 05/22/23 1137   Pulse 101 05/22/23 1157   Resp 16 05/22/23 1157   SpO2 96 % 05/22/23 1157       Electronically Signed By: Vivek Pitts MD  May 22, 2023  12:36 PM

## 2023-05-23 ENCOUNTER — ANCILLARY ORDERS (OUTPATIENT)
Dept: OBGYN | Facility: CLINIC | Age: 53
End: 2023-05-23

## 2023-05-23 DIAGNOSIS — Z12.31 VISIT FOR SCREENING MAMMOGRAM: ICD-10-CM

## 2023-05-24 LAB
PATH REPORT.COMMENTS IMP SPEC: NORMAL
PATH REPORT.COMMENTS IMP SPEC: NORMAL
PATH REPORT.FINAL DX SPEC: NORMAL
PATH REPORT.GROSS SPEC: NORMAL
PATH REPORT.MICROSCOPIC SPEC OTHER STN: NORMAL
PATH REPORT.RELEVANT HX SPEC: NORMAL
PHOTO IMAGE: NORMAL

## 2023-06-01 ASSESSMENT — ENCOUNTER SYMPTOMS
EYE PAIN: 0
MYALGIAS: 1
NERVOUS/ANXIOUS: 0
DYSURIA: 0
WEAKNESS: 0
FEVER: 0
NAUSEA: 0
PARESTHESIAS: 0
FREQUENCY: 0
HEMATOCHEZIA: 0
ABDOMINAL PAIN: 0
HEARTBURN: 0
BREAST MASS: 0
HEMATURIA: 0
CHILLS: 0
HEADACHES: 1
DIARRHEA: 0
CONSTIPATION: 1
DIZZINESS: 0
JOINT SWELLING: 0
COUGH: 0
SORE THROAT: 0
PALPITATIONS: 0
SHORTNESS OF BREATH: 0
ARTHRALGIAS: 0

## 2023-06-02 ENCOUNTER — OFFICE VISIT (OUTPATIENT)
Dept: OPTOMETRY | Facility: CLINIC | Age: 53
End: 2023-06-02
Payer: COMMERCIAL

## 2023-06-02 ENCOUNTER — OFFICE VISIT (OUTPATIENT)
Dept: OBGYN | Facility: CLINIC | Age: 53
End: 2023-06-02
Payer: COMMERCIAL

## 2023-06-02 ENCOUNTER — ANCILLARY PROCEDURE (OUTPATIENT)
Dept: MAMMOGRAPHY | Facility: CLINIC | Age: 53
End: 2023-06-02
Payer: COMMERCIAL

## 2023-06-02 VITALS
SYSTOLIC BLOOD PRESSURE: 125 MMHG | BODY MASS INDEX: 48.82 KG/M2 | WEIGHT: 293 LBS | HEART RATE: 62 BPM | DIASTOLIC BLOOD PRESSURE: 79 MMHG | OXYGEN SATURATION: 90 % | HEIGHT: 65 IN

## 2023-06-02 DIAGNOSIS — Z01.01 ENCOUNTER FOR EXAMINATION OF EYES AND VISION WITH ABNORMAL FINDINGS: Primary | ICD-10-CM

## 2023-06-02 DIAGNOSIS — H52.4 PRESBYOPIA: ICD-10-CM

## 2023-06-02 DIAGNOSIS — Z01.419 ENCOUNTER FOR GYNECOLOGICAL EXAMINATION WITHOUT ABNORMAL FINDING: Primary | ICD-10-CM

## 2023-06-02 DIAGNOSIS — E11.9 TYPE 2 DIABETES MELLITUS WITHOUT RETINOPATHY (H): ICD-10-CM

## 2023-06-02 DIAGNOSIS — H52.222 REGULAR ASTIGMATISM OF LEFT EYE: ICD-10-CM

## 2023-06-02 DIAGNOSIS — H52.13 MYOPIA OF BOTH EYES: ICD-10-CM

## 2023-06-02 DIAGNOSIS — Z12.31 VISIT FOR SCREENING MAMMOGRAM: ICD-10-CM

## 2023-06-02 PROCEDURE — 77067 SCR MAMMO BI INCL CAD: CPT | Mod: TC | Performed by: RADIOLOGY

## 2023-06-02 PROCEDURE — 99396 PREV VISIT EST AGE 40-64: CPT | Performed by: NURSE PRACTITIONER

## 2023-06-02 PROCEDURE — 92014 COMPRE OPH EXAM EST PT 1/>: CPT | Performed by: OPTOMETRIST

## 2023-06-02 PROCEDURE — 92015 DETERMINE REFRACTIVE STATE: CPT | Performed by: OPTOMETRIST

## 2023-06-02 ASSESSMENT — TONOMETRY
OS_IOP_MMHG: 18
IOP_METHOD: APPLANATION
OD_IOP_MMHG: 19

## 2023-06-02 ASSESSMENT — REFRACTION_WEARINGRX
SPECS_TYPE: PAL
OS_CYLINDER: SPHERE
OD_ADD: +2.25
OD_SPHERE: -1.00
OD_CYLINDER: +0.50
OS_ADD: +2.25
OD_AXIS: 180
OS_SPHERE: -1.50

## 2023-06-02 ASSESSMENT — EXTERNAL EXAM - LEFT EYE: OS_EXAM: NORMAL

## 2023-06-02 ASSESSMENT — REFRACTION_MANIFEST
OS_SPHERE: -1.50
OS_ADD: +2.25
OS_AXIS: 180
OS_CYLINDER: +0.75
OD_CYLINDER: SPHERE
OD_SPHERE: -0.75
OD_ADD: +2.25

## 2023-06-02 ASSESSMENT — CONF VISUAL FIELD
METHOD: COUNTING FINGERS
OD_SUPERIOR_NASAL_RESTRICTION: 0
OD_INFERIOR_TEMPORAL_RESTRICTION: 0
OS_NORMAL: 1
OD_NORMAL: 1
OS_INFERIOR_TEMPORAL_RESTRICTION: 0
OS_SUPERIOR_TEMPORAL_RESTRICTION: 0
OD_SUPERIOR_TEMPORAL_RESTRICTION: 0
OD_INFERIOR_NASAL_RESTRICTION: 0
OS_INFERIOR_NASAL_RESTRICTION: 0
OS_SUPERIOR_NASAL_RESTRICTION: 0

## 2023-06-02 ASSESSMENT — SLIT LAMP EXAM - LIDS
COMMENTS: NORMAL
COMMENTS: NORMAL,

## 2023-06-02 ASSESSMENT — EXTERNAL EXAM - RIGHT EYE: OD_EXAM: NORMAL

## 2023-06-02 ASSESSMENT — VISUAL ACUITY
OD_SC+: -1
OD_SC: 20/30
OS_SC: 20/40
OD_SC: 20/40-1
OS_SC: 20/40-2
METHOD: SNELLEN - LINEAR

## 2023-06-02 ASSESSMENT — CUP TO DISC RATIO
OD_RATIO: 0.25
OS_RATIO: 0.25

## 2023-06-02 NOTE — PROGRESS NOTES
Chief Complaint   Patient presents with     Diabetic Eye Exam        Chief Complaint(s) and History of Present Illness(es)     Diabetic Eye Exam            Vision: fluctuates with blood sugars    Diabetes Type: Type 2, on insulin and taking oral medications    Duration: years    Blood Sugars: is controlled (Usually checks few times per day but hasnt' had her CGM for ~1 week)               Lab Results   Component Value Date    A1C 7.9 12/06/2022    A1C 7.6 08/29/2022    A1C 7.7 05/23/2022    A1C 6.9 11/29/2021    A1C 6.8 08/18/2021    A1C 7.0 05/11/2021    A1C 7.7 02/08/2021    A1C 9.1 11/09/2020    A1C 8.9 08/18/2020    A1C 6.8 01/14/2020          Last Eye Exam: 1/27/2022  Dilated Previously:Yes, side effects of dilation explained today    What are you currently using to see?  Glasses - PAL's - wears full time (forgot to bring with today)     -Interested in discussing office Rx - having a hard time seeing computer in current PAL's - has to tilt her head way back to see screen    Distance Vision Acuity: Satisfied with vision    Near Vision Acuity: Satisfied with vision while reading and using computer with glasses     Eye Comfort: good  Do you use eye drops? : No  Occupation or Hobbies: [employer] -  -  Lots of screen time     [place] [employer]     Medical, surgical and family histories reviewed and updated 6/2/2023.       OBJECTIVE: See Ophthalmology exam    ASSESSMENT:    ICD-10-CM    1. Encounter for examination of eyes and vision with abnormal findings  Z01.01       2. Type 2 diabetes mellitus without retinopathy (H)  E11.9       3. Myopia of both eyes  H52.13       4. Regular astigmatism of left eye  H52.222       5. Presbyopia  H52.4           PLAN:    Flor Fernandez aware  eye exam results will be sent to [facility]  Patient Instructions   Patient educated on importance of good blood sugar control.  Letter sent to primary care provider with diabetic eye exam report.     Updated glasses  prescriptions provided today.     Return in 1 year for a comprehensive eye exam, or sooner if needed.      The effects of the dilating drops last for 4- 6 hours.  You will be more sensitive to light and vision will be blurry up close.  Mydriatic sunglasses were given if needed.     Abner Sr, PACHECO  [address] [place]  [address]    [phone]

## 2023-06-02 NOTE — PATIENT INSTRUCTIONS
If you have any questions regarding your visit, Please contact your care team.     [facility]: [phone]  To Schedule an Appointment 24/7  Call: [phone]ONE [facility]     Delisa Stallworth- APRN CNP      Daquan Grace-Surgery Scheduler  Carol-Surgery Scheduler         Monday 7:30 am-5:00 pm    Tuesday 8:00 am-4:00 pm    Wednesday 7:30 am-4:00 pm    Thursday 8:00 am-11:00 am    Friday 7:30 am-4:00 pm [address] [id number] [phone] ask for [facility]  [phone] Fax    Imaging Scheduling all locations  [phone]    [facility]  [address] [place] [address]   [place] [address] [phone]         Urgent Care locations:  [facility]   Monday-Friday  10 am - 8 pm  Saturday and Sunday   9 am - 5 pm     [phone]   If you need a medication refill, please contact your pharmacy. Please allow 3 business days for your refill to be completed.  As always, Thank you for trusting us with your healthcare needs!      see additional instructions from your care team below

## 2023-06-02 NOTE — PROGRESS NOTES
SUBJECTIVE:   CC: Flor is an 53 year old who presents for preventive health visit.     Healthy Habits:     Getting at least 3 servings of Calcium per day:  Yes    Bi-annual eye exam:  Yes    Dental care twice a year:  Yes    Sleep apnea or symptoms of sleep apnea:  None    Diet:  Diabetic    Frequency of exercise:  1 day/week    Duration of exercise:  15-30 minutes    Taking medications regularly:  Yes    Medication side effects:  None    PHQ-2 Total Score: 0    Additional concerns today:  No    Patient had mammogram, eye appointment this morning. Colonoscopy done last week. Appointment scheduled in 3 weeks with PCP for follow up on her primary care conditions.    Today's PHQ-2 Score:       2023    12:03 PM   PHQ-2 (  [employer])   Q1: Little interest or pleasure in doing things 0   Q2: Feeling down, depressed or hopeless 0   PHQ-2 Score 0   Q1: Little interest or pleasure in doing things Not at all   Q2: Feeling down, depressed or hopeless Not at all   PHQ-2 Score 0           Social History     Tobacco Use     Smoking status: Former     Types: Cigarettes     Quit date: 2014     Years since quittin.1     Smokeless tobacco: Never   Vaping Use     Vaping status: Not on file   Substance Use Topics     Alcohol use: Yes     Comment: rare             2023    12:03 PM   Alcohol Use   Prescreen: >3 drinks/day or >7 drinks/week? No     Reviewed orders with patient.  Reviewed health maintenance and updated orders accordingly - Yes  Patient Active Problem List   Diagnosis     Vertigo     Hyperlipidemia LDL goal <100     Tachycardia     Hypertension goal BP (blood pressure) < 140/90     History of stroke     Iron deficiency     Class 3 severe obesity due to excess calories with serious comorbidity and body mass index (BMI) of 45.0 to 49.9 in adult (H)     Anxiety disorder, unspecified type     Chronic low back pain without sciatica, unspecified back pain laterality     BMI 45.0-49.9, adult (H)     Type 2  diabetes mellitus with hyperglycemia, with long-term current use of insulin (H)     Panic attack     COPD (chronic obstructive pulmonary disease) (H)     Tobacco use disorder     Past Surgical History:   Procedure Laterality Date     APPENDECTOMY       CL AFF SURGICAL PATHOLOGY  2016     COLONOSCOPY N/A 2023    Procedure: COLONOSCOPY, FLEXIBLE, WITH LESION REMOVAL USING SNARE;  Surgeon: Timothy Brewster MD;  Location: [facility] OR     COLONOSCOPY WITH CO2 INSUFFLATION N/A 2023    Procedure: Colonoscopy with CO2 insufflation;  Surgeon: Timothy Brewster MD;  Location: [facility] OR     MAMMOPLASTY REDUCTION BILATERAL         Social History     Tobacco Use     Smoking status: Former     Types: Cigarettes     Quit date: 2014     Years since quittin.1     Smokeless tobacco: Never   Vaping Use     Vaping status: Not on file   Substance Use Topics     Alcohol use: Yes     Comment: rare     Family History   Problem Relation Age of Onset     Other Cancer Mother      Asthma Mother      Diabetes Father      Prostate Cancer Paternal Grandfather      Prostate Cancer Other      Glaucoma No family hx of      Macular Degeneration No family hx of            Breast Cancer Screening:  Any new diagnosis of family breast, ovarian, or bowel cancer? No    FHS-7:       2022    12:57 PM 2023     9:29 AM   Breast CA Risk Assessment (FHS-7)   Did any of your first-degree relatives have breast or ovarian cancer? No No   Did any of your relatives have bilateral breast cancer? No No   Did any man in your family have breast cancer? No No   Did any woman in your family have breast and ovarian cancer? No No   Did any woman in your family have breast cancer before age 50 y? No No   Do you have 2 or more relatives with breast and/or ovarian cancer? No No   Do you have 2 or more relatives with breast and/or bowel cancer? No No   Mammogram Screening: Recommended annual mammography  Pertinent mammograms are  reviewed under the imaging tab.    History of abnormal Pap smear: NO - age 30-65 PAP every 5 years with negative HPV co-testing recommended      Latest Ref Rng & Units 2/19/2020     7:37 AM 2/19/2020     7:36 AM   PAP / HPV   PAP (Historical)  NIL      HPV 16 DNA NEG^Negative  Negative     HPV 18 DNA NEG^Negative  Negative     Other HR HPV NEG^Negative  Negative       Reviewed and updated as needed this visit by clinical staff   Tobacco  Allergies  Meds   Med Hx  Surg Hx  Fam Hx  Soc Hx        Reviewed and updated as needed this visit by Provider                 Past Medical History:   Diagnosis Date     Anxiety      Cerebral infarction (H)      Diabetes (H)      Hypertension      Morbid obesity (H)      Tachycardia      Vertigo       Past Surgical History:   Procedure Laterality Date     APPENDECTOMY       CL AFF SURGICAL PATHOLOGY  12/29/2016     COLONOSCOPY N/A 5/22/2023    Procedure: COLONOSCOPY, FLEXIBLE, WITH LESION REMOVAL USING SNARE;  Surgeon: Timothy Brewster MD;  Location: [facility]     COLONOSCOPY WITH CO2 INSUFFLATION N/A 5/22/2023    Procedure: Colonoscopy with CO2 insufflation;  Surgeon: Timothy Brewster MD;  Location: [facility]     MAMMOPLASTY REDUCTION BILATERAL         Review of Systems  CONSTITUTIONAL: NEGATIVE for fever, chills, change in weight  INTEGUMENTARY/SKIN: NEGATIVE for worrisome rashes, moles or lesions  EYES: NEGATIVE for vision changes or irritation  ENT: NEGATIVE for ear, mouth and throat problems  RESP: NEGATIVE for significant cough or SOB  BREAST: NEGATIVE for masses, tenderness or discharge  CV: NEGATIVE for chest pain, palpitations or peripheral edema  GI: NEGATIVE for nausea, abdominal pain, heartburn, or change in bowel habits  : NEGATIVE for unusual urinary or vaginal symptoms. No vaginal bleeding.  MUSCULOSKELETAL: NEGATIVE for significant arthralgias or myalgia  NEURO: NEGATIVE for weakness, dizziness or paresthesias  PSYCHIATRIC: NEGATIVE for changes  "in mood or affect      OBJECTIVE:   /79 (BP Location: Right arm, Patient Position: Sitting, Cuff Size: Adult Large)   Pulse 62   Ht 1.646 m (5' 4.8\")   Wt 134.3 kg (296 lb)   LMP 07/01/2020   SpO2 90%   BMI 49.56 kg/m    Physical Exam  GENERAL APPEARANCE: healthy, alert and no distress  NECK: no adenopathy, no asymmetry, masses, or scars and thyroid normal to palpation  RESP: lungs clear to auscultation - no rales, rhonchi or wheezes  BREAST: normal without masses, tenderness or nipple discharge and no palpable axillary masses or adenopathy  CV: regular rate and rhythm, normal S1 S2, no S3 or S4, no murmur, click or rub, no peripheral edema and peripheral pulses strong  ABDOMEN: soft, nontender, no hepatosplenomegaly, no masses and bowel sounds normal   (female): normal female external genitalia, normal urethral meatus, vaginal mucosal atrophy noted, normal cervix, adnexae, and uterus without masses or abnormal discharge  MS: no musculoskeletal defects are noted and gait is age appropriate without ataxia  SKIN: no suspicious lesions or rashes  NEURO: Normal strength and tone, sensory exam grossly normal, mentation intact and speech normal  PSYCH: mentation appears normal and affect normal/bright    ASSESSMENT/PLAN:   (Z01.419) Encounter for gynecological examination without abnormal finding  (primary encounter diagnosis)  Comment: Health maintenance reviewed.    COUNSELING:  Reviewed preventive health counseling, as reflected in patient instructions  Special attention given to:        Regular exercise       Healthy diet/nutrition       (Teresa)menopause management        She reports that she quit smoking about 9 years ago. Her smoking use included cigarettes. She has never used smokeless tobacco.          JAGJIT Graham CNP  [facility]  "

## 2023-06-02 NOTE — PATIENT INSTRUCTIONS
Patient educated on importance of good blood sugar control.  Letter sent to primary care provider with diabetic eye exam report.     Updated glasses prescriptions provided today.     Return in 1 year for a comprehensive eye exam, or sooner if needed.      The effects of the dilating drops last for 4- 6 hours.  You will be more sensitive to light and vision will be blurry up close.  Mydriatic sunglasses were given if needed.     Abner Sr, PACHECO  [address]  [place]  [address] [phone]

## 2023-06-02 NOTE — LETTER
6/2/2023         RE: Flor Fernandez  [address]  [place] [address]        Dear Colleague,    Thank you for referring your patient, Flor Fernandez, to the [facility]. Please see a copy of my visit note below.    Chief Complaint   Patient presents with     Diabetic Eye Exam        Chief Complaint(s) and History of Present Illness(es)     Diabetic Eye Exam            Vision: fluctuates with blood sugars    Diabetes Type: Type 2, on insulin and taking oral medications    Duration: years    Blood Sugars: is controlled (Usually checks few times per day but hasnt' had her CGM for ~1 week)               Lab Results   Component Value Date    A1C 7.9 12/06/2022    A1C 7.6 08/29/2022    A1C 7.7 05/23/2022    A1C 6.9 11/29/2021    A1C 6.8 08/18/2021    A1C 7.0 05/11/2021    A1C 7.7 02/08/2021    A1C 9.1 11/09/2020    A1C 8.9 08/18/2020    A1C 6.8 01/14/2020          Last Eye Exam: 1/27/2022  Dilated Previously:Yes, side effects of dilation explained today    What are you currently using to see?  Glasses - PAL's - wears full time (forgot to bring with today)     -Interested in discussing office Rx - having a hard time seeing computer in current PAL's - has to tilt her head way back to see screen    Distance Vision Acuity: Satisfied with vision    Near Vision Acuity: Satisfied with vision while reading and using computer with glasses     Eye Comfort: good  Do you use eye drops? : No  Occupation or Hobbies: [employer] -  -  Lots of screen time     [place]     Medical, surgical and family histories reviewed and updated 6/2/2023.       OBJECTIVE: See Ophthalmology exam    ASSESSMENT:    ICD-10-CM    1. Encounter for examination of eyes and vision with abnormal findings  Z01.01       2. Type 2 diabetes mellitus without retinopathy (H)  E11.9       3. Myopia of both eyes  H52.13       4. Regular astigmatism of left eye  H52.222       5. Presbyopia  H52.4            PLAN:    Flor Fernandez aware  eye exam results will be sent to [facility]  Patient Instructions   Patient educated on importance of good blood sugar control.  Letter sent to primary care provider with diabetic eye exam report.     Updated glasses prescriptions provided today.     Return in 1 year for a comprehensive eye exam, or sooner if needed.      The effects of the dilating drops last for 4- 6 hours.  You will be more sensitive to light and vision will be blurry up close.  Mydriatic sunglasses were given if needed.     Abner rS OD  [address]  [address]    [phone]              Again, thank you for allowing me to participate in the care of your patient.        Sincerely,        Abner Sr OD

## 2023-06-12 DIAGNOSIS — E11.65 TYPE 2 DIABETES MELLITUS WITH HYPERGLYCEMIA, WITHOUT LONG-TERM CURRENT USE OF INSULIN (H): ICD-10-CM

## 2023-06-12 DIAGNOSIS — I10 BENIGN ESSENTIAL HYPERTENSION: ICD-10-CM

## 2023-06-12 RX ORDER — EMPAGLIFLOZIN 25 MG/1
TABLET, FILM COATED ORAL
Qty: 90 TABLET | Refills: 0 | Status: SHIPPED | OUTPATIENT
Start: 2023-06-12 | End: 2023-06-26

## 2023-06-12 RX ORDER — LISINOPRIL 10 MG/1
TABLET ORAL
Qty: 90 TABLET | Refills: 0 | Status: SHIPPED | OUTPATIENT
Start: 2023-06-12 | End: 2023-06-26

## 2023-06-12 RX ORDER — CHLORTHALIDONE 25 MG/1
TABLET ORAL
Qty: 90 TABLET | Refills: 0 | Status: SHIPPED | OUTPATIENT
Start: 2023-06-12 | End: 2023-06-26

## 2023-06-25 ASSESSMENT — ANXIETY QUESTIONNAIRES
IF YOU CHECKED OFF ANY PROBLEMS ON THIS QUESTIONNAIRE, HOW DIFFICULT HAVE THESE PROBLEMS MADE IT FOR YOU TO DO YOUR WORK, TAKE CARE OF THINGS AT HOME, OR GET ALONG WITH OTHER PEOPLE: NOT DIFFICULT AT ALL
5. BEING SO RESTLESS THAT IT IS HARD TO SIT STILL: NOT AT ALL
GAD7 TOTAL SCORE: 4
7. FEELING AFRAID AS IF SOMETHING AWFUL MIGHT HAPPEN: NOT AT ALL
GAD7 TOTAL SCORE: 4
7. FEELING AFRAID AS IF SOMETHING AWFUL MIGHT HAPPEN: NOT AT ALL
GAD7 TOTAL SCORE: 4
2. NOT BEING ABLE TO STOP OR CONTROL WORRYING: NOT AT ALL
6. BECOMING EASILY ANNOYED OR IRRITABLE: SEVERAL DAYS
8. IF YOU CHECKED OFF ANY PROBLEMS, HOW DIFFICULT HAVE THESE MADE IT FOR YOU TO DO YOUR WORK, TAKE CARE OF THINGS AT HOME, OR GET ALONG WITH OTHER PEOPLE?: NOT DIFFICULT AT ALL
4. TROUBLE RELAXING: SEVERAL DAYS
3. WORRYING TOO MUCH ABOUT DIFFERENT THINGS: SEVERAL DAYS
1. FEELING NERVOUS, ANXIOUS, OR ON EDGE: SEVERAL DAYS

## 2023-06-26 ENCOUNTER — OFFICE VISIT (OUTPATIENT)
Dept: INTERNAL MEDICINE | Facility: CLINIC | Age: 53
End: 2023-06-26
Payer: COMMERCIAL

## 2023-06-26 VITALS
BODY MASS INDEX: 47.09 KG/M2 | RESPIRATION RATE: 14 BRPM | OXYGEN SATURATION: 95 % | SYSTOLIC BLOOD PRESSURE: 112 MMHG | WEIGHT: 293 LBS | HEART RATE: 94 BPM | TEMPERATURE: 98 F | DIASTOLIC BLOOD PRESSURE: 74 MMHG | HEIGHT: 66 IN

## 2023-06-26 DIAGNOSIS — E66.813 CLASS 3 SEVERE OBESITY DUE TO EXCESS CALORIES WITH SERIOUS COMORBIDITY AND BODY MASS INDEX (BMI) OF 45.0 TO 49.9 IN ADULT (H): ICD-10-CM

## 2023-06-26 DIAGNOSIS — M79.10 MUSCULAR ACHES: ICD-10-CM

## 2023-06-26 DIAGNOSIS — I10 BENIGN ESSENTIAL HYPERTENSION: ICD-10-CM

## 2023-06-26 DIAGNOSIS — R00.0 TACHYCARDIA: ICD-10-CM

## 2023-06-26 DIAGNOSIS — E11.65 TYPE 2 DIABETES MELLITUS WITH HYPERGLYCEMIA, WITHOUT LONG-TERM CURRENT USE OF INSULIN (H): Primary | ICD-10-CM

## 2023-06-26 DIAGNOSIS — E66.01 CLASS 3 SEVERE OBESITY DUE TO EXCESS CALORIES WITH SERIOUS COMORBIDITY AND BODY MASS INDEX (BMI) OF 45.0 TO 49.9 IN ADULT (H): ICD-10-CM

## 2023-06-26 DIAGNOSIS — Z23 NEED FOR HEPATITIS B VACCINATION: ICD-10-CM

## 2023-06-26 DIAGNOSIS — E78.5 HYPERLIPIDEMIA LDL GOAL <100: ICD-10-CM

## 2023-06-26 DIAGNOSIS — I10 HYPERTENSION GOAL BP (BLOOD PRESSURE) < 140/90: ICD-10-CM

## 2023-06-26 DIAGNOSIS — F41.0 PANIC ATTACK: ICD-10-CM

## 2023-06-26 DIAGNOSIS — Z23 HIGH PRIORITY FOR 2019-NCOV VACCINE: ICD-10-CM

## 2023-06-26 LAB
ANION GAP SERPL CALCULATED.3IONS-SCNC: 14 MMOL/L (ref 7–15)
BUN SERPL-MCNC: 12.4 MG/DL (ref 6–20)
CALCIUM SERPL-MCNC: 10.2 MG/DL (ref 8.6–10)
CHLORIDE SERPL-SCNC: 94 MMOL/L (ref 98–107)
CHOLEST SERPL-MCNC: 145 MG/DL
CK SERPL-CCNC: 62 U/L (ref 26–192)
CREAT SERPL-MCNC: 0.61 MG/DL (ref 0.51–0.95)
DEPRECATED HCO3 PLAS-SCNC: 27 MMOL/L (ref 22–29)
GFR SERPL CREATININE-BSD FRML MDRD: >90 ML/MIN/1.73M2
GLUCOSE SERPL-MCNC: 153 MG/DL (ref 70–99)
HBA1C MFR BLD: 7.6 % (ref 0–5.6)
HDLC SERPL-MCNC: 41 MG/DL
LDLC SERPL CALC-MCNC: 66 MG/DL
NONHDLC SERPL-MCNC: 104 MG/DL
POTASSIUM SERPL-SCNC: 3.9 MMOL/L (ref 3.4–5.3)
SODIUM SERPL-SCNC: 135 MMOL/L (ref 136–145)
TRIGL SERPL-MCNC: 189 MG/DL
TSH SERPL DL<=0.005 MIU/L-ACNC: 3.24 UIU/ML (ref 0.3–4.2)

## 2023-06-26 PROCEDURE — 91312 COVID-19 BIVALENT 12+ (PFIZER): CPT | Performed by: INTERNAL MEDICINE

## 2023-06-26 PROCEDURE — 80048 BASIC METABOLIC PNL TOTAL CA: CPT | Performed by: INTERNAL MEDICINE

## 2023-06-26 PROCEDURE — 80061 LIPID PANEL: CPT | Performed by: INTERNAL MEDICINE

## 2023-06-26 PROCEDURE — 99214 OFFICE O/P EST MOD 30 MIN: CPT | Mod: 25 | Performed by: INTERNAL MEDICINE

## 2023-06-26 PROCEDURE — 84443 ASSAY THYROID STIM HORMONE: CPT | Performed by: INTERNAL MEDICINE

## 2023-06-26 PROCEDURE — 83036 HEMOGLOBIN GLYCOSYLATED A1C: CPT | Performed by: INTERNAL MEDICINE

## 2023-06-26 PROCEDURE — 0121A COVID-19 BIVALENT 12+ (PFIZER): CPT | Performed by: INTERNAL MEDICINE

## 2023-06-26 PROCEDURE — 90471 IMMUNIZATION ADMIN: CPT | Performed by: INTERNAL MEDICINE

## 2023-06-26 PROCEDURE — 82550 ASSAY OF CK (CPK): CPT | Performed by: INTERNAL MEDICINE

## 2023-06-26 PROCEDURE — 90746 HEPB VACCINE 3 DOSE ADULT IM: CPT | Performed by: INTERNAL MEDICINE

## 2023-06-26 PROCEDURE — 36415 COLL VENOUS BLD VENIPUNCTURE: CPT | Performed by: INTERNAL MEDICINE

## 2023-06-26 RX ORDER — HYDROXYZINE HYDROCHLORIDE 25 MG/1
25 TABLET, FILM COATED ORAL EVERY 8 HOURS PRN
Qty: 270 TABLET | Refills: 1 | Status: SHIPPED | OUTPATIENT
Start: 2023-06-26 | End: 2023-12-14

## 2023-06-26 RX ORDER — ATORVASTATIN CALCIUM 20 MG/1
20 TABLET, FILM COATED ORAL DAILY
Qty: 90 TABLET | Refills: 2 | Status: SHIPPED | OUTPATIENT
Start: 2023-06-26 | End: 2023-07-12 | Stop reason: SINTOL

## 2023-06-26 RX ORDER — CARVEDILOL 6.25 MG/1
6.25 TABLET ORAL 2 TIMES DAILY WITH MEALS
Qty: 180 TABLET | Refills: 3 | Status: SHIPPED | OUTPATIENT
Start: 2023-06-26 | End: 2024-07-15

## 2023-06-26 RX ORDER — LISINOPRIL 10 MG/1
10 TABLET ORAL DAILY
Qty: 90 TABLET | Refills: 3 | Status: SHIPPED | OUTPATIENT
Start: 2023-06-26 | End: 2024-07-15

## 2023-06-26 RX ORDER — CHLORTHALIDONE 25 MG/1
25 TABLET ORAL DAILY
Qty: 90 TABLET | Refills: 0 | Status: SHIPPED | OUTPATIENT
Start: 2023-06-26 | End: 2023-12-04

## 2023-06-26 ASSESSMENT — ANXIETY QUESTIONNAIRES: GAD7 TOTAL SCORE: 4

## 2023-06-26 NOTE — PATIENT INSTRUCTIONS
Ozempic 0.25 mg weekly.        .... 0.50    1.0       1.7      2.4        Return to clinic 3 months.        HOLD LIPITOR for about 2 or 3 weeks. ...  Have an opinion about if this is contributing  to muscle aches.          3

## 2023-06-26 NOTE — PROGRESS NOTES
Assessment & Plan     Type 2 diabetes mellitus with hyperglycemia, without long-term current use of insulin (H)  Will add ozempic   Side effects discussed and questions answered.   - Hemoglobin A1c; Future  - empagliflozin (JARDIANCE) 25 MG TABS tablet; Take 1 tablet (25 mg) by mouth daily  - semaglutide (OZEMPIC) 2 MG/3ML pen; Inject 0.25 mg Subcutaneous every 7 days  - Hemoglobin A1c    Class 3 severe obesity due to excess calories with serious comorbidity and body mass index (BMI) of 45.0 to 49.9 in adult (H)   see above    - semaglutide (OZEMPIC) 2 MG/3ML pen; Inject 0.25 mg Subcutaneous every 7 days    Hypertension goal BP (blood pressure) < 140/90   controlled.   Continue current management     - Basic metabolic panel; Future  - Basic metabolic panel    Hyperlipidemia LDL goal <100     - Lipid panel reflex to direct LDL Fasting; Future  - atorvastatin (LIPITOR) 20 MG tablet; Take 1 tablet (20 mg) by mouth daily  - Lipid panel reflex to direct LDL Fasting    Tachycardia   continue current management   - carvedilol (COREG) 6.25 MG tablet; Take 1 tablet (6.25 mg) by mouth 2 times daily (with meals)    Benign essential hypertension     - chlorthalidone (HYGROTON) 25 MG tablet; Take 1 tablet (25 mg) by mouth daily  - lisinopril (ZESTRIL) 10 MG tablet; Take 1 tablet (10 mg) by mouth daily    Muscular aches   check labs.    Hold statin for a couple weeks and observe.    - TSH with free T4 reflex; Future  - CK total; Future  - TSH with free T4 reflex  - CK total    Panic attack   Refill   - hydrOXYzine (ATARAX) 25 MG tablet; Take 1 tablet (25 mg) by mouth every 8 hours as needed for anxiety    High priority for 2019-nCoV vaccine     - COVID-19 BIVALENT 12+ (PFIZER)    Need for hepatitis B vaccination     - HEPATITIS B, ADULT (ENGERIX-B/RECOMBIVAX HB)      I spent a total of 30 minutes on the day of the visit.   Time spent by me doing chart review, history and exam, documentation and further activities per the note    "     BMI:   Estimated body mass index is 48.42 kg/m  as calculated from the following:    Height as of this encounter: 1.676 m (5' 6\").    Weight as of this encounter: 136.1 kg (300 lb).   Weight management plan: ozempic start.     See Patient Instructions    Gloria Mitchell MD  [facility] AUBRIE Ray is a 53 year old, presenting for the following health issues:  Diabetes  H/o MO, HTN, panic attacks, iron deficiency (Ferritin normal 1/2021), breast reduction, hyperlipidemia, T2DM (insulin, jardiance, glimepiride, has CGM, Freestyle)    Has mm aches  Interested in ozempic.   Daughter is taking this and lost weight.    Denies gastroparesis symptoms             6/26/2023     6:58 AM   Additional Questions   Roomed by kim   Accompanied by self     History of Present Illness       Mental Health Follow-up:  Patient presents to follow-up on Depression & Anxiety.Patient's depression since last visit has been:  Good  The patient is having other symptoms associated with depression.  Patient's anxiety since last visit has been:  Good  The patient is having other symptoms associated with anxiety.  Any significant life events: other  Patient is not feeling anxious or having panic attacks.  Patient has no concerns about alcohol or drug use.    Diabetes:   She presents for follow up of diabetes.  She is checking home blood glucose two times daily. She checks blood glucose before and after meals and at bedtime.  Blood glucose is sometimes over 200 and never under 70. She is aware of hypoglycemia symptoms including dizziness and weakness. She has no concerns regarding her diabetes at this time.  She is having numbness in feet, burning in feet and weight gain.         Hyperlipidemia:  She presents for follow up of hyperlipidemia.  She is taking medication to lower cholesterol. She is having myalgia or other side effects to statin medications.    Hypertension: She presents for follow up of " hypertension.  She does not check blood pressure  regularly outside of the clinic. Outpatient blood pressures have not been over 140/90. She follows a low salt diet.     She eats 2-3 servings of fruits and vegetables daily.She consumes 2 sweetened beverage(s) daily.She exercises with enough effort to increase her heart rate 10 to 19 minutes per day.  She exercises with enough effort to increase her heart rate 3 or less days per week.   She is taking medications regularly.  Today's RODRIGUE-7 Score: 4       Diabetes Follow-up    How often are you checking your blood sugar? Two times daily  Blood sugar testing frequency justification:   Insulin   What time of day are you checking your blood sugars (select all that apply)?  Before and after meals  Have you had any blood sugars above 200?  Yes    Have you had any blood sugars below 70?  No    What symptoms do you notice when your blood sugar is low?  Shaky and Dizzy    What concerns do you have today about your diabetes? None     Do you have any of these symptoms? (Select all that apply)  Numbness in feet and Burning in feet         Hyperlipidemia Follow-Up      Are you regularly taking any medication or supplement to lower your cholesterol?   Yes-      Are you having muscle aches or other side effects that you think could be caused by your cholesterol lowering medication?  Yes- ?    Hypertension Follow-up      Do you check your blood pressure regularly outside of the clinic? No     Are you following a low salt diet? Yes    Are your blood pressures ever more than 140 on the top number (systolic) OR more   than 90 on the bottom number (diastolic), for example 140/90? No    BP Readings from Last 2 Encounters:   06/02/23 125/79   05/22/23 115/68     Hemoglobin A1C (%)   Date Value   12/06/2022 7.9 (H)   08/29/2022 7.6 (H)   05/11/2021 7.0 (H)   02/08/2021 7.7 (H)     LDL Cholesterol Calculated (mg/dL)   Date Value   05/23/2022 60   05/11/2021 55   01/14/2020 48  "            Review of Systems   Constitutional, HEENT, cardiovascular, pulmonary, gi and gu systems are negative, except as otherwise noted.      Objective    /74   Pulse 94   Temp 98  F (36.7  C)   Resp 14   Ht 1.676 m (5' 6\")   Wt 136.1 kg (300 lb)   LMP 07/01/2020   SpO2 95%   BMI 48.42 kg/m    There is no height or weight on file to calculate BMI.     Physical Exam     GENERAL: healthy, alert, no distress and obese  EYES: Eyes grossly normal to inspection, PERRL and conjunctivae and sclerae normal  MS: no gross musculoskeletal defects noted, trace edema  NEURO: Normal strength and tone, mentation intact and speech normal  PSYCH: mentation appears normal, affect normal/bright  Diabetic foot exam: normal DP and PT pulses, no trophic changes or ulcerative lesions and normal sensory exam    Results for orders placed or performed in visit on 06/26/23 (from the past 24 hour(s))   Hemoglobin A1c   Result Value Ref Range    Hemoglobin A1C 7.6 (H) 0.0 - 5.6 %                   "

## 2023-06-27 NOTE — RESULT ENCOUNTER NOTE
Flor Fernandez    The sodium and chloride are a little low:  I would like to ask you to stop the chlorthalidone diuretic and recheck the electrolytes in a couple weeks.  Would you be agreeable?      Sincerely,       EVERT ARNDT M.D.

## 2023-06-28 ENCOUNTER — MYC MEDICAL ADVICE (OUTPATIENT)
Dept: INTERNAL MEDICINE | Facility: CLINIC | Age: 53
End: 2023-06-28
Payer: COMMERCIAL

## 2023-06-28 DIAGNOSIS — I10 HYPERTENSION GOAL BP (BLOOD PRESSURE) < 140/90: Primary | ICD-10-CM

## 2023-07-07 DIAGNOSIS — M54.16 LUMBAR BACK PAIN WITH RADICULOPATHY AFFECTING RIGHT LOWER EXTREMITY: ICD-10-CM

## 2023-07-07 RX ORDER — CYCLOBENZAPRINE HCL 5 MG
5 TABLET ORAL 2 TIMES DAILY PRN
Qty: 60 TABLET | Refills: 1 | Status: SHIPPED | OUTPATIENT
Start: 2023-07-07 | End: 2023-08-31

## 2023-07-10 ENCOUNTER — LAB (OUTPATIENT)
Dept: LAB | Facility: CLINIC | Age: 53
End: 2023-07-10
Payer: COMMERCIAL

## 2023-07-10 DIAGNOSIS — I10 HYPERTENSION GOAL BP (BLOOD PRESSURE) < 140/90: ICD-10-CM

## 2023-07-10 LAB
ANION GAP SERPL CALCULATED.3IONS-SCNC: 13 MMOL/L (ref 7–15)
BUN SERPL-MCNC: 13 MG/DL (ref 6–20)
CALCIUM SERPL-MCNC: 9.3 MG/DL (ref 8.6–10)
CHLORIDE SERPL-SCNC: 103 MMOL/L (ref 98–107)
CREAT SERPL-MCNC: 0.57 MG/DL (ref 0.51–0.95)
DEPRECATED HCO3 PLAS-SCNC: 22 MMOL/L (ref 22–29)
GFR SERPL CREATININE-BSD FRML MDRD: >90 ML/MIN/1.73M2
GLUCOSE SERPL-MCNC: 148 MG/DL (ref 70–99)
POTASSIUM SERPL-SCNC: 4.7 MMOL/L (ref 3.4–5.3)
SODIUM SERPL-SCNC: 138 MMOL/L (ref 136–145)

## 2023-07-10 PROCEDURE — 80048 BASIC METABOLIC PNL TOTAL CA: CPT

## 2023-07-10 PROCEDURE — 36415 COLL VENOUS BLD VENIPUNCTURE: CPT

## 2023-07-10 NOTE — RESULT ENCOUNTER NOTE
Flor Fernandez    Electrolytes and kidney function look great.  Thank you for rechecking.     Sincerely,       EVERT ARNDT M.D.

## 2023-07-12 ENCOUNTER — MYC MEDICAL ADVICE (OUTPATIENT)
Dept: INTERNAL MEDICINE | Facility: CLINIC | Age: 53
End: 2023-07-12
Payer: COMMERCIAL

## 2023-07-12 DIAGNOSIS — E78.5 HYPERLIPIDEMIA LDL GOAL <100: Primary | ICD-10-CM

## 2023-07-12 DIAGNOSIS — E11.65 TYPE 2 DIABETES MELLITUS WITH HYPERGLYCEMIA, WITHOUT LONG-TERM CURRENT USE OF INSULIN (H): ICD-10-CM

## 2023-07-12 RX ORDER — ROSUVASTATIN CALCIUM 5 MG/1
5 TABLET, COATED ORAL DAILY
Qty: 90 TABLET | Refills: 3 | Status: SHIPPED | OUTPATIENT
Start: 2023-07-12 | End: 2024-06-17

## 2023-07-17 ENCOUNTER — MYC MEDICAL ADVICE (OUTPATIENT)
Dept: INTERNAL MEDICINE | Facility: CLINIC | Age: 53
End: 2023-07-17
Payer: COMMERCIAL

## 2023-07-18 ENCOUNTER — OFFICE VISIT (OUTPATIENT)
Dept: INTERNAL MEDICINE | Facility: CLINIC | Age: 53
End: 2023-07-18
Payer: COMMERCIAL

## 2023-07-18 VITALS
HEART RATE: 86 BPM | DIASTOLIC BLOOD PRESSURE: 75 MMHG | SYSTOLIC BLOOD PRESSURE: 121 MMHG | OXYGEN SATURATION: 97 % | WEIGHT: 293 LBS | TEMPERATURE: 98 F | BODY MASS INDEX: 47.09 KG/M2 | HEIGHT: 66 IN

## 2023-07-18 DIAGNOSIS — E66.01 CLASS 3 SEVERE OBESITY DUE TO EXCESS CALORIES WITH SERIOUS COMORBIDITY AND BODY MASS INDEX (BMI) OF 45.0 TO 49.9 IN ADULT (H): ICD-10-CM

## 2023-07-18 DIAGNOSIS — E66.813 CLASS 3 SEVERE OBESITY DUE TO EXCESS CALORIES WITH SERIOUS COMORBIDITY AND BODY MASS INDEX (BMI) OF 45.0 TO 49.9 IN ADULT (H): ICD-10-CM

## 2023-07-18 DIAGNOSIS — I10 HYPERTENSION GOAL BP (BLOOD PRESSURE) < 140/90: ICD-10-CM

## 2023-07-18 DIAGNOSIS — E11.65 TYPE 2 DIABETES MELLITUS WITH HYPERGLYCEMIA, WITHOUT LONG-TERM CURRENT USE OF INSULIN (H): Primary | ICD-10-CM

## 2023-07-18 DIAGNOSIS — R60.0 EDEMA OF BOTH LOWER EXTREMITIES: ICD-10-CM

## 2023-07-18 PROCEDURE — 99214 OFFICE O/P EST MOD 30 MIN: CPT | Performed by: INTERNAL MEDICINE

## 2023-07-18 RX ORDER — FUROSEMIDE 20 MG
20 TABLET ORAL DAILY
Qty: 90 TABLET | Refills: 3 | Status: SHIPPED | OUTPATIENT
Start: 2023-07-18 | End: 2024-05-14

## 2023-07-18 NOTE — PROGRESS NOTES
"  Assessment & Plan     Hypertension goal BP (blood pressure) < 140/90  - furosemide (LASIX) 20 MG tablet; Take 1 tablet (20 mg) by mouth daily  - Basic metabolic panel; Future  Edema of both lower extremities   recurred due to stopping thiazide diuretic last month (promped by mildly low sodium level)  Will try loop diuretic (lasix.)   Check BMP next week.     Type 2 diabetes mellitus with hyperglycemia, without long-term current use of insulin (H)   tolerating ozempic well.   Increase dose for next month's refill.   - semaglutide (OZEMPIC) 2 MG/3ML pen; Inject 0.5 mg Subcutaneous every 7 days  Class 3 severe obesity due to excess calories with serious comorbidity and body mass index (BMI) of 45.0 to 49.9 in adult (H)   tolerating ozempic well.   Increase dose for next month's refill.   - semaglutide (OZEMPIC) 2 MG/3ML pen; Inject 0.5 mg Subcutaneous every 7 days        I spent a total of 14 minutes on the day of the visit.   Time spent by me doing chart review, history and exam, documentation and further activities per the note             Gloria Mitchell MD  [facility] AUBRIE Ray is a 53 year old, presenting for the following health issues:  No chief complaint on file.   .  H/o obesity, HTN, panic attacks, iron deficiency (Ferritin normal 1/2021), breast reduction, hyperlipidemia, T2DM (insulin, jardiance, glimepiride, has CGM, Freestyle).     Here for feeling poorly:  Dizzy, edema   We had stopped chlorthalidone due to lower sodium level last month.     \"My vertigo kicks in when I don't feel well\"   The main  Thing is the edema since stopping the thiazide diuretic (low sodium)       > Recent BMP normal.   HgbA1 C is 7.6   > Weight increased 20# past year.  > Recently added Ozempic... tolerating well.       ALSO:  No mm aches on new Statin Rx yet... ..             7/18/2023    11:41 AM   Additional Questions   Roomed by Bessy HARRISON     History of Present Illness       Reason " "for visit:  Mainly swelling in feet and ankles    She eats 2-3 servings of fruits and vegetables daily.She consumes 3 sweetened beverage(s) daily.She exercises with enough effort to increase her heart rate 10 to 19 minutes per day.  She exercises with enough effort to increase her heart rate 3 or less days per week.   She is taking medications regularly.               Review of Systems   Constitutional, HEENT, cardiovascular, pulmonary, gi and gu systems are negative, except as otherwise noted.      Objective    /75 (BP Location: Left arm, Patient Position: Sitting, Cuff Size: Adult Large)   Pulse 86   Temp 98  F (36.7  C) (Oral)   Ht 1.664 m (5' 5.5\")   Wt 137.9 kg (304 lb)   LMP 07/01/2020   SpO2 97%   BMI 49.82 kg/m    There is no height or weight on file to calculate BMI.  Physical Exam   GENERAL: healthy, alert and no distress  EYES: Eyes grossly normal to inspection, PERRL and conjunctivae and sclerae normal  MS: no gross musculoskeletal defects noted, 2-4 edema - pedal and calves.    SKIN: no suspicious lesions or rashes  NEURO: Normal strength and tone, mentation intact and speech normal  PSYCH: mentation appears normal, affect normal/bright    Lab on 07/10/2023   Component Date Value Ref Range Status     Sodium 07/10/2023 138  136 - 145 mmol/L Final     Potassium 07/10/2023 4.7  3.4 - 5.3 mmol/L Final     Chloride 07/10/2023 103  98 - 107 mmol/L Final     Carbon Dioxide (CO2) 07/10/2023 22  22 - 29 mmol/L Final     Anion Gap 07/10/2023 13  7 - 15 mmol/L Final     Urea Nitrogen 07/10/2023 13.0  6.0 - 20.0 mg/dL Final     Creatinine 07/10/2023 0.57  0.51 - 0.95 mg/dL Final     Calcium 07/10/2023 9.3  8.6 - 10.0 mg/dL Final     Glucose 07/10/2023 148 (H)  70 - 99 mg/dL Final     GFR Estimate 07/10/2023 >90  >60 mL/min/1.73m2 Final                   "

## 2023-07-18 NOTE — PATIENT INSTRUCTIONS
Start furosemide 20 mg daily     Recheck labs in a week or so      Plan to increase Ozempic for next refill       [Labia Majora] : normal [Normal] : normal [Absent] : absent [Uterine Adnexae] : absent [FreeTextEntry1] : rash healed

## 2023-07-26 ENCOUNTER — LAB (OUTPATIENT)
Dept: LAB | Facility: CLINIC | Age: 53
End: 2023-07-26
Payer: COMMERCIAL

## 2023-07-26 DIAGNOSIS — I10 HYPERTENSION GOAL BP (BLOOD PRESSURE) < 140/90: ICD-10-CM

## 2023-07-26 LAB
ANION GAP SERPL CALCULATED.3IONS-SCNC: 12 MMOL/L (ref 7–15)
BUN SERPL-MCNC: 10.6 MG/DL (ref 6–20)
CALCIUM SERPL-MCNC: 9.6 MG/DL (ref 8.6–10)
CHLORIDE SERPL-SCNC: 101 MMOL/L (ref 98–107)
CREAT SERPL-MCNC: 0.52 MG/DL (ref 0.51–0.95)
DEPRECATED HCO3 PLAS-SCNC: 25 MMOL/L (ref 22–29)
GFR SERPL CREATININE-BSD FRML MDRD: >90 ML/MIN/1.73M2
GLUCOSE SERPL-MCNC: 169 MG/DL (ref 70–99)
POTASSIUM SERPL-SCNC: 4.6 MMOL/L (ref 3.4–5.3)
SODIUM SERPL-SCNC: 138 MMOL/L (ref 136–145)

## 2023-07-26 PROCEDURE — 80048 BASIC METABOLIC PNL TOTAL CA: CPT

## 2023-07-26 PROCEDURE — 36415 COLL VENOUS BLD VENIPUNCTURE: CPT

## 2023-07-27 NOTE — RESULT ENCOUNTER NOTE
Flor Fernandez    Electrolytes and kidney function look great, thank you so much for rechecking!!    Best,       EVERT ARNDT M.D.

## 2023-07-28 DIAGNOSIS — F41.0 PANIC ATTACK: ICD-10-CM

## 2023-08-18 DIAGNOSIS — E11.65 TYPE 2 DIABETES MELLITUS WITH HYPERGLYCEMIA, WITH LONG-TERM CURRENT USE OF INSULIN (H): ICD-10-CM

## 2023-08-18 DIAGNOSIS — Z79.4 TYPE 2 DIABETES MELLITUS WITH HYPERGLYCEMIA, WITH LONG-TERM CURRENT USE OF INSULIN (H): ICD-10-CM

## 2023-08-18 RX ORDER — FLURBIPROFEN SODIUM 0.3 MG/ML
SOLUTION/ DROPS OPHTHALMIC
Qty: 100 EACH | Refills: 1 | Status: SHIPPED | OUTPATIENT
Start: 2023-08-18 | End: 2024-02-26

## 2023-08-31 DIAGNOSIS — M54.16 LUMBAR BACK PAIN WITH RADICULOPATHY AFFECTING RIGHT LOWER EXTREMITY: ICD-10-CM

## 2023-08-31 RX ORDER — CYCLOBENZAPRINE HCL 5 MG
5 TABLET ORAL 2 TIMES DAILY PRN
Qty: 60 TABLET | Refills: 0 | Status: SHIPPED | OUTPATIENT
Start: 2023-08-31 | End: 2023-09-02

## 2023-09-02 DIAGNOSIS — M54.16 LUMBAR BACK PAIN WITH RADICULOPATHY AFFECTING RIGHT LOWER EXTREMITY: ICD-10-CM

## 2023-09-02 PROBLEM — R00.0 TACHYCARDIA: Status: RESOLVED | Noted: 2017-02-13 | Resolved: 2023-09-02

## 2023-09-02 RX ORDER — CYCLOBENZAPRINE HCL 5 MG
5 TABLET ORAL 2 TIMES DAILY PRN
Qty: 60 TABLET | Refills: 1 | Status: SHIPPED | OUTPATIENT
Start: 2023-09-02 | End: 2023-09-30

## 2023-09-06 ENCOUNTER — OFFICE VISIT (OUTPATIENT)
Dept: ORTHOPEDICS | Facility: CLINIC | Age: 53
End: 2023-09-06
Payer: COMMERCIAL

## 2023-09-06 ENCOUNTER — ANCILLARY PROCEDURE (OUTPATIENT)
Dept: GENERAL RADIOLOGY | Facility: CLINIC | Age: 53
End: 2023-09-06
Attending: ORTHOPAEDIC SURGERY
Payer: COMMERCIAL

## 2023-09-06 VITALS
OXYGEN SATURATION: 96 % | HEART RATE: 106 BPM | SYSTOLIC BLOOD PRESSURE: 130 MMHG | WEIGHT: 293 LBS | HEIGHT: 66 IN | DIASTOLIC BLOOD PRESSURE: 70 MMHG | BODY MASS INDEX: 47.09 KG/M2

## 2023-09-06 DIAGNOSIS — S49.91XA RIGHT SHOULDER INJURY: ICD-10-CM

## 2023-09-06 DIAGNOSIS — S49.91XA INJURY OF RIGHT SHOULDER, INITIAL ENCOUNTER: Primary | ICD-10-CM

## 2023-09-06 PROCEDURE — 99203 OFFICE O/P NEW LOW 30 MIN: CPT | Performed by: ORTHOPAEDIC SURGERY

## 2023-09-06 PROCEDURE — 73030 X-RAY EXAM OF SHOULDER: CPT | Mod: TC | Performed by: RADIOLOGY

## 2023-09-06 ASSESSMENT — PAIN SCALES - GENERAL: PAINLEVEL: MODERATE PAIN (5)

## 2023-09-06 NOTE — LETTER
2023         RE: Flor Fernandez  [address]  [place] [address]        Dear Colleague,    Thank you for referring your patient, Flor Fernandez, to the [facility]. Please see a copy of my visit note below.    SUBJECTIVE:  Flor Fernandez is a 53 year old female who is seen as self referral for a right shoulder injury that occurred this morning..  Fell forward onto outstretched hands    Present symptoms: pain clavicle, neck and diffusely around the shoulder.   Pain to move it   No numbness/tingling   No weakness  Associated symptoms: none    Treatment up to this point: ice      Past Medical History:   Past Medical History:   Diagnosis Date     Anxiety      Cerebral infarction (H)      Diabetes (H)      Hypertension      Morbid obesity (H)      Tachycardia      Vertigo      Past Surgical History:   Past Surgical History:   Procedure Laterality Date     APPENDECTOMY       [facility] SURGICAL PATHOLOGY  2016     COLONOSCOPY N/A 2023    Procedure: COLONOSCOPY, FLEXIBLE, WITH LESION REMOVAL USING SNARE;  Surgeon: Timothy Brewster MD;  Location: [facility] OR     COLONOSCOPY WITH CO2 INSUFFLATION N/A 2023    Procedure: Colonoscopy with CO2 insufflation;  Surgeon: Timothy Brewster MD;  Location: [facility] OR     MAMMOPLASTY REDUCTION BILATERAL       Family History:   Family History   Problem Relation Age of Onset     Other Cancer Mother      Asthma Mother      Diabetes Father      Prostate Cancer Paternal Grandfather      Prostate Cancer Other      Glaucoma No family hx of      Macular Degeneration No family hx of      Social History:   Social History     Tobacco Use     Smoking status: Former     Packs/day: 0.50     Years: 25.00     Pack years: 12.50     Types: Cigarettes     Start date: 1987     Quit date: 2014     Years since quittin.4     Smokeless tobacco: Never   Substance Use Topics     Alcohol use: Yes     Comment: rare       Review of  "Systems:  Constitutional:  NEGATIVE for fever, chills, change in weight  Integumentary/Skin:  NEGATIVE for worrisome rashes, moles or lesions  Eyes:  NEGATIVE for vision changes or irritation  ENT/Mouth:  NEGATIVE for ear, mouth and throat problems  Resp:  NEGATIVE for significant cough or SOB  Breast:  NEGATIVE for masses, tenderness or discharge  CV:  NEGATIVE for chest pain, palpitations or peripheral edema  GI:  NEGATIVE for nausea, abdominal pain, heartburn, or change in bowel habits  :  Negative   Musculoskeletal:  See HPI above  Neuro:  NEGATIVE for weakness, dizziness or paresthesias  Endocrine:  NEGATIVE for temperature intolerance, skin/hair changes  Heme/allergy/immune:  NEGATIVE for bleeding problems  Psychiatric:  NEGATIVE for changes in mood or affect    OBJECTIVE:  Physical Exam:  /70 (BP Location: Left arm, Patient Position: Sitting, Cuff Size: Adult Large)   Pulse 106   Ht 1.664 m (5' 5.5\")   Wt 137.9 kg (304 lb)   LMP 07/01/2020   SpO2 96%   BMI 49.82 kg/m    General Appearance: healthy, alert and no distress   Skin: no suspicious lesions or rashes  Neuro: Normal strength and tone, mentation intact and speech normal  Vascular: good pulses, and cappillary refill   Lymph: no lymphadenopathy   Psych:  mentation appears normal and affect normal/bright  Resp: no increased work of breathing    Neck Exam:  Cervical range of motion: full and does not cause neck pain or reproduce shoulder pain.  Sensory deficits: none  Motor deficits: none      Right Shoulder Exam:  Shoulder Inspection: no bruising  Tender: Non-tender  Range of Motion:   Active: forward flexion: 140, internal rotation: L1   Passive: forward flexion: full, no crepitations, external rotation: full  Strength: forward flexion: 5/5, external rotation: 5/5, Liftoff: Able  Impingement: grade 1 all  Special tests: Anterior Drawer: 0  Posterior Drawer: 0    X-rays:  Obtained today right shoulder , were reviewed in the office with the " patient:   There is moderate AC joint arthrosis.  There is a small ridge of the glenoid neck of uncertain significance.  She seems to have good glenohumeral joint spacing.    MRI:   None    ASSESSMENT:  Shoulder strain.  I feel that based on exam her rotator cuff is intact, and that there was no major ligamentous injury to the shoulder.  Also does not appear to be any fractures.    PLAN:  I reassured her that I did not think that there was any major injury to the shoulder.  It is important to keep it moving and not use a sling or maybe it too much.  Although I would advise against doing a lot of lifting right now until it feels better.      ERIBERTO Slater MD  Dept. Orthopedic Surgery  [facility]        Again, thank you for allowing me to participate in the care of your patient.        Sincerely,        Billy Slater MD

## 2023-09-06 NOTE — PROGRESS NOTES
SUBJECTIVE:  Flor Fernandez is a 53 year old female who is seen as self referral for a right shoulder injury that occurred this morning..  Fell forward onto outstretched hands    Present symptoms: pain clavicle, neck and diffusely around the shoulder.   Pain to move it   No numbness/tingling   No weakness  Associated symptoms: none    Treatment up to this point: ice      Past Medical History:   Past Medical History:   Diagnosis Date    Anxiety     Cerebral infarction (H)     Diabetes (H)     Hypertension     Morbid obesity (H)     Tachycardia     Vertigo      Past Surgical History:   Past Surgical History:   Procedure Laterality Date    APPENDECTOMY      [facility]FF SURGICAL PATHOLOGY  2016    COLONOSCOPY N/A 2023    Procedure: COLONOSCOPY, FLEXIBLE, WITH LESION REMOVAL USING SNARE;  Surgeon: Timothy Brewster MD;  Location: [facility]    COLONOSCOPY WITH CO2 INSUFFLATION N/A 2023    Procedure: Colonoscopy with CO2 insufflation;  Surgeon: Timothy Brewster MD;  Location: [facility]    MAMMOPLASTY REDUCTION BILATERAL       Family History:   Family History   Problem Relation Age of Onset    Other Cancer Mother     Asthma Mother     Diabetes Father     Prostate Cancer Paternal Grandfather     Prostate Cancer Other     Glaucoma No family hx of     Macular Degeneration No family hx of      Social History:   Social History     Tobacco Use    Smoking status: Former     Packs/day: 0.50     Years: 25.00     Pack years: 12.50     Types: Cigarettes     Start date: 1987     Quit date: 2014     Years since quittin.4    Smokeless tobacco: Never   Substance Use Topics    Alcohol use: Yes     Comment: rare       Review of Systems:  Constitutional:  NEGATIVE for fever, chills, change in weight  Integumentary/Skin:  NEGATIVE for worrisome rashes, moles or lesions  Eyes:  NEGATIVE for vision changes or irritation  ENT/Mouth:  NEGATIVE for ear, mouth and throat problems  Resp:  NEGATIVE for  "significant cough or SOB  Breast:  NEGATIVE for masses, tenderness or discharge  CV:  NEGATIVE for chest pain, palpitations or peripheral edema  GI:  NEGATIVE for nausea, abdominal pain, heartburn, or change in bowel habits  :  Negative   Musculoskeletal:  See HPI above  Neuro:  NEGATIVE for weakness, dizziness or paresthesias  Endocrine:  NEGATIVE for temperature intolerance, skin/hair changes  Heme/allergy/immune:  NEGATIVE for bleeding problems  Psychiatric:  NEGATIVE for changes in mood or affect    OBJECTIVE:  Physical Exam:  /70 (BP Location: Left arm, Patient Position: Sitting, Cuff Size: Adult Large)   Pulse 106   Ht 1.664 m (5' 5.5\")   Wt 137.9 kg (304 lb)   LMP 07/01/2020   SpO2 96%   BMI 49.82 kg/m    General Appearance: healthy, alert and no distress   Skin: no suspicious lesions or rashes  Neuro: Normal strength and tone, mentation intact and speech normal  Vascular: good pulses, and cappillary refill   Lymph: no lymphadenopathy   Psych:  mentation appears normal and affect normal/bright  Resp: no increased work of breathing    Neck Exam:  Cervical range of motion: full and does not cause neck pain or reproduce shoulder pain.  Sensory deficits: none  Motor deficits: none      Right Shoulder Exam:  Shoulder Inspection: no bruising  Tender: Non-tender  Range of Motion:   Active: forward flexion: 140, internal rotation: L1   Passive: forward flexion: full, no crepitations, external rotation: full  Strength: forward flexion: 5/5, external rotation: 5/5, Liftoff: Able  Impingement: grade 1 all  Special tests: Anterior Drawer: 0  Posterior Drawer: 0    X-rays:  Obtained today right shoulder , were reviewed in the office with the patient:   There is moderate AC joint arthrosis.  There is a small ridge of the glenoid neck of uncertain significance.  She seems to have good glenohumeral joint spacing.    MRI:   None    ASSESSMENT:  Shoulder strain.  I feel that based on exam her rotator cuff is " intact, and that there was no major ligamentous injury to the shoulder.  Also does not appear to be any fractures.    PLAN:  I reassured her that I did not think that there was any major injury to the shoulder.  It is important to keep it moving and not use a sling or maybe it too much.  Although I would advise against doing a lot of lifting right now until it feels better.      ERIBERTO Slater MD  Dept. Orthopedic Surgery  [facility]

## 2023-09-30 DIAGNOSIS — M54.16 LUMBAR BACK PAIN WITH RADICULOPATHY AFFECTING RIGHT LOWER EXTREMITY: ICD-10-CM

## 2023-09-30 RX ORDER — CYCLOBENZAPRINE HCL 5 MG
TABLET ORAL
Qty: 60 TABLET | Refills: 0 | Status: SHIPPED | OUTPATIENT
Start: 2023-09-30 | End: 2023-10-26

## 2023-10-12 NOTE — PROGRESS NOTES
Chief Complaint   Patient presents with     Diabetic Eye Exam     Accompanied by self  Lab Results   Component Value Date    A1C 6.5 04/22/2019    A1C 6.1 12/07/2018    A1C 5.3 04/25/2016       Last Eye Exam: 2 years ago  Dilated Previously: Yes    What are you currently using to see?  does not use glasses or contacts    Distance Vision Acuity: Satisfied with vision    Near Vision Acuity: Not satisfied     Eye Comfort: dry and itchy  Do you use eye drops? 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       OBJECTIVE: See Ophthalmology exam    ASSESSMENT:    ICD-10-CM    1. Encounter for examination of eyes and vision with abnormal findings Z01.01    2. Type 2 diabetes mellitus without retinopathy (H) E11.9    3. Myopia of both eyes H52.13    4. Regular astigmatism of both eyes H52.223    5. Presbyopia H52.4       PLAN:    Flor Fernandez aware  eye exam results will be sent to Nayeli Barnard.  Patient Instructions   Patient educated on importance of good blood sugar control.  Letter sent to primary care provider with diabetic eye exam report.     Flor was advised of today's exam findings.  Optional to fill new glasses prescription, mild glasses prescription   Okay to use over the counter reading glasses; recommend +1.00 power  Copy of glasses Rx provided today.  Return in 1 year for eye exam, or sooner if needed.    The effects of the dilating drops last for 4- 6 hours.  You will be more sensitive to light and vision will be blurry up close.  Mydriatic sunglasses were given if needed.      Abner Sr O.D.  [facility]  [address] [place] [address]. [place]  [address]    [phone]            set-up required/verbal cues/1 person assist

## 2023-10-26 DIAGNOSIS — M54.16 LUMBAR BACK PAIN WITH RADICULOPATHY AFFECTING RIGHT LOWER EXTREMITY: ICD-10-CM

## 2023-10-26 RX ORDER — CYCLOBENZAPRINE HCL 5 MG
TABLET ORAL
Qty: 60 TABLET | Refills: 0 | Status: SHIPPED | OUTPATIENT
Start: 2023-10-26 | End: 2023-11-22

## 2023-10-28 ASSESSMENT — ANXIETY QUESTIONNAIRES
GAD7 TOTAL SCORE: 3
3. WORRYING TOO MUCH ABOUT DIFFERENT THINGS: SEVERAL DAYS
8. IF YOU CHECKED OFF ANY PROBLEMS, HOW DIFFICULT HAVE THESE MADE IT FOR YOU TO DO YOUR WORK, TAKE CARE OF THINGS AT HOME, OR GET ALONG WITH OTHER PEOPLE?: NOT DIFFICULT AT ALL
6. BECOMING EASILY ANNOYED OR IRRITABLE: SEVERAL DAYS
2. NOT BEING ABLE TO STOP OR CONTROL WORRYING: NOT AT ALL
1. FEELING NERVOUS, ANXIOUS, OR ON EDGE: NOT AT ALL
7. FEELING AFRAID AS IF SOMETHING AWFUL MIGHT HAPPEN: NOT AT ALL
7. FEELING AFRAID AS IF SOMETHING AWFUL MIGHT HAPPEN: NOT AT ALL
5. BEING SO RESTLESS THAT IT IS HARD TO SIT STILL: NOT AT ALL
GAD7 TOTAL SCORE: 3
IF YOU CHECKED OFF ANY PROBLEMS ON THIS QUESTIONNAIRE, HOW DIFFICULT HAVE THESE PROBLEMS MADE IT FOR YOU TO DO YOUR WORK, TAKE CARE OF THINGS AT HOME, OR GET ALONG WITH OTHER PEOPLE: NOT DIFFICULT AT ALL
4. TROUBLE RELAXING: SEVERAL DAYS

## 2023-10-31 ENCOUNTER — OFFICE VISIT (OUTPATIENT)
Dept: FAMILY MEDICINE | Facility: CLINIC | Age: 53
End: 2023-10-31
Payer: COMMERCIAL

## 2023-10-31 VITALS
OXYGEN SATURATION: 98 % | HEIGHT: 66 IN | DIASTOLIC BLOOD PRESSURE: 64 MMHG | BODY MASS INDEX: 47.09 KG/M2 | SYSTOLIC BLOOD PRESSURE: 120 MMHG | TEMPERATURE: 97.4 F | HEART RATE: 90 BPM | WEIGHT: 293 LBS | RESPIRATION RATE: 18 BRPM

## 2023-10-31 DIAGNOSIS — Z79.4 TYPE 2 DIABETES MELLITUS WITH HYPERGLYCEMIA, WITH LONG-TERM CURRENT USE OF INSULIN (H): Primary | ICD-10-CM

## 2023-10-31 DIAGNOSIS — E11.65 TYPE 2 DIABETES MELLITUS WITH HYPERGLYCEMIA, WITH LONG-TERM CURRENT USE OF INSULIN (H): Primary | ICD-10-CM

## 2023-10-31 DIAGNOSIS — E78.5 HYPERLIPIDEMIA LDL GOAL <100: ICD-10-CM

## 2023-10-31 DIAGNOSIS — I10 HYPERTENSION GOAL BP (BLOOD PRESSURE) < 140/90: ICD-10-CM

## 2023-10-31 DIAGNOSIS — Z86.73 HISTORY OF STROKE: ICD-10-CM

## 2023-10-31 LAB — HBA1C MFR BLD: 6.5 % (ref 0–5.6)

## 2023-10-31 PROCEDURE — 99213 OFFICE O/P EST LOW 20 MIN: CPT | Mod: 25 | Performed by: INTERNAL MEDICINE

## 2023-10-31 PROCEDURE — 36415 COLL VENOUS BLD VENIPUNCTURE: CPT | Performed by: INTERNAL MEDICINE

## 2023-10-31 PROCEDURE — 90471 IMMUNIZATION ADMIN: CPT | Performed by: INTERNAL MEDICINE

## 2023-10-31 PROCEDURE — 90677 PCV20 VACCINE IM: CPT | Performed by: INTERNAL MEDICINE

## 2023-10-31 PROCEDURE — 83036 HEMOGLOBIN GLYCOSYLATED A1C: CPT | Performed by: INTERNAL MEDICINE

## 2023-10-31 NOTE — PROGRESS NOTES
"  Assessment & Plan   Problem List Items Addressed This Visit       History of stroke    Hyperlipidemia LDL goal <70    Hypertension goal BP (blood pressure) < 140/90    Type 2 diabetes mellitus with hyperglycemia, with long-term current use of insulin (H) - Primary    Relevant Orders    HEMOGLOBIN A1C (Completed)      BP     120/64  11/1/2023    Lab Results   Component Value Date     07/26/2023     05/22/2023     12/06/2022     11/09/2020     Lab Results   Component Value Date    A1C 6.5 10/31/2023    A1C 7.0 05/11/2021     Lab Results   Component Value Date    LDL 66 06/26/2023    LDL 55 05/11/2021     Lab Results   Component Value Date    MICROL <5 12/06/2022    MICROL 6 08/19/2020     No results found for: \"MICROALBUMIN\"             Work on weight loss  Regular exercise    Aneesh Walton MD  [facility] AUBRIE Ray is a 53 year old, presenting for the following health issues:  Medication Follow-up        10/31/2023     9:32 AM   Additional Questions   Roomed by Claudine       History of Present Illness       Back Pain:  She presents for follow up of back pain. Patient's back pain is a chronic problem.  Location of back pain:  Right lower back, left lower back, right middle of back, left middle of back, right upper back, left upper back, right side of neck, left side of neck, right shoulder, left shoulder, right buttock, left buttock, right hip and left hip  Description of back pain: dull ache and shooting  Back pain spreads: right buttocks, left buttocks, left thigh, right shoulder, left shoulder, right side of neck and left side of neck    Since patient first noticed back pain, pain is: always present, but gets better and worse  Does back pain interfere with her job:  No       Mental Health Follow-up:  Patient presents to follow-up on Depression & Anxiety.Patient's depression since last visit has been:  Good  The patient is not having other symptoms " associated with depression.  Patient's anxiety since last visit has been:  Medium  The patient is not having other symptoms associated with anxiety.  Any significant life events: financial concerns  Patient is feeling anxious or having panic attacks.  Patient has no concerns about alcohol or drug use.    Diabetes:   She presents for follow up of diabetes.  She is checking home blood glucose three times daily.   She checks blood glucose before and after meals and at bedtime.  Blood glucose is never over 200 and never under 70. She is aware of hypoglycemia symptoms including dizziness and blurred vision.    She has no concerns regarding her diabetes at this time.  She is having numbness in feet and burning in feet.            Hyperlipidemia:  She presents for follow up of hyperlipidemia.   She is taking medication to lower cholesterol. She is not having myalgia or other side effects to statin medications.    Hypertension: She presents for follow up of hypertension.  She does not check blood pressure  regularly outside of the clinic. Outpatient blood pressures have not been over 140/90. She follows a low salt diet.     She eats 2-3 servings of fruits and vegetables daily.She consumes 2 sweetened beverage(s) daily.She exercises with enough effort to increase her heart rate 9 or less minutes per day.  She exercises with enough effort to increase her heart rate 3 or less days per week.   She is taking medications regularly.     No chronic bronchitis    Tylenol and muscle relaxers helps   Phyiscal therapy nd excercsies  No residual deficits,   High blood pressure and cholesterol .    Second stroke - med refills   Compliance.   -      7.6 goal A1c             Review of Systems   Constitutional, HEENT, cardiovascular, pulmonary, gi and gu systems are negative, except as otherwise noted.      Objective    /64 (BP Location: Left arm, Patient Position: Chair, Cuff Size: Adult Large)   Pulse 90   Temp 97.4  F (36.3  C)   " Resp 18   Ht 1.664 m (5' 5.5\")   Wt 136.5 kg (301 lb)   LMP 07/01/2020   SpO2 98%   BMI 49.33 kg/m    Body mass index is 49.33 kg/m .  Physical Exam   GENERAL: healthy, alert and no distress  EYES: Eyes grossly normal to inspection, PERRL and conjunctivae and sclerae normal  HENT: ear canals and TM's normal, nose and mouth without ulcers or lesions  NECK: no adenopathy, no asymmetry, masses, or scars and thyroid normal to palpation  RESP: lungs clear to auscultation - no rales, rhonchi or wheezes  CV: regular rate and rhythm, normal S1 S2, no S3 or S4, no murmur, click or rub, no peripheral edema and peripheral pulses strong  ABDOMEN: soft, nontender, no hepatosplenomegaly, no masses and bowel sounds normal  MS: no gross musculoskeletal defects noted, no edema  SKIN: no suspicious lesions or rashes  NEURO: Normal strength and tone, mentation intact and speech normal  BACK: no CVA tenderness, no paralumbar tenderness  PSYCH: mentation appears normal, affect normal/bright  LYMPH: no cervical, supraclavicular, axillary, or inguinal adenopathy    Results for orders placed or performed in visit on 10/31/23   HEMOGLOBIN A1C     Status: Abnormal   Result Value Ref Range    Hemoglobin A1C 6.5 (H) 0.0 - 5.6 %                     "

## 2023-11-09 ENCOUNTER — MYC MEDICAL ADVICE (OUTPATIENT)
Dept: FAMILY MEDICINE | Facility: CLINIC | Age: 53
End: 2023-11-09
Payer: COMMERCIAL

## 2023-11-09 DIAGNOSIS — E11.65 TYPE 2 DIABETES MELLITUS WITH HYPERGLYCEMIA, WITHOUT LONG-TERM CURRENT USE OF INSULIN (H): ICD-10-CM

## 2023-11-09 DIAGNOSIS — E66.01 CLASS 3 SEVERE OBESITY DUE TO EXCESS CALORIES WITH SERIOUS COMORBIDITY AND BODY MASS INDEX (BMI) OF 45.0 TO 49.9 IN ADULT (H): Primary | ICD-10-CM

## 2023-11-09 DIAGNOSIS — E66.813 CLASS 3 SEVERE OBESITY DUE TO EXCESS CALORIES WITH SERIOUS COMORBIDITY AND BODY MASS INDEX (BMI) OF 45.0 TO 49.9 IN ADULT (H): ICD-10-CM

## 2023-11-09 DIAGNOSIS — E66.01 CLASS 3 SEVERE OBESITY DUE TO EXCESS CALORIES WITH SERIOUS COMORBIDITY AND BODY MASS INDEX (BMI) OF 45.0 TO 49.9 IN ADULT (H): ICD-10-CM

## 2023-11-09 DIAGNOSIS — E66.813 CLASS 3 SEVERE OBESITY DUE TO EXCESS CALORIES WITH SERIOUS COMORBIDITY AND BODY MASS INDEX (BMI) OF 45.0 TO 49.9 IN ADULT (H): Primary | ICD-10-CM

## 2023-11-09 DIAGNOSIS — E11.65 TYPE 2 DIABETES MELLITUS WITH HYPERGLYCEMIA, WITH LONG-TERM CURRENT USE OF INSULIN (H): ICD-10-CM

## 2023-11-09 DIAGNOSIS — Z79.4 TYPE 2 DIABETES MELLITUS WITH HYPERGLYCEMIA, WITH LONG-TERM CURRENT USE OF INSULIN (H): ICD-10-CM

## 2023-11-09 RX ORDER — SEMAGLUTIDE 0.68 MG/ML
INJECTION, SOLUTION SUBCUTANEOUS
Qty: 3 ML | Refills: 1 | Status: SHIPPED | OUTPATIENT
Start: 2023-11-09 | End: 2024-01-23

## 2023-11-09 RX ORDER — GLIMEPIRIDE 2 MG/1
2 TABLET ORAL
Qty: 90 TABLET | Refills: 0 | Status: SHIPPED | OUTPATIENT
Start: 2023-11-09 | End: 2024-02-08

## 2023-11-22 DIAGNOSIS — Z79.4 TYPE 2 DIABETES MELLITUS WITH HYPERGLYCEMIA, WITH LONG-TERM CURRENT USE OF INSULIN (H): ICD-10-CM

## 2023-11-22 DIAGNOSIS — E11.65 TYPE 2 DIABETES MELLITUS WITH HYPERGLYCEMIA, WITH LONG-TERM CURRENT USE OF INSULIN (H): ICD-10-CM

## 2023-11-22 DIAGNOSIS — M54.16 LUMBAR BACK PAIN WITH RADICULOPATHY AFFECTING RIGHT LOWER EXTREMITY: ICD-10-CM

## 2023-11-22 RX ORDER — INSULIN GLARGINE-YFGN 100 [IU]/ML
INJECTION, SOLUTION SUBCUTANEOUS
Qty: 30 ML | Refills: 4 | Status: SHIPPED | OUTPATIENT
Start: 2023-11-22

## 2023-11-22 RX ORDER — CYCLOBENZAPRINE HCL 5 MG
TABLET ORAL
Qty: 60 TABLET | Refills: 0 | Status: SHIPPED | OUTPATIENT
Start: 2023-11-22 | End: 2023-11-27

## 2023-11-27 DIAGNOSIS — M54.16 LUMBAR BACK PAIN WITH RADICULOPATHY AFFECTING RIGHT LOWER EXTREMITY: ICD-10-CM

## 2023-11-27 RX ORDER — CYCLOBENZAPRINE HCL 5 MG
TABLET ORAL
Qty: 60 TABLET | Refills: 0 | Status: SHIPPED | OUTPATIENT
Start: 2023-11-27 | End: 2024-02-12

## 2023-12-04 DIAGNOSIS — I10 BENIGN ESSENTIAL HYPERTENSION: ICD-10-CM

## 2023-12-04 RX ORDER — CHLORTHALIDONE 25 MG/1
25 TABLET ORAL DAILY
Qty: 90 TABLET | Refills: 0 | Status: SHIPPED | OUTPATIENT
Start: 2023-12-04 | End: 2024-02-26

## 2023-12-14 DIAGNOSIS — F41.0 PANIC ATTACK: ICD-10-CM

## 2023-12-14 RX ORDER — HYDROXYZINE HYDROCHLORIDE 25 MG/1
25 TABLET, FILM COATED ORAL EVERY 8 HOURS PRN
Qty: 270 TABLET | Refills: 1 | Status: SHIPPED | OUTPATIENT
Start: 2023-12-14 | End: 2024-07-15

## 2023-12-26 DIAGNOSIS — E66.01 CLASS 3 SEVERE OBESITY DUE TO EXCESS CALORIES WITH SERIOUS COMORBIDITY AND BODY MASS INDEX (BMI) OF 45.0 TO 49.9 IN ADULT (H): ICD-10-CM

## 2023-12-26 DIAGNOSIS — Z79.4 TYPE 2 DIABETES MELLITUS WITH HYPERGLYCEMIA, WITH LONG-TERM CURRENT USE OF INSULIN (H): ICD-10-CM

## 2023-12-26 DIAGNOSIS — E11.65 TYPE 2 DIABETES MELLITUS WITH HYPERGLYCEMIA, WITH LONG-TERM CURRENT USE OF INSULIN (H): ICD-10-CM

## 2023-12-26 DIAGNOSIS — E66.813 CLASS 3 SEVERE OBESITY DUE TO EXCESS CALORIES WITH SERIOUS COMORBIDITY AND BODY MASS INDEX (BMI) OF 45.0 TO 49.9 IN ADULT (H): ICD-10-CM

## 2023-12-27 RX ORDER — SEMAGLUTIDE 1.34 MG/ML
INJECTION, SOLUTION SUBCUTANEOUS
Qty: 3 ML | Refills: 1 | Status: SHIPPED | OUTPATIENT
Start: 2023-12-27 | End: 2024-01-23

## 2024-01-22 ENCOUNTER — MYC MEDICAL ADVICE (OUTPATIENT)
Dept: FAMILY MEDICINE | Facility: CLINIC | Age: 54
End: 2024-01-22
Payer: COMMERCIAL

## 2024-01-22 DIAGNOSIS — F41.0 PANIC ATTACK: ICD-10-CM

## 2024-01-23 ENCOUNTER — OFFICE VISIT (OUTPATIENT)
Dept: FAMILY MEDICINE | Facility: CLINIC | Age: 54
End: 2024-01-23
Payer: COMMERCIAL

## 2024-01-23 ENCOUNTER — ANCILLARY PROCEDURE (OUTPATIENT)
Dept: CT IMAGING | Facility: CLINIC | Age: 54
End: 2024-01-23
Attending: INTERNAL MEDICINE
Payer: COMMERCIAL

## 2024-01-23 VITALS
SYSTOLIC BLOOD PRESSURE: 114 MMHG | OXYGEN SATURATION: 95 % | WEIGHT: 290 LBS | HEART RATE: 108 BPM | HEIGHT: 66 IN | BODY MASS INDEX: 46.61 KG/M2 | DIASTOLIC BLOOD PRESSURE: 69 MMHG | RESPIRATION RATE: 18 BRPM | TEMPERATURE: 97.6 F

## 2024-01-23 DIAGNOSIS — E11.65 TYPE 2 DIABETES MELLITUS WITH HYPERGLYCEMIA, WITHOUT LONG-TERM CURRENT USE OF INSULIN (H): ICD-10-CM

## 2024-01-23 DIAGNOSIS — J41.1 MUCOPURULENT CHRONIC BRONCHITIS (H): Primary | ICD-10-CM

## 2024-01-23 DIAGNOSIS — K57.32 DIVERTICULITIS OF COLON: ICD-10-CM

## 2024-01-23 DIAGNOSIS — R19.7 DIARRHEA OF PRESUMED INFECTIOUS ORIGIN: ICD-10-CM

## 2024-01-23 DIAGNOSIS — R10.31 ABDOMINAL PAIN, RIGHT LOWER QUADRANT: ICD-10-CM

## 2024-01-23 DIAGNOSIS — E66.813 CLASS 3 SEVERE OBESITY DUE TO EXCESS CALORIES WITH SERIOUS COMORBIDITY AND BODY MASS INDEX (BMI) OF 45.0 TO 49.9 IN ADULT (H): ICD-10-CM

## 2024-01-23 DIAGNOSIS — E66.01 CLASS 3 SEVERE OBESITY DUE TO EXCESS CALORIES WITH SERIOUS COMORBIDITY AND BODY MASS INDEX (BMI) OF 45.0 TO 49.9 IN ADULT (H): ICD-10-CM

## 2024-01-23 DIAGNOSIS — E11.65 TYPE 2 DIABETES MELLITUS WITH HYPERGLYCEMIA, WITH LONG-TERM CURRENT USE OF INSULIN (H): ICD-10-CM

## 2024-01-23 DIAGNOSIS — Z79.4 TYPE 2 DIABETES MELLITUS WITH HYPERGLYCEMIA, WITH LONG-TERM CURRENT USE OF INSULIN (H): ICD-10-CM

## 2024-01-23 LAB
ALBUMIN SERPL BCG-MCNC: 4.1 G/DL (ref 3.5–5.2)
ALBUMIN UR-MCNC: NEGATIVE MG/DL
ALP SERPL-CCNC: 122 U/L (ref 40–150)
ALT SERPL W P-5'-P-CCNC: 17 U/L (ref 0–50)
ANION GAP SERPL CALCULATED.3IONS-SCNC: 12 MMOL/L (ref 7–15)
APPEARANCE UR: CLEAR
AST SERPL W P-5'-P-CCNC: 21 U/L (ref 0–45)
BASOPHILS # BLD AUTO: 0 10E3/UL (ref 0–0.2)
BASOPHILS NFR BLD AUTO: 0 %
BILIRUB SERPL-MCNC: 0.7 MG/DL
BILIRUB UR QL STRIP: NEGATIVE
BUN SERPL-MCNC: 13.8 MG/DL (ref 6–20)
CALCIUM SERPL-MCNC: 9.5 MG/DL (ref 8.6–10)
CHLORIDE SERPL-SCNC: 99 MMOL/L (ref 98–107)
COLOR UR AUTO: YELLOW
CREAT SERPL-MCNC: 0.67 MG/DL (ref 0.51–0.95)
DEPRECATED HCO3 PLAS-SCNC: 27 MMOL/L (ref 22–29)
EGFRCR SERPLBLD CKD-EPI 2021: >90 ML/MIN/1.73M2
EOSINOPHIL # BLD AUTO: 0.3 10E3/UL (ref 0–0.7)
EOSINOPHIL NFR BLD AUTO: 1 %
ERYTHROCYTE [DISTWIDTH] IN BLOOD BY AUTOMATED COUNT: 13.4 % (ref 10–15)
ERYTHROCYTE [SEDIMENTATION RATE] IN BLOOD BY WESTERGREN METHOD: 10 MM/HR (ref 0–30)
GLUCOSE SERPL-MCNC: 148 MG/DL (ref 70–99)
GLUCOSE UR STRIP-MCNC: >=1000 MG/DL
HCT VFR BLD AUTO: 47.8 % (ref 35–47)
HGB BLD-MCNC: 15.3 G/DL (ref 11.7–15.7)
HGB UR QL STRIP: NEGATIVE
IMM GRANULOCYTES # BLD: 0 10E3/UL
IMM GRANULOCYTES NFR BLD: 0 %
KETONES UR STRIP-MCNC: NEGATIVE MG/DL
LEUKOCYTE ESTERASE UR QL STRIP: NEGATIVE
LYMPHOCYTES # BLD AUTO: 2.1 10E3/UL (ref 0.8–5.3)
LYMPHOCYTES NFR BLD AUTO: 12 %
MCH RBC QN AUTO: 28.8 PG (ref 26.5–33)
MCHC RBC AUTO-ENTMCNC: 32 G/DL (ref 31.5–36.5)
MCV RBC AUTO: 90 FL (ref 78–100)
MONOCYTES # BLD AUTO: 0.8 10E3/UL (ref 0–1.3)
MONOCYTES NFR BLD AUTO: 4 %
NEUTROPHILS # BLD AUTO: 15.1 10E3/UL (ref 1.6–8.3)
NEUTROPHILS NFR BLD AUTO: 82 %
NITRATE UR QL: NEGATIVE
PH UR STRIP: 6 [PH] (ref 5–7)
PLATELET # BLD AUTO: 384 10E3/UL (ref 150–450)
POTASSIUM SERPL-SCNC: 4.1 MMOL/L (ref 3.4–5.3)
PROT SERPL-MCNC: 7 G/DL (ref 6.4–8.3)
RBC # BLD AUTO: 5.32 10E6/UL (ref 3.8–5.2)
SODIUM SERPL-SCNC: 138 MMOL/L (ref 135–145)
SP GR UR STRIP: 1.01 (ref 1–1.03)
UROBILINOGEN UR STRIP-ACNC: 1 E.U./DL
WBC # BLD AUTO: 18.3 10E3/UL (ref 4–11)

## 2024-01-23 PROCEDURE — 74176 CT ABD & PELVIS W/O CONTRAST: CPT | Mod: TC | Performed by: RADIOLOGY

## 2024-01-23 PROCEDURE — 85652 RBC SED RATE AUTOMATED: CPT | Performed by: INTERNAL MEDICINE

## 2024-01-23 PROCEDURE — 81003 URINALYSIS AUTO W/O SCOPE: CPT | Performed by: INTERNAL MEDICINE

## 2024-01-23 PROCEDURE — 85025 COMPLETE CBC W/AUTO DIFF WBC: CPT | Performed by: INTERNAL MEDICINE

## 2024-01-23 PROCEDURE — 36415 COLL VENOUS BLD VENIPUNCTURE: CPT | Performed by: INTERNAL MEDICINE

## 2024-01-23 PROCEDURE — 99214 OFFICE O/P EST MOD 30 MIN: CPT | Performed by: INTERNAL MEDICINE

## 2024-01-23 PROCEDURE — 80053 COMPREHEN METABOLIC PANEL: CPT | Performed by: INTERNAL MEDICINE

## 2024-01-23 RX ORDER — CIPROFLOXACIN 500 MG/1
500 TABLET, FILM COATED ORAL 2 TIMES DAILY
Qty: 28 TABLET | Refills: 0 | Status: SHIPPED | OUTPATIENT
Start: 2024-01-23 | End: 2024-02-06

## 2024-01-23 RX ORDER — SEMAGLUTIDE 0.68 MG/ML
INJECTION, SOLUTION SUBCUTANEOUS
Qty: 3 ML | Refills: 1 | Status: SHIPPED | OUTPATIENT
Start: 2024-01-23 | End: 2024-03-12

## 2024-01-23 RX ORDER — METRONIDAZOLE 500 MG/1
500 TABLET ORAL 3 TIMES DAILY
Qty: 30 TABLET | Refills: 0 | Status: SHIPPED | OUTPATIENT
Start: 2024-01-23 | End: 2024-02-02

## 2024-01-23 NOTE — PROGRESS NOTES
"  Assessment & Plan   Problem List Items Addressed This Visit          Respiratory    COPD (chronic obstructive pulmonary disease) (H) - Primary       Digestive    Class 3 severe obesity due to excess calories with serious comorbidity and body mass index (BMI) of 45.0 to 49.9 in adult (H)    Relevant Medications    semaglutide (OZEMPIC, 0.25 OR 0.5 MG/DOSE,) 2 MG/3ML pen       Endocrine    Type 2 diabetes mellitus with hyperglycemia, with long-term current use of insulin (H)    Relevant Medications    semaglutide (OZEMPIC, 0.25 OR 0.5 MG/DOSE,) 2 MG/3ML pen     Other Visit Diagnoses       Type 2 diabetes mellitus with hyperglycemia, without long-term current use of insulin (H)        Relevant Medications    semaglutide (OZEMPIC, 0.25 OR 0.5 MG/DOSE,) 2 MG/3ML pen    Abdominal pain, right lower quadrant        Relevant Orders    UA Macroscopic with reflex to Microscopic and Culture - Lab Collect    Diarrhea of presumed infectious origin        Relevant Orders    CBC with platelets and differential (Completed)    ESR: Erythrocyte sedimentation rate    Comprehensive metabolic panel (BMP + Alb, Alk Phos, ALT, AST, Total. Bili, TP)    Enteric Bacteria and Virus Panel by CELY Stool         1. Abdominal pain, right lower quadrant  - UA to rule out  etiology    2. Diarrhea of presumed infectious origin  - CBC w. Diff to assess infectious etiology  - ESR to evaluate for Crohn's or other inflammatory bowel disease  - CMP to evaluate liver function  - Enteric bacteria and viral stool CELY to determine infectious etiology  - If patient's symptoms are not improving and test results are inconclusive, consider CT imaging next           BMI  Estimated body mass index is 47.52 kg/m  as calculated from the following:    Height as of this encounter: 1.664 m (5' 5.5\").    Weight as of this encounter: 131.5 kg (290 lb).             Kailey Ray is a 54 year old, presenting for the following health issues:  Abdominal Pain      " "1/23/2024     7:27 AM   Additional Questions   Roomed by Claudine LEDEZMA     Flor presents today for abdominal pain and bloody stool which began 3 days ago. She had one episode of emesis on Saturday. Her abdominal pain is located in the umbilical region and she rates it a 4-6/10. Her abdominal pain is worse after eating. She has noticed bright red blood in her stool and after wiping. Colonoscopy (5/2023) revealed one polyp negative for high-grade dysplasia and diverticuli. She reports she noticed stomachaches after increasing her ozempic dose to 1 mg. Patient has a history of appendectomy.    Diarrhea  Onset/Duration: 3 days  Description:       Consistency of stool: watery, runny, and loose       Blood in stool: YES       Number of loose stools past 24 hours: 1  Progression of Symptoms: improving and intermittent  Accompanying signs and symptoms:       Fever: No       Nausea/Vomiting: YES       Abdominal pain: YES       Weight loss: No       Episodes of constipation: YES  History   Ill contacts: No  Recent use of antibiotics: No  Recent travels: No  Recent medication-new or changes(Rx or OTC): No  Precipitating or alleviating factors: None  Therapies tried and outcome: None        Review of Systems  Constitutional, HEENT, cardiovascular, pulmonary, gi and gu systems are negative, except as otherwise noted.      Objective    /69 (BP Location: Right arm, Patient Position: Chair, Cuff Size: Adult Large)   Pulse 108   Temp 97.6  F (36.4  C)   Resp 18   Ht 1.664 m (5' 5.5\")   Wt 131.5 kg (290 lb)   LMP 07/01/2020   SpO2 95%   BMI 47.52 kg/m    Body mass index is 47.52 kg/m .  Physical Exam   GENERAL: alert and no distress  RESP: lungs clear to auscultation - no rales, rhonchi or wheezes  CV: regular rate and rhythm, normal S1 S2, no S3 or S4, no murmur, click or rub, no peripheral edema   ABDOMEN: tenderness RLQ and umbilical, bowel sounds normal, and no palpable or pulsatile masses            Signed " Electronically by: Aneesh Walton MD

## 2024-02-08 DIAGNOSIS — E11.65 TYPE 2 DIABETES MELLITUS WITH HYPERGLYCEMIA, WITHOUT LONG-TERM CURRENT USE OF INSULIN (H): ICD-10-CM

## 2024-02-08 RX ORDER — GLIMEPIRIDE 2 MG/1
TABLET ORAL
Qty: 90 TABLET | Refills: 0 | Status: SHIPPED | OUTPATIENT
Start: 2024-02-08 | End: 2024-04-17

## 2024-02-10 DIAGNOSIS — M54.16 LUMBAR BACK PAIN WITH RADICULOPATHY AFFECTING RIGHT LOWER EXTREMITY: ICD-10-CM

## 2024-02-11 ENCOUNTER — HEALTH MAINTENANCE LETTER (OUTPATIENT)
Age: 54
End: 2024-02-11

## 2024-02-12 RX ORDER — CYCLOBENZAPRINE HCL 5 MG
TABLET ORAL
Qty: 60 TABLET | Refills: 0 | Status: SHIPPED | OUTPATIENT
Start: 2024-02-12 | End: 2024-03-06

## 2024-02-26 DIAGNOSIS — E11.65 TYPE 2 DIABETES MELLITUS WITH HYPERGLYCEMIA, WITH LONG-TERM CURRENT USE OF INSULIN (H): ICD-10-CM

## 2024-02-26 DIAGNOSIS — I10 BENIGN ESSENTIAL HYPERTENSION: ICD-10-CM

## 2024-02-26 DIAGNOSIS — Z79.4 TYPE 2 DIABETES MELLITUS WITH HYPERGLYCEMIA, WITH LONG-TERM CURRENT USE OF INSULIN (H): ICD-10-CM

## 2024-02-26 RX ORDER — CHLORTHALIDONE 25 MG/1
25 TABLET ORAL DAILY
Qty: 90 TABLET | Refills: 0 | Status: SHIPPED | OUTPATIENT
Start: 2024-02-26 | End: 2024-05-21

## 2024-02-26 RX ORDER — FLURBIPROFEN SODIUM 0.3 MG/ML
SOLUTION/ DROPS OPHTHALMIC
Qty: 100 EACH | Refills: 0 | Status: SHIPPED | OUTPATIENT
Start: 2024-02-26 | End: 2024-06-03

## 2024-02-27 NOTE — TELEPHONE ENCOUNTER
Panel Management Review      Patient has the following on her problem list:   Diabetes    ASA: Passed    Last A1C  Lab Results   Component Value Date    A1C 6.1 08/12/2019    A1C 6.5 04/22/2019    A1C 6.1 12/07/2018    A1C 5.3 04/25/2016     A1C tested: MONITOR    Last LDL:    Lab Results   Component Value Date    CHOL 121 12/05/2018     Lab Results   Component Value Date    HDL 45 12/05/2018     Lab Results   Component Value Date    LDL 47 12/05/2018     Lab Results   Component Value Date    TRIG 144 12/05/2018     No results found for: CHOLHDLRATIO  Lab Results   Component Value Date    NHDL 76 12/05/2018       Is the patient on a Statin? YES             Is the patient on Aspirin? YES    Medications     HMG CoA Reductase Inhibitors     atorvastatin (LIPITOR) 20 MG tablet       Salicylates     CVS ASPIRIN 325 MG tablet             Last three blood pressure readings:  BP Readings from Last 3 Encounters:   08/12/19 122/68   07/29/19 118/70   06/07/19 134/68       Date of last diabetes office visit: 8/12/2019     Tobacco History:     History   Smoking Status     Former Smoker     Packs/day: 0.50     Years: 27.00     Types: Cigarettes     Quit date: 4/8/2014   Smokeless Tobacco     Never Used         Hypertension   Last three blood pressure readings:  BP Readings from Last 3 Encounters:   08/12/19 122/68   07/29/19 118/70   06/07/19 134/68     Blood pressure: MONITOR    HTN Guidelines:  Less than 140/90      Composite cancer screening  Chart review shows that this patient is due/due soon for the following Pap Smear  Summary:    Patient is due/failing the following:   LDL, PAP and PHYSICAL    Action needed:   Patient needs office visit for physical with fasting labs and pap.    Type of outreach:    Sent [employer] message.    Questions for provider review:    None                                                                                                                                    LS     Chart routed to none  Addended by: BARB VAUGHN on: 2/27/2024 03:20 PM     Modules accepted: Orders     .

## 2024-02-28 ENCOUNTER — MYC MEDICAL ADVICE (OUTPATIENT)
Dept: FAMILY MEDICINE | Facility: CLINIC | Age: 54
End: 2024-02-28
Payer: COMMERCIAL

## 2024-02-28 DIAGNOSIS — E11.65 TYPE 2 DIABETES MELLITUS WITH HYPERGLYCEMIA, WITHOUT LONG-TERM CURRENT USE OF INSULIN (H): Primary | ICD-10-CM

## 2024-02-28 DIAGNOSIS — E66.01 CLASS 3 SEVERE OBESITY DUE TO EXCESS CALORIES WITH SERIOUS COMORBIDITY AND BODY MASS INDEX (BMI) OF 45.0 TO 49.9 IN ADULT (H): ICD-10-CM

## 2024-02-28 DIAGNOSIS — E66.813 CLASS 3 SEVERE OBESITY DUE TO EXCESS CALORIES WITH SERIOUS COMORBIDITY AND BODY MASS INDEX (BMI) OF 45.0 TO 49.9 IN ADULT (H): ICD-10-CM

## 2024-03-06 DIAGNOSIS — M54.16 LUMBAR BACK PAIN WITH RADICULOPATHY AFFECTING RIGHT LOWER EXTREMITY: ICD-10-CM

## 2024-03-06 RX ORDER — CYCLOBENZAPRINE HCL 5 MG
TABLET ORAL
Qty: 60 TABLET | Refills: 0 | Status: SHIPPED | OUTPATIENT
Start: 2024-03-06 | End: 2024-03-28

## 2024-03-12 DIAGNOSIS — E66.813 CLASS 3 SEVERE OBESITY DUE TO EXCESS CALORIES WITH SERIOUS COMORBIDITY AND BODY MASS INDEX (BMI) OF 45.0 TO 49.9 IN ADULT (H): ICD-10-CM

## 2024-03-12 DIAGNOSIS — E11.65 TYPE 2 DIABETES MELLITUS WITH HYPERGLYCEMIA, WITHOUT LONG-TERM CURRENT USE OF INSULIN (H): ICD-10-CM

## 2024-03-12 DIAGNOSIS — E66.01 CLASS 3 SEVERE OBESITY DUE TO EXCESS CALORIES WITH SERIOUS COMORBIDITY AND BODY MASS INDEX (BMI) OF 45.0 TO 49.9 IN ADULT (H): ICD-10-CM

## 2024-03-12 RX ORDER — SEMAGLUTIDE 0.68 MG/ML
INJECTION, SOLUTION SUBCUTANEOUS
Qty: 3 ML | Refills: 1 | Status: SHIPPED | OUTPATIENT
Start: 2024-03-12 | End: 2024-04-17

## 2024-03-28 DIAGNOSIS — M54.16 LUMBAR BACK PAIN WITH RADICULOPATHY AFFECTING RIGHT LOWER EXTREMITY: ICD-10-CM

## 2024-03-28 RX ORDER — CYCLOBENZAPRINE HCL 5 MG
TABLET ORAL
Qty: 60 TABLET | Refills: 0 | Status: SHIPPED | OUTPATIENT
Start: 2024-03-28 | End: 2024-04-22

## 2024-03-31 DIAGNOSIS — E66.813 CLASS 3 SEVERE OBESITY DUE TO EXCESS CALORIES WITH SERIOUS COMORBIDITY AND BODY MASS INDEX (BMI) OF 45.0 TO 49.9 IN ADULT (H): ICD-10-CM

## 2024-03-31 DIAGNOSIS — E11.65 TYPE 2 DIABETES MELLITUS WITH HYPERGLYCEMIA, WITHOUT LONG-TERM CURRENT USE OF INSULIN (H): ICD-10-CM

## 2024-03-31 DIAGNOSIS — E66.01 CLASS 3 SEVERE OBESITY DUE TO EXCESS CALORIES WITH SERIOUS COMORBIDITY AND BODY MASS INDEX (BMI) OF 45.0 TO 49.9 IN ADULT (H): ICD-10-CM

## 2024-04-01 RX ORDER — SEMAGLUTIDE 1.34 MG/ML
1 INJECTION, SOLUTION SUBCUTANEOUS
Qty: 3 ML | Refills: 0 | Status: SHIPPED | OUTPATIENT
Start: 2024-04-01 | End: 2024-04-19

## 2024-04-15 DIAGNOSIS — F41.0 PANIC ATTACK: ICD-10-CM

## 2024-04-17 ENCOUNTER — OFFICE VISIT (OUTPATIENT)
Dept: FAMILY MEDICINE | Facility: CLINIC | Age: 54
End: 2024-04-17
Payer: COMMERCIAL

## 2024-04-17 ENCOUNTER — ALLIED HEALTH/NURSE VISIT (OUTPATIENT)
Dept: FAMILY MEDICINE | Facility: CLINIC | Age: 54
End: 2024-04-17

## 2024-04-17 VITALS
RESPIRATION RATE: 18 BRPM | SYSTOLIC BLOOD PRESSURE: 139 MMHG | OXYGEN SATURATION: 97 % | DIASTOLIC BLOOD PRESSURE: 84 MMHG | WEIGHT: 292 LBS | TEMPERATURE: 97 F | BODY MASS INDEX: 47.85 KG/M2 | HEART RATE: 105 BPM

## 2024-04-17 VITALS — DIASTOLIC BLOOD PRESSURE: 72 MMHG | SYSTOLIC BLOOD PRESSURE: 124 MMHG

## 2024-04-17 DIAGNOSIS — J43.9 PULMONARY EMPHYSEMA, UNSPECIFIED EMPHYSEMA TYPE (H): Primary | ICD-10-CM

## 2024-04-17 DIAGNOSIS — E11.65 TYPE 2 DIABETES MELLITUS WITH HYPERGLYCEMIA, WITH LONG-TERM CURRENT USE OF INSULIN (H): ICD-10-CM

## 2024-04-17 DIAGNOSIS — Z79.4 TYPE 2 DIABETES MELLITUS WITH HYPERGLYCEMIA, WITH LONG-TERM CURRENT USE OF INSULIN (H): ICD-10-CM

## 2024-04-17 DIAGNOSIS — Z01.30 BP CHECK: Primary | ICD-10-CM

## 2024-04-17 LAB — HBA1C MFR BLD: 6.5 % (ref 0–5.6)

## 2024-04-17 PROCEDURE — 99214 OFFICE O/P EST MOD 30 MIN: CPT | Performed by: INTERNAL MEDICINE

## 2024-04-17 PROCEDURE — 36415 COLL VENOUS BLD VENIPUNCTURE: CPT | Performed by: INTERNAL MEDICINE

## 2024-04-17 PROCEDURE — 83036 HEMOGLOBIN GLYCOSYLATED A1C: CPT | Performed by: INTERNAL MEDICINE

## 2024-04-17 PROCEDURE — 99207 PR NO CHARGE NURSE ONLY: CPT | Performed by: INTERNAL MEDICINE

## 2024-04-17 NOTE — PROGRESS NOTES
Flor Fernandez was evaluated at [facility] on April 17, 2024 at which time her blood pressure was:    BP Readings from Last 1 Encounters:   04/17/24 124/72     No data recorded      Reviewed lifestyle modifications for blood pressure control and reduction: including making healthy food choices, managing weight, getting regular exercise, smoking cessation, reducing alcohol consumption, monitoring blood pressure regularly.     Symptoms: None    BP Goal:< 140/90 mmHg    BP Assessment:  BP at goal    Potential Reasons for BP too high: NA - Not applicable    BP Follow-Up Plan: Recheck BP in 6 months at pharmacy    Recommendation to Provider: continue current therapy    Note completed by: Laurel Muñoz, PharmD    [phone]

## 2024-04-17 NOTE — PROGRESS NOTES
"  Assessment & Plan   Problem List Items Addressed This Visit       COPD (chronic obstructive pulmonary disease) (H) - Primary    Type 2 diabetes mellitus with hyperglycemia, with long-term current use of insulin (H)    Relevant Orders    Albumin Random Urine Quantitative with Creat Ratio    HEMOGLOBIN A1C (Completed)      BP     139/84  4/18/2024    Lab Results   Component Value Date     01/23/2024     05/22/2023     12/06/2022     11/09/2020     Lab Results   Component Value Date    A1C 6.5 04/17/2024    A1C 7.0 05/11/2021     Lab Results   Component Value Date    LDL 66 06/26/2023    LDL 55 05/11/2021     Lab Results   Component Value Date    MICROL <5 12/06/2022    MICROL 6 08/19/2020     No results found for: \"MICROALBUMIN\"           BMI  Estimated body mass index is 47.85 kg/m  as calculated from the following:    Height as of 1/23/24: 1.664 m (5' 5.5\").    Weight as of this encounter: 132.5 kg (292 lb).   Weight management plan: Discussed healthy diet and exercise guidelines    Work on weight loss  Regular exercise      Kailey Ray is a 54 year old, presenting for the following health issues:  Diabetes        4/17/2024     3:11 PM   Additional Questions   Roomed by Claudine     History of Present Illness       Back Pain:  She presents for follow up of back pain. Patient's back pain is a chronic problem.  Location of back pain:  Right lower back, left lower back, right middle of back, left middle of back, right upper back, left upper back, right side of neck, left side of neck, right shoulder, left shoulder, right buttock, left buttock, right hip and left hip  Description of back pain: dull ache, sharp and shooting  Back pain spreads: right side of neck and left side of neck    Since patient first noticed back pain, pain is: always present, but gets better and worse  Does back pain interfere with her job:  No       Diabetes:   She presents for follow up of diabetes.  She is " checking home blood glucose four or more times daily.   She checks blood glucose before and after meals and at bedtime.  Blood glucose is never over 200 and never under 70. She is aware of hypoglycemia symptoms including shakiness, dizziness and weakness.    She has no concerns regarding her diabetes at this time.  She is having numbness in feet and burning in feet.            She eats 2-3 servings of fruits and vegetables daily.She consumes 1 sweetened beverage(s) daily.She exercises with enough effort to increase her heart rate 9 or less minutes per day.  She exercises with enough effort to increase her heart rate 3 or less days per week.   She is taking medications regularly.                 Review of Systems  Constitutional, HEENT, cardiovascular, pulmonary, gi and gu systems are negative, except as otherwise noted.      Objective    /84 (BP Location: Left arm, Patient Position: Chair, Cuff Size: Adult Regular)   Pulse 105   Temp 97  F (36.1  C)   Resp 18   Wt 132.5 kg (292 lb)   LMP 07/01/2020   SpO2 97%   BMI 47.85 kg/m    Body mass index is 47.85 kg/m .  Physical Exam   GENERAL: alert and no distress  EYES: Eyes grossly normal to inspection, PERRL and conjunctivae and sclerae normal  HENT: ear canals and TM's normal, nose and mouth without ulcers or lesions  NECK: no adenopathy, no asymmetry, masses, or scars  RESP: lungs clear to auscultation - no rales, rhonchi or wheezes  CV: regular rate and rhythm, normal S1 S2, no S3 or S4, no murmur, click or rub, no peripheral edema  ABDOMEN: soft, nontender, no hepatosplenomegaly, no masses and bowel sounds normal  MS: no gross musculoskeletal defects noted, no edema  SKIN: no suspicious lesions or rashes  NEURO: Normal strength and tone, mentation intact and speech normal  BACK: no CVA tenderness, no paralumbar tenderness  PSYCH: mentation appears normal, affect normal/bright  LYMPH: no cervical, supraclavicular, axillary, or inguinal  adenopathy    Results for orders placed or performed in visit on 04/17/24   HEMOGLOBIN A1C     Status: Abnormal   Result Value Ref Range    Hemoglobin A1C 6.5 (H) 0.0 - 5.6 %           Signed Electronically by: Aneesh Walton MD

## 2024-04-19 DIAGNOSIS — E66.01 CLASS 3 SEVERE OBESITY DUE TO EXCESS CALORIES WITH SERIOUS COMORBIDITY AND BODY MASS INDEX (BMI) OF 45.0 TO 49.9 IN ADULT (H): ICD-10-CM

## 2024-04-19 DIAGNOSIS — E66.813 CLASS 3 SEVERE OBESITY DUE TO EXCESS CALORIES WITH SERIOUS COMORBIDITY AND BODY MASS INDEX (BMI) OF 45.0 TO 49.9 IN ADULT (H): ICD-10-CM

## 2024-04-19 DIAGNOSIS — E11.65 TYPE 2 DIABETES MELLITUS WITH HYPERGLYCEMIA, WITHOUT LONG-TERM CURRENT USE OF INSULIN (H): ICD-10-CM

## 2024-04-19 RX ORDER — SEMAGLUTIDE 1.34 MG/ML
INJECTION, SOLUTION SUBCUTANEOUS
Qty: 3 ML | Refills: 0 | Status: SHIPPED | OUTPATIENT
Start: 2024-04-19 | End: 2024-05-16

## 2024-04-22 DIAGNOSIS — M54.16 LUMBAR BACK PAIN WITH RADICULOPATHY AFFECTING RIGHT LOWER EXTREMITY: ICD-10-CM

## 2024-04-22 LAB
CREAT UR-MCNC: 103 MG/DL
MICROALBUMIN UR-MCNC: <12 MG/L
MICROALBUMIN/CREAT UR: NORMAL MG/G{CREAT}

## 2024-04-22 PROCEDURE — 82043 UR ALBUMIN QUANTITATIVE: CPT | Performed by: INTERNAL MEDICINE

## 2024-04-22 PROCEDURE — 82570 ASSAY OF URINE CREATININE: CPT | Performed by: INTERNAL MEDICINE

## 2024-04-22 RX ORDER — CYCLOBENZAPRINE HCL 5 MG
TABLET ORAL
Qty: 60 TABLET | Refills: 0 | Status: SHIPPED | OUTPATIENT
Start: 2024-04-22 | End: 2024-05-21

## 2024-05-03 ENCOUNTER — PATIENT OUTREACH (OUTPATIENT)
Dept: CARE COORDINATION | Facility: CLINIC | Age: 54
End: 2024-05-03
Payer: COMMERCIAL

## 2024-05-13 DIAGNOSIS — I10 HYPERTENSION GOAL BP (BLOOD PRESSURE) < 140/90: ICD-10-CM

## 2024-05-14 RX ORDER — FUROSEMIDE 20 MG
20 TABLET ORAL DAILY
Qty: 90 TABLET | Refills: 3 | Status: SHIPPED | OUTPATIENT
Start: 2024-05-14

## 2024-05-16 ENCOUNTER — OFFICE VISIT (OUTPATIENT)
Dept: FAMILY MEDICINE | Facility: CLINIC | Age: 54
End: 2024-05-16
Payer: COMMERCIAL

## 2024-05-16 VITALS
TEMPERATURE: 97.4 F | SYSTOLIC BLOOD PRESSURE: 131 MMHG | DIASTOLIC BLOOD PRESSURE: 79 MMHG | HEART RATE: 100 BPM | RESPIRATION RATE: 18 BRPM | OXYGEN SATURATION: 94 %

## 2024-05-16 DIAGNOSIS — Z79.4 TYPE 2 DIABETES MELLITUS WITH HYPERGLYCEMIA, WITH LONG-TERM CURRENT USE OF INSULIN (H): ICD-10-CM

## 2024-05-16 DIAGNOSIS — K31.84 GASTROPARESIS: ICD-10-CM

## 2024-05-16 DIAGNOSIS — E11.65 TYPE 2 DIABETES MELLITUS WITH HYPERGLYCEMIA, WITH LONG-TERM CURRENT USE OF INSULIN (H): ICD-10-CM

## 2024-05-16 DIAGNOSIS — J43.9 PULMONARY EMPHYSEMA, UNSPECIFIED EMPHYSEMA TYPE (H): Primary | ICD-10-CM

## 2024-05-16 PROCEDURE — 99213 OFFICE O/P EST LOW 20 MIN: CPT | Performed by: INTERNAL MEDICINE

## 2024-05-16 RX ORDER — ONDANSETRON 4 MG/1
4 TABLET, ORALLY DISINTEGRATING ORAL EVERY 8 HOURS PRN
Qty: 30 TABLET | Refills: 3 | Status: SHIPPED | OUTPATIENT
Start: 2024-05-16 | End: 2024-07-15

## 2024-05-16 ASSESSMENT — PAIN SCALES - GENERAL: PAINLEVEL: MILD PAIN (2)

## 2024-05-16 NOTE — LETTER
May 16, 2024      Flor Fernandez  [address]  [place] [address]        To Whom It May Concern:    Flor Fernandez  was seen on May 16, 2024.  Please excuse her  until 5/17/2024 due to illness.        Sincerely,        Aneesh Walton MD

## 2024-05-16 NOTE — PROGRESS NOTES
"  Assessment & Plan   Problem List Items Addressed This Visit       COPD (chronic obstructive pulmonary disease) (H) - Primary    Type 2 diabetes mellitus with hyperglycemia, with long-term current use of insulin (H)     Other Visit Diagnoses       Gastroparesis        Relevant Medications    ondansetron (ZOFRAN ODT) 4 MG ODT tab           BP     131/79  5/17/2024    Lab Results   Component Value Date     01/23/2024     05/22/2023     12/06/2022     11/09/2020     Lab Results   Component Value Date    A1C 6.5 04/17/2024    A1C 7.0 05/11/2021     Lab Results   Component Value Date    LDL 66 06/26/2023    LDL 55 05/11/2021     Lab Results   Component Value Date    MICROL <12.0 04/22/2024    MICROL <5 12/06/2022    MICROL 6 08/19/2020     No results found for: \"MICROALBUMIN\"         Work on weight loss  Regular exercise      Kailey Ray is a 54 year old, presenting for the following health issues:  Nausea, Vomiting, & Diarrhea    HPI     No consitapation   No diarrhea  Run down   Vomiting -  yellow acid.   Few times today     Started ozempic 1 year ago   Coliltis ( episodes off and on )   234 today nauea                  Review of Systems  Constitutional, HEENT, cardiovascular, pulmonary, gi and gu systems are negative, except as otherwise noted.      Objective    /79 (BP Location: Right arm, Patient Position: Chair, Cuff Size: Adult Large)   Pulse 100   Temp 97.4  F (36.3  C) (Oral)   Resp 18   LMP 07/01/2020   SpO2 94%   There is no height or weight on file to calculate BMI.  Physical Exam   GENERAL: alert and no distress  EYES: Eyes grossly normal to inspection, PERRL and conjunctivae and sclerae normal  HENT: ear canals and TM's normal, nose and mouth without ulcers or lesions  NECK: no adenopathy, no asymmetry, masses, or scars  RESP: lungs clear to auscultation - no rales, rhonchi or wheezes  CV: regular rate and rhythm, normal S1 S2, no S3 or S4, no murmur, click " or rub, no peripheral edema  ABDOMEN: soft, nontender, no hepatosplenomegaly, no masses and bowel sounds normal  MS: no gross musculoskeletal defects noted, no edema  SKIN: no suspicious lesions or rashes  NEURO: Normal strength and tone, mentation intact and speech normal  BACK: no CVA tenderness, no paralumbar tenderness  PSYCH: mentation appears normal, affect normal/bright  LYMPH: no cervical, supraclavicular, axillary, or inguinal adenopathy    No results found for any visits on 05/16/24.        Signed Electronically by: Aneesh Walton MD

## 2024-05-17 ENCOUNTER — PATIENT OUTREACH (OUTPATIENT)
Dept: CARE COORDINATION | Facility: CLINIC | Age: 54
End: 2024-05-17
Payer: COMMERCIAL

## 2024-05-21 DIAGNOSIS — M54.16 LUMBAR BACK PAIN WITH RADICULOPATHY AFFECTING RIGHT LOWER EXTREMITY: ICD-10-CM

## 2024-05-21 DIAGNOSIS — I10 BENIGN ESSENTIAL HYPERTENSION: ICD-10-CM

## 2024-05-21 RX ORDER — CYCLOBENZAPRINE HCL 5 MG
TABLET ORAL
Qty: 60 TABLET | Refills: 0 | Status: SHIPPED | OUTPATIENT
Start: 2024-05-21 | End: 2024-06-16

## 2024-05-21 RX ORDER — CHLORTHALIDONE 25 MG/1
25 TABLET ORAL DAILY
Qty: 90 TABLET | Refills: 0 | Status: SHIPPED | OUTPATIENT
Start: 2024-05-21 | End: 2024-07-15

## 2024-05-23 DIAGNOSIS — I10 BENIGN ESSENTIAL HYPERTENSION: ICD-10-CM

## 2024-05-23 RX ORDER — CHLORTHALIDONE 25 MG/1
25 TABLET ORAL DAILY
Qty: 90 TABLET | Refills: 0 | OUTPATIENT
Start: 2024-05-23

## 2024-05-28 ENCOUNTER — ANCILLARY ORDERS (OUTPATIENT)
Dept: FAMILY MEDICINE | Facility: CLINIC | Age: 54
End: 2024-05-28

## 2024-05-28 DIAGNOSIS — Z12.31 VISIT FOR SCREENING MAMMOGRAM: Primary | ICD-10-CM

## 2024-06-02 DIAGNOSIS — Z79.4 TYPE 2 DIABETES MELLITUS WITH HYPERGLYCEMIA, WITH LONG-TERM CURRENT USE OF INSULIN (H): ICD-10-CM

## 2024-06-02 DIAGNOSIS — E11.65 TYPE 2 DIABETES MELLITUS WITH HYPERGLYCEMIA, WITH LONG-TERM CURRENT USE OF INSULIN (H): ICD-10-CM

## 2024-06-03 RX ORDER — FLURBIPROFEN SODIUM 0.3 MG/ML
SOLUTION/ DROPS OPHTHALMIC
Qty: 100 EACH | Refills: 0 | Status: SHIPPED | OUTPATIENT
Start: 2024-06-03 | End: 2024-09-05

## 2024-06-04 ENCOUNTER — MYC MEDICAL ADVICE (OUTPATIENT)
Dept: FAMILY MEDICINE | Facility: CLINIC | Age: 54
End: 2024-06-04
Payer: COMMERCIAL

## 2024-06-05 ENCOUNTER — OFFICE VISIT (OUTPATIENT)
Dept: FAMILY MEDICINE | Facility: CLINIC | Age: 54
End: 2024-06-05
Payer: COMMERCIAL

## 2024-06-05 ENCOUNTER — MYC MEDICAL ADVICE (OUTPATIENT)
Dept: FAMILY MEDICINE | Facility: CLINIC | Age: 54
End: 2024-06-05

## 2024-06-05 VITALS
HEART RATE: 102 BPM | OXYGEN SATURATION: 96 % | WEIGHT: 293 LBS | DIASTOLIC BLOOD PRESSURE: 88 MMHG | BODY MASS INDEX: 48.18 KG/M2 | SYSTOLIC BLOOD PRESSURE: 123 MMHG | TEMPERATURE: 97.3 F

## 2024-06-05 DIAGNOSIS — J30.2 CHRONIC SEASONAL ALLERGIC RHINITIS: ICD-10-CM

## 2024-06-05 DIAGNOSIS — R05.1 ACUTE COUGH: Primary | ICD-10-CM

## 2024-06-05 DIAGNOSIS — J02.9 SORE THROAT: ICD-10-CM

## 2024-06-05 LAB
DEPRECATED S PYO AG THROAT QL EIA: NEGATIVE
FLUAV AG SPEC QL IA: NEGATIVE
FLUBV AG SPEC QL IA: NEGATIVE
GROUP A STREP BY PCR: NOT DETECTED

## 2024-06-05 PROCEDURE — 87804 INFLUENZA ASSAY W/OPTIC: CPT

## 2024-06-05 PROCEDURE — 87651 STREP A DNA AMP PROBE: CPT

## 2024-06-05 PROCEDURE — 99213 OFFICE O/P EST LOW 20 MIN: CPT

## 2024-06-05 RX ORDER — ALBUTEROL SULFATE 90 UG/1
2 AEROSOL, METERED RESPIRATORY (INHALATION) EVERY 6 HOURS PRN
Qty: 18 G | Refills: 0 | Status: SHIPPED | OUTPATIENT
Start: 2024-06-05 | End: 2024-06-24

## 2024-06-05 RX ORDER — ALBUTEROL SULFATE 0.83 MG/ML
2.5 SOLUTION RESPIRATORY (INHALATION) EVERY 6 HOURS PRN
Qty: 25 ML | Refills: 1 | Status: SHIPPED | OUTPATIENT
Start: 2024-06-05

## 2024-06-05 ASSESSMENT — ENCOUNTER SYMPTOMS: SORE THROAT: 1

## 2024-06-05 NOTE — RESULT ENCOUNTER NOTE
Martin Ray,     It was nice seeing you today.      Your influenza and rapid strep test came back negative. PCR should come back later today or tomorrow.      Feel free to contact me via mob[employer] or call the clinic at [phone].     Sincerely,  Jessica Goldman DNP, FNP-C

## 2024-06-05 NOTE — PROGRESS NOTES
Assessment & Plan     Acute cough  Influenza and strep negative. Lung clear so low suspicion for pneumonia. Most likely viral related. Albuterol neb refilled and albuterol inhaler ordered. Plan for patient to follow up if symptoms do not improve in one week.   - albuterol (PROAIR HFA/PROVENTIL HFA/VENTOLIN HFA) 108 (90 Base) MCG/ACT inhaler; Inhale 2 puffs into the lungs every 6 hours as needed for wheezing or cough  - Influenza A & B Antigen - Clinic Collect    Sore throat  - Streptococcus A Rapid Screen w/Reflex to PCR - Clinic Collect  - Group A Streptococcus PCR Throat Swab    Chronic seasonal allergic rhinitis  Albuterol neb refilled.   - albuterol (PROVENTIL) (2.5 MG/3ML) 0.083% neb solution; Take 1 vial (2.5 mg) by nebulization every 6 hours as needed for shortness of breath or wheezing                Kailey Ray is a 54 year old, presenting for the following health issues:  Pharyngitis      6/5/2024    10:56 AM   Additional Questions   Roomed by caroline mccormick     Pharyngitis     History of Present Illness       Reason for visit:  Want to have strep test done  Symptom onset:  1-3 days ago  Symptoms include:  Sore throat and dry barky cough and fatigue  Symptom intensity:  Moderate  Symptom progression:  Worsening  Had these symptoms before:  Yes  Has tried/received treatment for these symptoms:  Yes  Previous treatment was successful:  Yes  Prior treatment description:  Antibiotics  What makes it worse:  No  What makes it better:  Not at the moment    She eats 2-3 servings of fruits and vegetables daily.She consumes 1 sweetened beverage(s) daily.She exercises with enough effort to increase her heart rate 10 to 19 minutes per day.  She exercises with enough effort to increase her heart rate 3 or less days per week.   She is taking medications regularly.     Woke up Monday morning with sore throat, covid test this morning came back negative. Hasn't been around anyone that has been sick. Fatigue from cough  because she didn't sleep well. Cough started yesterday. Feels like the cough is originated in throat. Hasn't felt like this since before covid. No fever today, no previous fever. Has albuterol nebulizer at home. Shortness of breath this morning, no wheezing. Dry barky cough                Objective    /88 (BP Location: Left arm, Patient Position: Sitting, Cuff Size: Adult Large)   Pulse 102   Temp 97.3  F (36.3  C) (Temporal)   Wt 133.4 kg (294 lb)   LMP 07/01/2020   SpO2 96%   BMI 48.18 kg/m    Body mass index is 48.18 kg/m .  Physical Exam   GENERAL: alert and no distress  HENT: ear canals and TM's normal, nose and mouth without ulcers or lesions  NECK: no adenopathy, no asymmetry, masses, or scars  RESP: lungs clear to auscultation - no rales, rhonchi or wheezes  CV: regular rate and rhythm, normal S1 S2, no S3 or S4, no murmur, click or rub, no peripheral edema    Results for orders placed or performed in visit on 06/05/24 (from the past 24 hour(s))   Streptococcus A Rapid Screen w/Reflex to PCR - Clinic Collect    Specimen: Throat; Swab   Result Value Ref Range    Group A Strep antigen Negative Negative   Group A Streptococcus PCR Throat Swab    Specimen: Throat; Swab   Result Value Ref Range    Group A strep by PCR Not Detected Not Detected    Narrative    The Xpert Xpress Strep A test, performed on the [employer], is a rapid, qualitative in vitro diagnostic test for the detection of Streptococcus pyogenes (Group A ß-hemolytic Streptococcus, Strep A) in throat swab specimens from patients with signs and symptoms of pharyngitis. The Xpert Xpress Strep A test can be used as an aid in the diagnosis of Group A Streptococcal pharyngitis. The assay is not intended to monitor treatment for Group A Streptococcus infections. The Xpert Xpress Strep A test utilizes an automated real-time polymerase chain reaction (PCR) to detect Streptococcus pyogenes DNA.   Influenza A & B Antigen - Clinic  Collect    Specimen: Nose; Swab   Result Value Ref Range    Influenza A antigen Negative Negative    Influenza B antigen Negative Negative    Narrative    Test results must be correlated with clinical data. If necessary, results should be confirmed by a molecular assay or viral culture.           Signed Electronically by: JAGJIT Oliveira CNP

## 2024-06-07 ENCOUNTER — OFFICE VISIT (OUTPATIENT)
Dept: OPTOMETRY | Facility: CLINIC | Age: 54
End: 2024-06-07
Payer: COMMERCIAL

## 2024-06-07 ENCOUNTER — ANCILLARY PROCEDURE (OUTPATIENT)
Dept: MAMMOGRAPHY | Facility: CLINIC | Age: 54
End: 2024-06-07
Payer: COMMERCIAL

## 2024-06-07 DIAGNOSIS — Z01.01 ENCOUNTER FOR EXAMINATION OF EYES AND VISION WITH ABNORMAL FINDINGS: Primary | ICD-10-CM

## 2024-06-07 DIAGNOSIS — Z12.31 VISIT FOR SCREENING MAMMOGRAM: ICD-10-CM

## 2024-06-07 DIAGNOSIS — H52.13 MYOPIA OF BOTH EYES: ICD-10-CM

## 2024-06-07 DIAGNOSIS — H52.4 PRESBYOPIA: ICD-10-CM

## 2024-06-07 DIAGNOSIS — E11.9 TYPE 2 DIABETES MELLITUS WITHOUT RETINOPATHY (H): ICD-10-CM

## 2024-06-07 PROCEDURE — 77067 SCR MAMMO BI INCL CAD: CPT | Mod: TC | Performed by: RADIOLOGY

## 2024-06-07 PROCEDURE — 92014 COMPRE OPH EXAM EST PT 1/>: CPT | Performed by: OPTOMETRIST

## 2024-06-07 PROCEDURE — 77063 BREAST TOMOSYNTHESIS BI: CPT | Mod: TC | Performed by: RADIOLOGY

## 2024-06-07 PROCEDURE — 92015 DETERMINE REFRACTIVE STATE: CPT | Performed by: OPTOMETRIST

## 2024-06-07 ASSESSMENT — CONF VISUAL FIELD
OD_INFERIOR_NASAL_RESTRICTION: 0
OS_INFERIOR_NASAL_RESTRICTION: 0
OD_NORMAL: 1
OD_SUPERIOR_TEMPORAL_RESTRICTION: 0
METHOD: COUNTING FINGERS
OS_NORMAL: 1
OS_INFERIOR_TEMPORAL_RESTRICTION: 0
OD_INFERIOR_TEMPORAL_RESTRICTION: 0
OS_SUPERIOR_TEMPORAL_RESTRICTION: 0
OS_SUPERIOR_NASAL_RESTRICTION: 0
OD_SUPERIOR_NASAL_RESTRICTION: 0

## 2024-06-07 ASSESSMENT — REFRACTION_MANIFEST
OD_ADD: +2.25
OS_ADD: +2.25
OS_CYLINDER: SPHERE
OD_CYLINDER: SPHERE
OD_SPHERE: -1.00
OS_SPHERE: -1.00

## 2024-06-07 ASSESSMENT — REFRACTION_WEARINGRX
OS_ADD: +2.25
OD_CYLINDER: +0.50
OD_ADD: +2.25
OD_AXIS: 180
OD_SPHERE: -1.00
OS_CYLINDER: SPHERE
SPECS_TYPE: PAL
OS_SPHERE: -1.50

## 2024-06-07 ASSESSMENT — TONOMETRY
OD_IOP_MMHG: 16
IOP_METHOD: APPLANATION
OS_IOP_MMHG: 15

## 2024-06-07 ASSESSMENT — EXTERNAL EXAM - LEFT EYE: OS_EXAM: NORMAL

## 2024-06-07 ASSESSMENT — CUP TO DISC RATIO
OD_RATIO: 0.25
OS_RATIO: 0.25

## 2024-06-07 ASSESSMENT — VISUAL ACUITY
OS_SC: 20/40-1
OD_CC: 20/20-2
OD_SC: 20/40-2
OS_SC+: -2
OD_CC: 20/30
OS_CC: 20/25
OS_CC: 20/40-1
OS_CC+: -1
OD_CC+: -1
OD_SC: 20/40
OD_SC+: +1
METHOD: SNELLEN - LINEAR
OS_SC: 20/40
CORRECTION_TYPE: GLASSES

## 2024-06-07 ASSESSMENT — EXTERNAL EXAM - RIGHT EYE: OD_EXAM: NORMAL

## 2024-06-07 ASSESSMENT — SLIT LAMP EXAM - LIDS
COMMENTS: NORMAL
COMMENTS: NORMAL,

## 2024-06-07 NOTE — PROGRESS NOTES
Chief Complaint   Patient presents with    Diabetic Eye Exam        Chief Complaint(s) and History of Present Illness(es)       Diabetic Eye Exam              Diabetes Type: Type 2, on insulin and taking oral medications    Duration: years    Blood Sugars: is controlled (Checks 3x/day)                   Lab Results   Component Value Date    A1C 6.5 04/17/2024    A1C 6.5 10/31/2023    A1C 7.6 06/26/2023    A1C 7.9 12/06/2022    A1C 7.6 08/29/2022    A1C 7.0 05/11/2021    A1C 7.7 02/08/2021    A1C 9.1 11/09/2020    A1C 8.9 08/18/2020    A1C 6.8 01/14/2020          Last Eye Exam: 6/2/2023  Dilated Previously: Yes, side effects of dilation explained today    What are you currently using to see?  Glasses - PAL's - wears full time    -Was given work Rx last year but never filled Rx    Distance Vision Acuity: Noticed gradual change in both eyes    Near Vision Acuity: Satisfied with vision while reading and using computer with glasses    Eye Comfort: dry and watery at end of day   Do you use eye drops? 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       OBJECTIVE: See Ophthalmology exam    ASSESSMENT:    ICD-10-CM    1. Encounter for examination of eyes and vision with abnormal findings  Z01.01       2. Type 2 diabetes mellitus without retinopathy (H)  E11.9       3. Myopia of both eyes  H52.13       4. Presbyopia  H52.4           PLAN:    Flor Fernandez aware  eye exam results will be sent to Aneesh Walton.  Patient Instructions   Patient educated on importance of good blood sugar control.  Letter sent to primary care provider with diabetic eye exam report.     Updated glasses prescription provided today.   Allow 2 weeks to adapt to the new glasses.     Return in 1 year for a comprehensive eye exam, or sooner if needed.      The effects of the dilating drops last for 4- 6 hours.  You will be more  sensitive to light and vision will be blurry up close.  Mydriatic sunglasses were given if needed.     Abner Sr, OD  [address]  [place]  [address] [phone]

## 2024-06-07 NOTE — LETTER
6/7/2024      Flor WHITLEY Jim  [address] [place] [address]      Dear Colleague,    Thank you for referring your patient, Flor Fernandez, to the [facility]. Please see a copy of my visit note below.    Chief Complaint   Patient presents with     Diabetic Eye Exam        Chief Complaint(s) and History of Present Illness(es)       Diabetic Eye Exam              Diabetes Type: Type 2, on insulin and taking oral medications    Duration: years    Blood Sugars: is controlled (Checks 3x/day)                   Lab Results   Component Value Date    A1C 6.5 04/17/2024    A1C 6.5 10/31/2023    A1C 7.6 06/26/2023    A1C 7.9 12/06/2022    A1C 7.6 08/29/2022    A1C 7.0 05/11/2021    A1C 7.7 02/08/2021    A1C 9.1 11/09/2020    A1C 8.9 08/18/2020    A1C 6.8 01/14/2020          Last Eye Exam: 6/2/2023  Dilated Previously: Yes, side effects of dilation explained today    What are you currently using to see?  Glasses - PAL's - wears full time    -Was given work Rx last year but never filled Rx    Distance Vision Acuity: Noticed gradual change in both eyes    Near Vision Acuity: Satisfied with vision while reading and using computer with glasses    Eye Comfort: dry and watery at end of day   Do you use eye drops? : No  Occupation or Hobbies: [employer] -      Brooklyn Knight  Optometry Assistant     Medical, surgical and family histories reviewed and updated 6/7/2024.       OBJECTIVE: See Ophthalmology exam    ASSESSMENT:    ICD-10-CM    1. Encounter for examination of eyes and vision with abnormal findings  Z01.01       2. Type 2 diabetes mellitus without retinopathy (H)  E11.9       3. Myopia of both eyes  H52.13       4. Presbyopia  H52.4           PLAN:    Flor Fernandez aware  eye exam results will be sent to Aneesh Walton.  Patient Instructions   Patient educated on importance of good blood sugar control.  Letter sent to primary care provider with diabetic eye exam report.     Updated  glasses prescription provided today.   Allow 2 weeks to adapt to the new glasses.     Return in 1 year for a comprehensive eye exam, or sooner if needed.      The effects of the dilating drops last for 4- 6 hours.  You will be more sensitive to light and vision will be blurry up close.  Mydriatic sunglasses were given if needed.     Abner Sr, PACHECO  [address]  [place]  [address]    [phone]           Again, thank you for allowing me to participate in the care of your patient.        Sincerely,        Abner Sr OD

## 2024-06-07 NOTE — PATIENT INSTRUCTIONS
Patient educated on importance of good blood sugar control.  Letter sent to primary care provider with diabetic eye exam report.     Updated glasses prescription provided today.   Allow 2 weeks to adapt to the new glasses.     Return in 1 year for a comprehensive eye exam, or sooner if needed.      The effects of the dilating drops last for 4- 6 hours.  You will be more sensitive to light and vision will be blurry up close.  Mydriatic sunglasses were given if needed.     Abner Sr, OD  [facility] [place]  [address] [place] [address]. [place]  [place]  [address]    [phone]

## 2024-06-16 DIAGNOSIS — E78.5 HYPERLIPIDEMIA LDL GOAL <100: ICD-10-CM

## 2024-06-16 DIAGNOSIS — M54.16 LUMBAR BACK PAIN WITH RADICULOPATHY AFFECTING RIGHT LOWER EXTREMITY: ICD-10-CM

## 2024-06-16 DIAGNOSIS — E11.65 TYPE 2 DIABETES MELLITUS WITH HYPERGLYCEMIA, WITHOUT LONG-TERM CURRENT USE OF INSULIN (H): ICD-10-CM

## 2024-06-16 RX ORDER — CYCLOBENZAPRINE HCL 5 MG
TABLET ORAL
Qty: 60 TABLET | Refills: 0 | Status: SHIPPED | OUTPATIENT
Start: 2024-06-16 | End: 2024-07-09

## 2024-06-17 RX ORDER — ROSUVASTATIN CALCIUM 5 MG/1
5 TABLET, COATED ORAL DAILY
Qty: 90 TABLET | Refills: 2 | Status: SHIPPED | OUTPATIENT
Start: 2024-06-17

## 2024-06-24 DIAGNOSIS — R05.1 ACUTE COUGH: ICD-10-CM

## 2024-06-24 RX ORDER — ALBUTEROL SULFATE 90 UG/1
2 AEROSOL, METERED RESPIRATORY (INHALATION) EVERY 6 HOURS PRN
Qty: 18 G | Refills: 0 | Status: SHIPPED | OUTPATIENT
Start: 2024-06-24 | End: 2024-07-16

## 2024-07-09 DIAGNOSIS — M54.16 LUMBAR BACK PAIN WITH RADICULOPATHY AFFECTING RIGHT LOWER EXTREMITY: ICD-10-CM

## 2024-07-09 RX ORDER — CYCLOBENZAPRINE HCL 5 MG
TABLET ORAL
Qty: 60 TABLET | Refills: 0 | Status: SHIPPED | OUTPATIENT
Start: 2024-07-09 | End: 2024-07-15

## 2024-07-14 SDOH — HEALTH STABILITY: PHYSICAL HEALTH: ON AVERAGE, HOW MANY MINUTES DO YOU ENGAGE IN EXERCISE AT THIS LEVEL?: 10 MIN

## 2024-07-14 SDOH — HEALTH STABILITY: PHYSICAL HEALTH: ON AVERAGE, HOW MANY DAYS PER WEEK DO YOU ENGAGE IN MODERATE TO STRENUOUS EXERCISE (LIKE A BRISK WALK)?: 1 DAY

## 2024-07-14 ASSESSMENT — SOCIAL DETERMINANTS OF HEALTH (SDOH): HOW OFTEN DO YOU GET TOGETHER WITH FRIENDS OR RELATIVES?: ONCE A WEEK

## 2024-07-15 ENCOUNTER — OFFICE VISIT (OUTPATIENT)
Dept: FAMILY MEDICINE | Facility: CLINIC | Age: 54
End: 2024-07-15
Payer: COMMERCIAL

## 2024-07-15 VITALS
HEART RATE: 111 BPM | HEIGHT: 66 IN | RESPIRATION RATE: 18 BRPM | WEIGHT: 291.8 LBS | TEMPERATURE: 97.1 F | OXYGEN SATURATION: 95 % | SYSTOLIC BLOOD PRESSURE: 132 MMHG | DIASTOLIC BLOOD PRESSURE: 74 MMHG | BODY MASS INDEX: 46.9 KG/M2

## 2024-07-15 DIAGNOSIS — M54.16 LUMBAR BACK PAIN WITH RADICULOPATHY AFFECTING RIGHT LOWER EXTREMITY: ICD-10-CM

## 2024-07-15 DIAGNOSIS — E78.5 HYPERLIPIDEMIA LDL GOAL <100: ICD-10-CM

## 2024-07-15 DIAGNOSIS — E11.65 TYPE 2 DIABETES MELLITUS WITH HYPERGLYCEMIA, WITH LONG-TERM CURRENT USE OF INSULIN (H): ICD-10-CM

## 2024-07-15 DIAGNOSIS — J44.9 CHRONIC OBSTRUCTIVE PULMONARY DISEASE, UNSPECIFIED COPD TYPE (H): Primary | ICD-10-CM

## 2024-07-15 DIAGNOSIS — F41.0 PANIC ATTACK: ICD-10-CM

## 2024-07-15 DIAGNOSIS — R00.0 TACHYCARDIA: ICD-10-CM

## 2024-07-15 DIAGNOSIS — K31.84 GASTROPARESIS: ICD-10-CM

## 2024-07-15 DIAGNOSIS — I10 BENIGN ESSENTIAL HYPERTENSION: ICD-10-CM

## 2024-07-15 DIAGNOSIS — Z79.4 TYPE 2 DIABETES MELLITUS WITH HYPERGLYCEMIA, WITH LONG-TERM CURRENT USE OF INSULIN (H): ICD-10-CM

## 2024-07-15 DIAGNOSIS — E11.65 TYPE 2 DIABETES MELLITUS WITH HYPERGLYCEMIA, WITHOUT LONG-TERM CURRENT USE OF INSULIN (H): ICD-10-CM

## 2024-07-15 LAB
ALBUMIN SERPL BCG-MCNC: 4.6 G/DL (ref 3.5–5.2)
ALP SERPL-CCNC: 141 U/L (ref 40–150)
ALT SERPL W P-5'-P-CCNC: 39 U/L (ref 0–50)
ANION GAP SERPL CALCULATED.3IONS-SCNC: 14 MMOL/L (ref 7–15)
AST SERPL W P-5'-P-CCNC: 33 U/L (ref 0–45)
BASOPHILS # BLD AUTO: 0 10E3/UL (ref 0–0.2)
BASOPHILS NFR BLD AUTO: 0 %
BILIRUB SERPL-MCNC: 0.6 MG/DL
BUN SERPL-MCNC: 13.3 MG/DL (ref 6–20)
CALCIUM SERPL-MCNC: 9.9 MG/DL (ref 8.6–10)
CHLORIDE SERPL-SCNC: 97 MMOL/L (ref 98–107)
CHOLEST SERPL-MCNC: 142 MG/DL
CREAT SERPL-MCNC: 0.63 MG/DL (ref 0.51–0.95)
EGFRCR SERPLBLD CKD-EPI 2021: >90 ML/MIN/1.73M2
EOSINOPHIL # BLD AUTO: 0.2 10E3/UL (ref 0–0.7)
EOSINOPHIL NFR BLD AUTO: 2 %
ERYTHROCYTE [DISTWIDTH] IN BLOOD BY AUTOMATED COUNT: 13.1 % (ref 10–15)
FASTING STATUS PATIENT QL REPORTED: YES
FASTING STATUS PATIENT QL REPORTED: YES
GLUCOSE SERPL-MCNC: 235 MG/DL (ref 70–99)
HBA1C MFR BLD: 7.9 % (ref 0–5.6)
HCO3 SERPL-SCNC: 27 MMOL/L (ref 22–29)
HCT VFR BLD AUTO: 49.8 % (ref 35–47)
HDLC SERPL-MCNC: 44 MG/DL
HGB BLD-MCNC: 16.2 G/DL (ref 11.7–15.7)
IMM GRANULOCYTES # BLD: 0 10E3/UL
IMM GRANULOCYTES NFR BLD: 0 %
LDLC SERPL CALC-MCNC: 60 MG/DL
LYMPHOCYTES # BLD AUTO: 2.2 10E3/UL (ref 0.8–5.3)
LYMPHOCYTES NFR BLD AUTO: 24 %
MCH RBC QN AUTO: 28.7 PG (ref 26.5–33)
MCHC RBC AUTO-ENTMCNC: 32.5 G/DL (ref 31.5–36.5)
MCV RBC AUTO: 88 FL (ref 78–100)
MONOCYTES # BLD AUTO: 0.5 10E3/UL (ref 0–1.3)
MONOCYTES NFR BLD AUTO: 5 %
NEUTROPHILS # BLD AUTO: 6.3 10E3/UL (ref 1.6–8.3)
NEUTROPHILS NFR BLD AUTO: 69 %
NONHDLC SERPL-MCNC: 98 MG/DL
PLATELET # BLD AUTO: 345 10E3/UL (ref 150–450)
POTASSIUM SERPL-SCNC: 4.4 MMOL/L (ref 3.4–5.3)
PROT SERPL-MCNC: 7.7 G/DL (ref 6.4–8.3)
RBC # BLD AUTO: 5.64 10E6/UL (ref 3.8–5.2)
SODIUM SERPL-SCNC: 138 MMOL/L (ref 135–145)
TRIGL SERPL-MCNC: 191 MG/DL
WBC # BLD AUTO: 9.3 10E3/UL (ref 4–11)

## 2024-07-15 PROCEDURE — 83036 HEMOGLOBIN GLYCOSYLATED A1C: CPT | Performed by: INTERNAL MEDICINE

## 2024-07-15 PROCEDURE — 99213 OFFICE O/P EST LOW 20 MIN: CPT | Mod: 25 | Performed by: INTERNAL MEDICINE

## 2024-07-15 PROCEDURE — 36415 COLL VENOUS BLD VENIPUNCTURE: CPT | Performed by: INTERNAL MEDICINE

## 2024-07-15 PROCEDURE — 80053 COMPREHEN METABOLIC PANEL: CPT | Performed by: INTERNAL MEDICINE

## 2024-07-15 PROCEDURE — 99396 PREV VISIT EST AGE 40-64: CPT | Performed by: INTERNAL MEDICINE

## 2024-07-15 PROCEDURE — 85025 COMPLETE CBC W/AUTO DIFF WBC: CPT | Performed by: INTERNAL MEDICINE

## 2024-07-15 PROCEDURE — 80061 LIPID PANEL: CPT | Performed by: INTERNAL MEDICINE

## 2024-07-15 RX ORDER — DULAGLUTIDE 0.75 MG/.5ML
0.75 INJECTION, SOLUTION SUBCUTANEOUS
Qty: 6 ML | Refills: 3 | Status: SHIPPED | OUTPATIENT
Start: 2024-07-15

## 2024-07-15 RX ORDER — ONDANSETRON 4 MG/1
4 TABLET, ORALLY DISINTEGRATING ORAL EVERY 8 HOURS PRN
Qty: 30 TABLET | Refills: 3 | Status: SHIPPED | OUTPATIENT
Start: 2024-07-15

## 2024-07-15 RX ORDER — CYCLOBENZAPRINE HCL 5 MG
5 TABLET ORAL 3 TIMES DAILY PRN
Qty: 60 TABLET | Refills: 3 | Status: SHIPPED | OUTPATIENT
Start: 2024-07-15

## 2024-07-15 RX ORDER — LISINOPRIL 10 MG/1
10 TABLET ORAL DAILY
Qty: 90 TABLET | Refills: 3 | Status: SHIPPED | OUTPATIENT
Start: 2024-07-15

## 2024-07-15 RX ORDER — CHLORTHALIDONE 25 MG/1
25 TABLET ORAL DAILY
Qty: 90 TABLET | Refills: 0 | Status: SHIPPED | OUTPATIENT
Start: 2024-07-15

## 2024-07-15 RX ORDER — CARVEDILOL 6.25 MG/1
6.25 TABLET ORAL 2 TIMES DAILY WITH MEALS
Qty: 180 TABLET | Refills: 3 | Status: SHIPPED | OUTPATIENT
Start: 2024-07-15

## 2024-07-15 RX ORDER — HYDROXYZINE HYDROCHLORIDE 25 MG/1
25 TABLET, FILM COATED ORAL EVERY 8 HOURS PRN
Qty: 270 TABLET | Refills: 1 | Status: SHIPPED | OUTPATIENT
Start: 2024-07-15

## 2024-07-15 RX ORDER — FLASH GLUCOSE SENSOR
KIT MISCELLANEOUS
Qty: 6 EACH | Refills: 11 | Status: SHIPPED | OUTPATIENT
Start: 2024-07-15

## 2024-07-15 NOTE — PROGRESS NOTES
Preventive Care Visit  [facility] AUBRIE Walton MD, Internal Medicine - Pediatrics  Jul 15, 2024      Assessment & Plan   Problem List Items Addressed This Visit       COPD (chronic obstructive pulmonary disease) (H) - Primary    Relevant Orders    CBC with platelets and differential (Completed)    Hyperlipidemia LDL goal <70    Panic attack    Relevant Medications    hydrOXYzine HCl (ATARAX) 25 MG tablet    Type 2 diabetes mellitus with hyperglycemia, with long-term current use of insulin (H)    Relevant Medications    Continuous Glucose Sensor (FREESTYLE RUBÉN 14 DAY SENSOR) MISC    empagliflozin (JARDIANCE) 25 MG TABS tablet    dulaglutide (TRULICITY) 0.75 MG/0.5ML pen    Other Relevant Orders    Lipid panel reflex to direct LDL Non-fasting (Completed)    HEMOGLOBIN A1C (Completed)     Other Visit Diagnoses       Tachycardia        Relevant Medications    carvedilol (COREG) 6.25 MG tablet    Benign essential hypertension        Relevant Medications    chlorthalidone (HYGROTON) 25 MG tablet    lisinopril (ZESTRIL) 10 MG tablet    Other Relevant Orders    Comprehensive metabolic panel (BMP + Alb, Alk Phos, ALT, AST, Total. Bili, TP) (Completed)    Lumbar back pain with radiculopathy affecting right lower extremity        Relevant Medications    cyclobenzaprine (FLEXERIL) 5 MG tablet    Type 2 diabetes mellitus with hyperglycemia, without long-term current use of insulin (H)        Relevant Medications    empagliflozin (JARDIANCE) 25 MG TABS tablet    dulaglutide (TRULICITY) 0.75 MG/0.5ML pen    Gastroparesis        Relevant Medications    ondansetron (ZOFRAN ODT) 4 MG ODT tab           BP     132/74  7/16/2024    Lab Results   Component Value Date     07/15/2024     05/22/2023     12/06/2022     11/09/2020     Lab Results   Component Value Date    A1C 7.9 07/15/2024    A1C 7.0 05/11/2021     Lab Results   Component Value Date    LDL 60 07/15/2024    LDL 55  "05/11/2021     Lab Results   Component Value Date    MICROL <12.0 04/22/2024    MICROL <5 12/06/2022    MICROL 6 08/19/2020     No results found for: \"MICROALBUMIN\"           Counseling  Appropriate preventive services were discussed with this patient, including applicable screening as appropriate for fall prevention, nutrition, physical activity, Tobacco-use cessation, weight loss and cognition.  Checklist reviewing preventive services available has been given to the patient.  Reviewed patient's diet, addressing concerns and/or questions.   She is at risk for lack of exercise and has been provided with information to increase physical activity for the benefit of her well-being.   She is at risk for psychosocial distress and has been provided with information to reduce risk.     Work on weight loss  Regular exercise      Kailey Ray is a 54 year old, presenting for the following:  Physical (Annual )        7/15/2024     7:08 AM   Additional Questions   Roomed by pastor        Health Care Directive  Patient does not have a Health Care Directive or Living Will: Discussed advance care planning with patient; information given to patient to review.    HPI  Ozempic - nausea, abdominal up to 1 mg   GLP-1   no additional treatments .   Helpful to the blood sugars   Glipizide.   Metformin not an option .                  7/14/2024   General Health   How would you rate your overall physical health? (!) FAIR   Feel stress (tense, anxious, or unable to sleep) Only a little      (!) STRESS CONCERN      7/14/2024   Nutrition   Three or more servings of calcium each day? Yes   Diet: Low salt    Diabetic    Carbohydrate counting   How many servings of fruit and vegetables per day? (!) 2-3   How many sweetened beverages each day? 0-1       Multiple values from one day are sorted in reverse-chronological order         7/14/2024   Exercise   Days per week of moderate/strenous exercise 1 day   Average minutes spent exercising " at this level 10 min      (!) EXERCISE CONCERN      7/14/2024   Social Factors   Frequency of gathering with friends or relatives Once a week   Worry food won't last until get money to buy more No   Food not last or not have enough money for food? No   Do you have housing? (Housing is defined as stable permanent housing and does not include staying ouside in a car, in a tent, in an abandoned building, in an overnight shelter, or couch-surfing.) Yes   Are you worried about losing your housing? No   Lack of transportation? No   Unable to get utilities (heat,electricity)? No            7/14/2024   Fall Risk   Fallen 2 or more times in the past year? No   Trouble with walking or balance? No             7/14/2024   Dental   Dentist two times every year? Yes            7/14/2024   TB Screening   Were you born outside of the [place]? No              Today's PHQ-2 Score:       1/23/2024     7:13 AM   PHQ-2 ( 1999 [employer])   Q1: Little interest or pleasure in doing things 0   Q2: Feeling down, depressed or hopeless 0   PHQ-2 Score 0   Q1: Little interest or pleasure in doing things Not at all   Q2: Feeling down, depressed or hopeless Not at all   PHQ-2 Score 0         7/14/2024   Substance Use   Alcohol more than 3/day or more than 7/wk No   Do you use any other substances recreationally? No        Social History     Tobacco Use    Smoking status: Former     Current packs/day: 0.00     Average packs/day: 0.5 packs/day for 27.3 years (13.6 ttl pk-yrs)     Types: Cigarettes     Start date: 1/1/1987     Quit date: 4/8/2014     Years since quitting: 10.2     Passive exposure: Past    Smokeless tobacco: Never   Vaping Use    Vaping status: Never Used   Substance Use Topics    Alcohol use: Not Currently     Comment: rare    Drug use: No           6/7/2024   LAST FHS-7 RESULTS   1st degree relative breast or ovarian cancer No   Any relative bilateral breast cancer No   Any male have breast cancer No   Any ONE woman have BOTH breast AND  ovarian cancer No   Any woman with breast cancer before 50yrs No   2 or more relatives with breast AND/OR ovarian cancer No   2 or more relatives with breast AND/OR bowel cancer No           Mammogram Screening - Mammogram every 1-2 years updated in Health Maintenance based on mutual decision making        7/14/2024   STI Screening   New sexual partner(s) since last STI/HIV test? No        History of abnormal Pap smear: No - age 30- 64 PAP with HPV every 5 years recommended        Latest Ref Rng & Units 2/19/2020     7:37 AM 2/19/2020     7:36 AM 8/1/2016    12:00 AM   PAP / HPV   PAP (Historical)  NIL      HPV 16 DNA NEG^Negative  Negative     HPV 18 DNA NEG^Negative  Negative     Other HR HPV NEG^Negative  Negative     PAP-ABSTRACT    See Scanned Document           This result is from an external source.     ASCVD Risk   The ASCVD Risk score (Serafin LANZA, et al., 2019) failed to calculate for the following reasons:    The patient has a prior MI or stroke diagnosis           Reviewed and updated as needed this visit by Provider                    Lab work is in process  Labs reviewed in EPIC  BP Readings from Last 3 Encounters:   07/15/24 132/74   06/05/24 123/88   05/16/24 131/79    Wt Readings from Last 3 Encounters:   07/15/24 132.4 kg (291 lb 12.8 oz)   06/05/24 133.4 kg (294 lb)   04/17/24 132.5 kg (292 lb)                  Patient Active Problem List   Diagnosis    Vertigo    Hyperlipidemia LDL goal <70    Hypertension goal BP (blood pressure) < 140/90    History of stroke    Iron deficiency    Class 3 severe obesity due to excess calories with serious comorbidity and body mass index (BMI) of 45.0 to 49.9 in adult (H)    Anxiety disorder, unspecified type    Chronic low back pain without sciatica, unspecified back pain laterality    BMI 45.0-49.9, adult (H)    Type 2 diabetes mellitus with hyperglycemia, with long-term current use of insulin (H)    Panic attack    COPD (chronic obstructive pulmonary  disease) (H)    Tobacco use disorder     Past Surgical History:   Procedure Laterality Date    APPENDECTOMY      [facility]  12/29/2016    COLONOSCOPY N/A 5/22/2023    Procedure: COLONOSCOPY, FLEXIBLE, WITH LESION REMOVAL USING SNARE;  Surgeon: Timothy Brewster MD;  Location: [facility] OR    COLONOSCOPY WITH CO2 INSUFFLATION N/A 5/22/2023    Procedure: Colonoscopy with CO2 insufflation;  Surgeon: Timothy Brewster MD;  Location: [facility] OR    MAMMOPLASTY REDUCTION BILATERAL         Social History     Tobacco Use    Smoking status: Former     Current packs/day: 0.00     Average packs/day: 0.5 packs/day for 27.3 years (13.6 ttl pk-yrs)     Types: Cigarettes     Start date: 1/1/1987     Quit date: 4/8/2014     Years since quitting: 10.2     Passive exposure: Past    Smokeless tobacco: Never   Substance Use Topics    Alcohol use: Not Currently     Comment: rare     Family History   Problem Relation Age of Onset    Other Cancer Mother     Asthma Mother     Depression Mother     Anxiety Disorder Mother     Diabetes Father     Prostate Cancer Paternal Grandfather     Prostate Cancer Other     Diabetes Maternal Grandmother         grandfather on moms side    Substance Abuse Maternal Grandmother     Coronary Artery Disease Paternal Grandmother         grandma on dads side    Glaucoma No family hx of     Macular Degeneration No family hx of          Current Outpatient Medications   Medication Sig Dispense Refill    albuterol (PROVENTIL) (2.5 MG/3ML) 0.083% neb solution Take 1 vial (2.5 mg) by nebulization every 6 hours as needed for shortness of breath or wheezing 25 mL 1    aspirin (ASA) 325 MG EC tablet Take 325 mg by mouth daily      blood glucose (ONETOUCH VERIO IQ) test strip USE TO TEST BLOOD SUGAR ONCE DAILY OR USE AS DIRECTED 100 each 1    blood glucose calibration (NO BRAND SPECIFIED) solution Use to calibrate blood glucose monitor as needed as directed. 2 each 1    blood glucose monitoring  (NO BRAND SPECIFIED) meter device kit Use to test blood sugar 1 time daily or as directed. 1 kit 0    carvedilol (COREG) 6.25 MG tablet Take 1 tablet (6.25 mg) by mouth 2 times daily (with meals) 180 tablet 3    chlorthalidone (HYGROTON) 25 MG tablet Take 1 tablet (25 mg) by mouth daily 90 tablet 0    Continuous Blood Gluc  (FREESTYLE RUBÉN 14 DAY READER) EARNEST USE TO READ BLOOD SUGARS AS PER 'S INSTRUCTIONS. 1 each 1    Continuous Glucose Sensor (FREESTYLE RUBÉN 14 DAY SENSOR) MISC Change every 14 days. 6 each 11    cyanocobalamin (VITAMIN  B-12) 1000 MCG tablet Take 1,000 mcg by mouth daily      cyclobenzaprine (FLEXERIL) 5 MG tablet Take 1 tablet (5 mg) by mouth 3 times daily as needed for muscle spasms 60 tablet 3    dulaglutide (TRULICITY) 0.75 MG/0.5ML pen Inject 0.75 mg subcutaneously every 7 days 6 mL 3    empagliflozin (JARDIANCE) 25 MG TABS tablet Take 1 tablet (25 mg) by mouth daily 90 tablet 3    furosemide (LASIX) 20 MG tablet TAKE ONE TABLET BY MOUTH ONCE DAILY 90 tablet 3    hydrOXYzine HCl (ATARAX) 25 MG tablet Take 1 tablet (25 mg) by mouth every 8 hours as needed for anxiety 270 tablet 1    insulin pen needle (B-D U/F) 31G X 5 MM miscellaneous USE ONE PEN NEEDLE DAILY AS DIRECTED 100 each 0    lactobacillus rhamnosus, GG, (CULTURELL) capsule Take 1 capsule by mouth 2 times daily      lisinopril (ZESTRIL) 10 MG tablet Take 1 tablet (10 mg) by mouth daily 90 tablet 3    Multiple Vitamins-Minerals (MULTI-VITAMIN GUMMIES) CHEW       ondansetron (ZOFRAN ODT) 4 MG ODT tab Take 1 tablet (4 mg) by mouth every 8 hours as needed for nausea 30 tablet 3    ONETOUCH DELICA LANCETS 33G MISC USE TO TEST BLOOD SUGARS ONCE DAILY OR AS DIRECTED 100 each 1    rosuvastatin (CRESTOR) 5 MG tablet TAKE 1 TABLET (5 MG) BY MOUTH DAILY 90 tablet 2    SEMGLEE, YFGN, 100 UNIT/ML SOPN INJECT 36 UNITS SUBCUTANEOUSLY AT BEDTIME 30 mL 4    sertraline (ZOLOFT) 50 MG tablet TAKE ONE TABLET BY MOUTH ONCE DAILY 90  "tablet 2    albuterol (PROAIR HFA/PROVENTIL HFA/VENTOLIN HFA) 108 (90 Base) MCG/ACT inhaler INHALE 2 PUFFS INTO THE LUNGS EVERY 6 HOURS AS NEEDED FOR WHEEZING OR COUGH 18 g 0     Allergies   Allergen Reactions    Metformin GI Disturbance         Review of Systems  Constitutional, HEENT, cardiovascular, pulmonary, gi and gu systems are negative, except as otherwise noted.     Objective    Exam  /74   Pulse 111   Temp 97.1  F (36.2  C) (Temporal)   Resp 18   Ht 1.67 m (5' 5.75\")   Wt 132.4 kg (291 lb 12.8 oz)   LMP 07/01/2020   SpO2 95%   BMI 47.46 kg/m     Estimated body mass index is 47.46 kg/m  as calculated from the following:    Height as of this encounter: 1.67 m (5' 5.75\").    Weight as of this encounter: 132.4 kg (291 lb 12.8 oz).    Physical Exam  GENERAL: alert and no distress  EYES: Eyes grossly normal to inspection, PERRL and conjunctivae and sclerae normal  HENT: ear canals and TM's normal, nose and mouth without ulcers or lesions  NECK: no adenopathy, no asymmetry, masses, or scars  RESP: lungs clear to auscultation - no rales, rhonchi or wheezes  CV: regular rate and rhythm, normal S1 S2, no S3 or S4, no murmur, click or rub, no peripheral edema  ABDOMEN: soft, nontender, no hepatosplenomegaly, no masses and bowel sounds normal  MS: no gross musculoskeletal defects noted, no edema  SKIN: no suspicious lesions or rashes  NEURO: Normal strength and tone, mentation intact and speech normal  BACK: no CVA tenderness, no paralumbar tenderness  PSYCH: mentation appears normal, affect normal/bright  LYMPH: no cervical, supraclavicular, axillary, or inguinal adenopathy        Signed Electronically by: Aneesh Walton MD    "

## 2024-07-16 DIAGNOSIS — R05.1 ACUTE COUGH: ICD-10-CM

## 2024-07-16 RX ORDER — ALBUTEROL SULFATE 90 UG/1
2 AEROSOL, METERED RESPIRATORY (INHALATION) EVERY 6 HOURS PRN
Qty: 18 G | Refills: 0 | Status: SHIPPED | OUTPATIENT
Start: 2024-07-16

## 2024-08-22 DIAGNOSIS — R00.0 TACHYCARDIA: ICD-10-CM

## 2024-08-22 RX ORDER — CARVEDILOL 6.25 MG/1
6.25 TABLET ORAL 2 TIMES DAILY WITH MEALS
Qty: 180 TABLET | Refills: 3 | OUTPATIENT
Start: 2024-08-22

## 2024-09-05 DIAGNOSIS — Z79.4 TYPE 2 DIABETES MELLITUS WITH HYPERGLYCEMIA, WITH LONG-TERM CURRENT USE OF INSULIN (H): ICD-10-CM

## 2024-09-05 DIAGNOSIS — E11.65 TYPE 2 DIABETES MELLITUS WITH HYPERGLYCEMIA, WITH LONG-TERM CURRENT USE OF INSULIN (H): ICD-10-CM

## 2024-09-05 RX ORDER — FLURBIPROFEN SODIUM 0.3 MG/ML
SOLUTION/ DROPS OPHTHALMIC
Qty: 100 EACH | Refills: 2 | Status: SHIPPED | OUTPATIENT
Start: 2024-09-05

## 2024-10-10 ENCOUNTER — MYC MEDICAL ADVICE (OUTPATIENT)
Dept: FAMILY MEDICINE | Facility: CLINIC | Age: 54
End: 2024-10-10
Payer: COMMERCIAL

## 2024-10-10 DIAGNOSIS — M54.16 LUMBAR BACK PAIN WITH RADICULOPATHY AFFECTING RIGHT LOWER EXTREMITY: ICD-10-CM

## 2024-10-10 DIAGNOSIS — Z79.4 TYPE 2 DIABETES MELLITUS WITH HYPERGLYCEMIA, WITH LONG-TERM CURRENT USE OF INSULIN (H): ICD-10-CM

## 2024-10-10 DIAGNOSIS — E11.65 TYPE 2 DIABETES MELLITUS WITH HYPERGLYCEMIA, WITH LONG-TERM CURRENT USE OF INSULIN (H): ICD-10-CM

## 2024-10-14 RX ORDER — CYCLOBENZAPRINE HCL 5 MG
5 TABLET ORAL 3 TIMES DAILY PRN
Qty: 180 TABLET | Refills: 2 | Status: SHIPPED | OUTPATIENT
Start: 2024-10-14

## 2024-11-17 ENCOUNTER — HEALTH MAINTENANCE LETTER (OUTPATIENT)
Age: 54
End: 2024-11-17

## 2024-12-01 ENCOUNTER — OFFICE VISIT (OUTPATIENT)
Dept: URGENT CARE | Facility: URGENT CARE | Age: 54
End: 2024-12-01
Payer: COMMERCIAL

## 2024-12-01 ENCOUNTER — ANCILLARY PROCEDURE (OUTPATIENT)
Dept: GENERAL RADIOLOGY | Facility: CLINIC | Age: 54
End: 2024-12-01
Attending: FAMILY MEDICINE
Payer: COMMERCIAL

## 2024-12-01 VITALS
OXYGEN SATURATION: 97 % | SYSTOLIC BLOOD PRESSURE: 177 MMHG | RESPIRATION RATE: 20 BRPM | DIASTOLIC BLOOD PRESSURE: 76 MMHG | BODY MASS INDEX: 47.98 KG/M2 | WEIGHT: 293 LBS | HEART RATE: 128 BPM | TEMPERATURE: 99.4 F

## 2024-12-01 DIAGNOSIS — R05.1 ACUTE COUGH: ICD-10-CM

## 2024-12-01 DIAGNOSIS — J22 LOWER RESPIRATORY INFECTION: Primary | ICD-10-CM

## 2024-12-01 DIAGNOSIS — J02.9 SORE THROAT: ICD-10-CM

## 2024-12-01 PROCEDURE — 87804 INFLUENZA ASSAY W/OPTIC: CPT | Performed by: FAMILY MEDICINE

## 2024-12-01 PROCEDURE — 87651 STREP A DNA AMP PROBE: CPT | Performed by: FAMILY MEDICINE

## 2024-12-01 PROCEDURE — 71046 X-RAY EXAM CHEST 2 VIEWS: CPT | Mod: TC | Performed by: RADIOLOGY

## 2024-12-01 PROCEDURE — 99214 OFFICE O/P EST MOD 30 MIN: CPT | Performed by: FAMILY MEDICINE

## 2024-12-01 RX ORDER — AZITHROMYCIN 250 MG/1
TABLET, FILM COATED ORAL
Qty: 6 TABLET | Refills: 0 | Status: SHIPPED | OUTPATIENT
Start: 2024-12-01 | End: 2024-12-06

## 2024-12-01 NOTE — PROGRESS NOTES
Assessment & Plan     Lower respiratory infection  Differentials discussed in detail including lower respiratory tract infection, acute bronchitis.  Chest x-ray findings reviewed independently which came back unremarkable.  Rapid strep negative, COVID-19 test result pending.  Azithromycin prescribed to cover possible chest infection.  Recommended well hydration, warm fluids, over-the-counter analgesia/antitussive and to follow-up if symptoms persist or worsen.  Patient understood and in agreement with above plan.  All questions answered.    Sore throat  - Streptococcus A Rapid Screen w/Reflex to PCR - Clinic Collect  - Group A Streptococcus PCR Throat Swab    Acute cough  - XR Chest 2 Views; Future  - Influenza A & B Antigen - Clinic Collect      Kailey Ray is a 54 year old, presenting for the following health issues:  Urgent Care        12/1/2024    12:59 PM   Additional Questions   Roomed by GLIMER Faye   Accompanied by Self     HPI     Concern -   Onset: Friday  Description: c/o persistent cough, difficulty breathing, chest congestion, sore throat and fatigue since Friday.   Intensity: moderate  Progression of Symptoms:  same  Accompanying Signs & Symptoms: none  Previous history of similar problem:   Therapies tried and outcome: Over-the-counter cold meds  Home Covid 19 test was negative       Review of Systems  Constitutional, HEENT, cardiovascular, pulmonary, gi and gu systems are negative, except as otherwise noted.      Objective    BP (!) 177/76   Pulse (!) 128   Temp 99.4  F (37.4  C) (Tympanic)   Resp 20   Wt 133.8 kg (295 lb)   LMP 07/01/2020   SpO2 97%   BMI 47.98 kg/m    Body mass index is 47.98 kg/m .  Physical Exam   GENERAL: alert and no distress  EYES: Eyes grossly normal to inspection, PERRL and conjunctivae and sclerae normal  HENT: normal cephalic/atraumatic, ear canals and TM's normal, nose and mouth without ulcers or lesions, oropharynx clear, and oral mucous membranes  moist  NECK: no adenopathy, no asymmetry, masses, or scars  RESP: lungs clear to auscultation - no rales, rhonchi or wheezes  CV: regular rate and rhythm, normal S1 S2, no S3 or S4, no murmur, click or rub, no peripheral edema  MS: no gross musculoskeletal defects noted, no edema  SKIN: no suspicious lesions or rashes  NEURO: Normal strength and tone, mentation intact and speech normal  PSYCH: mentation appears normal, affect normal/bright      Results for orders placed or performed in visit on 12/01/24   XR Chest 2 Views     Status: None    Narrative    EXAM: XR CHEST 2 VIEWS  [facility]  DATE[facility] 12/1/2024    INDICATION:  Acute cough  COMPARISON: None.      Impression    IMPRESSION: Negative chest.   Results for orders placed or performed in visit on 12/01/24   Streptococcus A Rapid Screen w/Reflex to PCR - Clinic Collect     Status: Normal    Specimen: Throat; Swab   Result Value Ref Range    Group A Strep antigen Negative Negative   Influenza A & B Antigen - Clinic Collect     Status: Normal    Specimen: Nose; Swab   Result Value Ref Range    Influenza A antigen Negative Negative    Influenza B antigen Negative Negative    Narrative    Test results must be correlated with clinical data. If necessary, results should be confirmed by a molecular assay or viral culture.           Signed Electronically by: Foreign Dailey MD

## 2024-12-03 ENCOUNTER — MYC MEDICAL ADVICE (OUTPATIENT)
Dept: FAMILY MEDICINE | Facility: CLINIC | Age: 54
End: 2024-12-03
Payer: COMMERCIAL

## 2024-12-03 NOTE — TELEPHONE ENCOUNTER
Patient Quality Outreach    Patient is due for the following:   Diabetes -  A1C, Diabetic Follow-Up Visit, and Foot Exam  Hypertension -  Hypertension follow-up visit  Cervical Cancer Screening - PAP Needed      Topic Date Due    COVID-19 Vaccine (6 - 2024-25 season) 09/01/2024       Action(s) Taken:   Schedule a office visit for diabetes and bp recheck    Type of outreach:    Sent [employer] message.    Questions for provider review:    None           Cecille Alvarez CMA  Chart routed to Care Team.

## 2024-12-05 DIAGNOSIS — E11.65 TYPE 2 DIABETES MELLITUS WITH HYPERGLYCEMIA, WITH LONG-TERM CURRENT USE OF INSULIN (H): ICD-10-CM

## 2024-12-05 DIAGNOSIS — Z79.4 TYPE 2 DIABETES MELLITUS WITH HYPERGLYCEMIA, WITH LONG-TERM CURRENT USE OF INSULIN (H): ICD-10-CM

## 2024-12-05 RX ORDER — INSULIN GLARGINE-YFGN 100 [IU]/ML
INJECTION, SOLUTION SUBCUTANEOUS
Qty: 30 ML | Refills: 4 | Status: SHIPPED | OUTPATIENT
Start: 2024-12-05

## 2025-01-01 DIAGNOSIS — F41.0 PANIC ATTACK: ICD-10-CM

## 2025-01-02 RX ORDER — HYDROXYZINE HYDROCHLORIDE 25 MG/1
25 TABLET, FILM COATED ORAL EVERY 8 HOURS PRN
Qty: 270 TABLET | Refills: 0 | Status: SHIPPED | OUTPATIENT
Start: 2025-01-02

## 2025-01-14 DIAGNOSIS — F41.0 PANIC ATTACK: ICD-10-CM

## 2025-02-11 ASSESSMENT — ANXIETY QUESTIONNAIRES
GAD7 TOTAL SCORE: 6
1. FEELING NERVOUS, ANXIOUS, OR ON EDGE: SEVERAL DAYS
3. WORRYING TOO MUCH ABOUT DIFFERENT THINGS: SEVERAL DAYS
IF YOU CHECKED OFF ANY PROBLEMS ON THIS QUESTIONNAIRE, HOW DIFFICULT HAVE THESE PROBLEMS MADE IT FOR YOU TO DO YOUR WORK, TAKE CARE OF THINGS AT HOME, OR GET ALONG WITH OTHER PEOPLE: NOT DIFFICULT AT ALL
2. NOT BEING ABLE TO STOP OR CONTROL WORRYING: SEVERAL DAYS
7. FEELING AFRAID AS IF SOMETHING AWFUL MIGHT HAPPEN: SEVERAL DAYS
8. IF YOU CHECKED OFF ANY PROBLEMS, HOW DIFFICULT HAVE THESE MADE IT FOR YOU TO DO YOUR WORK, TAKE CARE OF THINGS AT HOME, OR GET ALONG WITH OTHER PEOPLE?: NOT DIFFICULT AT ALL
4. TROUBLE RELAXING: SEVERAL DAYS
GAD7 TOTAL SCORE: 6
6. BECOMING EASILY ANNOYED OR IRRITABLE: SEVERAL DAYS
5. BEING SO RESTLESS THAT IT IS HARD TO SIT STILL: NOT AT ALL

## 2025-02-12 ENCOUNTER — OFFICE VISIT (OUTPATIENT)
Dept: FAMILY MEDICINE | Facility: CLINIC | Age: 55
End: 2025-02-12
Payer: COMMERCIAL

## 2025-02-12 VITALS
BODY MASS INDEX: 47.09 KG/M2 | OXYGEN SATURATION: 98 % | HEART RATE: 92 BPM | HEIGHT: 66 IN | SYSTOLIC BLOOD PRESSURE: 129 MMHG | DIASTOLIC BLOOD PRESSURE: 78 MMHG | WEIGHT: 293 LBS | RESPIRATION RATE: 18 BRPM | TEMPERATURE: 97.7 F

## 2025-02-12 DIAGNOSIS — Z79.4 TYPE 2 DIABETES MELLITUS WITH HYPERGLYCEMIA, WITH LONG-TERM CURRENT USE OF INSULIN (H): Primary | ICD-10-CM

## 2025-02-12 DIAGNOSIS — Z12.4 CERVICAL CANCER SCREENING: ICD-10-CM

## 2025-02-12 DIAGNOSIS — E11.65 TYPE 2 DIABETES MELLITUS WITH HYPERGLYCEMIA, WITH LONG-TERM CURRENT USE OF INSULIN (H): Primary | ICD-10-CM

## 2025-02-12 DIAGNOSIS — F41.1 GAD (GENERALIZED ANXIETY DISORDER): ICD-10-CM

## 2025-02-12 DIAGNOSIS — R60.9 DEPENDENT EDEMA: ICD-10-CM

## 2025-02-12 DIAGNOSIS — E11.43 TYPE 2 DIABETES MELLITUS WITH DIABETIC AUTONOMIC NEUROPATHY, WITHOUT LONG-TERM CURRENT USE OF INSULIN (H): ICD-10-CM

## 2025-02-12 DIAGNOSIS — J41.1 MUCOPURULENT CHRONIC BRONCHITIS (H): ICD-10-CM

## 2025-02-12 PROBLEM — E66.813 CLASS 3 SEVERE OBESITY DUE TO EXCESS CALORIES WITH SERIOUS COMORBIDITY AND BODY MASS INDEX (BMI) OF 45.0 TO 49.9 IN ADULT (H): Status: RESOLVED | Noted: 2017-11-09 | Resolved: 2025-02-12

## 2025-02-12 PROBLEM — E66.01 CLASS 3 SEVERE OBESITY DUE TO EXCESS CALORIES WITH SERIOUS COMORBIDITY AND BODY MASS INDEX (BMI) OF 45.0 TO 49.9 IN ADULT (H): Status: RESOLVED | Noted: 2017-11-09 | Resolved: 2025-02-12

## 2025-02-12 LAB
EST. AVERAGE GLUCOSE BLD GHB EST-MCNC: 186 MG/DL
HBA1C MFR BLD: 8.1 % (ref 0–5.6)

## 2025-02-12 PROCEDURE — 36415 COLL VENOUS BLD VENIPUNCTURE: CPT | Performed by: INTERNAL MEDICINE

## 2025-02-12 PROCEDURE — 83036 HEMOGLOBIN GLYCOSYLATED A1C: CPT | Performed by: INTERNAL MEDICINE

## 2025-02-12 PROCEDURE — 99214 OFFICE O/P EST MOD 30 MIN: CPT | Performed by: INTERNAL MEDICINE

## 2025-02-12 RX ORDER — DULAGLUTIDE 3 MG/.5ML
3 INJECTION, SOLUTION SUBCUTANEOUS WEEKLY
Qty: 6 ML | Refills: 3 | Status: SHIPPED | OUTPATIENT
Start: 2025-02-12

## 2025-02-12 RX ORDER — SERTRALINE HYDROCHLORIDE 100 MG/1
100 TABLET, FILM COATED ORAL DAILY
Qty: 90 TABLET | Refills: 4 | Status: SHIPPED | OUTPATIENT
Start: 2025-02-12

## 2025-02-12 ASSESSMENT — PAIN SCALES - GENERAL: PAINLEVEL_OUTOF10: NO PAIN (0)

## 2025-02-12 NOTE — PROGRESS NOTES
"  Assessment & Plan   Problem List Items Addressed This Visit       COPD (chronic obstructive pulmonary disease) (H)    Type 2 diabetes mellitus with diabetic autonomic neuropathy, without long-term current use of insulin (H)    Relevant Medications    Dulaglutide (TRULICITY) 3 MG/0.5ML SOAJ    Type 2 diabetes mellitus with hyperglycemia, with long-term current use of insulin (H) - Primary    Relevant Medications    Dulaglutide (TRULICITY) 3 MG/0.5ML SOAJ    Other Relevant Orders    HEMOGLOBIN A1C (Completed)     Other Visit Diagnoses       Cervical cancer screening        RODRIGUE (generalized anxiety disorder)        Relevant Medications    sertraline (ZOLOFT) 100 MG tablet    Dependent edema        Relevant Orders    Compression Sleeve/Stocking Order for DME - ONLY FOR DME           BP     129/78  2/13/2025    Lab Results   Component Value Date     07/15/2024     05/22/2023     12/06/2022     11/09/2020     Lab Results   Component Value Date    A1C 8.1 02/12/2025    A1C 7.0 05/11/2021     Lab Results   Component Value Date    LDL 60 07/15/2024    LDL 55 05/11/2021     Lab Results   Component Value Date    MICROL <12.0 04/22/2024    MICROL <5 12/06/2022    MICROL 6 08/19/2020     No results found for: \"MICROALBUMIN\"         BMI  Estimated body mass index is 49.19 kg/m  as calculated from the following:    Height as of this encounter: 1.669 m (5' 5.7\").    Weight as of this encounter: 137 kg (302 lb).   Weight management plan: Discussed healthy diet and exercise guidelines    Work on weight loss  Regular exercise      Kailey Ray is a 55 year old, presenting for the following health issues:  Diabetes      2/12/2025     3:12 PM   Additional Questions   Roomed by Claudine     History of Present Illness       Mental Health Follow-up:  Patient presents to follow-up on Depression & Anxiety.Patient's depression since last visit has been:  Medium  The patient is having other symptoms associated " "with depression.  Patient's anxiety since last visit has been:  Bad  The patient is having other symptoms associated with anxiety.  Any significant life events: housing concerns and other  Patient is feeling anxious or having panic attacks.  Patient has no concerns about alcohol or drug use.    Diabetes:   She presents for follow up of diabetes.  She is checking home blood glucose four or more times daily.   She checks blood glucose before and after meals and at bedtime.  Blood glucose is sometimes over 200 and never under 70. She is aware of hypoglycemia symptoms including shakiness, dizziness, weakness, lethargy and blurred vision.   She is concerned about other.   She is having numbness in feet, burning in feet and weight gain.            She eats 2-3 servings of fruits and vegetables daily.She consumes 3 sweetened beverage(s) daily.She exercises with enough effort to increase her heart rate 10 to 19 minutes per day.  She exercises with enough effort to increase her heart rate 3 or less days per week.   She is taking medications regularly.       Anxiety symptoms are present   Sometimes at nighttime   Sleep challenging   Animals - Mom at home .   Stress spikes high never .  Over 200 feels   Not over 300 s                   Review of Systems  Constitutional, HEENT, cardiovascular, pulmonary, gi and gu systems are negative, except as otherwise noted.      Objective    /78 (BP Location: Left arm, Patient Position: Sitting, Cuff Size: Adult Large)   Pulse 92   Temp 97.7  F (36.5  C) (Temporal)   Resp 18   Ht 1.669 m (5' 5.7\")   Wt (!) 137 kg (302 lb)   LMP 07/01/2020   SpO2 98%   BMI 49.19 kg/m    Body mass index is 49.19 kg/m .  Physical Exam   GENERAL: alert and no distress  EYES: Eyes grossly normal to inspection, PERRL and conjunctivae and sclerae normal  HENT: ear canals and TM's normal, nose and mouth without ulcers or lesions  NECK: no adenopathy, no asymmetry, masses, or scars  RESP: lungs clear " to auscultation - no rales, rhonchi or wheezes  CV: regular rate and rhythm, normal S1 S2, no S3 or S4, no murmur, click or rub, no peripheral edema  ABDOMEN: soft, nontender, no hepatosplenomegaly, no masses and bowel sounds normal  MS: no gross musculoskeletal defects noted, no edema  SKIN: no suspicious lesions or rashes  NEURO: Normal strength and tone, mentation intact and speech normal  BACK: no CVA tenderness, no paralumbar tenderness  PSYCH: mentation appears normal, affect normal/bright  LYMPH: no cervical, supraclavicular, axillary, or inguinal adenopathy    Results for orders placed or performed in visit on 02/12/25   HEMOGLOBIN A1C     Status: Abnormal   Result Value Ref Range    Estimated Average Glucose 186 (H) <117 mg/dL    Hemoglobin A1C 8.1 (H) 0.0 - 5.6 %           Signed Electronically by: Aneesh Walton MD

## 2025-02-13 ENCOUNTER — MYC MEDICAL ADVICE (OUTPATIENT)
Dept: FAMILY MEDICINE | Facility: CLINIC | Age: 55
End: 2025-02-13
Payer: COMMERCIAL

## 2025-03-06 DIAGNOSIS — E11.65 TYPE 2 DIABETES MELLITUS WITH HYPERGLYCEMIA, WITHOUT LONG-TERM CURRENT USE OF INSULIN (H): ICD-10-CM

## 2025-03-06 DIAGNOSIS — E78.5 HYPERLIPIDEMIA LDL GOAL <100: ICD-10-CM

## 2025-03-06 RX ORDER — ROSUVASTATIN CALCIUM 5 MG/1
5 TABLET, COATED ORAL DAILY
Qty: 90 TABLET | Refills: 0 | Status: SHIPPED | OUTPATIENT
Start: 2025-03-06

## 2025-04-01 DIAGNOSIS — M54.16 LUMBAR BACK PAIN WITH RADICULOPATHY AFFECTING RIGHT LOWER EXTREMITY: ICD-10-CM

## 2025-04-01 DIAGNOSIS — F41.0 PANIC ATTACK: ICD-10-CM

## 2025-04-01 RX ORDER — HYDROXYZINE HYDROCHLORIDE 25 MG/1
25 TABLET, FILM COATED ORAL EVERY 8 HOURS PRN
Qty: 270 TABLET | Refills: 0 | Status: SHIPPED | OUTPATIENT
Start: 2025-04-01

## 2025-04-01 RX ORDER — CYCLOBENZAPRINE HCL 5 MG
5 TABLET ORAL 3 TIMES DAILY PRN
Qty: 180 TABLET | Refills: 2 | Status: SHIPPED | OUTPATIENT
Start: 2025-04-01

## 2025-06-01 ENCOUNTER — HEALTH MAINTENANCE LETTER (OUTPATIENT)
Age: 55
End: 2025-06-01

## 2025-06-01 DIAGNOSIS — E11.65 TYPE 2 DIABETES MELLITUS WITH HYPERGLYCEMIA, WITH LONG-TERM CURRENT USE OF INSULIN (H): ICD-10-CM

## 2025-06-01 DIAGNOSIS — Z79.4 TYPE 2 DIABETES MELLITUS WITH HYPERGLYCEMIA, WITH LONG-TERM CURRENT USE OF INSULIN (H): ICD-10-CM

## 2025-06-01 RX ORDER — DULAGLUTIDE 0.75 MG/.5ML
INJECTION, SOLUTION SUBCUTANEOUS
Qty: 6 ML | Refills: 3 | OUTPATIENT
Start: 2025-06-01

## 2025-06-07 DIAGNOSIS — Z79.4 TYPE 2 DIABETES MELLITUS WITH HYPERGLYCEMIA, WITH LONG-TERM CURRENT USE OF INSULIN (H): ICD-10-CM

## 2025-06-07 DIAGNOSIS — E11.65 TYPE 2 DIABETES MELLITUS WITH HYPERGLYCEMIA, WITHOUT LONG-TERM CURRENT USE OF INSULIN (H): ICD-10-CM

## 2025-06-07 DIAGNOSIS — E78.5 HYPERLIPIDEMIA LDL GOAL <100: ICD-10-CM

## 2025-06-07 DIAGNOSIS — E11.65 TYPE 2 DIABETES MELLITUS WITH HYPERGLYCEMIA, WITH LONG-TERM CURRENT USE OF INSULIN (H): ICD-10-CM

## 2025-06-08 RX ORDER — DULAGLUTIDE 0.75 MG/.5ML
INJECTION, SOLUTION SUBCUTANEOUS
Qty: 6 ML | Refills: 3 | OUTPATIENT
Start: 2025-06-08

## 2025-06-08 RX ORDER — ROSUVASTATIN CALCIUM 5 MG/1
5 TABLET, COATED ORAL DAILY
Qty: 90 TABLET | Refills: 0 | Status: SHIPPED | OUTPATIENT
Start: 2025-06-08

## 2025-06-16 ENCOUNTER — PATIENT OUTREACH (OUTPATIENT)
Dept: CARE COORDINATION | Facility: CLINIC | Age: 55
End: 2025-06-16
Payer: COMMERCIAL

## 2025-06-25 DIAGNOSIS — F41.0 PANIC ATTACK: ICD-10-CM

## 2025-06-25 RX ORDER — HYDROXYZINE HYDROCHLORIDE 25 MG/1
25 TABLET, FILM COATED ORAL EVERY 8 HOURS PRN
Qty: 270 TABLET | Refills: 0 | Status: SHIPPED | OUTPATIENT
Start: 2025-06-25

## 2025-06-30 ENCOUNTER — PATIENT OUTREACH (OUTPATIENT)
Dept: CARE COORDINATION | Facility: CLINIC | Age: 55
End: 2025-06-30
Payer: COMMERCIAL

## 2025-07-02 DIAGNOSIS — E11.65 TYPE 2 DIABETES MELLITUS WITH HYPERGLYCEMIA, WITH LONG-TERM CURRENT USE OF INSULIN (H): ICD-10-CM

## 2025-07-02 DIAGNOSIS — Z79.4 TYPE 2 DIABETES MELLITUS WITH HYPERGLYCEMIA, WITH LONG-TERM CURRENT USE OF INSULIN (H): ICD-10-CM

## 2025-07-02 RX ORDER — PEN NEEDLE, DIABETIC 31 GX5/16"
NEEDLE, DISPOSABLE MISCELLANEOUS
Qty: 100 EACH | Refills: 2 | Status: SHIPPED | OUTPATIENT
Start: 2025-07-02

## 2025-07-17 PROBLEM — E11.65 TYPE 2 DIABETES MELLITUS WITH HYPERGLYCEMIA, WITH LONG-TERM CURRENT USE OF INSULIN (H): Status: RESOLVED | Noted: 2019-06-07 | Resolved: 2025-07-17

## 2025-07-17 PROBLEM — Z79.4 TYPE 2 DIABETES MELLITUS WITH HYPERGLYCEMIA, WITH LONG-TERM CURRENT USE OF INSULIN (H): Status: RESOLVED | Noted: 2019-06-07 | Resolved: 2025-07-17

## 2025-07-24 DIAGNOSIS — E11.65 TYPE 2 DIABETES MELLITUS WITH HYPERGLYCEMIA, WITHOUT LONG-TERM CURRENT USE OF INSULIN (H): ICD-10-CM

## 2025-07-24 RX ORDER — EMPAGLIFLOZIN 25 MG/1
25 TABLET, FILM COATED ORAL DAILY
Qty: 90 TABLET | Refills: 3 | Status: SHIPPED | OUTPATIENT
Start: 2025-07-24

## 2025-07-30 ENCOUNTER — ANCILLARY PROCEDURE (OUTPATIENT)
Dept: MAMMOGRAPHY | Facility: CLINIC | Age: 55
End: 2025-07-30
Attending: INTERNAL MEDICINE
Payer: COMMERCIAL

## 2025-07-30 DIAGNOSIS — Z12.31 VISIT FOR SCREENING MAMMOGRAM: ICD-10-CM

## 2025-07-30 PROCEDURE — 77063 BREAST TOMOSYNTHESIS BI: CPT | Mod: TC | Performed by: RADIOLOGY

## 2025-07-30 PROCEDURE — 77067 SCR MAMMO BI INCL CAD: CPT | Mod: TC | Performed by: RADIOLOGY

## 2025-07-31 DIAGNOSIS — I10 HYPERTENSION GOAL BP (BLOOD PRESSURE) < 140/90: ICD-10-CM

## 2025-07-31 RX ORDER — FUROSEMIDE 20 MG/1
20 TABLET ORAL DAILY
Qty: 90 TABLET | Refills: 3 | Status: SHIPPED | OUTPATIENT
Start: 2025-07-31

## 2025-08-13 DIAGNOSIS — R00.0 TACHYCARDIA: ICD-10-CM

## 2025-08-13 RX ORDER — CARVEDILOL 6.25 MG/1
TABLET ORAL
Qty: 180 TABLET | Refills: 0 | Status: SHIPPED | OUTPATIENT
Start: 2025-08-13

## 2025-08-19 DIAGNOSIS — Z79.4 TYPE 2 DIABETES MELLITUS WITH HYPERGLYCEMIA, WITH LONG-TERM CURRENT USE OF INSULIN (H): ICD-10-CM

## 2025-08-19 DIAGNOSIS — E11.65 TYPE 2 DIABETES MELLITUS WITH HYPERGLYCEMIA, WITH LONG-TERM CURRENT USE OF INSULIN (H): ICD-10-CM

## 2025-08-19 RX ORDER — FLASH GLUCOSE SENSOR
KIT MISCELLANEOUS
Qty: 6 EACH | Refills: 11 | Status: SHIPPED | OUTPATIENT
Start: 2025-08-19

## 2025-08-24 ENCOUNTER — HEALTH MAINTENANCE LETTER (OUTPATIENT)
Age: 55
End: 2025-08-24

## (undated) DEVICE — KIT ENDO FIRST STEP DISINFECTANT 200ML W/POUCH EP-4

## (undated) DEVICE — PAD CHUX UNDERPAD 23X24" 7136